# Patient Record
Sex: FEMALE | Race: WHITE | NOT HISPANIC OR LATINO | Employment: OTHER | URBAN - NONMETROPOLITAN AREA
[De-identification: names, ages, dates, MRNs, and addresses within clinical notes are randomized per-mention and may not be internally consistent; named-entity substitution may affect disease eponyms.]

---

## 2020-07-08 ENCOUNTER — HOSPITAL ENCOUNTER (INPATIENT)
Facility: HOSPITAL | Age: 85
LOS: 12 days | Discharge: NURSING, REHAB OR LONG-TERM CARE | End: 2020-07-20
Attending: FAMILY MEDICINE | Admitting: FAMILY MEDICINE

## 2020-07-08 DIAGNOSIS — Z74.09 IMPAIRED MOBILITY: ICD-10-CM

## 2020-07-08 DIAGNOSIS — Z74.09 IMPAIRED MOBILITY AND ADLS: ICD-10-CM

## 2020-07-08 DIAGNOSIS — Z78.9 IMPAIRED MOBILITY AND ADLS: ICD-10-CM

## 2020-07-08 DIAGNOSIS — R13.10 DYSPHAGIA, UNSPECIFIED TYPE: Primary | ICD-10-CM

## 2020-07-08 PROBLEM — A41.9 SEPSIS: Status: ACTIVE | Noted: 2020-07-08

## 2020-07-08 PROBLEM — J18.9 PNEUMONIA DUE TO INFECTIOUS ORGANISM: Status: ACTIVE | Noted: 2020-07-08

## 2020-07-08 LAB
ANION GAP SERPL CALCULATED.3IONS-SCNC: 10 MMOL/L (ref 5–15)
B PARAPERT DNA SPEC QL NAA+PROBE: NOT DETECTED
B PERT DNA SPEC QL NAA+PROBE: NOT DETECTED
BUN SERPL-MCNC: 20 MG/DL (ref 8–23)
BUN/CREAT SERPL: 15.9 (ref 7–25)
C PNEUM DNA NPH QL NAA+NON-PROBE: NOT DETECTED
CALCIUM SPEC-SCNC: 9.2 MG/DL (ref 8.2–9.6)
CHLORIDE SERPL-SCNC: 105 MMOL/L (ref 98–107)
CO2 SERPL-SCNC: 30 MMOL/L (ref 22–29)
CREAT SERPL-MCNC: 1.26 MG/DL (ref 0.57–1)
D-LACTATE SERPL-SCNC: 1.3 MMOL/L (ref 0.5–2)
D-LACTATE SERPL-SCNC: 1.6 MMOL/L (ref 0.5–2)
DEPRECATED RDW RBC AUTO: 46.8 FL (ref 37–54)
ERYTHROCYTE [DISTWIDTH] IN BLOOD BY AUTOMATED COUNT: 13.3 % (ref 12.3–15.4)
FLUAV H1 2009 PAND RNA NPH QL NAA+PROBE: NOT DETECTED
FLUAV H1 HA GENE NPH QL NAA+PROBE: NOT DETECTED
FLUAV H3 RNA NPH QL NAA+PROBE: NOT DETECTED
FLUAV SUBTYP SPEC NAA+PROBE: NOT DETECTED
FLUBV RNA ISLT QL NAA+PROBE: NOT DETECTED
GFR SERPL CREATININE-BSD FRML MDRD: 39 ML/MIN/1.73
GLUCOSE SERPL-MCNC: 114 MG/DL (ref 65–99)
HADV DNA SPEC NAA+PROBE: NOT DETECTED
HCOV 229E RNA SPEC QL NAA+PROBE: NOT DETECTED
HCOV HKU1 RNA SPEC QL NAA+PROBE: NOT DETECTED
HCOV NL63 RNA SPEC QL NAA+PROBE: NOT DETECTED
HCOV OC43 RNA SPEC QL NAA+PROBE: NOT DETECTED
HCT VFR BLD AUTO: 37.5 % (ref 34–46.6)
HGB BLD-MCNC: 11.9 G/DL (ref 12–15.9)
HMPV RNA NPH QL NAA+NON-PROBE: NOT DETECTED
HPIV1 RNA SPEC QL NAA+PROBE: NOT DETECTED
HPIV2 RNA SPEC QL NAA+PROBE: NOT DETECTED
HPIV3 RNA NPH QL NAA+PROBE: NOT DETECTED
HPIV4 P GENE NPH QL NAA+PROBE: NOT DETECTED
LYMPHOCYTES # BLD MANUAL: 0.45 10*3/MM3 (ref 0.7–3.1)
LYMPHOCYTES NFR BLD MANUAL: 3 % (ref 19.6–45.3)
LYMPHOCYTES NFR BLD MANUAL: 5 % (ref 5–12)
M PNEUMO IGG SER IA-ACNC: NOT DETECTED
MCH RBC QN AUTO: 30.3 PG (ref 26.6–33)
MCHC RBC AUTO-ENTMCNC: 31.7 G/DL (ref 31.5–35.7)
MCV RBC AUTO: 95.4 FL (ref 79–97)
METAMYELOCYTES NFR BLD MANUAL: 6 % (ref 0–0)
MONOCYTES # BLD AUTO: 0.75 10*3/MM3 (ref 0.1–0.9)
NEUTROPHILS # BLD AUTO: 12.7 10*3/MM3 (ref 1.7–7)
NEUTROPHILS NFR BLD MANUAL: 61 % (ref 42.7–76)
NEUTS BAND NFR BLD MANUAL: 24 % (ref 0–5)
PLATELET # BLD AUTO: 90 10*3/MM3 (ref 140–450)
PMV BLD AUTO: 11.1 FL (ref 6–12)
POLYCHROMASIA BLD QL SMEAR: ABNORMAL
POTASSIUM SERPL-SCNC: 3.5 MMOL/L (ref 3.5–5.2)
RBC # BLD AUTO: 3.93 10*6/MM3 (ref 3.77–5.28)
RHINOVIRUS RNA SPEC NAA+PROBE: NOT DETECTED
RSV RNA NPH QL NAA+NON-PROBE: NOT DETECTED
SARS-COV-2 RNA RESP QL NAA+PROBE: NOT DETECTED
SMALL PLATELETS BLD QL SMEAR: ABNORMAL
SODIUM SERPL-SCNC: 145 MMOL/L (ref 136–145)
VARIANT LYMPHS NFR BLD MANUAL: 1 % (ref 0–5)
WBC # BLD AUTO: 14.94 10*3/MM3 (ref 3.4–10.8)
WBC MORPH BLD: NORMAL

## 2020-07-08 PROCEDURE — 93005 ELECTROCARDIOGRAM TRACING: CPT | Performed by: FAMILY MEDICINE

## 2020-07-08 PROCEDURE — 93010 ELECTROCARDIOGRAM REPORT: CPT | Performed by: INTERNAL MEDICINE

## 2020-07-08 PROCEDURE — 85025 COMPLETE CBC W/AUTO DIFF WBC: CPT | Performed by: FAMILY MEDICINE

## 2020-07-08 PROCEDURE — 87635 SARS-COV-2 COVID-19 AMP PRB: CPT | Performed by: EMERGENCY MEDICINE

## 2020-07-08 PROCEDURE — 92610 EVALUATE SWALLOWING FUNCTION: CPT | Performed by: SPEECH-LANGUAGE PATHOLOGIST

## 2020-07-08 PROCEDURE — 99201: CPT

## 2020-07-08 PROCEDURE — 85007 BL SMEAR W/DIFF WBC COUNT: CPT | Performed by: FAMILY MEDICINE

## 2020-07-08 PROCEDURE — 0100U HC BIOFIRE FILMARRAY RESP PANEL 2: CPT | Performed by: FAMILY MEDICINE

## 2020-07-08 PROCEDURE — 87040 BLOOD CULTURE FOR BACTERIA: CPT | Performed by: FAMILY MEDICINE

## 2020-07-08 PROCEDURE — 99221 1ST HOSP IP/OBS SF/LOW 40: CPT | Performed by: FAMILY MEDICINE

## 2020-07-08 PROCEDURE — 83605 ASSAY OF LACTIC ACID: CPT | Performed by: FAMILY MEDICINE

## 2020-07-08 PROCEDURE — 25010000002 PIPERACILLIN SOD-TAZOBACTAM PER 1 G: Performed by: FAMILY MEDICINE

## 2020-07-08 PROCEDURE — 80048 BASIC METABOLIC PNL TOTAL CA: CPT | Performed by: FAMILY MEDICINE

## 2020-07-08 RX ORDER — CITALOPRAM 20 MG/1
20 TABLET ORAL DAILY
COMMUNITY
End: 2020-07-20 | Stop reason: HOSPADM

## 2020-07-08 RX ORDER — PREGABALIN 100 MG/1
100 CAPSULE ORAL 3 TIMES DAILY
COMMUNITY
End: 2020-07-20 | Stop reason: HOSPADM

## 2020-07-08 RX ORDER — SODIUM CHLORIDE 9 MG/ML
50 INJECTION, SOLUTION INTRAVENOUS CONTINUOUS
Status: DISCONTINUED | OUTPATIENT
Start: 2020-07-08 | End: 2020-07-10

## 2020-07-08 RX ORDER — ALPRAZOLAM 0.25 MG/1
0.25 TABLET ORAL DAILY PRN
COMMUNITY
End: 2020-07-20 | Stop reason: HOSPADM

## 2020-07-08 RX ORDER — HYDROCODONE BITARTRATE AND ACETAMINOPHEN 10; 325 MG/1; MG/1
1 TABLET ORAL EVERY 4 HOURS PRN
COMMUNITY
End: 2020-07-20 | Stop reason: HOSPADM

## 2020-07-08 RX ORDER — AMLODIPINE BESYLATE 5 MG/1
5 TABLET ORAL DAILY
COMMUNITY

## 2020-07-08 RX ORDER — POTASSIUM CHLORIDE 750 MG/1
10 TABLET, EXTENDED RELEASE ORAL DAILY
COMMUNITY
End: 2020-07-20 | Stop reason: HOSPADM

## 2020-07-08 RX ORDER — ACETAMINOPHEN 325 MG/1
650 TABLET ORAL 2 TIMES DAILY
COMMUNITY

## 2020-07-08 RX ORDER — FUROSEMIDE 20 MG/1
20 TABLET ORAL 2 TIMES DAILY
COMMUNITY
End: 2020-07-20 | Stop reason: HOSPADM

## 2020-07-08 RX ORDER — SODIUM CHLORIDE 0.9 % (FLUSH) 0.9 %
10 SYRINGE (ML) INJECTION EVERY 12 HOURS SCHEDULED
Status: DISCONTINUED | OUTPATIENT
Start: 2020-07-08 | End: 2020-07-20 | Stop reason: HOSPADM

## 2020-07-08 RX ORDER — ALPRAZOLAM 0.5 MG/1
0.5 TABLET ORAL DAILY
Status: DISCONTINUED | OUTPATIENT
Start: 2020-07-08 | End: 2020-07-13

## 2020-07-08 RX ORDER — CETIRIZINE HYDROCHLORIDE 10 MG/1
10 TABLET ORAL DAILY
COMMUNITY
End: 2020-07-20 | Stop reason: HOSPADM

## 2020-07-08 RX ORDER — SODIUM CHLORIDE 0.9 % (FLUSH) 0.9 %
10 SYRINGE (ML) INJECTION AS NEEDED
Status: DISCONTINUED | OUTPATIENT
Start: 2020-07-08 | End: 2020-07-20 | Stop reason: HOSPADM

## 2020-07-08 RX ORDER — CAPSAICIN 0.75 MG/G
CREAM TOPICAL 3 TIMES DAILY
Status: ON HOLD | COMMUNITY
End: 2020-07-08

## 2020-07-08 RX ORDER — HYDROCODONE BITARTRATE AND ACETAMINOPHEN 10; 325 MG/1; MG/1
1 TABLET ORAL EVERY 6 HOURS PRN
Status: DISCONTINUED | OUTPATIENT
Start: 2020-07-08 | End: 2020-07-13

## 2020-07-08 RX ORDER — CITALOPRAM 20 MG/1
20 TABLET ORAL DAILY
Status: DISCONTINUED | OUTPATIENT
Start: 2020-07-08 | End: 2020-07-16

## 2020-07-08 RX ORDER — BRIMONIDINE TARTRATE AND TIMOLOL MALEATE 2; 5 MG/ML; MG/ML
1 SOLUTION OPHTHALMIC EVERY 12 HOURS
COMMUNITY

## 2020-07-08 RX ORDER — CEFDINIR 300 MG/1
300 CAPSULE ORAL 2 TIMES DAILY
COMMUNITY
Start: 2020-07-07 | End: 2020-07-20 | Stop reason: HOSPADM

## 2020-07-08 RX ORDER — LIDOCAINE 40 MG/G
1 CREAM TOPICAL 4 TIMES DAILY PRN
COMMUNITY

## 2020-07-08 RX ORDER — ALBUTEROL SULFATE 90 UG/1
2 AEROSOL, METERED RESPIRATORY (INHALATION) EVERY 4 HOURS PRN
COMMUNITY

## 2020-07-08 RX ORDER — ALPRAZOLAM 0.25 MG/1
0.25 TABLET ORAL
COMMUNITY

## 2020-07-08 RX ORDER — FERROUS SULFATE 325(65) MG
325 TABLET ORAL
COMMUNITY

## 2020-07-08 RX ORDER — DOCUSATE SODIUM 100 MG/1
100 CAPSULE, LIQUID FILLED ORAL DAILY
COMMUNITY

## 2020-07-08 RX ADMIN — TAZOBACTAM SODIUM AND PIPERACILLIN SODIUM 3.38 G: 375; 3 INJECTION, SOLUTION INTRAVENOUS at 11:45

## 2020-07-08 RX ADMIN — TAZOBACTAM SODIUM AND PIPERACILLIN SODIUM 3.38 G: 375; 3 INJECTION, SOLUTION INTRAVENOUS at 18:41

## 2020-07-08 RX ADMIN — HYDROCODONE BITARTRATE AND ACETAMINOPHEN 1 TABLET: 10; 325 TABLET ORAL at 15:13

## 2020-07-08 RX ADMIN — SODIUM CHLORIDE, PRESERVATIVE FREE 10 ML: 5 INJECTION INTRAVENOUS at 08:55

## 2020-07-08 RX ADMIN — SODIUM CHLORIDE 75 ML/HR: 9 INJECTION, SOLUTION INTRAVENOUS at 03:42

## 2020-07-08 RX ADMIN — CITALOPRAM 20 MG: 20 TABLET, FILM COATED ORAL at 08:55

## 2020-07-08 RX ADMIN — ALPRAZOLAM 0.5 MG: 0.5 TABLET ORAL at 08:55

## 2020-07-08 NOTE — PLAN OF CARE
Problem: Patient Care Overview  Goal: Plan of Care Review  Outcome: Ongoing (interventions implemented as appropriate)  Flowsheets (Taken 7/8/2020 1029)  Progress: no change (eval)  Plan of Care Reviewed With: patient  Outcome Summary: ST clinical bedside swallow evaluation completed. Pt admitted with acute sepsis and bilateral pneumonia per MD notes. Hx of HTN and arthritis. Pt is oriented to name, month, and year. Pt able to state she in the hospital but unsure which hospital. Pt able to follow directions without difficulty. Pt vocal quality is noted to have a strained/hoarse quality. Unsure of baseline vocal quality. Pt presented with full range of consistencies except mech soft. Timely swallow noted. Anterior mastication noted with regular solids. Pt does not have bottom dentures with her currently. 1x delayed mild throat clear noted with nectar thick liquids. Audible swallow and belching noted with thin liquids via straw. Pt okay to begin mech soft diet with thin liquids. Meds whole with applesauce as this is how pt stated she takes meds at assisted living. ST cannot fully r/o aspiration with PO. Nsg to continue to monitor for any increased lung congestion. ST to follow PRN for diet toleration.

## 2020-07-08 NOTE — THERAPY EVALUATION
Acute Care - Speech Language Pathology   Swallow Initial Evaluation [facility]     Patient Name: Sunny Lacey  : 1921  MRN: [id number]  Today's Date: 2020               Admit Date: 2020  ST clinical bedside swallow evaluation completed. Pt admitted with acute sepsis and bilateral pneumonia per MD notes. Hx of HTN and arthritis. Pt is oriented to name, month, and year. Pt able to state she in the hospital but unsure which hospital. Pt able to follow directions without difficulty. Pt vocal quality is noted to have a strained/hoarse quality. Unsure of baseline vocal quality. Pt presented with full range of consistencies except mech soft. Timely swallow noted. Anterior mastication noted with regular solids. Pt does not have bottom dentures with her currently. 1x delayed mild throat clear noted with nectar thick liquids. Audible swallow and belching noted with thin liquids via straw. Pt okay to begin mech soft diet with thin liquids. Meds whole with applesauce as this is how pt stated she takes meds at assisted living. ST cannot fully r/o aspiration with PO. Nsg to continue to monitor for any increased lung congestion. ST to follow PRN for diet toleration.   Rosio Garcia CCC-SLP 2020 10:31      Visit Dx:     ICD-10-CM ICD-9-CM   1. Dysphagia, unspecified type R13.10 787.20     Patient Active Problem List   Diagnosis   • Sepsis (CMS/HCC)   • Pneumonia due to infectious organism     Past Medical History:   Diagnosis Date   • Anemia    • Anxiety    • Arthritis    • Hypertension    • Skin cancer      Past Surgical History:   Procedure Laterality Date   • SKIN CANCER EXCISION          SWALLOW EVALUATION (last 72 hours)      SLP Adult Swallow Evaluation     Row Name 20 0932                   Rehab Evaluation    Document Type  evaluation  -BN        Subjective Information  no complaints  -BN        Patient Observations  alert;cooperative  -BN        Patient/Family Observations  no family  present  -BN        Patient Effort  good  -BN           General Information    Patient Profile Reviewed  yes  -BN        Pertinent History Of Current Problem  Admitted with acute sepsis and bilateral pneumonia per MD notes. Hx of HTN and arthritis  -BN        Current Method of Nutrition  NPO  -BN        Precautions/Limitations, Vision  WFL with corrective lenses  -BN        Precautions/Limitations, Hearing  WFL  -BN        Prior Level of Function-Communication  WFL  -BN        Prior Level of Function-Swallowing  no diet consistency restrictions  -BN        Plans/Goals Discussed with  patient  -BN        Barriers to Rehab  none identified  -BN        Patient's Goals for Discharge  return to PO diet  -BN           Pain Assessment    Additional Documentation  Pain Scale: FACES Pre/Post-Treatment (Group)  -BN           Pain Scale: FACES Pre/Post-Treatment    Pain: FACES Scale, Pretreatment  0-->no hurt  -BN        Pain: FACES Scale, Post-Treatment  0-->no hurt  -BN           Oral Motor and Function    Dentition Assessment  upper dentures/partial in place;other (see comments) lower dentures not available  -BN        Secretion Management  WNL/WFL  -BN        Mucosal Quality  dry  -BN        Volitional Swallow  WFL  -BN           Oral Musculature and Cranial Nerve Assessment    Oral Motor General Assessment  WFL  -BN           General Eating/Swallowing Observations    Respiratory Support Currently in Use  nasal cannula  -BN        Eating/Swallowing Skills  fed by SLP  -BN        Positioning During Eating  upright in bed  -BN        Utensils Used  straw;spoon  -BN        Consistencies Trialed  regular textures;pudding thick;honey-thick liquids;nectar/syrup-thick liquids;thin liquids  -BN           Clinical Swallow Eval    Oral Prep Phase  impaired  -BN        Oral Transit  WFL  -BN        Oral Residue  impaired  -BN        Pharyngeal Phase  suspected pharyngeal impairment  -BN        Esophageal Phase  suspected esophageal  impairment  -BN        Clinical Swallow Evaluation Summary  ST clinical bedside swallow evaluation completed. Pt admitted with acute sepsis and bilateral pneumonia per MD notes. Hx of HTN and arthritis. Pt is oriented to name, month, and year. Pt able to state she in the hospital but unsure which hospital. Pt able to follow directions without difficulty. Pt vocal quality is noted to have a strained/hoarse quality. Unsure of baseline vocal quality. Pt presented with full range of consistencies except mech soft. Timely swallow noted. Anterior mastication noted with regular solids. Pt does not have bottom dentures with her currently. 1x delayed mild throat clear noted with nectar thick liquids. Audible swallow and belching noted with thin liquids via straw. Pt okay to begin mech soft diet with thin liquids. Meds whole with applesauce as this is how pt stated she takes meds at assisted living. ST cannot fully r/o aspiration with PO. Nsg to continue to monitor for any increased lung congestion. ST to follow PRN for diet toleration.   -BN           Oral Prep Concerns    Oral Prep Concerns  poor rotary chew  -BN        Poor Rotary Chew  regular consistencies  -BN           Oral Residue Concerns    Oral Residue Concerns  diffuse residue throughout oral cavity  -BN        Diffuse Residue Throughout Oral Cavity  regular consistencies  -BN           Pharyngeal Phase Concerns    Pharyngeal Phase Concerns  throat clear  -BN        Throat Clear  nectar  -BN           Esophageal Phase Concerns    Esophageal Phase Concerns  belching  -BN        Belching  thin  -BN           Clinical Impression    SLP Swallowing Diagnosis  mild;oral dysfunction;pharyngeal dysfunction  -BN        Functional Impact  risk of aspiration/pneumonia  -BN        Rehab Potential/Prognosis, Swallowing  good, to achieve stated therapy goals  -BN        Swallow Criteria for Skilled Therapeutic Interventions Met  demonstrates skilled criteria  -BN            Recommendations    Therapy Frequency (Swallow)  PRN  -BN        Predicted Duration Therapy Intervention (Days)  until discharge  -BN        SLP Diet Recommendation  soft textures;thin liquids  -BN        Recommended Precautions and Strategies  upright posture during/after eating;small bites of food and sips of liquid  -BN        SLP Rec. for Method of Medication Administration  meds whole;with pudding or applesauce  -BN        Monitor for Signs of Aspiration  yes;notify SLP if any concerns;cough;gurgly voice;throat clearing;pneumonia;right lower lobe infiltrates  -BN        Anticipated Dischage Disposition (SLP)  assisted living facility (care home)  -BN           Swallow Goals (SLP)    Oral Nutrition/Hydration Goal Selection (SLP)  oral nutrition/hydration, SLP goal 1  -BN           Oral Nutrition/Hydration Goal 1 (SLP)    Oral Nutrition/Hydration Goal 1, SLP  Pt will tolerate LRD without s/s of aspiration  -BN        Time Frame (Oral Nutrition/Hydration Goal 1, SLP)  short term goal (STG);by discharge  -BN        Barriers (Oral Nutrition/Hydration Goal 1, SLP)  n/a  -BN        Progress/Outcomes (Oral Nutrition/Hydration Goal 1, SLP)  goal ongoing  -BN          User Key  (r) = Recorded By, (t) = Taken By, (c) = Cosigned By    Initials Name Effective Dates    Rosio Torres, CCC-SLP 02/11/20 -           EDUCATION  The patient has been educated in the following areas:   Dysphagia (Swallowing Impairment) Modified Diet Instruction.    SLP Recommendation and Plan  SLP Swallowing Diagnosis: mild, oral dysfunction, pharyngeal dysfunction  SLP Diet Recommendation: soft textures, thin liquids  Recommended Precautions and Strategies: upright posture during/after eating, small bites of food and sips of liquid  SLP Rec. for Method of Medication Administration: meds whole, with pudding or applesauce     Monitor for Signs of Aspiration: yes, notify SLP if any concerns, cough, gurgly voice, throat clearing, pneumonia, right  lower lobe infiltrates     Swallow Criteria for Skilled Therapeutic Interventions Met: demonstrates skilled criteria  Anticipated Dischage Disposition (SLP): assisted living facility ([facility])  Rehab Potential/Prognosis, Swallowing: good, to achieve stated therapy goals  Therapy Frequency (Swallow): PRN  Predicted Duration Therapy Intervention (Days): until discharge       Plan of Care Reviewed With: patient  Progress: no change(eval)  Outcome Summary: ST clinical bedside swallow evaluation completed. Pt admitted with acute sepsis and bilateral pneumonia per MD notes. Hx of HTN and arthritis. Pt is oriented to name, month, and year. Pt able to state she in the hospital but unsure which hospital. Pt able to follow directions without difficulty. Pt vocal quality is noted to have a strained/hoarse quality. Unsure of baseline vocal quality. Pt presented with full range of consistencies except mech soft. Timely swallow noted. Anterior mastication noted with regular solids. Pt does not have bottom dentures with her currently. 1x delayed mild throat clear noted with nectar thick liquids. Audible swallow and belching noted with thin liquids via straw. Pt okay to begin mech soft diet with thin liquids. Meds whole with applesauce as this is how pt stated she takes meds at assisted living. ST cannot fully r/o aspiration with PO. Nsg to continue to monitor for any increased lung congestion. ST to follow PRN for diet toleration.    SLP GOALS     Row Name 07/08/20 0932             Oral Nutrition/Hydration Goal 1 (SLP)    Oral Nutrition/Hydration Goal 1, SLP  Pt will tolerate LRD without s/s of aspiration  -BN      Time Frame (Oral Nutrition/Hydration Goal 1, SLP)  short term goal (STG);by discharge  -BN      Barriers (Oral Nutrition/Hydration Goal 1, SLP)  n/a  -BN      Progress/Outcomes (Oral Nutrition/Hydration Goal 1, SLP)  goal ongoing  -BN        User Key  (r) = Recorded By, (t) = Taken By, (c) = Cosigned By    Initials Name  Provider Type    Rosio Torers CCC-SLP Speech and Language Pathologist             Time Calculation:   Time Calculation- SLP     Row Name 07/08/20 1031             Time Calculation- SLP    SLP Start Time  0932  -RADHA      SLP Stop Time  1031  -      SLP Time Calculation (min)  59 min  -RADHA      SLP Received On  07/08/20  -RADHA      SLP Goal Re-Cert Due Date  07/18/20  -        User Key  (r) = Recorded By, (t) = Taken By, (c) = Cosigned By    Initials Name Provider Type    Rosio Torres CCC-SLP Speech and Language Pathologist          Therapy Charges for Today     Code Description Service Date Service Provider Modifiers Qty    [id number] HC ST EVAL ORAL PHARYNG SWALLOW 4 7/8/2020 Rosio Garcia CCC-SLP GN 1               Rosio William Radha, CCC-SLP  7/8/2020

## 2020-07-08 NOTE — H&P
"[facility]  HISTORY AND PHYSICAL    Date of Admission: 7/8/2020  Primary Care Physician: Nevaeh Thornton PA    Subjective    Chief Complaint: \"she has pneumonia\"    This is a 89 yr old lady resident of assisted living in [facility] who presentd to the [facility] ed with fever 103, tachypnea and evidence of sepsis. Ct chest confirmed bilateral pneumonia and she was transferred her for sepsis care. Covid 19 testing was neg.      Review of Systems   Constitutional: Positive for activity change, chills, fatigue and fever.   HENT: Negative.    Eyes: Negative.    Respiratory: Positive for cough.    Cardiovascular: Negative.    Gastrointestinal: Negative.    Endocrine: Negative.    Genitourinary: Negative.    Musculoskeletal: Negative.    Skin: Negative.    Allergic/Immunologic: Negative.    Neurological: Negative.    Hematological: Negative.    Psychiatric/Behavioral: Negative.         Otherwise complete ROS reviewed and negative except as mentioned in the HPI.      Past Medical History:   Past Medical History:   Diagnosis Date   • Anemia    • Anxiety    • Arthritis    • Hypertension    • Skin cancer        Past Surgical History:  Past Surgical History:   Procedure Laterality Date   • SKIN CANCER EXCISION         Social History:  reports that she has never smoked. She does not have any smokeless tobacco history on file. She reports that she does not drink alcohol or use drugs.    Family History: family history is not on file.     Allergies:  No Known Allergies    Medications:  Prior to Admission medications    Medication Sig Start Date End Date Taking? Authorizing Provider   acetaminophen (TYLENOL) 500 MG tablet Take 650 mg by mouth.   Yes ProviderMilton MD   ALPRAZolam (XANAX) 0.5 MG tablet Take 0.5 mg by mouth Daily.   Yes ProviderMilton MD   amLODIPine (NORVASC) 5 MG tablet Take 5 mg by mouth Daily.   Yes ProviderMilton MD   bimatoprost (LUMIGAN) 0.01 % ophthalmic drops Administer 1 " "drop to both eyes Every Night.   Yes Milton Diaz MD   brimonidine-timolol (COMBIGAN) 0.2-0.5 % ophthalmic solution    Yes Milton Diaz MD   Calcium Carbonate-Vit D-Min (CALCIUM 1200 PO) Take 600 mg by mouth 2 (two) times a day.   Yes Milton Diaz MD   capsaicin (ZOSTRIX) 0.075 % topical cream Apply  topically to the appropriate area as directed 3 (Three) Times a Day.   Yes Milton Diaz MD   citalopram (CeleXA) 20 MG tablet Take 20 mg by mouth Daily.   Yes Milton Diaz MD   furosemide (LASIX) 20 MG tablet Take 20 mg by mouth 2 (Two) Times a Day.   Yes Provider, Historical, MD   HYDROcodone-acetaminophen (NORCO) 5-325 MG per tablet Take 1 tablet by mouth Every 6 (Six) Hours As Needed.   Yes Milton Diaz MD   potassium chloride (K-DUR,KLOR-CON) 10 MEQ CR tablet Take 10 mEq by mouth Daily.   Yes Milton Diaz MD   pregabalin (LYRICA) 100 MG capsule Take 100 mg by mouth 3 (Three) Times a Day.   Yes Milton Diaz MD       Objective    Vital Signs: /50 (BP Location: Right arm, Patient Position: Lying)   Pulse 99   Temp 98.1 °F (36.7 °C) (Oral)   Resp 18   Ht 170.2 cm (67\")   Wt 81.7 kg (180 lb 3.2 oz)   SpO2 95%   BMI 28.22 kg/m²   Physical Exam   Constitutional: She is oriented to person, place, and time. She appears well-developed and well-nourished.   HENT:   Head: Normocephalic and atraumatic.   Right Ear: External ear normal.   Left Ear: External ear normal.   Nose: Nose normal.   Mouth/Throat: Oropharynx is clear and moist.   Eyes: Pupils are equal, round, and reactive to light. Conjunctivae and EOM are normal.   Neck: Normal range of motion. Neck supple.   Cardiovascular: Regular rhythm, normal heart sounds and intact distal pulses.   Pulmonary/Chest: Effort normal and breath sounds normal.   Abdominal: Soft. Bowel sounds are normal.   Musculoskeletal: Normal range of motion.   Neurological: She is alert and oriented to person, " place, and time.   Skin: Skin is warm and dry. Capillary refill takes less than 2 seconds.   Psychiatric: She has a normal mood and affect. Her behavior is normal. Judgment and thought content normal.   Nursing note and vitals reviewed.      Results Reviewed:  Lab Results (last 24 hours)     Procedure Component Value Units Date/Time    Respiratory Panel, PCR - Swab, Nasopharynx [[id number]]  (Normal) Collected:  07/08/20 0505    Specimen:  Swab from Nasopharynx Updated:  07/08/20 0652     ADENOVIRUS, PCR Not Detected     Coronavirus 229E Not Detected     Coronavirus HKU1 Not Detected     Coronavirus NL63 Not Detected     Coronavirus OC43 Not Detected     Human Metapneumovirus Not Detected     Human Rhinovirus/Enterovirus Not Detected     Influenza B PCR Not Detected     Parainfluenza Virus 1 Not Detected     Parainfluenza Virus 2 Not Detected     Parainfluenza Virus 3 Not Detected     Parainfluenza Virus 4 Not Detected     Bordetella pertussis pcr Not Detected     Influenza A H1 2009 PCR Not Detected     Chlamydophila pneumoniae PCR Not Detected     Mycoplasma pneumo by PCR Not Detected     Influenza A PCR Not Detected     Influenza A H3 Not Detected     Influenza A H1 Not Detected     RSV, PCR Not Detected     Bordetella parapertussis PCR Not Detected    Narrative:       The coronavirus on the RVP is NOT COVID-19 and is NOT indicative of infection with COVID-19.     CBC & Differential [[id number]] Collected:  07/08/20 0445    Specimen:  Blood Updated:  07/08/20 0520    Narrative:       The following orders were created for panel order CBC & Differential.  Procedure                               Abnormality         Status                     ---------                               -----------         ------                     CBC Auto Differential[[id number]]        Abnormal            Final result                 Please view results for these tests on the individual orders.    CBC Auto Differential [[id number]]   (Abnormal) Collected:  07/08/20 0445    Specimen:  Blood Updated:  07/08/20 0520     WBC 14.94 10*3/mm3      RBC 3.93 10*6/mm3      Hemoglobin 11.9 g/dL      Hematocrit 37.5 %      MCV 95.4 fL      MCH 30.3 pg      MCHC 31.7 g/dL      RDW 13.3 %      RDW-SD 46.8 fl      MPV 11.1 fL      Platelets 90 10*3/mm3     Manual Differential [[id number]]  (Abnormal) Collected:  07/08/20 0445    Specimen:  Blood Updated:  07/08/20 0520     Neutrophil % 61.0 %      Lymphocyte % 3.0 %      Monocyte % 5.0 %      Bands %  24.0 %      Metamyelocyte % 6.0 %      Atypical Lymphocyte % 1.0 %      Neutrophils Absolute 12.70 10*3/mm3      Lymphocytes Absolute 0.45 10*3/mm3      Monocytes Absolute 0.75 10*3/mm3      Polychromasia Slight/1+     WBC Morphology Normal     Platelet Estimate Decreased    Basic Metabolic Panel [[id number]]  (Abnormal) Collected:  07/08/20 0445    Specimen:  Blood Updated:  07/08/20 0508     Glucose 114 mg/dL      BUN 20 mg/dL      Creatinine 1.26 mg/dL      Sodium 145 mmol/L      Potassium 3.5 mmol/L      Chloride 105 mmol/L      CO2 30.0 mmol/L      Calcium 9.2 mg/dL      eGFR Non African Amer 39 mL/min/1.73      BUN/Creatinine Ratio 15.9     Anion Gap 10.0 mmol/L     Narrative:       GFR Normal >60  Chronic Kidney Disease <60  Kidney Failure <15      Lactic Acid, Plasma [[id number]]  (Normal) Collected:  07/08/20 0445    Specimen:  Blood Updated:  07/08/20 0508     Lactate 1.6 mmol/L     Blood Culture - Blood, Hand, Right [[id number]] Collected:  07/08/20 0445    Specimen:  Blood from Hand, Right Updated:  07/08/20 0455    Blood Culture - Blood, Arm, Right [[id number]] Collected:  07/08/20 0445    Specimen:  Blood from Arm, Right Updated:  07/08/20 0454    COVID PRE-OP / PRE-PROCEDURE SCREENING ORDER (NO ISOLATION) - Swab, Nasopharynx [[id number]] Collected:  07/08/20 0055    Specimen:  Swab from Nasopharynx Updated:  07/08/20 0154    Narrative:       The following orders were created for panel order COVID PRE-OP /  PRE-PROCEDURE SCREENING ORDER (NO ISOLATION) - Swab, Nasopharynx.  Procedure                               Abnormality         Status                     ---------                               -----------         ------                     COVID-19,CEPHEID,COR/LUIS/...[[id number]]  Normal              Final result                 Please view results for these tests on the individual orders.    COVID-19,CEPHEID,COR/LUIS/PAD IN-HOUSE(OR EMERGENT/ADD-ON),NP SWAB IN TRANSPORT MEDIA 3-4 HR TAT - Swab, Nasopharynx [[id number]]  (Normal) Collected:  07/08/20 0055    Specimen:  Swab from Nasopharynx Updated:  07/08/20 0154     COVID19 Not Detected    Narrative:       Fact sheet for providers: https://www.fda.gov/media/695002/download     Fact sheet for patients: https://www.fda.gov/media/198008/download        Imaging Results (Last 24 Hours)     ** No results found for the last 24 hours. **            Active Hospital Problems    Diagnosis   • Sepsis (CMS/[facility])   • Pneumonia due to infectious organism       Assessment / Plan  Antbx, monitor sepsis markers urine output, discussion with son regarding code status      Code Status: full code     I discussed the patient's findings and my recommendations with the son..    Estimated length of stay 4 days.    Phong Joseph MD   07/08/20   07:44

## 2020-07-08 NOTE — CONSULTS
Pharmacy Dosing Service  Antimicrobials  Zosyn    Assessment/Action/Plan:  Initiated Zosyn 3.375g IV once followed by Zosyn 3.375g IV every 8 hours. Current end date: 7/13/20     Subjective:  Sunny Lacey is a 89 y.o. female currently being treated for Sepsis, PNA.  Day 1 of therapy    Objective:  Estimated Creatinine Clearance: 27.4 mL/min (A) (by C-G formula based on SCr of 1.26 mg/dL (H)).   Lab Results   Component Value Date    CREATININE 1.26 (H) 07/08/2020    CREATININE 1.63 (H) 02/29/2016    CREATININE 1.38 02/26/2016    CREATININE 1.19 02/02/2016     Lab Results   Component Value Date    WBC 5.13 02/29/2016     Temp Readings from Last 1 Encounters:   07/08/20 98.1 °F (36.7 °C) (Oral)       Culture Results:  Microbiology Results (last 10 days)       Procedure Component Value - Date/Time    COVID PRE-OP / PRE-PROCEDURE SCREENING ORDER (NO ISOLATION) - Swab, Nasopharynx [[id number]] Collected:  07/08/20 0055    Lab Status:  Final result Specimen:  Swab from Nasopharynx Updated:  07/08/20 0154    Narrative:       The following orders were created for panel order COVID PRE-OP / PRE-PROCEDURE SCREENING ORDER (NO ISOLATION) - Swab, Nasopharynx.  Procedure                               Abnormality         Status                     ---------                               -----------         ------                     COVID-19,CEPHEID,COR/LUIS/...[[id number]]  Normal              Final result                 Please view results for these tests on the individual orders.    COVID-19,CEPHEID,COR/LUIS/PAD IN-HOUSE(OR EMERGENT/ADD-ON),NP SWAB IN TRANSPORT MEDIA 3-4 HR TAT - Swab, Nasopharynx [[id number]]  (Normal) Collected:  07/08/20 0055    Lab Status:  Final result Specimen:  Swab from Nasopharynx Updated:  07/08/20 0154     COVID19 Not Detected    Narrative:       Fact sheet for providers: https://www.fda.gov/media/645532/download     Fact sheet for patients: https://www.fda.gov/media/365506/download          Current  Antimicrobials:   Anti-Infectives (From admission, onward)      Ordered     Dose/Rate Route Frequency Start Stop    07/08/20 0256  piperacillin-tazobactam (ZOSYN) 3.375 g in iso-osmotic dextrose 50 ml (premix)     Ordering Provider:  Phong Joseph MD    3.375 g  over 4 Hours Intravenous Every 8 Hours 07/08/20 1100 07/13/20 1059    07/08/20 0256  piperacillin-tazobactam (ZOSYN) 3.375 g in iso-osmotic dextrose 50 ml (premix)     Ordering Provider:  Phong Joseph MD    3.375 g  over 30 Minutes Intravenous Once 07/08/20 0400              Myriam Isbell [facility]  07/08/20 05:15

## 2020-07-08 NOTE — PLAN OF CARE
Problem: Patient Care Overview  Goal: Plan of Care Review  Flowsheets (Taken 7/8/2020 7042)  Outcome Summary: RD nutrition assessment completed from remote workstation.  Called pt's room x 2 with no answer.  No po intake recorded.  Will continue to monitor po intake, and follow for supplementation needs as well as further d/c needs.

## 2020-07-08 NOTE — CONSULTS
"Pharmacy Dosing Service  Pharmacokinetics  Vancomycin Initial Evaluation    Assessment/Action/Plan:  Initiated Vancomycin 750 mg IVPB every 24 hours. Vancomycin levels not ordered at this time..  Patient is receiving Zosyn (extended infusion).  Current vancomycin end date: 7/12/20. Pharmacy will monitor renal function and adjust dose accordingly.     Subjective:  Sunny Lacey is a 89 y.o. female with a Vancomycin \"Pharmacy to Dose\" consult for the treatment of sepsis, PNA .  Day 1 of therapy.    Objective:  Ht: 170.2 cm (67\"); Wt: 81.7 kg (180 lb 3.2 oz)  Estimated Creatinine Clearance: 27.4 mL/min (A) (by C-G formula based on SCr of 1.26 mg/dL (H)).   Lab Results   Component Value Date    CREATININE 1.26 (H) 07/08/2020    CREATININE 1.63 (H) 02/29/2016    CREATININE 1.38 02/26/2016    CREATININE 1.19 02/02/2016      Lab Results   Component Value Date    WBC 14.94 (H) 07/08/2020    WBC 5.13 02/29/2016    WBC 6.08 02/26/2016      Baseline culture results:  Microbiology Results (last 10 days)       Procedure Component Value - Date/Time    COVID PRE-OP / PRE-PROCEDURE SCREENING ORDER (NO ISOLATION) - Swab, Nasopharynx [[id number]] Collected:  07/08/20 0055    Lab Status:  Final result Specimen:  Swab from Nasopharynx Updated:  07/08/20 0154    Narrative:       The following orders were created for panel order COVID PRE-OP / PRE-PROCEDURE SCREENING ORDER (NO ISOLATION) - Swab, Nasopharynx.  Procedure                               Abnormality         Status                     ---------                               -----------         ------                     COVID-19,CEPHEID,COR/LUIS/...[[id number]]  Normal              Final result                 Please view results for these tests on the individual orders.    COVID-19,CEPHEID,COR/LUIS/PAD IN-HOUSE(OR EMERGENT/ADD-ON),NP SWAB IN TRANSPORT MEDIA 3-4 HR TAT - Swab, Nasopharynx [[id number]]  (Normal) Collected:  07/08/20 0055    Lab Status:  Final result Specimen:  " Swab from Nasopharynx Updated:  07/08/20 0154     COVID19 Not Detected    Narrative:       Fact sheet for providers: https://www.fda.gov/media/186500/download     Fact sheet for patients: https://www.fda.gov/media/513927/download            Myriam Isbell RPH  07/08/20 05:30

## 2020-07-08 NOTE — ED PROVIDER NOTES
Subjective   CODERS: this patient was not seen by myself. I was asked to place in the COVID testing only          Review of Systems    No past medical history on file.    Allergies not on file    No past surgical history on file.    No family history on file.    Social History     Socioeconomic History   • Marital status:      Spouse name: Not on file   • Number of children: Not on file   • Years of education: Not on file   • Highest education level: Not on file           Objective   Physical Exam    Procedures           ED Course                                           MDM    Final diagnoses:   None            Gray Dillon MD  07/08/20 0042

## 2020-07-08 NOTE — NURSING NOTE
Attempted IV stick, #22 left wrist, good blood return but IV infiltrated with flush.  Second attempt tried: #22 to left forearm; cath would not thread.

## 2020-07-08 NOTE — PLAN OF CARE
VSS. C/O pain controlled by PRN pain medication. Speech evaluated pt. Resulting in a mechanical soft and thin liquid diet. Pt. Tolerates taking medications with apple sauce well. Weight shift encouraged/assisted.  Sinus , 1st degree on tele. Safety maintained.

## 2020-07-08 NOTE — PROGRESS NOTES
Discharge Planning Assessment  [facility]     Patient Name: Sunny Lacey  MRN: [id number]  Today's Date: 7/8/2020    Admit Date: 7/8/2020    Discharge Needs Assessment     Row Name 07/08/20 1114       Living Environment    Lives With  facility resident    Name(s) of Who Lives With Patient  Pt from [facility]     Current Living Arrangements  independent/assisted living facility    Primary Care Provided by  self;other (see comments)    Provides Primary Care For  no one, unable/limited ability to care for self    Family Caregiver if Needed  none    Quality of Family Relationships  supportive;involved;helpful    Able to Return to Prior Arrangements  yes       Resource/Environmental Concerns    Resource/Environmental Concerns  none    Transportation Concerns  car, none       Transition Planning    Patient/Family Anticipates Transition to  other (see comments)    Patient/Family Anticipated Services at Transition  other (see comments)    Transportation Anticipated  family or friend will provide       Discharge Needs Assessment    Readmission Within the Last 30 Days  no previous admission in last 30 days    Concerns to be Addressed  denies needs/concerns at this time    Equipment Currently Used at Home  commode;shower chair;walker, rolling motorized scooter available    Anticipated Changes Related to Illness  none    Equipment Needed After Discharge  none    Outpatient/Agency/Support Group Needs  homecare agency    Discharge Facility/Level of Care Needs  home with home health        Discharge Plan     Row Name 07/08/20 1117       Plan    Plan  [facility] with possible need for Home Health    Patient/Family in Agreement with Plan  yes    Plan Comments  Spoke with pt and son- Cristino [phone] to assess for home needs. Pt lives at [place] [facility] and they plan same.  Pt will need to be able to get around, will follow and see how pt does here.  Pt has RX coverage/PCP. Will follow.         Destination       Coordination has not been started for this encounter.      Durable Medical Equipment      Coordination has not been started for this encounter.      Dialysis/Infusion      Coordination has not been started for this encounter.      Home Medical Care      Coordination has not been started for this encounter.      Therapy      Coordination has not been started for this encounter.      Community Resources      Coordination has not been started for this encounter.          Demographic Summary    No documentation.       Functional Status    No documentation.       Psychosocial    No documentation.       Abuse/Neglect    No documentation.       Legal    No documentation.       Substance Abuse    No documentation.       Patient Forms    No documentation.           ESTELA Romero

## 2020-07-09 ENCOUNTER — APPOINTMENT (OUTPATIENT)
Dept: GENERAL RADIOLOGY | Facility: HOSPITAL | Age: 85
End: 2020-07-09

## 2020-07-09 LAB
ANION GAP SERPL CALCULATED.3IONS-SCNC: 9 MMOL/L (ref 5–15)
BASOPHILS # BLD AUTO: 0.06 10*3/MM3 (ref 0–0.2)
BASOPHILS NFR BLD AUTO: 0.5 % (ref 0–1.5)
BUN SERPL-MCNC: 22 MG/DL (ref 8–23)
BUN/CREAT SERPL: 19.1 (ref 7–25)
CALCIUM SPEC-SCNC: 8.6 MG/DL (ref 8.2–9.6)
CHLORIDE SERPL-SCNC: 106 MMOL/L (ref 98–107)
CO2 SERPL-SCNC: 31 MMOL/L (ref 22–29)
CREAT SERPL-MCNC: 1.15 MG/DL (ref 0.57–1)
DEPRECATED RDW RBC AUTO: 47.5 FL (ref 37–54)
EOSINOPHIL # BLD AUTO: 0.04 10*3/MM3 (ref 0–0.4)
EOSINOPHIL NFR BLD AUTO: 0.4 % (ref 0.3–6.2)
ERYTHROCYTE [DISTWIDTH] IN BLOOD BY AUTOMATED COUNT: 13.3 % (ref 12.3–15.4)
GFR SERPL CREATININE-BSD FRML MDRD: 44 ML/MIN/1.73
GLUCOSE SERPL-MCNC: 106 MG/DL (ref 65–99)
HCT VFR BLD AUTO: 34.6 % (ref 34–46.6)
HGB BLD-MCNC: 10.8 G/DL (ref 12–15.9)
LYMPHOCYTES # BLD AUTO: 1.02 10*3/MM3 (ref 0.7–3.1)
LYMPHOCYTES NFR BLD AUTO: 9.2 % (ref 19.6–45.3)
MCH RBC QN AUTO: 29.9 PG (ref 26.6–33)
MCHC RBC AUTO-ENTMCNC: 31.2 G/DL (ref 31.5–35.7)
MCV RBC AUTO: 95.8 FL (ref 79–97)
MONOCYTES # BLD AUTO: 1.2 10*3/MM3 (ref 0.1–0.9)
MONOCYTES NFR BLD AUTO: 10.9 % (ref 5–12)
NEUTROPHILS NFR BLD AUTO: 77.8 % (ref 42.7–76)
NEUTROPHILS NFR BLD AUTO: 8.58 10*3/MM3 (ref 1.7–7)
PLATELET # BLD AUTO: 85 10*3/MM3 (ref 140–450)
PMV BLD AUTO: 11.6 FL (ref 6–12)
POTASSIUM SERPL-SCNC: 3.4 MMOL/L (ref 3.5–5.2)
RBC # BLD AUTO: 3.61 10*6/MM3 (ref 3.77–5.28)
SODIUM SERPL-SCNC: 146 MMOL/L (ref 136–145)
WBC # BLD AUTO: 11.03 10*3/MM3 (ref 3.4–10.8)

## 2020-07-09 PROCEDURE — 80048 BASIC METABOLIC PNL TOTAL CA: CPT | Performed by: FAMILY MEDICINE

## 2020-07-09 PROCEDURE — 25010000002 PIPERACILLIN SOD-TAZOBACTAM PER 1 G: Performed by: FAMILY MEDICINE

## 2020-07-09 PROCEDURE — 85025 COMPLETE CBC W/AUTO DIFF WBC: CPT | Performed by: FAMILY MEDICINE

## 2020-07-09 PROCEDURE — 71045 X-RAY EXAM CHEST 1 VIEW: CPT

## 2020-07-09 PROCEDURE — 25010000002 VANCOMYCIN 10 G RECONSTITUTED SOLUTION: Performed by: FAMILY MEDICINE

## 2020-07-09 PROCEDURE — 99231 SBSQ HOSP IP/OBS SF/LOW 25: CPT | Performed by: FAMILY MEDICINE

## 2020-07-09 PROCEDURE — 92526 ORAL FUNCTION THERAPY: CPT

## 2020-07-09 RX ADMIN — TAZOBACTAM SODIUM AND PIPERACILLIN SODIUM 3.38 G: 375; 3 INJECTION, SOLUTION INTRAVENOUS at 18:10

## 2020-07-09 RX ADMIN — VANCOMYCIN HYDROCHLORIDE 750 MG: 10 INJECTION, POWDER, LYOPHILIZED, FOR SOLUTION INTRAVENOUS at 05:56

## 2020-07-09 RX ADMIN — SODIUM CHLORIDE, PRESERVATIVE FREE 10 ML: 5 INJECTION INTRAVENOUS at 20:09

## 2020-07-09 RX ADMIN — SODIUM CHLORIDE 50 ML/HR: 9 INJECTION, SOLUTION INTRAVENOUS at 20:10

## 2020-07-09 RX ADMIN — SODIUM CHLORIDE, PRESERVATIVE FREE 10 ML: 5 INJECTION INTRAVENOUS at 08:12

## 2020-07-09 RX ADMIN — TAZOBACTAM SODIUM AND PIPERACILLIN SODIUM 3.38 G: 375; 3 INJECTION, SOLUTION INTRAVENOUS at 02:29

## 2020-07-09 RX ADMIN — HYDROCODONE BITARTRATE AND ACETAMINOPHEN 1 TABLET: 10; 325 TABLET ORAL at 01:27

## 2020-07-09 RX ADMIN — TAZOBACTAM SODIUM AND PIPERACILLIN SODIUM 3.38 G: 375; 3 INJECTION, SOLUTION INTRAVENOUS at 13:43

## 2020-07-09 RX ADMIN — SODIUM CHLORIDE 75 ML/HR: 9 INJECTION, SOLUTION INTRAVENOUS at 01:31

## 2020-07-09 RX ADMIN — ALPRAZOLAM 0.5 MG: 0.5 TABLET ORAL at 08:12

## 2020-07-09 RX ADMIN — CITALOPRAM 20 MG: 20 TABLET, FILM COATED ORAL at 08:12

## 2020-07-09 NOTE — PLAN OF CARE
Problem: Patient Care Overview  Goal: Plan of Care Review  Outcome: Ongoing (interventions implemented as appropriate)  Flowsheets (Taken 7/9/2020 0924)  Progress: no change  Plan of Care Reviewed With: patient  Outcome Summary: Swallowing tx completed this AM s/p breakfast. RN, Darcy, arrived, ascultated lungs and reported no concerns with R lung, yet wheezes and congestion on L. Pt was alert, cooperative. She completed trials of thin liquids via straw, as well as 1x regular solid diet consistency trial. Pt was noted to have functional mastication with the regular solid consistency, yet prolonged oral manipulation and increased distraction when the solid bolus was in the oral cavity, as she was observed to verbalize to SLP multiple times prior to completing a swallow, requiring cues. She was noted to have mild to moderate oral residue, though fatigue was noted at that time, and was provided thin liquid washes to clear, which were successful in clearing oral residue to minimal. Nurse aid, Supriya, reported pt had frequent coughing with mechanical soft meatloaf last PM, which she stated was dry, therefore, SLP placed diet message in EPIC to request side of gravy on each meal tray to be added to provided meat to increase bolus cohesion. With thin liquid trials presented by SLP at time of tx, pt often consumed multiple consecutive sips via straw, with mildly increased work of breathing observed. She presented with a significantly delayed throat clear x2, though each instance was minutes following the thin trial. Cannot fully r/o aspiration at this time, yet feel pt is safe to continue current diet of mechanical soft diet consistency with regular/thin liquid consistency. Will continue to monitor diet toleration PRN to r/o changes or new concerns. Thanks!

## 2020-07-09 NOTE — PLAN OF CARE
Problem: Patient Care Overview  Goal: Plan of Care Review  Flowsheets (Taken 7/9/2020 0542)  Progress: no change  Plan of Care Reviewed With: patient  Outcome Summary: VSS, prn pain med given for headache and generalized pain x1, turned q 2 hours, pt A&O x4 but is slightly confused at times, IV abx continued per order, scd's in place, sinus 85-99 with a first degree and PAC's on telemetry. Annalisa changed.

## 2020-07-09 NOTE — THERAPY TREATMENT NOTE
Acute Care - Speech Language Pathology   Swallow Treatment Note [facility]     Patient Name: Sunny Lacey  : 1921  MRN: [id number]  Today's Date: 2020               Admit Date: 2020     Swallowing tx completed this AM s/p breakfast. RN, Darcy, arrived, ascultated lungs and reported no concerns with R lung, yet wheezes and congestion on L. Pt was alert, cooperative. She completed trials of thin liquids via straw, as well as 1x regular solid diet consistency trial. Pt was noted to have functional mastication with the regular solid consistency, yet prolonged oral manipulation and increased distraction when the solid bolus was in the oral cavity, as she was observed to verbalize to SLP multiple times prior to completing a swallow, requiring cues. She was noted to have mild to moderate oral residue, though fatigue was noted at that time, and was provided thin liquid washes to clear, which were successful in clearing oral residue to minimal. Nurse aid, Supriya, reported pt had frequent coughing with mechanical soft meatloaf last PM, which she stated was dry, therefore, SLP placed diet message in EPIC to request side of gravy on each meal tray to be added to provided meat to increase bolus cohesion. With thin liquid trials presented by SLP at time of tx, pt often consumed multiple consecutive sips via straw, with mildly increased work of breathing observed. She presented with a significantly delayed throat clear x2, though each instance was minutes following the thin trial. Cannot fully r/o aspiration at this time, yet feel pt is safe to continue current diet of mechanical soft diet consistency with regular/thin liquid consistency. Will continue to monitor diet toleration PRN to r/o changes or new concerns. Thanks! Mariana Dickson, CCC-SLP 2020 09:31    Visit Dx:      ICD-10-CM ICD-9-CM   1. Dysphagia, unspecified type R13.10 787.20     Patient Active Problem List   Diagnosis   • Sepsis (CMS/HCC)   •  Pneumonia due to infectious organism       Therapy Treatment  Rehabilitation Treatment Summary     Row Name 07/09/20 0905             Treatment Time/Intention    Discipline  speech language pathologist  -TM      Document Type  therapy note (daily note)  -TM      Subjective Information  weakness  -TM      Mode of Treatment  individual therapy;speech-language pathology  -TM      Patient/Family Observations  No family present. RN, Darcy, and Nurse aid, Supriya, intermittently present.  -TM      Care Plan Review  care plan/treatment goals reviewed;risks/benefits reviewed  -TM      Patient Effort  adequate  -TM      Recorded by [TM] Mariana Dickson CCC-SLP 07/09/20 0923      Row Name 07/09/20 0905             Pain Assessment    Additional Documentation  Pain Scale: Numbers Pre/Post-Treatment (Group)  -TM      Recorded by [TM] Mariana Dickson CCC-SLP 07/09/20 0923      Row Name 07/09/20 0905             Pain Scale: Numbers Pre/Post-Treatment    Pain Scale: Numbers, Pretreatment  0/10 - no pain  -TM      Pain Scale: Numbers, Post-Treatment  0/10 - no pain  -TM      Recorded by [TM] Mariana Dickson CCC-SLP 07/09/20 0923      Row Name 07/09/20 0905             Outcome Summary/Treatment Plan (SLP)    Daily Summary of Progress (SLP)  progress towards functional goals is fair  -TM      Barriers to Overall Progress (SLP)  n/a  -TM      Plan for Continued Treatment (SLP)  Continue to monitor  -TM      Anticipated Dischage Disposition (SLP)  assisted living facility ([facility])  -TM      Recorded by [TM] Mariana Dickson CCC-SLP 07/09/20 0923        User Key  (r) = Recorded By, (t) = Taken By, (c) = Cosigned By    Initials Name Effective Dates Discipline    TM Mariana Dickson CCC-SLP 08/02/16 -  SLP          Outcome Summary  Outcome Summary/Treatment Plan (SLP)  Daily Summary of Progress (SLP): progress towards functional goals is fair (07/09/20 0905 : Mariana Dickson CCC-SLP)  Barriers to Overall Progress (SLP): n/a (07/09/20 0905 :  Mariana Dickson CCC-SLP)  Plan for Continued Treatment (SLP): Continue to monitor (07/09/20 0905 : Mariana Dickson CCC-SLP)  Anticipated Dischage Disposition (SLP): assisted living facility (skilled nursing) (07/09/20 0905 : Mariana Dickson CCC-SLP)      SLP GOALS     Row Name 07/09/20 0905 07/08/20 0932          Oral Nutrition/Hydration Goal 1 (SLP)    Oral Nutrition/Hydration Goal 1, SLP  Pt will tolerate LRD without s/s of aspiration  -TM  Pt will tolerate LRD without s/s of aspiration  -BN     Time Frame (Oral Nutrition/Hydration Goal 1, SLP)  short term goal (STG);by discharge  -TM  short term goal (STG);by discharge  -BN     Barriers (Oral Nutrition/Hydration Goal 1, SLP)  n/a  -TM  n/a  -BN     Progress/Outcomes (Oral Nutrition/Hydration Goal 1, SLP)  continuing progress toward goal  -TM  goal ongoing  -BN       User Key  (r) = Recorded By, (t) = Taken By, (c) = Cosigned By    Initials Name Provider Type    TM Mariana Dickson CCC-SLP Speech and Language Pathologist    Rosio Torres CCC-SLP Speech and Language Pathologist          EDUCATION  The patient has been educated in the following areas:   Dysphagia (Swallowing Impairment).    SLP Recommendation and Plan  Daily Summary of Progress (SLP): progress towards functional goals is fair  Barriers to Overall Progress (SLP): n/a  Plan for Continued Treatment (SLP): Continue to monitor  Anticipated Dischage Disposition (SLP): assisted living facility ([facility])                    Time Calculation:   Time Calculation- SLP     Row Name 07/09/20 0930             Time Calculation- SLP    SLP Start Time  0905  -TM      SLP Stop Time  0920  -TM      SLP Time Calculation (min)  15 min  -TM      SLP Received On  07/09/20  -        User Key  (r) = Recorded By, (t) = Taken By, (c) = Cosigned By    Initials Name Provider Type    Mariana Ornelas CCC-SLP Speech and Language Pathologist          Therapy Charges for Today     Code Description Service Date Service Provider  Modifiers Qty    [id number] HC ST TREATMENT SWALLOW 1 7/9/2020 Mariana Dickson, PATY-SLP GN 1                 Mariana A. Dickson, CCC-SLP  7/9/2020

## 2020-07-09 NOTE — PLAN OF CARE
VSS. No C/O pain. Assisted to chair x2. Continues on IV antibiotics. Weight shift assistance and provided. Sinus 73-95 PAC, PVC on tele. Lower extremities elevated throughout shift. Continues on 2L of 02 NC. Safety maintained.

## 2020-07-10 LAB
ANION GAP SERPL CALCULATED.3IONS-SCNC: 10 MMOL/L (ref 5–15)
BASOPHILS # BLD AUTO: 0.07 10*3/MM3 (ref 0–0.2)
BASOPHILS NFR BLD AUTO: 0.8 % (ref 0–1.5)
BUN SERPL-MCNC: 12 MG/DL (ref 8–23)
BUN/CREAT SERPL: 16.2 (ref 7–25)
CALCIUM SPEC-SCNC: 8.8 MG/DL (ref 8.2–9.6)
CHLORIDE SERPL-SCNC: 106 MMOL/L (ref 98–107)
CO2 SERPL-SCNC: 29 MMOL/L (ref 22–29)
CREAT SERPL-MCNC: 0.74 MG/DL (ref 0.57–1)
DEPRECATED RDW RBC AUTO: 45.2 FL (ref 37–54)
EOSINOPHIL # BLD AUTO: 0.08 10*3/MM3 (ref 0–0.4)
EOSINOPHIL NFR BLD AUTO: 0.9 % (ref 0.3–6.2)
ERYTHROCYTE [DISTWIDTH] IN BLOOD BY AUTOMATED COUNT: 13.2 % (ref 12.3–15.4)
GFR SERPL CREATININE-BSD FRML MDRD: 72 ML/MIN/1.73
GLUCOSE SERPL-MCNC: 108 MG/DL (ref 65–99)
HCT VFR BLD AUTO: 33 % (ref 34–46.6)
HGB BLD-MCNC: 10.6 G/DL (ref 12–15.9)
LYMPHOCYTES # BLD AUTO: 0.6 10*3/MM3 (ref 0.7–3.1)
LYMPHOCYTES NFR BLD AUTO: 6.7 % (ref 19.6–45.3)
MCH RBC QN AUTO: 30.2 PG (ref 26.6–33)
MCHC RBC AUTO-ENTMCNC: 32.1 G/DL (ref 31.5–35.7)
MCV RBC AUTO: 94 FL (ref 79–97)
MONOCYTES # BLD AUTO: 0.75 10*3/MM3 (ref 0.1–0.9)
MONOCYTES NFR BLD AUTO: 8.3 % (ref 5–12)
NEUTROPHILS NFR BLD AUTO: 7.46 10*3/MM3 (ref 1.7–7)
NEUTROPHILS NFR BLD AUTO: 82.9 % (ref 42.7–76)
PLATELET # BLD AUTO: 103 10*3/MM3 (ref 140–450)
PMV BLD AUTO: 11.5 FL (ref 6–12)
POTASSIUM SERPL-SCNC: 3.2 MMOL/L (ref 3.5–5.2)
RBC # BLD AUTO: 3.51 10*6/MM3 (ref 3.77–5.28)
SODIUM SERPL-SCNC: 145 MMOL/L (ref 136–145)
WBC # BLD AUTO: 9 10*3/MM3 (ref 3.4–10.8)

## 2020-07-10 PROCEDURE — 25010000002 PIPERACILLIN SOD-TAZOBACTAM PER 1 G: Performed by: FAMILY MEDICINE

## 2020-07-10 PROCEDURE — 99231 SBSQ HOSP IP/OBS SF/LOW 25: CPT | Performed by: FAMILY MEDICINE

## 2020-07-10 PROCEDURE — 85025 COMPLETE CBC W/AUTO DIFF WBC: CPT | Performed by: FAMILY MEDICINE

## 2020-07-10 PROCEDURE — 80048 BASIC METABOLIC PNL TOTAL CA: CPT | Performed by: FAMILY MEDICINE

## 2020-07-10 PROCEDURE — 25010000002 VANCOMYCIN 10 G RECONSTITUTED SOLUTION: Performed by: FAMILY MEDICINE

## 2020-07-10 RX ORDER — POTASSIUM CHLORIDE 1.5 G/1.77G
40 POWDER, FOR SOLUTION ORAL ONCE
Status: DISCONTINUED | OUTPATIENT
Start: 2020-07-10 | End: 2020-07-10

## 2020-07-10 RX ORDER — POTASSIUM CHLORIDE 750 MG/1
40 CAPSULE, EXTENDED RELEASE ORAL ONCE
Status: COMPLETED | OUTPATIENT
Start: 2020-07-10 | End: 2020-07-10

## 2020-07-10 RX ADMIN — VANCOMYCIN HYDROCHLORIDE 750 MG: 10 INJECTION, POWDER, LYOPHILIZED, FOR SOLUTION INTRAVENOUS at 06:01

## 2020-07-10 RX ADMIN — POTASSIUM CHLORIDE 40 MEQ: 750 CAPSULE, EXTENDED RELEASE ORAL at 08:22

## 2020-07-10 RX ADMIN — TAZOBACTAM SODIUM AND PIPERACILLIN SODIUM 3.38 G: 375; 3 INJECTION, SOLUTION INTRAVENOUS at 11:28

## 2020-07-10 RX ADMIN — TAZOBACTAM SODIUM AND PIPERACILLIN SODIUM 3.38 G: 375; 3 INJECTION, SOLUTION INTRAVENOUS at 02:28

## 2020-07-10 RX ADMIN — SODIUM CHLORIDE, PRESERVATIVE FREE 10 ML: 5 INJECTION INTRAVENOUS at 08:23

## 2020-07-10 RX ADMIN — ALPRAZOLAM 0.5 MG: 0.5 TABLET ORAL at 08:22

## 2020-07-10 RX ADMIN — TAZOBACTAM SODIUM AND PIPERACILLIN SODIUM 3.38 G: 375; 3 INJECTION, SOLUTION INTRAVENOUS at 18:09

## 2020-07-10 RX ADMIN — HYDROCODONE BITARTRATE AND ACETAMINOPHEN 1 TABLET: 10; 325 TABLET ORAL at 23:17

## 2020-07-10 RX ADMIN — GLYCERIN 2 G: 2 SUPPOSITORY RECTAL at 11:28

## 2020-07-10 RX ADMIN — CITALOPRAM 20 MG: 20 TABLET, FILM COATED ORAL at 08:22

## 2020-07-10 RX ADMIN — HYDROCODONE BITARTRATE AND ACETAMINOPHEN 1 TABLET: 10; 325 TABLET ORAL at 03:30

## 2020-07-10 NOTE — PROGRESS NOTES
Mrs crane is looking better---more awake and alert--bp is coming back up    Review of Systems   Constitutional: Negative.    HENT: Negative.    Eyes: Negative.    Respiratory: Positive for cough.    Cardiovascular: Negative.    Gastrointestinal: Negative.    Endocrine: Negative.    Genitourinary: Negative.    Musculoskeletal: Negative.    Skin: Negative.    Allergic/Immunologic: Negative.    Neurological: Negative.    Hematological: Negative.    Psychiatric/Behavioral: Negative.      Temp:  [97.7 °F (36.5 °C)-99.1 °F (37.3 °C)] 97.7 °F (36.5 °C)  Heart Rate:  [79-98] 92  Resp:  [20-22] 20  BP: (135-155)/(53-82) 153/67  I/O last 3 completed shifts:  In: 3861.7 [P.O.:560; I.V.:2801.7; IV Piggyback:500]  Out: 627 [Urine:627]  No intake/output data recorded.    Physical Exam   Constitutional: She appears well-developed and well-nourished.   HENT:   Head: Normocephalic and atraumatic.   Right Ear: External ear normal.   Left Ear: External ear normal.   Nose: Nose normal.   Mouth/Throat: Oropharynx is clear and moist.   Eyes: Pupils are equal, round, and reactive to light. Conjunctivae and EOM are normal.   Neck: Normal range of motion. Neck supple.   Cardiovascular: Normal rate, regular rhythm, normal heart sounds and intact distal pulses.   Pulmonary/Chest: Effort normal and breath sounds normal.   Abdominal: Soft. Bowel sounds are normal.   Musculoskeletal: Normal range of motion.   Neurological: She is alert.   Skin: Skin is warm. Capillary refill takes less than 2 seconds.   Psychiatric: She has a normal mood and affect.   Nursing note and vitals reviewed.        Sepsis (CMS/[facility])    Pneumonia due to infectious organism    contineu anbax for now---monitor labs--OOb

## 2020-07-10 NOTE — PLAN OF CARE
Problem: Patient Care Overview  Goal: Plan of Care Review  Outcome: Ongoing (interventions implemented as appropriate)  Note:   Medicated for pain x1. Pt restless most of the night. Answers questions approp. O2 on at 2l/m per NC. Purwick on but failed x2 and removed. Up with assist x2 to BSC voided small amt. Pt weak. Up to chair early in shift for cpl hrs. IV ABX's continue. S 79-99 pvc's.

## 2020-07-10 NOTE — PROGRESS NOTES
"Pharmacy Dosing Service  Pharmacokinetics  Vancomycin Follow-up Evaluation    Assessment/Action/Plan:  Patient is currently receiving Vancomycin 750 mg IV every 24 hours for the treatment of Pneumonia, day 2 of therapy. Current vancomycin end date: 7/13/20. Labs/dose reviewed. On concurrent Zosyn therapy. Scr trending down this AM. Cannot verify that initial dose of 750 mg was given on 7/8/20 (administration  never charted on the MAR and no notes indicate dose was given) - will add one day of therapy to current regimen in light of this. Will order a trough for tomorrow, 7/11/20, at 0500 (prior to 3rd dose). Pharmacy will continue to monitor renal function and adjust dose accordingly.     Subjective:  Sunny Lacey is a 89 y.o. female    Objective:  Ht: 170.2 cm (67\"); Wt: 85 kg (187 lb 8 oz)  Estimated Creatinine Clearance: 44 mL/min (by C-G formula based on SCr of 0.74 mg/dL).   Lab Results   Component Value Date    CREATININE 0.74 07/10/2020    CREATININE 1.15 (H) 07/09/2020    CREATININE 1.26 (H) 07/08/2020      Lab Results   Component Value Date    WBC 9.00 07/10/2020    WBC 11.03 (H) 07/09/2020    WBC 14.94 (H) 07/08/2020       No results found for: VANCOPEAK, VANCOTROUGH, VANCORANDOM    Culture Results:  Microbiology Results (last 10 days)       Procedure Component Value - Date/Time    Respiratory Panel, PCR - Swab, Nasopharynx [[id number]]  (Normal) Collected:  07/08/20 0505    Lab Status:  Final result Specimen:  Swab from Nasopharynx Updated:  07/08/20 0652     ADENOVIRUS, PCR Not Detected     Coronavirus 229E Not Detected     Coronavirus HKU1 Not Detected     Coronavirus NL63 Not Detected     Coronavirus OC43 Not Detected     Human Metapneumovirus Not Detected     Human Rhinovirus/Enterovirus Not Detected     Influenza B PCR Not Detected     Parainfluenza Virus 1 Not Detected     Parainfluenza Virus 2 Not Detected     Parainfluenza Virus 3 Not Detected     Parainfluenza Virus 4 Not Detected     " Bordetella pertussis pcr Not Detected     Influenza A H1 2009 PCR Not Detected     Chlamydophila pneumoniae PCR Not Detected     Mycoplasma pneumo by PCR Not Detected     Influenza A PCR Not Detected     Influenza A H3 Not Detected     Influenza A H1 Not Detected     RSV, PCR Not Detected     Bordetella parapertussis PCR Not Detected    Narrative:       The coronavirus on the RVP is NOT COVID-19 and is NOT indicative of infection with COVID-19.     Blood Culture - Blood, Arm, Right [[id number]] Collected:  07/08/20 0445    Lab Status:  Preliminary result Specimen:  Blood from Arm, Right Updated:  07/10/20 0500     Blood Culture No growth at 2 days    Blood Culture - Blood, Hand, Right [[id number]] Collected:  07/08/20 0445    Lab Status:  Preliminary result Specimen:  Blood from Hand, Right Updated:  07/10/20 0500     Blood Culture No growth at 2 days    COVID PRE-OP / PRE-PROCEDURE SCREENING ORDER (NO ISOLATION) - Swab, Nasopharynx [[id number]] Collected:  07/08/20 0055    Lab Status:  Final result Specimen:  Swab from Nasopharynx Updated:  07/08/20 0154    Narrative:       The following orders were created for panel order COVID PRE-OP / PRE-PROCEDURE SCREENING ORDER (NO ISOLATION) - Swab, Nasopharynx.  Procedure                               Abnormality         Status                     ---------                               -----------         ------                     COVID-19,CEPHEID,COR/LUIS/...[[id number]]  Normal              Final result                 Please view results for these tests on the individual orders.    COVID-19,CEPHEID,COR/LUIS/PAD IN-HOUSE(OR EMERGENT/ADD-ON),NP SWAB IN TRANSPORT MEDIA 3-4 HR TAT - Swab, Nasopharynx [[id number]]  (Normal) Collected:  07/08/20 0055    Lab Status:  Final result Specimen:  Swab from Nasopharynx Updated:  07/08/20 0154     COVID19 Not Detected    Narrative:       Fact sheet for providers: https://www.fda.gov/media/419159/download     Fact sheet for patients:  https://www.fda.gov/media/728016/download          Antimicrobials:  Anti-Infectives (From admission, onward)      Ordered     Dose/Rate Route Frequency Start Stop    07/10/20 0803  vancomycin 750 mg/250 mL 0.9% NS IVPB ([facility])     Ordering Provider:  Phong Joseph MD    750 mg  over 60 Minutes Intravenous Every 24 Hours 07/11/20 0600 07/14/20 0559    07/08/20 0256  piperacillin-tazobactam (ZOSYN) 3.375 g in iso-osmotic dextrose 50 ml (premix)  Review   Ordering Provider:  Phong Joseph MD    3.375 g  over 4 Hours Intravenous Every 8 Hours 07/08/20 1100 07/13/20 1059    07/08/20 0256  piperacillin-tazobactam (ZOSYN) 3.375 g in iso-osmotic dextrose 50 ml (premix)     Ordering Provider:  Phong Joseph MD    3.375 g  over 30 Minutes Intravenous Once 07/08/20 0400              Ankush Mendieta PharmD   07/10/20 08:04

## 2020-07-10 NOTE — DISCHARGE PLACEMENT REQUEST
"Deann Sousa Y (89 y.o. Female)     Date of Birth Social Security Number Address Home Phone MRN    09/28/1921  [facility] [address] [phone] [id number]    Hindu Marital Status          Jain        Admission Date Admission Type Admitting Provider Attending Provider Department, Room/Bed    7/8/20 Urgent Phong Joseph MD [place], Thomas Waldon, MD [facility] 4B, 408/1    Discharge Date Discharge Disposition Discharge Destination                       Attending Provider:  Phong Joseph MD    Allergies:  No Known Allergies    Isolation:  None   Infection:  None   Code Status:  CPR    Ht:  170.2 cm (67\")   Wt:  85 kg (187 lb 8 oz)    Admission Cmt:  None   Principal Problem:  None                Active Insurance as of 7/8/2020     Primary Coverage     Payor Plan Insurance Group Employer/Plan Group    MEDICARE MEDICARE A & B      Payor Plan Address Payor Plan Phone Number Payor Plan Fax Number Effective Dates    [address] [phone]  9/1/1986 - None Entered    [facility] [id number]       Subscriber Name Subscriber Birth Date Member ID       DEANN SOUSA Y 9/28/1921 [id number]           Secondary Coverage     Payor Plan Insurance Group Employer/Plan Group    [place]      Payor Plan Address Payor Plan Phone Number Payor Plan Fax Number Effective Dates    [address] [address] [phone]  7/7/2020 - None Entered    [facility] [id number]       Subscriber Name Subscriber Birth Date Member ID       DEANN SOUSA Y 9/28/1921 [id number]                 Emergency Contacts      (Rel.) Home Phone Work Phone Mobile Phone    Cristino Sousa (Power of ) -- -- [phone]    Dominic Sousa (Son) [phone] -- [phone]               History & Physical      Phong Joseph MD at 07/08/20 0741          [facility]  HISTORY AND PHYSICAL    Date of Admission: 7/8/2020  Primary Care Physician: " "Nevaeh Thornton PA    Subjective    Chief Complaint: \"she has pneumonia\"    This is a 89 yr old lady resident of assisted living in [facility] who presentd to the [facility] ed with fever 103, tachypnea and evidence of sepsis. Ct chest confirmed bilateral pneumonia and she was transferred her for sepsis care. Covid 19 testing was neg.      Review of Systems   Constitutional: Positive for activity change, chills, fatigue and fever.   HENT: Negative.    Eyes: Negative.    Respiratory: Positive for cough.    Cardiovascular: Negative.    Gastrointestinal: Negative.    Endocrine: Negative.    Genitourinary: Negative.    Musculoskeletal: Negative.    Skin: Negative.    Allergic/Immunologic: Negative.    Neurological: Negative.    Hematological: Negative.    Psychiatric/Behavioral: Negative.         Otherwise complete ROS reviewed and negative except as mentioned in the HPI.      Past Medical History:   Past Medical History:   Diagnosis Date   • Anemia    • Anxiety    • Arthritis    • Hypertension    • Skin cancer        Past Surgical History:  Past Surgical History:   Procedure Laterality Date   • SKIN CANCER EXCISION         Social History:  reports that she has never smoked. She does not have any smokeless tobacco history on file. She reports that she does not drink alcohol or use drugs.    Family History: family history is not on file.     Allergies:  No Known Allergies    Medications:  Prior to Admission medications    Medication Sig Start Date End Date Taking? Authorizing Provider   acetaminophen (TYLENOL) 500 MG tablet Take 650 mg by mouth.   Yes Milton Diaz MD   ALPRAZolam (XANAX) 0.5 MG tablet Take 0.5 mg by mouth Daily.   Yes Milton Diaz MD   amLODIPine (NORVASC) 5 MG tablet Take 5 mg by mouth Daily.   Yes Milton Diaz MD   bimatoprost (LUMIGAN) 0.01 % ophthalmic drops Administer 1 drop to both eyes Every Night.   Yes Milton Diaz MD   brimonidine-timolol (COMBIGAN) 0.2-0.5 % " "ophthalmic solution    Yes Milton Diaz MD   Calcium Carbonate-Vit D-Min (CALCIUM 1200 PO) Take 600 mg by mouth 2 (two) times a day.   Yes Milton Diaz MD   capsaicin (ZOSTRIX) 0.075 % topical cream Apply  topically to the appropriate area as directed 3 (Three) Times a Day.   Yes Milton Diaz MD   citalopram (CeleXA) 20 MG tablet Take 20 mg by mouth Daily.   Yes Provider, Historical, MD   furosemide (LASIX) 20 MG tablet Take 20 mg by mouth 2 (Two) Times a Day.   Yes Provider, Historical, MD   HYDROcodone-acetaminophen (NORCO) 5-325 MG per tablet Take 1 tablet by mouth Every 6 (Six) Hours As Needed.   Yes Milton Diaz MD   potassium chloride (K-DUR,KLOR-CON) 10 MEQ CR tablet Take 10 mEq by mouth Daily.   Yes Provider, Historical, MD   pregabalin (LYRICA) 100 MG capsule Take 100 mg by mouth 3 (Three) Times a Day.   Yes Milton Diaz MD       Objective    Vital Signs: /50 (BP Location: Right arm, Patient Position: Lying)   Pulse 99   Temp 98.1 °F (36.7 °C) (Oral)   Resp 18   Ht 170.2 cm (67\")   Wt 81.7 kg (180 lb 3.2 oz)   SpO2 95%   BMI 28.22 kg/m²    Physical Exam   Constitutional: She is oriented to person, place, and time. She appears well-developed and well-nourished.   HENT:   Head: Normocephalic and atraumatic.   Right Ear: External ear normal.   Left Ear: External ear normal.   Nose: Nose normal.   Mouth/Throat: Oropharynx is clear and moist.   Eyes: Pupils are equal, round, and reactive to light. Conjunctivae and EOM are normal.   Neck: Normal range of motion. Neck supple.   Cardiovascular: Regular rhythm, normal heart sounds and intact distal pulses.   Pulmonary/Chest: Effort normal and breath sounds normal.   Abdominal: Soft. Bowel sounds are normal.   Musculoskeletal: Normal range of motion.   Neurological: She is alert and oriented to person, place, and time.   Skin: Skin is warm and dry. Capillary refill takes less than 2 seconds.   Psychiatric: She " has a normal mood and affect. Her behavior is normal. Judgment and thought content normal.   Nursing note and vitals reviewed.      Results Reviewed:  Lab Results (last 24 hours)     Procedure Component Value Units Date/Time    Respiratory Panel, PCR - Swab, Nasopharynx [[id number]]  (Normal) Collected:  07/08/20 0505    Specimen:  Swab from Nasopharynx Updated:  07/08/20 0652     ADENOVIRUS, PCR Not Detected     Coronavirus 229E Not Detected     Coronavirus HKU1 Not Detected     Coronavirus NL63 Not Detected     Coronavirus OC43 Not Detected     Human Metapneumovirus Not Detected     Human Rhinovirus/Enterovirus Not Detected     Influenza B PCR Not Detected     Parainfluenza Virus 1 Not Detected     Parainfluenza Virus 2 Not Detected     Parainfluenza Virus 3 Not Detected     Parainfluenza Virus 4 Not Detected     Bordetella pertussis pcr Not Detected     Influenza A H1 2009 PCR Not Detected     Chlamydophila pneumoniae PCR Not Detected     Mycoplasma pneumo by PCR Not Detected     Influenza A PCR Not Detected     Influenza A H3 Not Detected     Influenza A H1 Not Detected     RSV, PCR Not Detected     Bordetella parapertussis PCR Not Detected    Narrative:       The coronavirus on the RVP is NOT COVID-19 and is NOT indicative of infection with COVID-19.     CBC & Differential [[id number]] Collected:  07/08/20 0445    Specimen:  Blood Updated:  07/08/20 0520    Narrative:       The following orders were created for panel order CBC & Differential.  Procedure                               Abnormality         Status                     ---------                               -----------         ------                     CBC Auto Differential[[id number]]        Abnormal            Final result                 Please view results for these tests on the individual orders.    CBC Auto Differential [[id number]]  (Abnormal) Collected:  07/08/20 0445    Specimen:  Blood Updated:  07/08/20 0520     WBC 14.94 10*3/mm3      RBC  3.93 10*6/mm3      Hemoglobin 11.9 g/dL      Hematocrit 37.5 %      MCV 95.4 fL      MCH 30.3 pg      MCHC 31.7 g/dL      RDW 13.3 %      RDW-SD 46.8 fl      MPV 11.1 fL      Platelets 90 10*3/mm3     Manual Differential [[id number]]  (Abnormal) Collected:  07/08/20 0445    Specimen:  Blood Updated:  07/08/20 0520     Neutrophil % 61.0 %      Lymphocyte % 3.0 %      Monocyte % 5.0 %      Bands %  24.0 %      Metamyelocyte % 6.0 %      Atypical Lymphocyte % 1.0 %      Neutrophils Absolute 12.70 10*3/mm3      Lymphocytes Absolute 0.45 10*3/mm3      Monocytes Absolute 0.75 10*3/mm3      Polychromasia Slight/1+     WBC Morphology Normal     Platelet Estimate Decreased    Basic Metabolic Panel [[id number]]  (Abnormal) Collected:  07/08/20 0445    Specimen:  Blood Updated:  07/08/20 0508     Glucose 114 mg/dL      BUN 20 mg/dL      Creatinine 1.26 mg/dL      Sodium 145 mmol/L      Potassium 3.5 mmol/L      Chloride 105 mmol/L      CO2 30.0 mmol/L      Calcium 9.2 mg/dL      eGFR Non African Amer 39 mL/min/1.73      BUN/Creatinine Ratio 15.9     Anion Gap 10.0 mmol/L     Narrative:       GFR Normal >60  Chronic Kidney Disease <60  Kidney Failure <15      Lactic Acid, Plasma [[id number]]  (Normal) Collected:  07/08/20 0445    Specimen:  Blood Updated:  07/08/20 0508     Lactate 1.6 mmol/L     Blood Culture - Blood, Hand, Right [[id number]] Collected:  07/08/20 0445    Specimen:  Blood from Hand, Right Updated:  07/08/20 0455    Blood Culture - Blood, Arm, Right [[id number]] Collected:  07/08/20 0445    Specimen:  Blood from Arm, Right Updated:  07/08/20 0454    COVID PRE-OP / PRE-PROCEDURE SCREENING ORDER (NO ISOLATION) - Swab, Nasopharynx [[id number]] Collected:  07/08/20 0055    Specimen:  Swab from Nasopharynx Updated:  07/08/20 0154    Narrative:       The following orders were created for panel order COVID PRE-OP / PRE-PROCEDURE SCREENING ORDER (NO ISOLATION) - Swab, Nasopharynx.  Procedure                                Abnormality         Status                     ---------                               -----------         ------                     COVID-19,CEPHEID,COR/LUIS/...[[id number]]  Normal              Final result                 Please view results for these tests on the individual orders.    COVID-19,CEPHEID,COR/LUIS/PAD IN-HOUSE(OR EMERGENT/ADD-ON),NP SWAB IN TRANSPORT MEDIA 3-4 HR TAT - Swab, Nasopharynx [[id number]]  (Normal) Collected:  07/08/20 0055    Specimen:  Swab from Nasopharynx Updated:  07/08/20 0154     COVID19 Not Detected    Narrative:       Fact sheet for providers: https://www.fda.gov/media/857092/download     Fact sheet for patients: https://www.fda.gov/media/263959/download        Imaging Results (Last 24 Hours)     ** No results found for the last 24 hours. **            Active Hospital Problems    Diagnosis   • Sepsis (CMS/[facility])   • Pneumonia due to infectious organism       Assessment / Plan  Antbx, monitor sepsis markers urine output, discussion with son regarding code status      Code Status: full code     I discussed the patient's findings and my recommendations with the son..    Estimated length of stay 4 days.    Phong Joseph MD   07/08/20   07:44    Electronically signed by Phong Joseph MD at 07/08/20 0745          Physician Progress Notes (most recent note)      Phong Joseph MD at 07/09/20 1786        Mrs crane is looking better---more awake and alert--bp is coming back up    Review of Systems   Constitutional: Negative.    HENT: Negative.    Eyes: Negative.    Respiratory: Positive for cough.    Cardiovascular: Negative.    Gastrointestinal: Negative.    Endocrine: Negative.    Genitourinary: Negative.    Musculoskeletal: Negative.    Skin: Negative.    Allergic/Immunologic: Negative.    Neurological: Negative.    Hematological: Negative.    Psychiatric/Behavioral: Negative.      Temp:  [97.7 °F (36.5 °C)-99.1 °F (37.3 °C)] 97.7 °F (36.5 °C)  Heart Rate:  [79-98]  "92  Resp:  [20-22] 20  BP: (135-155)/(53-82) 153/67  I/O last 3 completed shifts:  In: 3861.7 [P.O.:560; I.V.:2801.7; IV Piggyback:500]  Out: 627 [Urine:627]  No intake/output data recorded.    Physical Exam   Constitutional: She appears well-developed and well-nourished.   HENT:   Head: Normocephalic and atraumatic.   Right Ear: External ear normal.   Left Ear: External ear normal.   Nose: Nose normal.   Mouth/Throat: Oropharynx is clear and moist.   Eyes: Pupils are equal, round, and reactive to light. Conjunctivae and EOM are normal.   Neck: Normal range of motion. Neck supple.   Cardiovascular: Normal rate, regular rhythm, normal heart sounds and intact distal pulses.   Pulmonary/Chest: Effort normal and breath sounds normal.   Abdominal: Soft. Bowel sounds are normal.   Musculoskeletal: Normal range of motion.   Neurological: She is alert.   Skin: Skin is warm. Capillary refill takes less than 2 seconds.   Psychiatric: She has a normal mood and affect.   Nursing note and vitals reviewed.        Sepsis (CMS/HCC)    Pneumonia due to infectious organism    contineu anbax for now---monitor labs--OOb    Electronically signed by Phong Joseph MD at 07/10/20 0722          Consult Notes (most recent note)      Myriam Isbell [facility] at 07/08/20 0532          Pharmacy Dosing Service  Pharmacokinetics  Vancomycin Initial Evaluation    Assessment/Action/Plan:  Initiated Vancomycin 750 mg IVPB every 24 hours. Vancomycin levels not ordered at this time..  Patient is receiving Zosyn (extended infusion).  Current vancomycin end date: 7/12/20. Pharmacy will monitor renal function and adjust dose accordingly.     Subjective:  Sunny Lacey is a 89 y.o. female with a Vancomycin \"Pharmacy to Dose\" consult for the treatment of sepsis, PNA .  Day 1 of therapy.    Objective:  Ht: 170.2 cm (67\"); Wt: 81.7 kg (180 lb 3.2 oz)  Estimated Creatinine Clearance: 27.4 mL/min (A) (by C-G formula based on SCr of 1.26 mg/dL " (H)).   Lab Results   Component Value Date    CREATININE 1.26 (H) 07/08/2020    CREATININE 1.63 (H) 02/29/2016    CREATININE 1.38 02/26/2016    CREATININE 1.19 02/02/2016      Lab Results   Component Value Date    WBC 14.94 (H) 07/08/2020    WBC 5.13 02/29/2016    WBC 6.08 02/26/2016      Baseline culture results:  Microbiology Results (last 10 days)       Procedure Component Value - Date/Time    COVID PRE-OP / PRE-PROCEDURE SCREENING ORDER (NO ISOLATION) - Swab, Nasopharynx [[id number]] Collected:  07/08/20 0055    Lab Status:  Final result Specimen:  Swab from Nasopharynx Updated:  07/08/20 0154    Narrative:       The following orders were created for panel order COVID PRE-OP / PRE-PROCEDURE SCREENING ORDER (NO ISOLATION) - Swab, Nasopharynx.  Procedure                               Abnormality         Status                     ---------                               -----------         ------                     COVID-19,CEPHEID,COR/LUIS/...[[id number]]  Normal              Final result                 Please view results for these tests on the individual orders.    COVID-19,CEPHEID,COR/LUIS/PAD IN-HOUSE(OR EMERGENT/ADD-ON),NP SWAB IN TRANSPORT MEDIA 3-4 HR TAT - Swab, Nasopharynx [[id number]]  (Normal) Collected:  07/08/20 0055    Lab Status:  Final result Specimen:  Swab from Nasopharynx Updated:  07/08/20 0154     COVID19 Not Detected    Narrative:       Fact sheet for providers: https://www.fda.gov/media/154390/download     Fact sheet for patients: https://www.fda.gov/media/659460/download            Myriam Isbell RPH  07/08/20 05:30      Electronically signed by Myriam Isbell RPH at 07/08/20 0531       Physical Therapy Notes (most recent note)    No notes exist for this encounter.         Occupational Therapy Notes (most recent note)    No notes exist for this encounter.         Discharge Summary    No notes of this type exist for this encounter.

## 2020-07-10 NOTE — PROGRESS NOTES
Continued Stay Note  [facility]     Patient Name: Sunny Lacey  MRN: [id number]  Today's Date: 7/10/2020    Admit Date: 7/8/2020    Discharge Plan     Row Name 07/10/20 1343       Plan    Plan Comments  GENIE spoke with Cristino, son, in regards to discharge planning. For pt to be able to return to [facility] she would need to be mostly independent which she is not. Pt will need placement. Cristino, son, plans on calling family to see what their recommendations are for SNF placement         Discharge Codes    No documentation.             Candis Duarte

## 2020-07-10 NOTE — PROGRESS NOTES
Continued Stay Note   [place]     Patient Name: Sunny Lacey  MRN: [id number]  Today's Date: 7/10/2020    Admit Date: 7/8/2020    Discharge Plan     Row Name 07/10/20 1405       Plan    Plan Comments  Family has requested referral to be sent to [facility]. GENIE faxed info and left message with Cord, admissions. SW will await possible bed offer.        Candis Duarte

## 2020-07-10 NOTE — PLAN OF CARE
VSS. No C/O pain. Continues on IV antibiotics. Continues on 2L of O2 NC. Pt. Ambulated in room with walker. Weight shift assistance and provided. Sinus 66-90, PVCs on tele. Safety maintained.

## 2020-07-10 NOTE — PLAN OF CARE
Problem: Patient Care Overview  Goal: Plan of Care Review  Outcome: Ongoing (interventions implemented as appropriate)  Note:   PO intake is fair, 38% avg of 4 meals. Ordered Boost Plus daily with dinner. Pt had previously resided at [facility]. Per  note, family has requested referral be sent to [facility] as pt is not independent enough to return to the assisted living at this time. Will continue to follow.

## 2020-07-11 LAB
ANION GAP SERPL CALCULATED.3IONS-SCNC: 11 MMOL/L (ref 5–15)
BASOPHILS # BLD AUTO: 0.04 10*3/MM3 (ref 0–0.2)
BASOPHILS NFR BLD AUTO: 0.6 % (ref 0–1.5)
BUN SERPL-MCNC: 8 MG/DL (ref 8–23)
BUN/CREAT SERPL: 11.9 (ref 7–25)
CALCIUM SPEC-SCNC: 9.1 MG/DL (ref 8.2–9.6)
CHLORIDE SERPL-SCNC: 106 MMOL/L (ref 98–107)
CO2 SERPL-SCNC: 30 MMOL/L (ref 22–29)
CREAT SERPL-MCNC: 0.67 MG/DL (ref 0.57–1)
DEPRECATED RDW RBC AUTO: 44 FL (ref 37–54)
EOSINOPHIL # BLD AUTO: 0.13 10*3/MM3 (ref 0–0.4)
EOSINOPHIL NFR BLD AUTO: 1.9 % (ref 0.3–6.2)
ERYTHROCYTE [DISTWIDTH] IN BLOOD BY AUTOMATED COUNT: 13 % (ref 12.3–15.4)
GFR SERPL CREATININE-BSD FRML MDRD: 81 ML/MIN/1.73
GLUCOSE SERPL-MCNC: 108 MG/DL (ref 65–99)
HCT VFR BLD AUTO: 32.8 % (ref 34–46.6)
HGB BLD-MCNC: 10.6 G/DL (ref 12–15.9)
IMM GRANULOCYTES # BLD AUTO: 0.05 10*3/MM3 (ref 0–0.05)
IMM GRANULOCYTES NFR BLD AUTO: 0.7 % (ref 0–0.5)
LYMPHOCYTES # BLD AUTO: 0.7 10*3/MM3 (ref 0.7–3.1)
LYMPHOCYTES NFR BLD AUTO: 10.3 % (ref 19.6–45.3)
MCH RBC QN AUTO: 30.1 PG (ref 26.6–33)
MCHC RBC AUTO-ENTMCNC: 32.3 G/DL (ref 31.5–35.7)
MCV RBC AUTO: 93.2 FL (ref 79–97)
MONOCYTES # BLD AUTO: 0.61 10*3/MM3 (ref 0.1–0.9)
MONOCYTES NFR BLD AUTO: 8.9 % (ref 5–12)
NEUTROPHILS NFR BLD AUTO: 5.29 10*3/MM3 (ref 1.7–7)
NEUTROPHILS NFR BLD AUTO: 77.6 % (ref 42.7–76)
NRBC BLD AUTO-RTO: 0 /100 WBC (ref 0–0.2)
PLATELET # BLD AUTO: 111 10*3/MM3 (ref 140–450)
PMV BLD AUTO: 11.2 FL (ref 6–12)
POTASSIUM SERPL-SCNC: 3.4 MMOL/L (ref 3.5–5.2)
RBC # BLD AUTO: 3.52 10*6/MM3 (ref 3.77–5.28)
SODIUM SERPL-SCNC: 147 MMOL/L (ref 136–145)
VANCOMYCIN TROUGH SERPL-MCNC: 6.8 MCG/ML (ref 5–20)
WBC # BLD AUTO: 6.82 10*3/MM3 (ref 3.4–10.8)

## 2020-07-11 PROCEDURE — 80202 ASSAY OF VANCOMYCIN: CPT | Performed by: FAMILY MEDICINE

## 2020-07-11 PROCEDURE — 25010000002 VANCOMYCIN 10 G RECONSTITUTED SOLUTION: Performed by: FAMILY MEDICINE

## 2020-07-11 PROCEDURE — 94799 UNLISTED PULMONARY SVC/PX: CPT

## 2020-07-11 PROCEDURE — 99231 SBSQ HOSP IP/OBS SF/LOW 25: CPT | Performed by: FAMILY MEDICINE

## 2020-07-11 PROCEDURE — 25010000002 PIPERACILLIN SOD-TAZOBACTAM PER 1 G: Performed by: FAMILY MEDICINE

## 2020-07-11 PROCEDURE — 80048 BASIC METABOLIC PNL TOTAL CA: CPT | Performed by: FAMILY MEDICINE

## 2020-07-11 PROCEDURE — 85025 COMPLETE CBC W/AUTO DIFF WBC: CPT | Performed by: FAMILY MEDICINE

## 2020-07-11 RX ORDER — BRIMONIDINE TARTRATE AND TIMOLOL MALEATE 2; 5 MG/ML; MG/ML
1 SOLUTION OPHTHALMIC EVERY 12 HOURS
Status: DISCONTINUED | OUTPATIENT
Start: 2020-07-11 | End: 2020-07-20 | Stop reason: HOSPADM

## 2020-07-11 RX ORDER — ACETAMINOPHEN 325 MG/1
650 TABLET ORAL 2 TIMES DAILY
Status: DISCONTINUED | OUTPATIENT
Start: 2020-07-11 | End: 2020-07-20 | Stop reason: HOSPADM

## 2020-07-11 RX ORDER — LATANOPROST 50 UG/ML
1 SOLUTION/ DROPS OPHTHALMIC NIGHTLY
Status: DISCONTINUED | OUTPATIENT
Start: 2020-07-11 | End: 2020-07-20 | Stop reason: HOSPADM

## 2020-07-11 RX ORDER — LIDOCAINE 40 MG/G
CREAM TOPICAL 4 TIMES DAILY PRN
Status: DISCONTINUED | OUTPATIENT
Start: 2020-07-11 | End: 2020-07-20 | Stop reason: HOSPADM

## 2020-07-11 RX ORDER — DOCUSATE SODIUM 100 MG/1
100 CAPSULE, LIQUID FILLED ORAL DAILY
Status: DISCONTINUED | OUTPATIENT
Start: 2020-07-11 | End: 2020-07-20 | Stop reason: HOSPADM

## 2020-07-11 RX ORDER — AMLODIPINE BESYLATE 5 MG/1
5 TABLET ORAL DAILY
Status: DISCONTINUED | OUTPATIENT
Start: 2020-07-11 | End: 2020-07-14

## 2020-07-11 RX ORDER — ALPRAZOLAM 0.25 MG/1
0.25 TABLET ORAL DAILY PRN
Status: DISCONTINUED | OUTPATIENT
Start: 2020-07-11 | End: 2020-07-20 | Stop reason: HOSPADM

## 2020-07-11 RX ORDER — ALBUTEROL SULFATE 2.5 MG/3ML
2.5 SOLUTION RESPIRATORY (INHALATION) EVERY 6 HOURS PRN
Status: DISCONTINUED | OUTPATIENT
Start: 2020-07-11 | End: 2020-07-20 | Stop reason: HOSPADM

## 2020-07-11 RX ORDER — FERROUS SULFATE 325(65) MG
325 TABLET ORAL
Status: DISCONTINUED | OUTPATIENT
Start: 2020-07-11 | End: 2020-07-20 | Stop reason: HOSPADM

## 2020-07-11 RX ADMIN — DICLOFENAC 4 G: 10 GEL TOPICAL at 21:42

## 2020-07-11 RX ADMIN — TAZOBACTAM SODIUM AND PIPERACILLIN SODIUM 3.38 G: 375; 3 INJECTION, SOLUTION INTRAVENOUS at 02:26

## 2020-07-11 RX ADMIN — TAZOBACTAM SODIUM AND PIPERACILLIN SODIUM 3.38 G: 375; 3 INJECTION, SOLUTION INTRAVENOUS at 11:41

## 2020-07-11 RX ADMIN — ACETAMINOPHEN 650 MG: 325 TABLET, FILM COATED ORAL at 13:54

## 2020-07-11 RX ADMIN — BRIMONIDINE TARTRATE, TIMOLOL MALEATE 1 DROP: 2; 5 SOLUTION/ DROPS OPHTHALMIC at 21:41

## 2020-07-11 RX ADMIN — DICLOFENAC 4 G: 10 GEL TOPICAL at 13:55

## 2020-07-11 RX ADMIN — ACETAMINOPHEN 650 MG: 325 TABLET, FILM COATED ORAL at 21:42

## 2020-07-11 RX ADMIN — CITALOPRAM 20 MG: 20 TABLET, FILM COATED ORAL at 08:08

## 2020-07-11 RX ADMIN — AMLODIPINE BESYLATE 5 MG: 5 TABLET ORAL at 13:54

## 2020-07-11 RX ADMIN — DOCUSATE SODIUM 100 MG: 100 CAPSULE ORAL at 13:54

## 2020-07-11 RX ADMIN — SODIUM CHLORIDE, PRESERVATIVE FREE 10 ML: 5 INJECTION INTRAVENOUS at 21:41

## 2020-07-11 RX ADMIN — VANCOMYCIN HYDROCHLORIDE 750 MG: 10 INJECTION, POWDER, LYOPHILIZED, FOR SOLUTION INTRAVENOUS at 06:40

## 2020-07-11 RX ADMIN — FERROUS SULFATE TAB 325 MG (65 MG ELEMENTAL FE) 325 MG: 325 (65 FE) TAB at 13:54

## 2020-07-11 RX ADMIN — VANCOMYCIN HYDROCHLORIDE 500 MG: 10 INJECTION, POWDER, LYOPHILIZED, FOR SOLUTION INTRAVENOUS at 18:38

## 2020-07-11 RX ADMIN — ALPRAZOLAM 0.5 MG: 0.5 TABLET ORAL at 08:08

## 2020-07-11 RX ADMIN — TAZOBACTAM SODIUM AND PIPERACILLIN SODIUM 3.38 G: 375; 3 INJECTION, SOLUTION INTRAVENOUS at 19:54

## 2020-07-11 RX ADMIN — LATANOPROST 1 DROP: 50 SOLUTION OPHTHALMIC at 21:42

## 2020-07-11 NOTE — PROGRESS NOTES
"Pharmacy Dosing Service  Pharmacokinetics  Vancomycin Follow-up Evaluation    Assessment/Action/Plan:  7/11/220 0500 Vancomycin trough = 6.8 (23 hours post 750mg dose).  SCr = 0.67 Vancomycin dose adjusted to 500mg iv q12h.   Pharmacy will continue to monitor renal function and adjust dose accordingly.    Per [employer] calculator:  Regimen: 500 mg every 12 hours   Start time: 15:48 on 07/11/2020  Exposure target: AUC24 (range)400-600 mg/L.hr  AUC24,ss: 420 mg/L.hr  PAUC*: 60 %  Ctrough,ss: 14.0 mg/L  Pconc*: 4 %  Tox.: 9 %     Subjective:  Sunny Lacey is a 89 y.o. female currently on Vancomycin for the treatment of sepsis; pneumonia, day 3 of therapy (Current vancomycin end date: 7/13/2020).    Objective:  Ht: 170.2 cm (67\"); Wt: 80.6 kg (177 lb 9.6 oz)  Estimated Creatinine Clearance: 42.9 mL/min (by C-G formula based on SCr of 0.67 mg/dL).   Lab Results   Component Value Date    CREATININE 0.67 07/11/2020    CREATININE 0.74 07/10/2020    CREATININE 1.15 (H) 07/09/2020      Lab Results   Component Value Date    WBC 6.82 07/11/2020    WBC 9.00 07/10/2020    WBC 11.03 (H) 07/09/2020         Lab Results   Component Value Date    VANCOTROUGH 6.80 07/11/2020       Culture Results:  Microbiology Results (last 10 days)       Procedure Component Value - Date/Time    Respiratory Panel, PCR - Swab, Nasopharynx [[id number]]  (Normal) Collected:  07/08/20 0505    Lab Status:  Final result Specimen:  Swab from Nasopharynx Updated:  07/08/20 0652     ADENOVIRUS, PCR Not Detected     Coronavirus 229E Not Detected     Coronavirus HKU1 Not Detected     Coronavirus NL63 Not Detected     Coronavirus OC43 Not Detected     Human Metapneumovirus Not Detected     Human Rhinovirus/Enterovirus Not Detected     Influenza B PCR Not Detected     Parainfluenza Virus 1 Not Detected     Parainfluenza Virus 2 Not Detected     Parainfluenza Virus 3 Not Detected     Parainfluenza Virus 4 Not Detected     Bordetella pertussis pcr Not Detected "     Influenza A H1 2009 PCR Not Detected     Chlamydophila pneumoniae PCR Not Detected     Mycoplasma pneumo by PCR Not Detected     Influenza A PCR Not Detected     Influenza A H3 Not Detected     Influenza A H1 Not Detected     RSV, PCR Not Detected     Bordetella parapertussis PCR Not Detected    Narrative:       The coronavirus on the RVP is NOT COVID-19 and is NOT indicative of infection with COVID-19.     Blood Culture - Blood, Arm, Right [[id number]] Collected:  07/08/20 0445    Lab Status:  Preliminary result Specimen:  Blood from Arm, Right Updated:  07/11/20 0500     Blood Culture No growth at 3 days    Blood Culture - Blood, Hand, Right [[id number]] Collected:  07/08/20 0445    Lab Status:  Preliminary result Specimen:  Blood from Hand, Right Updated:  07/11/20 0500     Blood Culture No growth at 3 days    COVID PRE-OP / PRE-PROCEDURE SCREENING ORDER (NO ISOLATION) - Swab, Nasopharynx [[id number]] Collected:  07/08/20 0055    Lab Status:  Final result Specimen:  Swab from Nasopharynx Updated:  07/08/20 0154    Narrative:       The following orders were created for panel order COVID PRE-OP / PRE-PROCEDURE SCREENING ORDER (NO ISOLATION) - Swab, Nasopharynx.  Procedure                               Abnormality         Status                     ---------                               -----------         ------                     COVID-19,CEPHEID,COR/LUIS/...[[id number]]  Normal              Final result                 Please view results for these tests on the individual orders.    COVID-19,CEPHEID,COR/LUIS/PAD IN-HOUSE(OR EMERGENT/ADD-ON),NP SWAB IN TRANSPORT MEDIA 3-4 HR TAT - Swab, Nasopharynx [[id number]]  (Normal) Collected:  07/08/20 0055    Lab Status:  Final result Specimen:  Swab from Nasopharynx Updated:  07/08/20 0154     COVID19 Not Detected    Narrative:       Fact sheet for providers: https://www.fda.gov/media/152703/download     Fact sheet for patients: https://www.fda.gov/media/229291/download             Rafael Jack, PharmD   07/11/20 13:46

## 2020-07-11 NOTE — PLAN OF CARE
Problem: Patient Care Overview  Goal: Plan of Care Review  Outcome: Ongoing (interventions implemented as appropriate)  Flowsheets (Taken 7/11/2020 2008)  Progress: no change  Plan of Care Reviewed With: patient  Outcome Summary: Patient has no c/o pain/ Continues to receive IV antibiotics. NSR 70-94 on tele. VSS. Safety maintained. Will continue to monitor.

## 2020-07-12 ENCOUNTER — APPOINTMENT (OUTPATIENT)
Dept: CT IMAGING | Facility: HOSPITAL | Age: 85
End: 2020-07-12

## 2020-07-12 LAB
ANION GAP SERPL CALCULATED.3IONS-SCNC: 9 MMOL/L (ref 5–15)
BASOPHILS # BLD AUTO: 0.08 10*3/MM3 (ref 0–0.2)
BASOPHILS NFR BLD AUTO: 1.1 % (ref 0–1.5)
BUN SERPL-MCNC: 6 MG/DL (ref 8–23)
BUN/CREAT SERPL: 11.3 (ref 7–25)
CALCIUM SPEC-SCNC: 9.1 MG/DL (ref 8.2–9.6)
CHLORIDE SERPL-SCNC: 104 MMOL/L (ref 98–107)
CO2 SERPL-SCNC: 32 MMOL/L (ref 22–29)
CREAT SERPL-MCNC: 0.53 MG/DL (ref 0.57–1)
DEPRECATED RDW RBC AUTO: 41.4 FL (ref 37–54)
EOSINOPHIL # BLD AUTO: 0.16 10*3/MM3 (ref 0–0.4)
EOSINOPHIL NFR BLD AUTO: 2.2 % (ref 0.3–6.2)
ERYTHROCYTE [DISTWIDTH] IN BLOOD BY AUTOMATED COUNT: 12.6 % (ref 12.3–15.4)
GFR SERPL CREATININE-BSD FRML MDRD: 107 ML/MIN/1.73
GLUCOSE SERPL-MCNC: 106 MG/DL (ref 65–99)
HCT VFR BLD AUTO: 36.1 % (ref 34–46.6)
HGB BLD-MCNC: 11.8 G/DL (ref 12–15.9)
IMM GRANULOCYTES # BLD AUTO: 0.08 10*3/MM3 (ref 0–0.05)
IMM GRANULOCYTES NFR BLD AUTO: 1.1 % (ref 0–0.5)
LYMPHOCYTES # BLD AUTO: 0.69 10*3/MM3 (ref 0.7–3.1)
LYMPHOCYTES NFR BLD AUTO: 9.3 % (ref 19.6–45.3)
MCH RBC QN AUTO: 29.7 PG (ref 26.6–33)
MCHC RBC AUTO-ENTMCNC: 32.7 G/DL (ref 31.5–35.7)
MCV RBC AUTO: 90.9 FL (ref 79–97)
MONOCYTES # BLD AUTO: 0.7 10*3/MM3 (ref 0.1–0.9)
MONOCYTES NFR BLD AUTO: 9.5 % (ref 5–12)
NEUTROPHILS NFR BLD AUTO: 5.68 10*3/MM3 (ref 1.7–7)
NEUTROPHILS NFR BLD AUTO: 76.8 % (ref 42.7–76)
NRBC BLD AUTO-RTO: 0 /100 WBC (ref 0–0.2)
PLATELET # BLD AUTO: 129 10*3/MM3 (ref 140–450)
PMV BLD AUTO: 10.1 FL (ref 6–12)
POTASSIUM SERPL-SCNC: 2.8 MMOL/L (ref 3.5–5.2)
RBC # BLD AUTO: 3.97 10*6/MM3 (ref 3.77–5.28)
SODIUM SERPL-SCNC: 145 MMOL/L (ref 136–145)
WBC # BLD AUTO: 7.39 10*3/MM3 (ref 3.4–10.8)

## 2020-07-12 PROCEDURE — 25010000002 PIPERACILLIN SOD-TAZOBACTAM PER 1 G: Performed by: FAMILY MEDICINE

## 2020-07-12 PROCEDURE — 85025 COMPLETE CBC W/AUTO DIFF WBC: CPT | Performed by: FAMILY MEDICINE

## 2020-07-12 PROCEDURE — 25010000002 VANCOMYCIN 10 G RECONSTITUTED SOLUTION: Performed by: FAMILY MEDICINE

## 2020-07-12 PROCEDURE — 25010000002 ENOXAPARIN PER 10 MG: Performed by: FAMILY MEDICINE

## 2020-07-12 PROCEDURE — 70450 CT HEAD/BRAIN W/O DYE: CPT

## 2020-07-12 PROCEDURE — 80048 BASIC METABOLIC PNL TOTAL CA: CPT | Performed by: FAMILY MEDICINE

## 2020-07-12 PROCEDURE — 99222 1ST HOSP IP/OBS MODERATE 55: CPT | Performed by: PSYCHIATRY & NEUROLOGY

## 2020-07-12 RX ORDER — POTASSIUM CHLORIDE 750 MG/1
20 CAPSULE, EXTENDED RELEASE ORAL 2 TIMES DAILY WITH MEALS
Status: DISCONTINUED | OUTPATIENT
Start: 2020-07-12 | End: 2020-07-20 | Stop reason: HOSPADM

## 2020-07-12 RX ORDER — DEXTROSE, SODIUM CHLORIDE, AND POTASSIUM CHLORIDE 5; .45; .15 G/100ML; G/100ML; G/100ML
30 INJECTION INTRAVENOUS CONTINUOUS
Status: DISCONTINUED | OUTPATIENT
Start: 2020-07-12 | End: 2020-07-17

## 2020-07-12 RX ADMIN — TAZOBACTAM SODIUM AND PIPERACILLIN SODIUM 3.38 G: 375; 3 INJECTION, SOLUTION INTRAVENOUS at 03:42

## 2020-07-12 RX ADMIN — CITALOPRAM 20 MG: 20 TABLET, FILM COATED ORAL at 09:02

## 2020-07-12 RX ADMIN — ACETAMINOPHEN 650 MG: 325 TABLET, FILM COATED ORAL at 09:02

## 2020-07-12 RX ADMIN — DICLOFENAC 4 G: 10 GEL TOPICAL at 11:32

## 2020-07-12 RX ADMIN — DICLOFENAC 4 G: 10 GEL TOPICAL at 20:26

## 2020-07-12 RX ADMIN — TAZOBACTAM SODIUM AND PIPERACILLIN SODIUM 3.38 G: 375; 3 INJECTION, SOLUTION INTRAVENOUS at 20:31

## 2020-07-12 RX ADMIN — BRIMONIDINE TARTRATE, TIMOLOL MALEATE 1 DROP: 2; 5 SOLUTION/ DROPS OPHTHALMIC at 20:26

## 2020-07-12 RX ADMIN — POTASSIUM CHLORIDE 20 MEQ: 750 CAPSULE, EXTENDED RELEASE ORAL at 05:58

## 2020-07-12 RX ADMIN — DICLOFENAC 4 G: 10 GEL TOPICAL at 09:03

## 2020-07-12 RX ADMIN — POTASSIUM CHLORIDE 20 MEQ: 750 CAPSULE, EXTENDED RELEASE ORAL at 17:43

## 2020-07-12 RX ADMIN — AMLODIPINE BESYLATE 5 MG: 5 TABLET ORAL at 09:02

## 2020-07-12 RX ADMIN — ALPRAZOLAM 0.5 MG: 0.5 TABLET ORAL at 09:02

## 2020-07-12 RX ADMIN — SODIUM CHLORIDE 500 ML: 9 INJECTION, SOLUTION INTRAVENOUS at 10:22

## 2020-07-12 RX ADMIN — ENOXAPARIN SODIUM 40 MG: 40 INJECTION SUBCUTANEOUS at 17:43

## 2020-07-12 RX ADMIN — BRIMONIDINE TARTRATE, TIMOLOL MALEATE 1 DROP: 2; 5 SOLUTION/ DROPS OPHTHALMIC at 09:02

## 2020-07-12 RX ADMIN — ACETAMINOPHEN 650 MG: 325 TABLET, FILM COATED ORAL at 20:25

## 2020-07-12 RX ADMIN — VANCOMYCIN HYDROCHLORIDE 500 MG: 10 INJECTION, POWDER, LYOPHILIZED, FOR SOLUTION INTRAVENOUS at 09:02

## 2020-07-12 RX ADMIN — TAZOBACTAM SODIUM AND PIPERACILLIN SODIUM 3.38 G: 375; 3 INJECTION, SOLUTION INTRAVENOUS at 11:32

## 2020-07-12 RX ADMIN — POTASSIUM CHLORIDE, DEXTROSE MONOHYDRATE AND SODIUM CHLORIDE 50 ML/HR: 150; 5; 450 INJECTION, SOLUTION INTRAVENOUS at 11:29

## 2020-07-12 RX ADMIN — SODIUM CHLORIDE, PRESERVATIVE FREE 10 ML: 5 INJECTION INTRAVENOUS at 20:25

## 2020-07-12 RX ADMIN — DOCUSATE SODIUM 100 MG: 100 CAPSULE ORAL at 09:02

## 2020-07-12 RX ADMIN — LATANOPROST 1 DROP: 50 SOLUTION OPHTHALMIC at 20:26

## 2020-07-12 RX ADMIN — VANCOMYCIN HYDROCHLORIDE 750 MG: 10 INJECTION, POWDER, LYOPHILIZED, FOR SOLUTION INTRAVENOUS at 17:46

## 2020-07-12 RX ADMIN — DICLOFENAC 4 G: 10 GEL TOPICAL at 17:43

## 2020-07-12 RX ADMIN — FERROUS SULFATE TAB 325 MG (65 MG ELEMENTAL FE) 325 MG: 325 (65 FE) TAB at 09:02

## 2020-07-12 NOTE — PROGRESS NOTES
"Pharmacy Dosing Service  Anticoagulant  Enoxaparin    Assessment/Action/Plan:  Initiated Lovenox 40 mg SC every 24 hours for VTE Ppx.     Subjective:  Sunny Lacey is a 89 y.o. female on Enoxaparin 40 mg SQ every 24 hours for indication of VTE prophylaxis.  Objective:  [Ht: 170.2 cm (67\"); Wt: 80.5 kg (177 lb 6 oz); BMI: Body mass index is 27.78 kg/m².]  Estimated Creatinine Clearance: 42.9 mL/min (A) (by C-G formula based on SCr of 0.53 mg/dL (L)).   Lab Results   Component Value Date    INR 1.08 02/26/2016    INR 1.08 02/02/2016    INR 1.07 01/25/2016    PROTIME 14.3 02/26/2016    PROTIME 14.3 02/02/2016    PROTIME 14.2 01/25/2016      Lab Results   Component Value Date    HGB 11.8 (L) 07/12/2020    HGB 10.6 (L) 07/11/2020    HGB 10.6 (L) 07/10/2020      Lab Results   Component Value Date     (L) 07/12/2020     (L) 07/11/2020     (L) 07/10/2020       Arianne Myles, PharmD  07/12/20 09:26     "

## 2020-07-12 NOTE — PROGRESS NOTES
Sitting up eating dinner--voices no complaints    Review of Systems   Constitutional: Positive for activity change and fatigue.   HENT: Negative.    Eyes: Negative.    Respiratory: Negative.  Negative for cough.    Cardiovascular: Negative.    Gastrointestinal: Negative.    Endocrine: Negative.    Genitourinary: Negative.    Musculoskeletal: Negative.    Skin: Negative.    Allergic/Immunologic: Negative.    Neurological: Negative.    Hematological: Negative.    Psychiatric/Behavioral: Negative.      Temp:  [97.6 °F (36.4 °C)-98.6 °F (37 °C)] 97.8 °F (36.6 °C)  Heart Rate:  [80-88] 86  Resp:  [20] 20  BP: (118-174)/(67-93) 118/70  I/O last 3 completed shifts:  In: 650 [P.O.:600; IV Piggyback:50]  Out: 400 [Urine:400]  I/O this shift:  In: 100 [IV Piggyback:100]  Out: -     Physical Exam   Constitutional: She is oriented to person, place, and time. She appears well-developed and well-nourished.   HENT:   Head: Normocephalic and atraumatic.   Right Ear: External ear normal.   Left Ear: External ear normal.   Nose: Nose normal.   Mouth/Throat: Oropharynx is clear and moist.   Eyes: Pupils are equal, round, and reactive to light. Conjunctivae and EOM are normal.   Neck: Normal range of motion. Neck supple.   Cardiovascular: Normal rate, regular rhythm, normal heart sounds and intact distal pulses.   Pulmonary/Chest: Effort normal and breath sounds normal.   Abdominal: Soft. Bowel sounds are normal.   Musculoskeletal: Normal range of motion.   Neurological: She is alert and oriented to person, place, and time.   Skin: Skin is warm. Capillary refill takes less than 2 seconds.   Psychiatric: She has a normal mood and affect. Her behavior is normal. Judgment and thought content normal.   Nursing note and vitals reviewed.        Sepsis (CMS/HCC)    Pneumonia due to infectious organism    Continue iv antbx for now

## 2020-07-12 NOTE — PLAN OF CARE
Problem: Patient Care Overview  Goal: Plan of Care Review  Outcome: Ongoing (interventions implemented as appropriate)  Flowsheets (Taken 7/12/2020 0571)  Progress: declining  Plan of Care Reviewed With: patient  Outcome Summary: VSS. NSR with HR 70's-90's. K+ noted to be 2.8 this a.m. Dr. Starkey notified. Orders received. Spoke with son, Cristino, this shift and states he is concerned with pt. not being on continuous IVF's. Son informed that pt. Is still receiving IV antibiotics. Son states he would like to talk with MD at some point as he has some questions to ask. Pt. has become more confused during the night and yells out into the hallway. Bed alarm on for pt. safety. Safety maintained. Will continue to monitor.

## 2020-07-12 NOTE — PROGRESS NOTES
INFECTIOUS DISEASES CONSULT NOTE    Patient:  Sunny Lacey 89 y.o. female  ROOM # 408/1  YOB: 1921  MRN: [id number]  [facility]:  [id number]  Admit date: 7/8/2020   Admitting Physician: Phong Joseph MD  Primary Care Physician: Nevaeh Thornton PA  REFERRING PROVIDER: No ref. provider found      REASON FOR CONSULTATION :sepsis, blood and urine cultures from [facility](neg), 103 temp and cta [facility] showing bilat infil---      HISTORY OF PRESENT ILLNESS:  89 year old female unable to provide history.    Per [facility] and P , she lives in assisted living, presented to [facility] with temp to 103. CT chest reported with bilateral pneumonia.    Unable to see reports in media tab        Past Medical History:   Diagnosis Date   • Anemia    • Anxiety    • Arthritis    • Hypertension    • Skin cancer      Past Surgical History:   Procedure Laterality Date   • SKIN CANCER EXCISION       History reviewed. No pertinent family history.  Social History     Socioeconomic History   • Marital status:      Spouse name: Not on file   • Number of children: Not on file   • Years of education: Not on file   • Highest education level: Not on file   Tobacco Use   • Smoking status: Never Smoker   Substance and Sexual Activity   • Alcohol use: Never     Frequency: Never   • Drug use: Never   • Sexual activity: Defer           Current Meds:     Current Facility-Administered Medications   Medication Dose Route Frequency Provider Last Rate Last Dose   • acetaminophen (TYLENOL) tablet 650 mg  650 mg Oral BID Phong Joseph MD   650 mg at 07/12/20 0902   • albuterol (PROVENTIL) nebulizer solution 0.083% 2.5 mg/3mL  2.5 mg Nebulization Q6H PRN Phong Joseph MD       • ALPRAZolam (XANAX) tablet 0.25 mg  0.25 mg Oral Daily PRN Phong Joseph MD       • ALPRAZolam (XANAX) tablet 0.5 mg  0.5 mg Oral Daily Phong Joseph MD   0.5 mg at 07/12/20 0902   • amLODIPine (NORVASC) tablet 5 mg  5 mg Oral Daily  Phong Joseph MD   5 mg at 07/12/20 0902   • brimonidine-timolol (COMBIGAN) 0.2-0.5 % ophthalmic solution 1 drop  1 drop Right Eye Q12H Phong Joseph MD   1 drop at 07/12/20 0902   • citalopram (CeleXA) tablet 20 mg  20 mg Oral Daily Phong Joseph MD   20 mg at 07/12/20 0902   • dextrose 5 % and sodium chloride 0.45 % with KCl 20 mEq/L infusion  50 mL/hr Intravenous Continuous Phong Joseph MD 50 mL/hr at 07/12/20 1129 50 mL/hr at 07/12/20 1129   • diclofenac (VOLTAREN) 1 % gel 4 g  4 g Topical 4x Daily Phong Joseph MD   4 g at 07/12/20 1132   • docusate sodium (COLACE) capsule 100 mg  100 mg Oral Daily Phong Joseph MD   100 mg at 07/12/20 0902   • enoxaparin (LOVENOX) syringe 40 mg  40 mg Subcutaneous Q24H Phong Joseph MD       • ferrous sulfate tablet 325 mg  325 mg Oral Daily With Breakfast Phong Joseph MD   325 mg at 07/12/20 0902   • glycerin (ADULT) rectal suppository 2 g  2 g Rectal Daily PRN Phong Joseph MD   2 g at 07/10/20 1128   • HYDROcodone-acetaminophen (NORCO)  MG per tablet 1 tablet  1 tablet Oral Q6H PRN Phong Joseph MD   1 tablet at 07/10/20 2317   • latanoprost (XALATAN) 0.005 % ophthalmic solution 1 drop  1 drop Both Eyes Nightly Phong Joseph MD   1 drop at 07/11/20 2142   • lidocaine (LMX) 4 % cream   Topical 4x Daily PRN Phong Joseph MD       • Pharmacy Consult - Pharmacy to dose   Does not apply Continuous PRN Phong Joseph MD       • Pharmacy to Dose enoxaparin (LOVENOX)   Does not apply Continuous PRN Phong Joseph MD       • piperacillin-tazobactam (ZOSYN) 3.375 g in iso-osmotic dextrose 50 ml (premix)  3.375 g Intravenous Q8H Phong Joseph MD 0 mL/hr at 07/09/20 2012 3.375 g at 07/12/20 1132   • potassium chloride (MICRO-K) CR capsule 20 mEq  20 mEq Oral BID With Meals Oliver Starkey MD   20 mEq at 07/12/20 0526   • sodium chloride 0.9  % bolus 1,464 mL  30 mL/kg Intravenous PRN Phong Joseph MD       • sodium chloride 0.9 % flush 10 mL  10 mL Intravenous Q12H Phong Joseph MD   10 mL at 07/11/20 2141   • sodium chloride 0.9 % flush 10 mL  10 mL Intravenous PRN Phong Joseph MD       • vancomycin 750 mg/250 mL 0.9% NS IVPB ([facility])  750 mg Intravenous Q12H Phong Joseph MD           Home Meds:  Prior to Admission medications    Medication Sig Start Date End Date Taking? Authorizing Provider   acetaminophen (TYLENOL) 325 MG tablet Take 650 mg by mouth 2 (two) times a day.   Yes Milton Diaz MD   ALPRAZolam (XANAX) 0.25 MG tablet Take 0.25 mg by mouth every night at bedtime.   Yes Milton Diaz MD   amLODIPine (NORVASC) 5 MG tablet Take 5 mg by mouth Daily.   Yes Milton Diaz MD   bimatoprost (LUMIGAN) 0.01 % ophthalmic drops Administer 1 drop to both eyes Every Night.   Yes Milton Diaz MD   brimonidine-timolol (COMBIGAN) 0.2-0.5 % ophthalmic solution Administer 1 drop to the right eye Every 12 (Twelve) Hours.   Yes ProviderMilton MD   Calcium Carbonate-Vit D-Min (CALCIUM 1200 PO) Take 600 mg by mouth 2 (two) times a day.   Yes Milton Diaz MD   cefdinir (OMNICEF) 300 MG capsule Take 300 mg by mouth 2 (Two) Times a Day.   Yes Milton Diaz MD   cetirizine (zyrTEC) 10 MG tablet Take 10 mg by mouth Daily.   Yes Milton Diaz MD   citalopram (CeleXA) 20 MG tablet Take 20 mg by mouth Daily.   Yes Milton Diaz MD   diclofenac (VOLTAREN) 1 % gel gel Apply 4 g topically to the appropriate area as directed 4 (Four) Times a Day As Needed.   Yes Milton Diaz MD   docusate sodium (COLACE) 100 MG capsule Take 100 mg by mouth Daily.   Yes Milton Diaz MD   ferrous sulfate 325 (65 FE) MG tablet Take 325 mg by mouth Daily With Breakfast.   Yes Milton Diaz MD   furosemide (LASIX) 20 MG tablet Take 20 mg by mouth 2 (Two) Times a  "Day.   Yes Milton Diaz MD   HYDROcodone-acetaminophen (NORCO)  MG per tablet Take 1 tablet by mouth Every 4 (Four) Hours As Needed (Q4-6H PRN, max 3 per day).   Yes Milton Diaz MD   potassium chloride (K-DUR,KLOR-CON) 10 MEQ CR tablet Take 10 mEq by mouth Daily.   Yes Milton Diaz MD   pregabalin (LYRICA) 100 MG capsule Take 100 mg by mouth 3 (Three) Times a Day.   Yes Milton Diaz MD   albuterol sulfate  (90 Base) MCG/ACT inhaler Inhale 2 puffs Every 4 (Four) Hours As Needed for Wheezing or Shortness of Air.    Milton Diaz MD   ALPRAZolam (XANAX) 0.25 MG tablet Take 0.25 mg by mouth Daily As Needed for Anxiety.    Milton Diaz MD   lidocaine (LMX) 4 % cream Apply  topically to the appropriate area as directed 4 (Four) Times a Day As Needed for Mild Pain .    Milton Diaz MD          No Known Allergies    Review of Systems   Unable to perform ROS: Dementia         Vital Signs:  /70 (BP Location: Left arm, Patient Position: Sitting)   Pulse 79   Temp 98.4 °F (36.9 °C) (Oral)   Resp 20   Ht 170.2 cm (67\")   Wt 80.5 kg (177 lb 6 oz)   SpO2 93%   BMI 27.78 kg/m²   Temp (24hrs), Av.9 °F (36.6 °C), Min:97.6 °F (36.4 °C), Max:98.4 °F (36.9 °C)      Physical Exam   Constitutional: She appears well-developed and well-nourished. No distress.   Sitting up in recliner. No family at bedside   HENT:   Head: Normocephalic and atraumatic.   Eyes: No scleral icterus.   Cardiovascular: Normal rate.   Pulmonary/Chest: Effort normal. No respiratory distress. She has no rales.   Abdominal: She exhibits no distension. There is no tenderness.   Neurological: She is alert.   Appeared to have some difficulty with answering questions - expressive   She did converse some [place] and a niece living in [place]   Skin: Skin is warm and dry. She is not diaphoretic.   Psychiatric:   Pleasant and cooperative   "   Vitals reviewed.      Results Review:    I reviewed the patient's new clinical results.    Lab Results:  CBC:   Lab Results   Lab 07/08/20  0445  07/10/20  0448 07/11/20  0500 07/12/20  0248   WBC 14.94*   < > 9.00 6.82 7.39   HEMOGLOBIN 11.9*   < > 10.6* 10.6* 11.8*   HEMATOCRIT 37.5   < > 33.0* 32.8* 36.1   PLATELETS 90*   < > 103* 111* 129*   LYMPHOCYTE % 3.0*  --   --   --   --    MONOCYTES % 5.0  --   --   --   --     < > = values in this interval not displayed.       CMP:   Lab Results   Lab 07/10/20  0448 07/11/20  0500 07/12/20  0248   SODIUM 145 147* 145   POTASSIUM 3.2* 3.4* 2.8*   CHLORIDE 106 106 104   CO2 29.0 30.0* 32.0*   BUN 12 8 6*   CREATININE 0.74 0.67 0.53*   CALCIUM 8.8 9.1 9.1   GLUCOSE 108* 108* 106*       Lab Results (last 72 hours)     Procedure Component Value Units Date/Time    Blood Culture - Blood, Arm, Right [[id number]] Collected:  07/08/20 0445    Specimen:  Blood from Arm, Right Updated:  07/12/20 0500     Blood Culture No growth at 4 days    Blood Culture - Blood, Hand, Right [[id number]] Collected:  07/08/20 0445    Specimen:  Blood from Hand, Right Updated:  07/12/20 0500     Blood Culture No growth at 4 days    Basic Metabolic Panel [[id number]]  (Abnormal) Collected:  07/12/20 0248    Specimen:  Blood Updated:  07/12/20 0345     Glucose 106 mg/dL      BUN 6 mg/dL      Creatinine 0.53 mg/dL      Sodium 145 mmol/L      Potassium 2.8 mmol/L      Chloride 104 mmol/L      CO2 32.0 mmol/L      Calcium 9.1 mg/dL      eGFR Non African Amer 107 mL/min/1.73      BUN/Creatinine Ratio 11.3     Anion Gap 9.0 mmol/L     Narrative:       GFR Normal >60  Chronic Kidney Disease <60  Kidney Failure <15      CBC & Differential [[id number]] Collected:  07/12/20 0248    Specimen:  Blood Updated:  07/12/20 0331    Narrative:       The following orders were created for panel order CBC & Differential.  Procedure                               Abnormality         Status                     ---------                                -----------         ------                     CBC Auto Differential[[id number]]        Abnormal            Final result                 Please view results for these tests on the individual orders.    CBC Auto Differential [[id number]]  (Abnormal) Collected:  07/12/20 0248    Specimen:  Blood Updated:  07/12/20 0331     WBC 7.39 10*3/mm3      RBC 3.97 10*6/mm3      Hemoglobin 11.8 g/dL      Hematocrit 36.1 %      MCV 90.9 fL      MCH 29.7 pg      MCHC 32.7 g/dL      RDW 12.6 %      RDW-SD 41.4 fl      MPV 10.1 fL      Platelets 129 10*3/mm3      Neutrophil % 76.8 %      Lymphocyte % 9.3 %      Monocyte % 9.5 %      Eosinophil % 2.2 %      Basophil % 1.1 %      Immature Grans % 1.1 %      Neutrophils, Absolute 5.68 10*3/mm3      Lymphocytes, Absolute 0.69 10*3/mm3      Monocytes, Absolute 0.70 10*3/mm3      Eosinophils, Absolute 0.16 10*3/mm3      Basophils, Absolute 0.08 10*3/mm3      Immature Grans, Absolute 0.08 10*3/mm3      nRBC 0.0 /100 WBC     Basic Metabolic Panel [[id number]]  (Abnormal) Collected:  07/11/20 0500    Specimen:  Blood Updated:  07/11/20 0755     Glucose 108 mg/dL      BUN 8 mg/dL      Creatinine 0.67 mg/dL      Sodium 147 mmol/L      Potassium 3.4 mmol/L      Chloride 106 mmol/L      CO2 30.0 mmol/L      Calcium 9.1 mg/dL      eGFR Non African Amer 81 mL/min/1.73      BUN/Creatinine Ratio 11.9     Anion Gap 11.0 mmol/L     Narrative:       GFR Normal >60  Chronic Kidney Disease <60  Kidney Failure <15      Vancomycin, Trough Please draw vancomycin trough one hour before the next dose is due at 0600. [[id number]]  (Normal) Collected:  07/11/20 0500    Specimen:  Blood Updated:  07/11/20 0600     Vancomycin Trough 6.80 mcg/mL     CBC & Differential [[id number]] Collected:  07/11/20 0500    Specimen:  Blood Updated:  07/11/20 0550    Narrative:       The following orders were created for panel order CBC & Differential.  Procedure                               Abnormality          Status                     ---------                               -----------         ------                     CBC Auto Differential[[id number]]        Abnormal            Final result                 Please view results for these tests on the individual orders.    CBC Auto Differential [[id number]]  (Abnormal) Collected:  07/11/20 0500    Specimen:  Blood Updated:  07/11/20 0550     WBC 6.82 10*3/mm3      RBC 3.52 10*6/mm3      Hemoglobin 10.6 g/dL      Hematocrit 32.8 %      MCV 93.2 fL      MCH 30.1 pg      MCHC 32.3 g/dL      RDW 13.0 %      RDW-SD 44.0 fl      MPV 11.2 fL      Platelets 111 10*3/mm3      Neutrophil % 77.6 %      Lymphocyte % 10.3 %      Monocyte % 8.9 %      Eosinophil % 1.9 %      Basophil % 0.6 %      Immature Grans % 0.7 %      Neutrophils, Absolute 5.29 10*3/mm3      Lymphocytes, Absolute 0.70 10*3/mm3      Monocytes, Absolute 0.61 10*3/mm3      Eosinophils, Absolute 0.13 10*3/mm3      Basophils, Absolute 0.04 10*3/mm3      Immature Grans, Absolute 0.05 10*3/mm3      nRBC 0.0 /100 WBC     Basic Metabolic Panel [[id number]]  (Abnormal) Collected:  07/10/20 0448    Specimen:  Blood Updated:  07/10/20 0543     Glucose 108 mg/dL      BUN 12 mg/dL      Creatinine 0.74 mg/dL      Sodium 145 mmol/L      Potassium 3.2 mmol/L      Chloride 106 mmol/L      CO2 29.0 mmol/L      Calcium 8.8 mg/dL      eGFR Non African Amer 72 mL/min/1.73      BUN/Creatinine Ratio 16.2     Anion Gap 10.0 mmol/L     Narrative:       GFR Normal >60  Chronic Kidney Disease <60  Kidney Failure <15      CBC & Differential [[id number]] Collected:  07/10/20 0448    Specimen:  Blood Updated:  07/10/20 0539    Narrative:       The following orders were created for panel order CBC & Differential.  Procedure                               Abnormality         Status                     ---------                               -----------         ------                     CBC Auto Differential[[id number]]        Abnormal             Final result                 Please view results for these tests on the individual orders.    CBC Auto Differential [[id number]]  (Abnormal) Collected:  07/10/20 0448    Specimen:  Blood Updated:  07/10/20 0539     WBC 9.00 10*3/mm3      RBC 3.51 10*6/mm3      Hemoglobin 10.6 g/dL      Hematocrit 33.0 %      MCV 94.0 fL      MCH 30.2 pg      MCHC 32.1 g/dL      RDW 13.2 %      RDW-SD 45.2 fl      MPV 11.5 fL      Platelets 103 10*3/mm3      Neutrophil % 82.9 %      Lymphocyte % 6.7 %      Monocyte % 8.3 %      Eosinophil % 0.9 %      Basophil % 0.8 %      Neutrophils, Absolute 7.46 10*3/mm3      Lymphocytes, Absolute 0.60 10*3/mm3      Monocytes, Absolute 0.75 10*3/mm3      Eosinophils, Absolute 0.08 10*3/mm3      Basophils, Absolute 0.07 10*3/mm3           Culture Results:    Blood Culture   Date Value Ref Range Status   07/08/2020 No growth at 4 days  Preliminary   07/08/2020 No growth at 4 days  Preliminary         Radiology:   Imaging Results (Last 72 Hours)     Procedure Component Value Units Date/Time    CT Head Without Contrast [[id number]] Collected:  07/12/20 1007     Updated:  07/12/20 1012    Narrative:       EXAMINATION:   CT HEAD WO CONTRAST-  7/12/2020 10:07 AM CDT     HISTORY: CT BRAIN without contrast 7/12/2020 9:46 AM CDT     HISTORY: Neuro deficit     COMPARISON: None      DLP: 688 mGy cm     TECHNIQUE: Serial axial tomographic images of the brain were obtained  without the use of intravenous contrast.      FINDINGS:   The midline structures are nondisplaced. There is mild cerebral and  cerebellar volume loss, with an associated increase in the prominence of  the ventricles and sulci. The basilar cisterns are normal in size and  configuration. There is no evidence of intracranial hemorrhage or  mass-effect. There is low attenuation in the periventricular white  matter, consistent with chronic ischemic change.     Old lacunar infarctions present right basal ganglia region.     There are no abnormal  extra-axial fluid collections. There is no  evidence of tonsillar herniation.      The included orbits and their contents are unremarkable. The visualized  paranasal sinuses, mastoid air cells and middle ear cavities are clear.  The visualized osseous structures and overlying soft tissues of the  skull and face are intact.        Impression:       Mild cerebral and cerebellar volume loss with chronic microvascular  disease but no evidence of acute intracranial process.        This report was finalized on 07/12/2020 10:09 by Dr. Dimas Arcos MD.            Assessment/Plan       Sepsis (CMS/HCC)    Pneumonia due to infectious organism  Leukocytosis - improving  Fever at [facility] - resolved    RECOMMENDATION:   · Review [facility] records - CT  · Continue current regimen as improving   · Follow up re: cultures at [place]        Ligia Dominguez MD  07/12/20  15:18

## 2020-07-12 NOTE — PROGRESS NOTES
"Pharmacy Dosing Service  Pharmacokinetics  Vancomycin Follow-up Evaluation     Assessment/Action/Plan:  Vancomycin dose adjusted to 750mg iv q12h.  SCr = 0.53 (down from 0.67)   Previously on 7/11/220 0500 Vancomycin trough = 6.8 (23 hours post 750mg dose). Pharmacy will continue to monitor renal function and adjust dose accordingly.     Per [employer] calculator:  Loading dose: none  Regimen: 750 mg every 12 hours  Start time: 16:42 on 07/12/2020  Exposure target: AUC24 (range)400-600 mg/L.hr  AUC24,ss: 505 mg/L.hr  PAUC*: 92 %  Ctrough,ss: 15.8 mg/L  Pconc*: 12 %  Tox.: 11 %        Subjective:  Sunny Lacey is a 89 y.o. female currently on Vancomycin for the treatment of sepsis; pneumonia, day 4 of therapy (Current vancomycin end date: 7/13/2020).    Objective:  Ht: 170.2 cm (67\"); Wt: 80.5 kg (177 lb 6 oz)  Estimated Creatinine Clearance: 42.9 mL/min (A) (by C-G formula based on SCr of 0.53 mg/dL (L)).   Lab Results   Component Value Date    CREATININE 0.53 (L) 07/12/2020    CREATININE 0.67 07/11/2020    CREATININE 0.74 07/10/2020      Lab Results   Component Value Date    WBC 7.39 07/12/2020    WBC 6.82 07/11/2020    WBC 9.00 07/10/2020         Lab Results   Component Value Date    VANCOTROUGH 6.80 07/11/2020       Culture Results:  Microbiology Results (last 10 days)       Procedure Component Value - Date/Time    Respiratory Panel, PCR - Swab, Nasopharynx [[id number]]  (Normal) Collected:  07/08/20 0505    Lab Status:  Final result Specimen:  Swab from Nasopharynx Updated:  07/08/20 0697     ADENOVIRUS, PCR Not Detected     Coronavirus 229E Not Detected     Coronavirus HKU1 Not Detected     Coronavirus NL63 Not Detected     Coronavirus OC43 Not Detected     Human Metapneumovirus Not Detected     Human Rhinovirus/Enterovirus Not Detected     Influenza B PCR Not Detected     Parainfluenza Virus 1 Not Detected     Parainfluenza Virus 2 Not Detected     Parainfluenza Virus 3 Not Detected     Parainfluenza " Virus 4 Not Detected     Bordetella pertussis pcr Not Detected     Influenza A H1 2009 PCR Not Detected     Chlamydophila pneumoniae PCR Not Detected     Mycoplasma pneumo by PCR Not Detected     Influenza A PCR Not Detected     Influenza A H3 Not Detected     Influenza A H1 Not Detected     RSV, PCR Not Detected     Bordetella parapertussis PCR Not Detected    Narrative:       The coronavirus on the RVP is NOT COVID-19 and is NOT indicative of infection with COVID-19.     Blood Culture - Blood, Arm, Right [[id number]] Collected:  07/08/20 0445    Lab Status:  Preliminary result Specimen:  Blood from Arm, Right Updated:  07/12/20 0500     Blood Culture No growth at 4 days    Blood Culture - Blood, Hand, Right [[id number]] Collected:  07/08/20 0445    Lab Status:  Preliminary result Specimen:  Blood from Hand, Right Updated:  07/12/20 0500     Blood Culture No growth at 4 days    COVID PRE-OP / PRE-PROCEDURE SCREENING ORDER (NO ISOLATION) - Swab, Nasopharynx [[id number]] Collected:  07/08/20 0055    Lab Status:  Final result Specimen:  Swab from Nasopharynx Updated:  07/08/20 0154    Narrative:       The following orders were created for panel order COVID PRE-OP / PRE-PROCEDURE SCREENING ORDER (NO ISOLATION) - Swab, Nasopharynx.  Procedure                               Abnormality         Status                     ---------                               -----------         ------                     COVID-19,CEPHEID,COR/LUIS/...[[id number]]  Normal              Final result                 Please view results for these tests on the individual orders.    COVID-19,CEPHEID,COR/LUIS/PAD IN-HOUSE(OR EMERGENT/ADD-ON),NP SWAB IN TRANSPORT MEDIA 3-4 HR TAT - Swab, Nasopharynx [[id number]]  (Normal) Collected:  07/08/20 0055    Lab Status:  Final result Specimen:  Swab from Nasopharynx Updated:  07/08/20 0154     COVID19 Not Detected    Narrative:       Fact sheet for providers: https://www.fda.gov/media/848547/download     Fact sheet  for patients: https://www.fda.gov/media/619092/download            Rafael Jack, PharmD   07/12/20 14:48

## 2020-07-12 NOTE — CONSULTS
Neurology Consult Note    Patient:  Sunny Lacey   YOB: 1921  MRN:  [id number]  Date of Admission:  7/8/2020 12:46 AM    Date: 7/12/2020    Referring Provider: Phong Joseph MD   Reason for Consultation:   delirium    History of present illness:     This is a 89 y.o.  female admitted with chills, fever, tachypnea and a chest CT + for pneumonia evaluated for delirium.Patient is Covid negative.She also has a H/O renal insufficiency, anxiety and osteoarthritis, hypertension.   Granddaughter is in room and states patient lives in assistant living but is normally quite sharp  Nursing states she has been like this all day and they have not noticed a change or improvement.     However patient is able to express that she need to use bathroom. She has not been eating but has been taking her pills in applesauce. She does not like applesauce however.  Also she is hard of hearing so sometimes she does not answer because she does not hear what is said.    She is on vancomycin and Zosyn.   Of note patient is on Norco 5/325 mg every 6 hours and Xanax 0.5 mg q day.    She has been hypokalemic and today's K+ is only 2.8   She is anemic with a hemoglobin of 10.6 improved today to 112.8. 4 days ago her WBC was up to 14,940 and now I normal.     She had a Head CT which did not show any acute changes.   Past Medical History:   Diagnosis Date   • Anemia    • Anxiety    • Arthritis    • Hypertension    • Skin cancer    PAST MEDICAL HISTORY:   1.  Hypertension.    2.  Hyperlipidemia.   3.  Renal insufficiency.   4.  Anxiety.   5.  Depression.   6.  Osteoarthritis.   7.  Osteoporosis.   8.  Shingles.        Past Surgical History:   Procedure Laterality Date   • SKIN CANCER EXCISION     1.  Hip replacement bilaterally.  2.  Knee replacement unilaterally.   3.  Bilateral cataract removal with intraocular lens implant.   4.  Hemorrhoidectomy.  5.  Total abdominal hysterectomy.  6.  Back surgery.     Prior to  Admission medications    Medication Sig Start Date End Date Taking? Authorizing Provider   acetaminophen (TYLENOL) 325 MG tablet Take 650 mg by mouth 2 (two) times a day.   Yes Milton Diaz MD   ALPRAZolam (XANAX) 0.25 MG tablet Take 0.25 mg by mouth every night at bedtime.   Yes Milton Diaz MD   amLODIPine (NORVASC) 5 MG tablet Take 5 mg by mouth Daily.   Yes Milton Diaz MD   bimatoprost (LUMIGAN) 0.01 % ophthalmic drops Administer 1 drop to both eyes Every Night.   Yes Milton Diaz MD   brimonidine-timolol (COMBIGAN) 0.2-0.5 % ophthalmic solution Administer 1 drop to the right eye Every 12 (Twelve) Hours.   Yes Milton Diaz MD   Calcium Carbonate-Vit D-Min (CALCIUM 1200 PO) Take 600 mg by mouth 2 (two) times a day.   Yes Milton Diaz MD   cefdinir (OMNICEF) 300 MG capsule Take 300 mg by mouth 2 (Two) Times a Day.   Yes Milton Diaz MD   cetirizine (zyrTEC) 10 MG tablet Take 10 mg by mouth Daily.   Yes Milton Diaz MD   citalopram (CeleXA) 20 MG tablet Take 20 mg by mouth Daily.   Yes Milton Diaz MD   diclofenac (VOLTAREN) 1 % gel gel Apply 4 g topically to the appropriate area as directed 4 (Four) Times a Day As Needed.   Yes Milton Diaz MD   docusate sodium (COLACE) 100 MG capsule Take 100 mg by mouth Daily.   Yes Milton Diaz MD   ferrous sulfate 325 (65 FE) MG tablet Take 325 mg by mouth Daily With Breakfast.   Yes Milton Diaz MD   furosemide (LASIX) 20 MG tablet Take 20 mg by mouth 2 (Two) Times a Day.   Yes Milton Diaz MD   HYDROcodone-acetaminophen (NORCO)  MG per tablet Take 1 tablet by mouth Every 4 (Four) Hours As Needed (Q4-6H PRN, max 3 per day).   Yes Milton Diaz MD   potassium chloride (K-DUR,KLOR-CON) 10 MEQ CR tablet Take 10 mEq by mouth Daily.   Yes Milton Diaz MD   pregabalin (LYRICA) 100 MG capsule Take 100 mg by mouth 3 (Three) Times a Day.   Yes  ProviderMilton MD   albuterol sulfate  (90 Base) MCG/ACT inhaler Inhale 2 puffs Every 4 (Four) Hours As Needed for Wheezing or Shortness of Air.    Milton Diaz MD   ALPRAZolam (XANAX) 0.25 MG tablet Take 0.25 mg by mouth Daily As Needed for Anxiety.    Milton Diaz MD   lidocaine (LMX) 4 % cream Apply  topically to the appropriate area as directed 4 (Four) Times a Day As Needed for Mild Pain .    Milton Diaz MD       Hospital scheduled medications:     acetaminophen 650 mg Oral BID   ALPRAZolam 0.5 mg Oral Daily   amLODIPine 5 mg Oral Daily   brimonidine-timolol 1 drop Right Eye Q12H   citalopram 20 mg Oral Daily   diclofenac 4 g Topical 4x Daily   docusate sodium 100 mg Oral Daily   enoxaparin 40 mg Subcutaneous Q24H   ferrous sulfate 325 mg Oral Daily With Breakfast   latanoprost 1 drop Both Eyes Nightly   piperacillin-tazobactam 3.375 g Intravenous Q8H   potassium chloride 20 mEq Oral BID With Meals   sodium chloride 10 mL Intravenous Q12H   vancomycin 750 mg Intravenous Q12H     Hospital PRN medications:  albuterol  •  ALPRAZolam  •  glycerin  •  HYDROcodone-acetaminophen  •  lidocaine  •  Pharmacy Consult - Pharmacy to dose  •  Pharmacy to Dose enoxaparin (LOVENOX)  •  sodium chloride  •  sodium chloride    No Known Allergies    Social History     Socioeconomic History   • Marital status:      Spouse name: Not on file   • Number of children: Not on file   • Years of education: Not on file   • Highest education level: Not on file   Tobacco Use   • Smoking status: Never Smoker   Substance and Sexual Activity   • Alcohol use: Never     Frequency: Never   • Drug use: Never   • Sexual activity: Defer     History reviewed. No pertinent family history.    Review of Systems  A 14 point review of systems was reviewed but she only partially answered but what was answered was negative except for needing to go to bathroom    Vital Signs   Temp:  [97.6 °F (36.4 °C)-98.5 °F  (36.9 °C)] 98.5 °F (36.9 °C)  Heart Rate:  [79-95] 85  Resp:  [20] 20  BP: (118-158)/(67-84) 149/70    General Exam:  Head:  Normal cephalic, atraumatic  HEENT:  Neck supple  Fundoscopic Exam:  No signs of disc edema  CVS:  Regular rate and rhythm.  No murmurs  Carotid Examination:  No bruits  Lungs:  Clear to auscultation  Abdomen:  Non-tender, Non-distended  Extremities:  No signs of peripheral edema  Skin:  No rashes    Neurologic Exam:    Mental Status:    -Awake, Alert, Oriented to person.  Knows she is in [place] and in a hospital Knows it is summer and thinks the month is August  Knows the year is 2020. Cannot name the president or describe him. Began to get a bit agitated by questions.   -No word finding difficulties  -No aphasia  -No dysarthria  -Follows some simple  commands    CN II:  Visual fields full.to threat  Pupils equally reactive to light  CN III, IV, VI:  Extraocular Muscles full with no signs of nystagmus  CN V:  Facial sensory is symmetric   CN VII:  Facial motor symmetric  CN VIII:  Gross hearing intact bilaterally  CN IX/X:  Palate elevates symmetrically  CN XI:  Shoulder shrug symmetric  CN XII:  Tongue is midline on protrusion    Motor: (strength out of 5:  1= minimal movement, 2 = movement in plane of gravity, 3 = movement against gravity, 4 = movement against some resistance, 5 = full strength)    She spontaneously moves all 4 extremities and has a good hand  and can pull up (biceps) but she would not actively resist on other muscle testing.     DTR:  -Right   Bicep: 2+ Triceps: 2+ Brachioradialis: 2+   Patella: 2+ Ankle: 2+ Neg Babinski  -Left   Bicep: 2+ Triceps: 2+ Brachioradialis: 2+   Patella: 2+ Ankle: 2+ Neg Babinski    Sensory:  -Intact to light touch, pinprick, temperature, pain, and proprioception    Coordination:  -Finger to nose/Heel to shin --was unable to get her to do -No ataxia with movements seen    Gait  -Not tested for safety reasons.       Results Review:  Lab  Results (last 7 days)     Procedure Component Value Units Date/Time    Blood Culture - Blood, Arm, Right [[id number]] Collected:  07/08/20 0445    Specimen:  Blood from Arm, Right Updated:  07/12/20 0500     Blood Culture No growth at 4 days    Blood Culture - Blood, Hand, Right [[id number]] Collected:  07/08/20 0445    Specimen:  Blood from Hand, Right Updated:  07/12/20 0500     Blood Culture No growth at 4 days    Basic Metabolic Panel [[id number]]  (Abnormal) Collected:  07/12/20 0248    Specimen:  Blood Updated:  07/12/20 0345     Glucose 106 mg/dL      BUN 6 mg/dL      Creatinine 0.53 mg/dL      Sodium 145 mmol/L      Potassium 2.8 mmol/L      Chloride 104 mmol/L      CO2 32.0 mmol/L      Calcium 9.1 mg/dL      eGFR Non African Amer 107 mL/min/1.73      BUN/Creatinine Ratio 11.3     Anion Gap 9.0 mmol/L     Narrative:       GFR Normal >60  Chronic Kidney Disease <60  Kidney Failure <15      CBC & Differential [[id number]] Collected:  07/12/20 0248    Specimen:  Blood Updated:  07/12/20 0331    Narrative:       The following orders were created for panel order CBC & Differential.  Procedure                               Abnormality         Status                     ---------                               -----------         ------                     CBC Auto Differential[[id number]]        Abnormal            Final result                 Please view results for these tests on the individual orders.    CBC Auto Differential [[id number]]  (Abnormal) Collected:  07/12/20 0248    Specimen:  Blood Updated:  07/12/20 0331     WBC 7.39 10*3/mm3      RBC 3.97 10*6/mm3      Hemoglobin 11.8 g/dL      Hematocrit 36.1 %      MCV 90.9 fL      MCH 29.7 pg      MCHC 32.7 g/dL      RDW 12.6 %      RDW-SD 41.4 fl      MPV 10.1 fL      Platelets 129 10*3/mm3      Neutrophil % 76.8 %      Lymphocyte % 9.3 %      Monocyte % 9.5 %      Eosinophil % 2.2 %      Basophil % 1.1 %      Immature Grans % 1.1 %      Neutrophils, Absolute 5.68  10*3/mm3      Lymphocytes, Absolute 0.69 10*3/mm3      Monocytes, Absolute 0.70 10*3/mm3      Eosinophils, Absolute 0.16 10*3/mm3      Basophils, Absolute 0.08 10*3/mm3      Immature Grans, Absolute 0.08 10*3/mm3      nRBC 0.0 /100 WBC     Basic Metabolic Panel [[id number]]  (Abnormal) Collected:  07/11/20 0500    Specimen:  Blood Updated:  07/11/20 0755     Glucose 108 mg/dL      BUN 8 mg/dL      Creatinine 0.67 mg/dL      Sodium 147 mmol/L      Potassium 3.4 mmol/L      Chloride 106 mmol/L      CO2 30.0 mmol/L      Calcium 9.1 mg/dL      eGFR Non African Amer 81 mL/min/1.73      BUN/Creatinine Ratio 11.9     Anion Gap 11.0 mmol/L     Narrative:       GFR Normal >60  Chronic Kidney Disease <60  Kidney Failure <15      Vancomycin, Trough Please draw vancomycin trough one hour before the next dose is due at 0600. [[id number]]  (Normal) Collected:  07/11/20 0500    Specimen:  Blood Updated:  07/11/20 0600     Vancomycin Trough 6.80 mcg/mL     CBC & Differential [[id number]] Collected:  07/11/20 0500    Specimen:  Blood Updated:  07/11/20 0550    Narrative:       The following orders were created for panel order CBC & Differential.  Procedure                               Abnormality         Status                     ---------                               -----------         ------                     CBC Auto Differential[[id number]]        Abnormal            Final result                 Please view results for these tests on the individual orders.    CBC Auto Differential [[id number]]  (Abnormal) Collected:  07/11/20 0500    Specimen:  Blood Updated:  07/11/20 0550     WBC 6.82 10*3/mm3      RBC 3.52 10*6/mm3      Hemoglobin 10.6 g/dL      Hematocrit 32.8 %      MCV 93.2 fL      MCH 30.1 pg      MCHC 32.3 g/dL      RDW 13.0 %      RDW-SD 44.0 fl      MPV 11.2 fL      Platelets 111 10*3/mm3      Neutrophil % 77.6 %      Lymphocyte % 10.3 %      Monocyte % 8.9 %      Eosinophil % 1.9 %      Basophil % 0.6 %      Immature  Grans % 0.7 %      Neutrophils, Absolute 5.29 10*3/mm3      Lymphocytes, Absolute 0.70 10*3/mm3      Monocytes, Absolute 0.61 10*3/mm3      Eosinophils, Absolute 0.13 10*3/mm3      Basophils, Absolute 0.04 10*3/mm3      Immature Grans, Absolute 0.05 10*3/mm3      nRBC 0.0 /100 WBC     Basic Metabolic Panel [[id number]]  (Abnormal) Collected:  07/10/20 0448    Specimen:  Blood Updated:  07/10/20 0543     Glucose 108 mg/dL      BUN 12 mg/dL      Creatinine 0.74 mg/dL      Sodium 145 mmol/L      Potassium 3.2 mmol/L      Chloride 106 mmol/L      CO2 29.0 mmol/L      Calcium 8.8 mg/dL      eGFR Non African Amer 72 mL/min/1.73      BUN/Creatinine Ratio 16.2     Anion Gap 10.0 mmol/L     Narrative:       GFR Normal >60  Chronic Kidney Disease <60  Kidney Failure <15      CBC & Differential [[id number]] Collected:  07/10/20 0448    Specimen:  Blood Updated:  07/10/20 0539    Narrative:       The following orders were created for panel order CBC & Differential.  Procedure                               Abnormality         Status                     ---------                               -----------         ------                     CBC Auto Differential[[id number]]        Abnormal            Final result                 Please view results for these tests on the individual orders.    CBC Auto Differential [[id number]]  (Abnormal) Collected:  07/10/20 0448    Specimen:  Blood Updated:  07/10/20 0539     WBC 9.00 10*3/mm3      RBC 3.51 10*6/mm3      Hemoglobin 10.6 g/dL      Hematocrit 33.0 %      MCV 94.0 fL      MCH 30.2 pg      MCHC 32.1 g/dL      RDW 13.2 %      RDW-SD 45.2 fl      MPV 11.5 fL      Platelets 103 10*3/mm3      Neutrophil % 82.9 %      Lymphocyte % 6.7 %      Monocyte % 8.3 %      Eosinophil % 0.9 %      Basophil % 0.8 %      Neutrophils, Absolute 7.46 10*3/mm3      Lymphocytes, Absolute 0.60 10*3/mm3      Monocytes, Absolute 0.75 10*3/mm3      Eosinophils, Absolute 0.08 10*3/mm3      Basophils, Absolute 0.07  10*3/mm3     Basic Metabolic Panel [[id number]]  (Abnormal) Collected:  07/09/20 0557    Specimen:  Blood Updated:  07/09/20 0649     Glucose 106 mg/dL      BUN 22 mg/dL      Creatinine 1.15 mg/dL      Sodium 146 mmol/L      Potassium 3.4 mmol/L      Chloride 106 mmol/L      CO2 31.0 mmol/L      Calcium 8.6 mg/dL      eGFR Non African Amer 44 mL/min/1.73      BUN/Creatinine Ratio 19.1     Anion Gap 9.0 mmol/L     Narrative:       GFR Normal >60  Chronic Kidney Disease <60  Kidney Failure <15      CBC & Differential [[id number]] Collected:  07/09/20 0557    Specimen:  Blood Updated:  07/09/20 0637    Narrative:       The following orders were created for panel order CBC & Differential.  Procedure                               Abnormality         Status                     ---------                               -----------         ------                     CBC Auto Differential[[id number]]        Abnormal            Final result                 Please view results for these tests on the individual orders.    CBC Auto Differential [[id number]]  (Abnormal) Collected:  07/09/20 0557    Specimen:  Blood Updated:  07/09/20 0637     WBC 11.03 10*3/mm3      RBC 3.61 10*6/mm3      Hemoglobin 10.8 g/dL      Hematocrit 34.6 %      MCV 95.8 fL      MCH 29.9 pg      MCHC 31.2 g/dL      RDW 13.3 %      RDW-SD 47.5 fl      MPV 11.6 fL      Platelets 85 10*3/mm3      Neutrophil % 77.8 %      Lymphocyte % 9.2 %      Monocyte % 10.9 %      Eosinophil % 0.4 %      Basophil % 0.5 %      Neutrophils, Absolute 8.58 10*3/mm3      Lymphocytes, Absolute 1.02 10*3/mm3      Monocytes, Absolute 1.20 10*3/mm3      Eosinophils, Absolute 0.04 10*3/mm3      Basophils, Absolute 0.06 10*3/mm3     Lactic Acid, Plasma [[id number]]  (Normal) Collected:  07/08/20 1256    Specimen:  Blood Updated:  07/08/20 1323     Lactate 1.3 mmol/L     Respiratory Panel, PCR - Swab, Nasopharynx [[id number]]  (Normal) Collected:  07/08/20 0505    Specimen:  Swab from  Nasopharynx Updated:  07/08/20 0652     ADENOVIRUS, PCR Not Detected     Coronavirus 229E Not Detected     Coronavirus HKU1 Not Detected     Coronavirus NL63 Not Detected     Coronavirus OC43 Not Detected     Human Metapneumovirus Not Detected     Human Rhinovirus/Enterovirus Not Detected     Influenza B PCR Not Detected     Parainfluenza Virus 1 Not Detected     Parainfluenza Virus 2 Not Detected     Parainfluenza Virus 3 Not Detected     Parainfluenza Virus 4 Not Detected     Bordetella pertussis pcr Not Detected     Influenza A H1 2009 PCR Not Detected     Chlamydophila pneumoniae PCR Not Detected     Mycoplasma pneumo by PCR Not Detected     Influenza A PCR Not Detected     Influenza A H3 Not Detected     Influenza A H1 Not Detected     RSV, PCR Not Detected     Bordetella parapertussis PCR Not Detected    Narrative:       The coronavirus on the RVP is NOT COVID-19 and is NOT indicative of infection with COVID-19.     CBC & Differential [[id number]] Collected:  07/08/20 0445    Specimen:  Blood Updated:  07/08/20 0520    Narrative:       The following orders were created for panel order CBC & Differential.  Procedure                               Abnormality         Status                     ---------                               -----------         ------                     CBC Auto Differential[[id number]]        Abnormal            Final result                 Please view results for these tests on the individual orders.    CBC Auto Differential [[id number]]  (Abnormal) Collected:  07/08/20 0445    Specimen:  Blood Updated:  07/08/20 0520     WBC 14.94 10*3/mm3      RBC 3.93 10*6/mm3      Hemoglobin 11.9 g/dL      Hematocrit 37.5 %      MCV 95.4 fL      MCH 30.3 pg      MCHC 31.7 g/dL      RDW 13.3 %      RDW-SD 46.8 fl      MPV 11.1 fL      Platelets 90 10*3/mm3     Manual Differential [[id number]]  (Abnormal) Collected:  07/08/20 0445    Specimen:  Blood Updated:  07/08/20 0520     Neutrophil % 61.0 %       Lymphocyte % 3.0 %      Monocyte % 5.0 %      Bands %  24.0 %      Metamyelocyte % 6.0 %      Atypical Lymphocyte % 1.0 %      Neutrophils Absolute 12.70 10*3/mm3      Lymphocytes Absolute 0.45 10*3/mm3      Monocytes Absolute 0.75 10*3/mm3      Polychromasia Slight/1+     WBC Morphology Normal     Platelet Estimate Decreased    Basic Metabolic Panel [[id number]]  (Abnormal) Collected:  07/08/20 0445    Specimen:  Blood Updated:  07/08/20 0508     Glucose 114 mg/dL      BUN 20 mg/dL      Creatinine 1.26 mg/dL      Sodium 145 mmol/L      Potassium 3.5 mmol/L      Chloride 105 mmol/L      CO2 30.0 mmol/L      Calcium 9.2 mg/dL      eGFR Non African Amer 39 mL/min/1.73      BUN/Creatinine Ratio 15.9     Anion Gap 10.0 mmol/L     Narrative:       GFR Normal >60  Chronic Kidney Disease <60  Kidney Failure <15      Lactic Acid, Plasma [[id number]]  (Normal) Collected:  07/08/20 0445    Specimen:  Blood Updated:  07/08/20 0508     Lactate 1.6 mmol/L     COVID PRE-OP / PRE-PROCEDURE SCREENING ORDER (NO ISOLATION) - Swab, Nasopharynx [[id number]] Collected:  07/08/20 0055    Specimen:  Swab from Nasopharynx Updated:  07/08/20 0154    Narrative:       The following orders were created for panel order COVID PRE-OP / PRE-PROCEDURE SCREENING ORDER (NO ISOLATION) - Swab, Nasopharynx.  Procedure                               Abnormality         Status                     ---------                               -----------         ------                     COVID-19,CEPHEID,COR/LUIS/...[[id number]]  Normal              Final result                 Please view results for these tests on the individual orders.    COVID-19,CEPHEID,COR/LUIS/PAD IN-HOUSE(OR EMERGENT/ADD-ON),NP SWAB IN TRANSPORT MEDIA 3-4 HR TAT - Swab, Nasopharynx [[id number]]  (Normal) Collected:  07/08/20 0055    Specimen:  Swab from Nasopharynx Updated:  07/08/20 0154     COVID19 Not Detected    Narrative:       Fact sheet for providers:  https://www.fda.gov/media/434077/download     Fact sheet for patients: https://www.fda.gov/media/091672/download          .  Imaging Results (Last 24 Hours)     Procedure Component Value Units Date/Time    CT Head Without Contrast [[id number]] Collected:  07/12/20 1007     Updated:  07/12/20 1012    Narrative:       EXAMINATION:   CT HEAD WO CONTRAST-  7/12/2020 10:07 AM CDT     HISTORY: CT BRAIN without contrast 7/12/2020 9:46 AM CDT     HISTORY: Neuro deficit     COMPARISON: None      DLP: 688 mGy cm     TECHNIQUE: Serial axial tomographic images of the brain were obtained  without the use of intravenous contrast.      FINDINGS:   The midline structures are nondisplaced. There is mild cerebral and  cerebellar volume loss, with an associated increase in the prominence of  the ventricles and sulci. The basilar cisterns are normal in size and  configuration. There is no evidence of intracranial hemorrhage or  mass-effect. There is low attenuation in the periventricular white  matter, consistent with chronic ischemic change.     Old lacunar infarctions present right basal ganglia region.     There are no abnormal extra-axial fluid collections. There is no  evidence of tonsillar herniation.      The included orbits and their contents are unremarkable. The visualized  paranasal sinuses, mastoid air cells and middle ear cavities are clear.  The visualized osseous structures and overlying soft tissues of the  skull and face are intact.        Impression:       Mild cerebral and cerebellar volume loss with chronic microvascular  disease but no evidence of acute intracranial process.        This report was finalized on 07/12/2020 10:09 by Dr. Dimas Arcos MD.              Impression  1. Suspect some underlying cognitive impairment superimposed on metabolic encephalopathy  2.  Medications in this 89 year old that would contribute to cognitive issues include xanax and Norco.   3. Pneumonia/infection  will contribute to  cognitive deficits  4. Anemia  5. Hypokalemia    Plan  Recommend reduction of nighttime xanax to 0.25 and a rare prn 0.25 mg xanax.  Goal is to stop this in the future if at all possible.  Recommend reduced dosage of Norco as well with goal of stopping this if possible.   Defer treatment of hypokalemia to Dr Joseph     Its a nonfocal exam but was not fully able to form a complete exam due to her inability to comprehend some commands for testing.  For now I do not see the need to obtain a MRI unless the exam progresses or she worsens.       I discussed the patients findings and my recommendations with patient, family and nursing staff    Seda Poe MD  07/12/20  17:51

## 2020-07-13 PROBLEM — E87.6 HYPOKALEMIA: Status: ACTIVE | Noted: 2020-07-13

## 2020-07-13 PROBLEM — Z91.81 AT RISK FOR FALLS: Status: ACTIVE | Noted: 2020-07-13

## 2020-07-13 LAB
AMMONIA BLD-SCNC: 36 UMOL/L (ref 11–51)
ANION GAP SERPL CALCULATED.3IONS-SCNC: 11 MMOL/L (ref 5–15)
BACTERIA SPEC AEROBE CULT: NORMAL
BACTERIA SPEC AEROBE CULT: NORMAL
BACTERIA UR QL AUTO: ABNORMAL /HPF
BASOPHILS # BLD AUTO: 0.11 10*3/MM3 (ref 0–0.2)
BASOPHILS NFR BLD AUTO: 1.3 % (ref 0–1.5)
BILIRUB UR QL STRIP: NEGATIVE
BUN SERPL-MCNC: 8 MG/DL (ref 8–23)
BUN/CREAT SERPL: 12.1 (ref 7–25)
CALCIUM SPEC-SCNC: 9.5 MG/DL (ref 8.2–9.6)
CHLORIDE SERPL-SCNC: 106 MMOL/L (ref 98–107)
CLARITY UR: CLEAR
CO2 SERPL-SCNC: 28 MMOL/L (ref 22–29)
COLOR UR: YELLOW
CREAT SERPL-MCNC: 0.66 MG/DL (ref 0.57–1)
D DIMER PPP FEU-MCNC: 1.38 MG/L (FEU) (ref 0–0.5)
DEPRECATED RDW RBC AUTO: 42 FL (ref 37–54)
EOSINOPHIL # BLD AUTO: 0.23 10*3/MM3 (ref 0–0.4)
EOSINOPHIL NFR BLD AUTO: 2.7 % (ref 0.3–6.2)
ERYTHROCYTE [DISTWIDTH] IN BLOOD BY AUTOMATED COUNT: 12.9 % (ref 12.3–15.4)
FERRITIN SERPL-MCNC: 410 NG/ML (ref 13–150)
GFR SERPL CREATININE-BSD FRML MDRD: 83 ML/MIN/1.73
GLUCOSE SERPL-MCNC: 133 MG/DL (ref 65–99)
GLUCOSE UR STRIP-MCNC: NEGATIVE MG/DL
HCT VFR BLD AUTO: 41.5 % (ref 34–46.6)
HGB BLD-MCNC: 13.6 G/DL (ref 12–15.9)
HGB UR QL STRIP.AUTO: ABNORMAL
HYALINE CASTS UR QL AUTO: ABNORMAL /LPF
IMM GRANULOCYTES # BLD AUTO: 0.18 10*3/MM3 (ref 0–0.05)
IMM GRANULOCYTES NFR BLD AUTO: 2.1 % (ref 0–0.5)
IRON 24H UR-MRATE: 58 MCG/DL (ref 37–145)
IRON SATN MFR SERPL: 23 % (ref 20–50)
KETONES UR QL STRIP: NEGATIVE
LEUKOCYTE ESTERASE UR QL STRIP.AUTO: ABNORMAL
LYMPHOCYTES # BLD AUTO: 1.02 10*3/MM3 (ref 0.7–3.1)
LYMPHOCYTES NFR BLD AUTO: 12 % (ref 19.6–45.3)
MAGNESIUM SERPL-MCNC: 2.1 MG/DL (ref 1.7–2.3)
MCH RBC QN AUTO: 29.6 PG (ref 26.6–33)
MCHC RBC AUTO-ENTMCNC: 32.8 G/DL (ref 31.5–35.7)
MCV RBC AUTO: 90.4 FL (ref 79–97)
MONOCYTES # BLD AUTO: 0.77 10*3/MM3 (ref 0.1–0.9)
MONOCYTES NFR BLD AUTO: 9 % (ref 5–12)
NEUTROPHILS NFR BLD AUTO: 6.2 10*3/MM3 (ref 1.7–7)
NEUTROPHILS NFR BLD AUTO: 72.9 % (ref 42.7–76)
NITRITE UR QL STRIP: NEGATIVE
NRBC BLD AUTO-RTO: 0 /100 WBC (ref 0–0.2)
PH UR STRIP.AUTO: 7.5 [PH] (ref 5–8)
PLATELET # BLD AUTO: 168 10*3/MM3 (ref 140–450)
PMV BLD AUTO: 10 FL (ref 6–12)
POTASSIUM SERPL-SCNC: 3 MMOL/L (ref 3.5–5.2)
PROT UR QL STRIP: ABNORMAL
RBC # BLD AUTO: 4.59 10*6/MM3 (ref 3.77–5.28)
RBC # UR: ABNORMAL /HPF
REF LAB TEST METHOD: ABNORMAL
SODIUM SERPL-SCNC: 145 MMOL/L (ref 136–145)
SP GR UR STRIP: 1.01 (ref 1–1.03)
SQUAMOUS #/AREA URNS HPF: ABNORMAL /HPF
TIBC SERPL-MCNC: 252 MCG/DL (ref 298–536)
TRANSFERRIN SERPL-MCNC: 169 MG/DL (ref 200–360)
TSH SERPL DL<=0.05 MIU/L-ACNC: 2.13 UIU/ML (ref 0.27–4.2)
UNIDENT CRYS URNS QL MICRO: ABNORMAL /HPF
UROBILINOGEN UR QL STRIP: ABNORMAL
VIT B12 BLD-MCNC: 611 PG/ML (ref 211–946)
WBC # BLD AUTO: 8.51 10*3/MM3 (ref 3.4–10.8)
WBC UR QL AUTO: ABNORMAL /HPF

## 2020-07-13 PROCEDURE — 84425 ASSAY OF VITAMIN B-1: CPT | Performed by: PSYCHIATRY & NEUROLOGY

## 2020-07-13 PROCEDURE — 85379 FIBRIN DEGRADATION QUANT: CPT | Performed by: PSYCHIATRY & NEUROLOGY

## 2020-07-13 PROCEDURE — 82607 VITAMIN B-12: CPT | Performed by: PSYCHIATRY & NEUROLOGY

## 2020-07-13 PROCEDURE — 25010000002 VANCOMYCIN 10 G RECONSTITUTED SOLUTION: Performed by: FAMILY MEDICINE

## 2020-07-13 PROCEDURE — 82140 ASSAY OF AMMONIA: CPT | Performed by: PSYCHIATRY & NEUROLOGY

## 2020-07-13 PROCEDURE — 99232 SBSQ HOSP IP/OBS MODERATE 35: CPT | Performed by: PSYCHIATRY & NEUROLOGY

## 2020-07-13 PROCEDURE — 82728 ASSAY OF FERRITIN: CPT | Performed by: PSYCHIATRY & NEUROLOGY

## 2020-07-13 PROCEDURE — 93005 ELECTROCARDIOGRAM TRACING: CPT | Performed by: FAMILY MEDICINE

## 2020-07-13 PROCEDURE — 81001 URINALYSIS AUTO W/SCOPE: CPT | Performed by: FAMILY MEDICINE

## 2020-07-13 PROCEDURE — 92526 ORAL FUNCTION THERAPY: CPT

## 2020-07-13 PROCEDURE — 99231 SBSQ HOSP IP/OBS SF/LOW 25: CPT | Performed by: FAMILY MEDICINE

## 2020-07-13 PROCEDURE — 83540 ASSAY OF IRON: CPT | Performed by: PSYCHIATRY & NEUROLOGY

## 2020-07-13 PROCEDURE — 25010000002 ONDANSETRON PER 1 MG: Performed by: FAMILY MEDICINE

## 2020-07-13 PROCEDURE — 94799 UNLISTED PULMONARY SVC/PX: CPT

## 2020-07-13 PROCEDURE — 97116 GAIT TRAINING THERAPY: CPT

## 2020-07-13 PROCEDURE — 84207 ASSAY OF VITAMIN B-6: CPT | Performed by: PSYCHIATRY & NEUROLOGY

## 2020-07-13 PROCEDURE — 84443 ASSAY THYROID STIM HORMONE: CPT | Performed by: PSYCHIATRY & NEUROLOGY

## 2020-07-13 PROCEDURE — 25010000002 PIPERACILLIN SOD-TAZOBACTAM PER 1 G: Performed by: FAMILY MEDICINE

## 2020-07-13 PROCEDURE — 83735 ASSAY OF MAGNESIUM: CPT | Performed by: CLINICAL NURSE SPECIALIST

## 2020-07-13 PROCEDURE — 80048 BASIC METABOLIC PNL TOTAL CA: CPT | Performed by: FAMILY MEDICINE

## 2020-07-13 PROCEDURE — 84466 ASSAY OF TRANSFERRIN: CPT | Performed by: PSYCHIATRY & NEUROLOGY

## 2020-07-13 PROCEDURE — 97110 THERAPEUTIC EXERCISES: CPT

## 2020-07-13 PROCEDURE — 25010000002 ENOXAPARIN PER 10 MG: Performed by: FAMILY MEDICINE

## 2020-07-13 PROCEDURE — 93010 ELECTROCARDIOGRAM REPORT: CPT | Performed by: INTERNAL MEDICINE

## 2020-07-13 PROCEDURE — 85025 COMPLETE CBC W/AUTO DIFF WBC: CPT | Performed by: FAMILY MEDICINE

## 2020-07-13 PROCEDURE — 97161 PT EVAL LOW COMPLEX 20 MIN: CPT

## 2020-07-13 RX ORDER — ONDANSETRON 2 MG/ML
4 INJECTION INTRAMUSCULAR; INTRAVENOUS EVERY 6 HOURS PRN
Status: DISCONTINUED | OUTPATIENT
Start: 2020-07-13 | End: 2020-07-20 | Stop reason: HOSPADM

## 2020-07-13 RX ORDER — ACETAMINOPHEN 325 MG/1
650 TABLET ORAL EVERY 6 HOURS PRN
Status: DISCONTINUED | OUTPATIENT
Start: 2020-07-13 | End: 2020-07-20 | Stop reason: HOSPADM

## 2020-07-13 RX ORDER — ALPRAZOLAM 0.25 MG/1
0.25 TABLET ORAL NIGHTLY
Status: DISCONTINUED | OUTPATIENT
Start: 2020-07-13 | End: 2020-07-20 | Stop reason: HOSPADM

## 2020-07-13 RX ADMIN — ONDANSETRON HYDROCHLORIDE 4 MG: 2 SOLUTION INTRAMUSCULAR; INTRAVENOUS at 16:54

## 2020-07-13 RX ADMIN — FERROUS SULFATE TAB 325 MG (65 MG ELEMENTAL FE) 325 MG: 325 (65 FE) TAB at 09:00

## 2020-07-13 RX ADMIN — ACETAMINOPHEN 650 MG: 325 TABLET, FILM COATED ORAL at 20:48

## 2020-07-13 RX ADMIN — SODIUM CHLORIDE, PRESERVATIVE FREE 10 ML: 5 INJECTION INTRAVENOUS at 20:49

## 2020-07-13 RX ADMIN — VANCOMYCIN HYDROCHLORIDE 750 MG: 10 INJECTION, POWDER, LYOPHILIZED, FOR SOLUTION INTRAVENOUS at 17:48

## 2020-07-13 RX ADMIN — TAZOBACTAM SODIUM AND PIPERACILLIN SODIUM 3.38 G: 375; 3 INJECTION, SOLUTION INTRAVENOUS at 11:14

## 2020-07-13 RX ADMIN — DICLOFENAC 4 G: 10 GEL TOPICAL at 20:48

## 2020-07-13 RX ADMIN — BRIMONIDINE TARTRATE, TIMOLOL MALEATE 1 DROP: 2; 5 SOLUTION/ DROPS OPHTHALMIC at 20:48

## 2020-07-13 RX ADMIN — POTASSIUM CHLORIDE 20 MEQ: 750 CAPSULE, EXTENDED RELEASE ORAL at 17:40

## 2020-07-13 RX ADMIN — ACETAMINOPHEN 650 MG: 325 TABLET, FILM COATED ORAL at 09:00

## 2020-07-13 RX ADMIN — VANCOMYCIN HYDROCHLORIDE 750 MG: 10 INJECTION, POWDER, LYOPHILIZED, FOR SOLUTION INTRAVENOUS at 06:58

## 2020-07-13 RX ADMIN — DICLOFENAC 4 G: 10 GEL TOPICAL at 08:59

## 2020-07-13 RX ADMIN — AMLODIPINE BESYLATE 5 MG: 5 TABLET ORAL at 09:00

## 2020-07-13 RX ADMIN — TAZOBACTAM SODIUM AND PIPERACILLIN SODIUM 3.38 G: 375; 3 INJECTION, SOLUTION INTRAVENOUS at 03:42

## 2020-07-13 RX ADMIN — SODIUM CHLORIDE, PRESERVATIVE FREE 10 ML: 5 INJECTION INTRAVENOUS at 08:59

## 2020-07-13 RX ADMIN — DICLOFENAC 4 G: 10 GEL TOPICAL at 17:40

## 2020-07-13 RX ADMIN — TAZOBACTAM SODIUM AND PIPERACILLIN SODIUM 3.38 G: 375; 3 INJECTION, SOLUTION INTRAVENOUS at 20:48

## 2020-07-13 RX ADMIN — POTASSIUM CHLORIDE, DEXTROSE MONOHYDRATE AND SODIUM CHLORIDE 50 ML/HR: 150; 5; 450 INJECTION, SOLUTION INTRAVENOUS at 12:11

## 2020-07-13 RX ADMIN — CITALOPRAM 20 MG: 20 TABLET, FILM COATED ORAL at 09:00

## 2020-07-13 RX ADMIN — BRIMONIDINE TARTRATE, TIMOLOL MALEATE 1 DROP: 2; 5 SOLUTION/ DROPS OPHTHALMIC at 09:00

## 2020-07-13 RX ADMIN — DICLOFENAC 4 G: 10 GEL TOPICAL at 11:14

## 2020-07-13 RX ADMIN — ENOXAPARIN SODIUM 40 MG: 40 INJECTION SUBCUTANEOUS at 17:40

## 2020-07-13 RX ADMIN — LATANOPROST 1 DROP: 50 SOLUTION OPHTHALMIC at 20:48

## 2020-07-13 RX ADMIN — ALPRAZOLAM 0.25 MG: 0.25 TABLET ORAL at 20:48

## 2020-07-13 RX ADMIN — POTASSIUM CHLORIDE 20 MEQ: 750 CAPSULE, EXTENDED RELEASE ORAL at 09:00

## 2020-07-13 NOTE — PROGRESS NOTES
Continued Stay Note   [place]     Patient Name: Sunny Lacey  MRN: [id number]  Today's Date: 7/13/2020    Admit Date: 7/8/2020    Discharge Plan     Row Name 07/13/20 1323       Plan    Plan  Waiting on decision from [facility]- [facility] declined    Patient/Family in Agreement with Plan  yes    Plan Comments  Harpreet from [facility] called back and declined pt.  Informed Cristino Lacey that is wanting to proceed with [facility], left message for Cord from [facility] to call SW back decision. Will follow.         Discharge Codes    No documentation.             ESTELA Romero

## 2020-07-13 NOTE — DISCHARGE PLACEMENT REQUEST
"Jossy Bejarano [phone]  Deann Lacey Y (89 y.o. Female)     Date of Birth Social Security Number Address Home Phone MRN    09/28/1921  [facility] [id number] [phone] [id number]    Hindu Marital Status          Buddhist        Admission Date Admission Type Admitting Provider Attending Provider Department, Room/Bed    7/8/20 Urgent Phong Joseph MD [place], Thomas Waldon, MD [facility] 4B, 408/1    Discharge Date Discharge Disposition Discharge Destination                       Attending Provider:  Phong Joseph MD    Allergies:  No Known Allergies    Isolation:  None   Infection:  None   Code Status:  CPR    Ht:  170.2 cm (67\")   Wt:  76.7 kg (169 lb 3 oz)    Admission Cmt:  None   Principal Problem:  None                Active Insurance as of 7/8/2020     Primary Coverage     Payor Plan Insurance Group Employer/Plan Group    MEDICARE MEDICARE A & B      Payor Plan Address Payor Plan Phone Number Payor Plan Fax Number Effective Dates    [address] [phone]  9/1/1986 - None Entered    [facility] [place] [address]       Subscriber Name Subscriber Birth Date Member ID       DEANN LACEY Y 9/28/1921 [id number]           Secondary Coverage     Payor Plan Insurance Group Employer/Plan Group    [place]      Payor Plan Address Payor Plan Phone Number Payor Plan Fax Number Effective Dates    [address] [address] [phone]  7/7/2020 - None Entered    [facility] [id number]       Subscriber Name Subscriber Birth Date Member ID       DEANN LACEY Y 9/28/1921 [id number]                 Emergency Contacts      (Rel.) Home Phone Work Phone Mobile Phone    Cristino Lacey (Power of ) -- -- [phone]    Dominic Lacey (Son) [phone] -- [phone]               Physical Therapy Notes (last 24 hours) (Notes from 07/12/20 1117 through 07/13/20 1117)      Michel Sanchez D, PT at 07/13/20 1040  Version 1 of 1     "     Problem: Patient Care Overview  Goal: Plan of Care Review  Outcome: Ongoing (interventions implemented as appropriate)  Flowsheets (Taken 2020 09)  Plan of Care Reviewed With: patient  Outcome Summary: PT IE complete.  Pt ambulated a few feet to chair cg A x1 w/ RW.  O2 sat 97% on 2L during activity.  Pt w/ decreased strength and endurance.  Frail appearing.  High fall risk.  PT to provide strengthening and gait training.  Recommend SNF at [place].  Thank you for referral.       Electronically signed by Michel Sanchez, PT at 20 1040     Michel Sanchez, PT at 20 1041  Version 1 of 1         Patient Name: Sunny Lacey  : 1921    MRN: [id number]                              Today's Date: 2020       Admit Date: 2020    Visit Dx:     ICD-10-CM ICD-9-CM   1. Dysphagia, unspecified type R13.10 787.20   2. Impaired mobility Z74.09 799.89     Patient Active Problem List   Diagnosis   • Sepsis (CMS/HCC)   • Pneumonia due to infectious organism     Past Medical History:   Diagnosis Date   • Anemia    • Anxiety    • Arthritis    • Hypertension    • Skin cancer      Past Surgical History:   Procedure Laterality Date   • SKIN CANCER EXCISION       General Information     Row Name 20          PT Evaluation Time/Intention    Document Type  evaluation presented to [facility] w/ 103 fever, B pneumonia, sepsis  -     Mode of Treatment  physical therapy  -     Row Name 20          General Information    Patient Profile Reviewed?  yes  -     Prior Level of Function  independent:;min assist:;all household mobility;ADL's difficult to gather PLOF, pt would begin sentence then lose her train of thought  -     Existing Precautions/Restrictions  fall;oxygen therapy device and L/min  -     Barriers to Rehab  hearing deficit  -     Row Name 20          Resource/Environmental Concerns    Current Living Arrangements  independent/assisted living facility  -     Row Name  07/13/20 0903          Cognitive Assessment/Intervention- PT/OT    Orientation Status (Cognition)  oriented x 3  -LH     Row Name 07/13/20 0903          Safety Issues, Functional Mobility    Safety Issues Affecting Function (Mobility)  ability to follow commands  -     Impairments Affecting Function (Mobility)  balance;cognition;endurance/activity tolerance;strength;shortness of breath  -       User Key  (r) = Recorded By, (t) = Taken By, (c) = Cosigned By    Initials Name Provider Type     Michel Sanchez PT Physical Therapist        Mobility     Row Name 07/13/20 0903          Bed Mobility Assessment/Treatment    Bed Mobility Assessment/Treatment  supine-sit  -     Supine-Sit [place] (Bed Mobility)  verbal cues;contact guard  -     Assistive Device (Bed Mobility)  bed rails;head of bed elevated  -     Row Name 07/13/20 0903          Sit-Stand Transfer    Sit-Stand [place] (Transfers)  verbal cues;contact guard  -     Row Name 07/13/20 0903          Gait/Stairs Assessment/Training    [place] Level (Gait)  verbal cues;contact guard  -     Distance in Feet (Gait)  6  -LH     Pattern (Gait)  step-to  -LH     Deviations/Abnormal Patterns (Gait)  gait speed decreased;stride length decreased  -LH     Bilateral Gait Deviations  heel strike decreased  -LH     Left Sided Gait Deviations  forward flexed posture  -       User Key  (r) = Recorded By, (t) = Taken By, (c) = Cosigned By    Initials Name Provider Type     Michel Sanchez PT Physical Therapist        Obj/Interventions     Row Name 07/13/20 0903          General ROM    GENERAL ROM COMMENTS  WFL  -     Row Name 07/13/20 0903          MMT (Manual Muscle Testing)    General MMT Comments  functionally 3+ to 4-/5  -LH     Row Name 07/13/20 0903          Static Sitting Balance    Level of [place] (Unsupported Sitting, Static Balance)  standby assist  -     Sitting Position (Unsupported Sitting, Static Balance)  sitting on edge of  bed  -[facility] Name 07/13/20 0903          Static Standing Balance    Level of [place] (Supported Standing, Static Balance)  contact guard assist  -     Assistive Device Utilized (Supported Standing, Static Balance)  walker, rolling  -     Row Name 07/13/20 0903          Sensory Assessment/Intervention    Sensory General Assessment  no sensation deficits identified  -       User Key  (r) = Recorded By, (t) = Taken By, (c) = Cosigned By    Initials Name Provider Type     Michel Sanchez, PT Physical Therapist        Goals/Plan     Row Name 07/13/20 0903          Bed Mobility Goal 1 (PT)    Activity/Assistive Device (Bed Mobility Goal 1, PT)  sit to supine/supine to sit  -     [place] Level/Cues Needed (Bed Mobility Goal 1, PT)  standby assist  -     Time Frame (Bed Mobility Goal 1, PT)  by discharge  -     Progress/Outcomes (Bed Mobility Goal 1, PT)  goal ongoing  -LH     Row Name 07/13/20 0903          Transfer Goal 1 (PT)    Activity/Assistive Device (Transfer Goal 1, PT)  sit-to-stand/stand-to-sit  -     [place] Level/Cues Needed (Transfer Goal 1, PT)  standby assist  -     Time Frame (Transfer Goal 1, PT)  by discharge  -     Progress/Outcome (Transfer Goal 1, PT)  goal ongoing  -[facility] Name 07/13/20 0903          Gait Training Goal 1 (PT)    Activity/Assistive Device (Gait Training Goal 1, PT)  gait (walking locomotion);assistive device use;decrease fall risk;increase endurance/gait distance  -     [place] Level (Gait Training Goal 1, PT)  contact guard assist  -     Distance (Gait Goal 1, PT)  40ft  -     Time Frame (Gait Training Goal 1, PT)  by discharge  -     Progress/Outcome (Gait Training Goal 1, PT)  goal ongoing  -       User Key  (r) = Recorded By, (t) = Taken By, (c) = Cosigned By    Initials Name Provider Type     Michel Sanchez, PT Physical Therapist        Clinical Impression     [facility] Name 07/13/20 0903          Pain Assessment    Additional  Documentation  Pain Scale: Numbers Pre/Post-Treatment (Group)  -     Row Name 07/13/20 0903          Pain Scale: Numbers Pre/Post-Treatment    Pain Scale: Numbers, Pretreatment  0/10 - no pain  -     Pain Scale: Numbers, Post-Treatment  0/10 - no pain  -     Pain Intervention(s)  Medication (See MAR)  -     Row Name 07/13/20 0903          Plan of Care Review    Plan of Care Reviewed With  patient  -     Outcome Summary  PT IE complete.  Pt ambulated a few feet to chair cg A x1 w/ RW.  O2 sat 97% on 2L during activity.  Pt w/ decreased strength and endurance.  Frail appearing.  High fall risk.  PT to provide strengthening and gait training.  Recommend SNF at [place].  Thank you for referral.  -[facility] Name 07/13/20 0903          Physical Therapy Clinical Impression    Patient/Family Goals Statement (PT Clinical Impression)  return home  -     Criteria for Skilled Interventions Met (PT Clinical Impression)  yes;treatment indicated  -     Rehab Potential (PT Clinical Summary)  good, to achieve stated therapy goals  -     Predicted Duration of Therapy (PT)  unti dc  -[facility] Name 07/13/20 0903          Positioning and Restraints    Pre-Treatment Position  in bed  -     Post Treatment Position  chair  -LH     In Chair  reclined;call light within reach;encouraged to call for assist;exit alarm on;legs elevated  -       User Key  (r) = Recorded By, (t) = Taken By, (c) = Cosigned By    Initials Name Provider Type     Michel Sanchez, PT Physical Therapist        Outcome Measures     Row Name 07/13/20 1036          How much help from another person do you currently need...    Turning from your back to your side while in flat bed without using bedrails?  3  -LH     Moving from lying on back to sitting on the side of a flat bed without bedrails?  2  -LH     Moving to and from a bed to a chair (including a wheelchair)?  3  -LH     Standing up from a chair using your arms (e.g., wheelchair, bedside chair)?  3   -     Climbing 3-5 steps with a railing?  1  -     To walk in hospital room?  3  -     AM-PAC 6 Clicks Score (PT)  15  -     Row Name 07/13/20 1036          Functional Assessment    Outcome Measure Options  AM-PAC 6 Clicks Basic Mobility (PT)  -       User Key  (r) = Recorded By, (t) = Taken By, (c) = Cosigned By    Initials Name Provider Type     Michel Sanchez, PT Physical Therapist        Physical Therapy Education                 Title: PT OT SLP Therapies (Done)     Topic: Physical Therapy (Done)     Point: Mobility training (Done)     Description:   Instruct learner(s) on safety and technique for assisting patient out of bed, chair or wheelchair.  Instruct in the proper use of assistive devices, such as walker, crutches, cane or brace.              Patient Friendly Description:   It's important to get you on your feet again, but we need to do so in a way that is safe for you. Falling has serious consequences, and your personal safety is the most important thing of all.        When it's time to get out of bed, one of us or a family member will sit next to you on the bed to give you support.     If your doctor or nurse tells you to use a walker, crutches, a cane, or a brace, be sure you use it every time you get out of bed, even if you think you don't need it.    Learning Progress Summary           Patient Acceptance, E,D, DU,VU by  at 7/13/2020 1039    Comment:  benefits of PT and POC, call for A OOB                   Point: Precautions (Done)     Description:   Instruct learner(s) on prescribed precautions during mobility and gait tasks              Learning Progress Summary           Patient Acceptance, E,D, DU,VU by  at 7/13/2020 1039    Comment:  benefits of PT and POC, call for A OOB                               User Key     Initials Effective Dates Name Provider Type [facility] 08/02/16 -  Michel Sanchez, PT Physical Therapist PT              PT Recommendation and Plan  Planned  Therapy Interventions (PT Eval): balance training, bed mobility training, gait training, home exercise program, patient/family education, strengthening, transfer training  Outcome Summary/Treatment Plan (PT)  Anticipated Discharge Disposition (PT): skilled nursing facility  Plan of Care Reviewed With: patient  Outcome Summary: PT IE complete.  Pt ambulated a few feet to chair cg A x1 w/ RW.  O2 sat 97% on 2L during activity.  Pt w/ decreased strength and endurance.  Frail appearing.  High fall risk.  PT to provide strengthening and gait training.  Recommend SNF at [place].  Thank you for referral.     Time Calculation:   PT Charges     Row Name 07/13/20 1040             Time Calculation    Start Time  0903  -      Stop Time  0957  -      Time Calculation (min)  54 min  -      PT Received On  07/13/20  -      PT Goal Re-Cert Due Date  07/23/20  -        User Key  (r) = Recorded By, (t) = Taken By, (c) = Cosigned By    Initials Name Provider Type     Michel Sanchez, PT Physical Therapist        Therapy Charges for Today     Code Description Service Date Service Provider Modifiers Qty    83692772039  PT EVAL LOW COMPLEXITY 4 7/13/2020 Michel Sanchez, PT GP 1          PT G-Codes  Outcome Measure Options: AM-PAC 6 Clicks Basic Mobility (PT)  AM-PAC 6 Clicks Score (PT): 15    Michel Sanchez PT  7/13/2020         Electronically signed by Michel Sanchez, PT at 07/13/20 1041

## 2020-07-13 NOTE — THERAPY TREATMENT NOTE
Acute Care - Physical Therapy Treatment Note  [facility]     Patient Name: Sunny Lacey  : 1921  MRN: [id number]  Today's Date: 2020             Admit Date: 2020    Visit Dx:    ICD-10-CM ICD-9-CM   1. Dysphagia, unspecified type R13.10 787.20   2. Impaired mobility Z74.09 799.89     Patient Active Problem List   Diagnosis   • Sepsis (CMS/HCC)   • Pneumonia due to infectious organism   • At risk for falls   • Hypokalemia       Therapy Treatment    Rehabilitation Treatment Summary     Row Name 20 1425 20 1406          Treatment Time/Intention    Discipline  physical therapy assistant  -AE  speech language pathologist  -MB     Document Type  therapy note (daily note)  -AE  therapy note (daily note)  -MB     Subjective Information  complains of;weakness  -AE  complains of;fatigue  -MB     Mode of Treatment  --  speech-language pathology  -MB     Patient/Family Observations  --  No family present  -MB     Patient Effort  --  adequate  -MB     Existing Precautions/Restrictions  fall;oxygen therapy device and L/min  -AE  --     Recorded by [AE] Megan Alejandre, PTA 20 1503 [MB] Anjelica Gonzales CCC-SLP 20 1437     Row Name 20 1425             Vital Signs    Pre SpO2 (%)  9  -AE      O2 Delivery Pre Treatment  supplemental O2 3L  -AE      O2 Delivery Intra Treatment  supplemental O2 3L  -AE      Post SpO2 (%)  96  -AE      O2 Delivery Post Treatment  supplemental O2 3L  -AE      Recorded by [AE] Megan Alejandre, PTA 20 1503      Row Name 20 1425             Bed Mobility Assessment/Treatment    Supine-Sit [place] (Bed Mobility)  minimum assist (75% patient effort);verbal cues  -AE      Assistive Device (Bed Mobility)  bed rails;draw sheet  -AE      Recorded by [AE] Megan Alejandre, PTA 20 1503      Row Name 20 1421             Sit-Stand Transfer    Sit-Stand [place] (Transfers)  contact guard;verbal cues  -AE      Recorded by [AE]  Megan Alejandre, [facility] 07/13/20 1503      Row Name 07/13/20 1425             Gait/Stairs Assessment/Training    [place] Level (Gait)  contact guard;verbal cues  -AE      Assistive Device (Gait)  walker, front-wheeled  -AE      Distance in Feet (Gait)  30  -AE      Deviations/Abnormal Patterns (Gait)  base of support, narrow  -AE      Left Sided Gait Deviations  forward flexed posture  -AE      Recorded by [AE] Megan Alejandre, [facility] 07/13/20 1503      Row Name 07/13/20 1425             Therapeutic Exercise    Lower Extremity (Therapeutic Exercise)  heel slides, bilateral  -AE      Lower Extremity Range of Motion (Therapeutic Exercise)  hip abduction/adduction, bilateral  -AE      Exercise Type (Therapeutic Exercise)  AROM (active range of motion)  -AE      Position (Therapeutic Exercise)  supine  -AE      Sets/Reps (Therapeutic Exercise)  20  -AE      Recorded by [AE] Megan Alejandre, [facility] 07/13/20 1503      Row Name 07/13/20 1425             Positioning and Restraints    Pre-Treatment Position  in bed  -AE      Post Treatment Position  bed  -AE      In Bed  call light within reach  -AE      Recorded by [AE] Megan Alejandre, [facility] 07/13/20 1503      Row Name 07/13/20 1425             Pain Scale: Numbers Pre/Post-Treatment    Pain Scale: Numbers, Pretreatment  0/10 - no pain  -AE      Pain Scale: Numbers, Post-Treatment  0/10 - no pain  -AE      Recorded by [AE] Megan Alejandre, [facility] 07/13/20 1503      Row Name 07/13/20 1406             Pain Scale: FACES Pre/Post-Treatment    Pain: FACES Scale, Pretreatment  0-->no hurt  -MB      Recorded by [MB] Anjelica Gonzales, CCC-SLP 07/13/20 1437      Row Name 07/13/20 1406             Outcome Summary/Treatment Plan (SLP)    Daily Summary of Progress (SLP)  progress toward functional goals is gradual  -MB      Barriers to Overall Progress (SLP)  none  -MB      Plan for Continued Treatment (SLP)  Downgrade to nectar thick, continue mechanical soft solids  -MB      Anticipated  Dischage Disposition (SLP)  assisted living facility ([facility])  -MB      Recorded by [MB] Anjelica Gonzales, CCC-SLP 07/13/20 1437        User Key  (r) = Recorded By, (t) = Taken By, (c) = Cosigned By    Initials Name Effective Dates Discipline    Anjelica Barron, CCC-SLP 08/02/16 -  SLP    AE Megan Alejandre, PTA 06/22/15 -  PT               Rehab Goal Summary     Row Name 07/13/20 1406 07/13/20 0903          Bed Mobility Goal 1 (PT)    Activity/Assistive Device (Bed Mobility Goal 1, PT)  --  sit to supine/supine to sit  -     [place] Level/Cues Needed (Bed Mobility Goal 1, PT)  --  standby assist  -LH     Time Frame (Bed Mobility Goal 1, PT)  --  by discharge  -     Progress/Outcomes (Bed Mobility Goal 1, PT)  --  goal ongoing  -        Transfer Goal 1 (PT)    Activity/Assistive Device (Transfer Goal 1, PT)  --  sit-to-stand/stand-to-sit  -LH     [place] Level/Cues Needed (Transfer Goal 1, PT)  --  standby assist  -LH     Time Frame (Transfer Goal 1, PT)  --  by discharge  -     Progress/Outcome (Transfer Goal 1, PT)  --  goal ongoing  -        Gait Training Goal 1 (PT)    Activity/Assistive Device (Gait Training Goal 1, PT)  --  gait (walking locomotion);assistive device use;decrease fall risk;increase endurance/gait distance  -     [place] Level (Gait Training Goal 1, PT)  --  contact guard assist  -LH     Distance (Gait Goal 1, PT)  --  40ft  -     Time Frame (Gait Training Goal 1, PT)  --  by discharge  -     Progress/Outcome (Gait Training Goal 1, PT)  --  goal ongoing  -        Oral Nutrition/Hydration Goal 1 (SLP)    Oral Nutrition/Hydration Goal 1, SLP  Pt will tolerate LRD without s/s of aspiration  -MB  --     Time Frame (Oral Nutrition/Hydration Goal 1, SLP)  short term goal (STG);by discharge  -MB  --     Barriers (Oral Nutrition/Hydration Goal 1, SLP)  none  -MB  --     Progress/Outcomes (Oral Nutrition/Hydration Goal 1, SLP)  progress slower than expected  -MB  --        User Key  (r) = Recorded By, (t) = Taken By, (c) = Cosigned By    Initials Name Provider Type Discipline    MB Anjelica Gonzales, CCC-SLP Speech and Language Pathologist SLP     Michel Sanchez, PT Physical Therapist PT          Physical Therapy Education                 Title: PT OT SLP Therapies (Done)     Topic: Physical Therapy (Done)     Point: Mobility training (Done)     Description:   Instruct learner(s) on safety and technique for assisting patient out of bed, chair or wheelchair.  Instruct in the proper use of assistive devices, such as walker, crutches, cane or brace.              Patient Friendly Description:   It's important to get you on your feet again, but we need to do so in a way that is safe for you. Falling has serious consequences, and your personal safety is the most important thing of all.        When it's time to get out of bed, one of us or a family member will sit next to you on the bed to give you support.     If your doctor or nurse tells you to use a walker, crutches, a cane, or a brace, be sure you use it every time you get out of bed, even if you think you don't need it.    Learning Progress Summary           Patient Acceptance, E,D, DU,VU by  at 7/13/2020 1039    Comment:  benefits of PT and POC, call for A OOB                   Point: Precautions (Done)     Description:   Instruct learner(s) on prescribed precautions during mobility and gait tasks              Learning Progress Summary           Patient Acceptance, E,D, DU,VU by  at 7/13/2020 1039    Comment:  benefits of PT and POC, call for A OOB                               User Key     Initials Effective Dates Name Provider Type Discipline     08/02/16 -  Michel Sanchez, PT Physical Therapist PT                PT Recommendation and Plan           Time Calculation:   PT Charges     Row Name 07/13/20 1503 07/13/20 1040          Time Calculation    Start Time  1425  -AE  0903  -     Stop Time  1448  -AE  0957  -      Time Calculation (min)  23 min  -AE  54 min  -     PT Received On  07/13/20  -AE  07/13/20  -     PT Goal Re-Cert Due Date  07/23/20  -AE  07/23/20  -        Time Calculation- PT    Total Timed Code Minutes- PT  23 minute(s)  -AE  --        Timed Charges    72760 - PT Therapeutic Exercise Minutes  8  -AE  --     57040 - Gait Training Minutes   15  -AE  --       User Key  (r) = Recorded By, (t) = Taken By, (c) = Cosigned By    Initials Name Provider Type    AE Megan Alejandre PTA Physical Therapy Assistant     Michel Sanchez, PT Physical Therapist        Therapy Charges for Today     Code Description Service Date Service Provider Modifiers Qty    05709845838 HC PT THER PROC EA 15 MIN 7/13/2020 Megan Alejandre PTA GP 1    50012962377 HC GAIT TRAINING EA 15 MIN 7/13/2020 Megan Alejandre PTA GP 1          PT G-Codes  Outcome Measure Options: AM-PAC 6 Clicks Basic Mobility (PT)  AM-PAC 6 Clicks Score (PT): 15    Megan Alejandre PTA  7/13/2020

## 2020-07-13 NOTE — THERAPY EVALUATION
Patient Name: Sunny Lacey  : 1921    MRN: [id number]                              Today's Date: 2020       Admit Date: 2020    Visit Dx:     ICD-10-CM ICD-9-CM   1. Dysphagia, unspecified type R13.10 787.20   2. Impaired mobility Z74.09 799.89     Patient Active Problem List   Diagnosis   • Sepsis (CMS/HCC)   • Pneumonia due to infectious organism     Past Medical History:   Diagnosis Date   • Anemia    • Anxiety    • Arthritis    • Hypertension    • Skin cancer      Past Surgical History:   Procedure Laterality Date   • SKIN CANCER EXCISION       General Information     Row Name 20          PT Evaluation Time/Intention    Document Type  evaluation presented to [facility] w/ 103 fever, B pneumonia, sepsis  -     Mode of Treatment  physical therapy  -     Row Name 20          General Information    Patient Profile Reviewed?  yes  -     Prior Level of Function  independent:;min assist:;all household mobility;ADL's difficult to gather PLOF, pt would begin sentence then lose her train of thought  -     Existing Precautions/Restrictions  fall;oxygen therapy device and L/min  -     Barriers to Rehab  hearing deficit  -     Row Name 20          Resource/Environmental Concerns    Current Living Arrangements  independent/assisted living facility  -     Row Name 20          Cognitive Assessment/Intervention- PT/OT    Orientation Status (Cognition)  oriented x 3  -     Row Name 20          Safety Issues, Functional Mobility    Safety Issues Affecting Function (Mobility)  ability to follow commands  -     Impairments Affecting Function (Mobility)  balance;cognition;endurance/activity tolerance;strength;shortness of breath  -       User Key  (r) = Recorded By, (t) = Taken By, (c) = Cosigned By    Initials Name Provider Type     Michel Sanchez, PT Physical Therapist        Mobility     Row Name 20          Bed Mobility  Assessment/Treatment    Bed Mobility Assessment/Treatment  supine-sit  -     Supine-Sit [place] (Bed Mobility)  verbal cues;contact guard  -     Assistive Device (Bed Mobility)  bed rails;head of bed elevated  -[facility] Name 07/13/20 0903          Sit-Stand Transfer    Sit-Stand [place] (Transfers)  verbal cues;contact guard  -LH     Row Name 07/13/20 0903          Gait/Stairs Assessment/Training    [place] Level (Gait)  verbal cues;contact guard  -     Distance in Feet (Gait)  6  -LH     Pattern (Gait)  step-to  -LH     Deviations/Abnormal Patterns (Gait)  gait speed decreased;stride length decreased  -     Bilateral Gait Deviations  heel strike decreased  -     Left Sided Gait Deviations  forward flexed posture  -       User Key  (r) = Recorded By, (t) = Taken By, (c) = Cosigned By    Initials Name Provider Type     Michel Sanchez, PT Physical Therapist        Obj/Interventions     [facility] Name 07/13/20 0903          General ROM    GENERAL ROM COMMENTS  WFL  -[facility] Name 07/13/20 0903          MMT (Manual Muscle Testing)    General MMT Comments  functionally 3+ to 4-/5  -[facility] Name 07/13/20 0903          Static Sitting Balance    Level of [place] (Unsupported Sitting, Static Balance)  standby assist  -     Sitting Position (Unsupported Sitting, Static Balance)  sitting on edge of bed  -[facility] Name 07/13/20 0903          Static Standing Balance    Level of [place] (Supported Standing, Static Balance)  contact guard assist  -     Assistive Device Utilized (Supported Standing, Static Balance)  walker, rolling  -[facility] Name 07/13/20 0903          Sensory Assessment/Intervention    Sensory General Assessment  no sensation deficits identified  -       User Key  (r) = Recorded By, (t) = Taken By, (c) = Cosigned By    Initials Name Provider Type     Michel Sanchez, PT Physical Therapist        Goals/Plan     [facility] Name 07/13/20 0903          Bed Mobility Goal 1  (PT)    Activity/Assistive Device (Bed Mobility Goal 1, PT)  sit to supine/supine to sit  -     [place] Level/Cues Needed (Bed Mobility Goal 1, PT)  standby assist  -LH     Time Frame (Bed Mobility Goal 1, PT)  by discharge  -     Progress/Outcomes (Bed Mobility Goal 1, PT)  goal ongoing  [facility] Name 07/13/20 0903          Transfer Goal 1 (PT)    Activity/Assistive Device (Transfer Goal 1, PT)  sit-to-stand/stand-to-sit  -     [place] Level/Cues Needed (Transfer Goal 1, PT)  standby assist  -LH     Time Frame (Transfer Goal 1, PT)  by discharge  -     Progress/Outcome (Transfer Goal 1, PT)  goal ongoing  [facility] Name 07/13/20 0903          Gait Training Goal 1 (PT)    Activity/Assistive Device (Gait Training Goal 1, PT)  gait (walking locomotion);assistive device use;decrease fall risk;increase endurance/gait distance  -     [place] Level (Gait Training Goal 1, PT)  contact guard assist  -     Distance (Gait Goal 1, PT)  40ft  -     Time Frame (Gait Training Goal 1, PT)  by discharge  -     Progress/Outcome (Gait Training Goal 1, PT)  goal ongoing  -       User Key  (r) = Recorded By, (t) = Taken By, (c) = Cosigned By    Initials Name Provider Type     Michel Sanchez, PT Physical Therapist        Clinical Impression     [facility] Name 07/13/20 0903          Pain Assessment    Additional Documentation  Pain Scale: Numbers Pre/Post-Treatment (Group)  -LH     Row Name 07/13/20 0903          Pain Scale: Numbers Pre/Post-Treatment    Pain Scale: Numbers, Pretreatment  0/10 - no pain  -     Pain Scale: Numbers, Post-Treatment  0/10 - no pain  -     Pain Intervention(s)  Medication (See MAR)  -[facility] Name 07/13/20 0903          Plan of Care Review    Plan of Care Reviewed With  patient  -     Outcome Summary  PT IE complete.  Pt ambulated a few feet to chair cg A x1 w/ RW.  O2 sat 97% on 2L during activity.  Pt w/ decreased strength and endurance.  Frail appearing.  High fall  risk.  PT to provide strengthening and gait training.  Recommend SNF at [place].  Thank you for referral.  -     Row Name 07/13/20 0903          Physical Therapy Clinical Impression    Patient/Family Goals Statement (PT Clinical Impression)  return home  -     Criteria for Skilled Interventions Met (PT Clinical Impression)  yes;treatment indicated  -     Rehab Potential (PT Clinical Summary)  good, to achieve stated therapy goals  -LH     Predicted Duration of Therapy (PT)  unti dc  -     Row Name 07/13/20 0903          Positioning and Restraints    Pre-Treatment Position  in bed  -LH     Post Treatment Position  chair  -LH     In Chair  reclined;call light within reach;encouraged to call for assist;exit alarm on;legs elevated  -       User Key  (r) = Recorded By, (t) = Taken By, (c) = Cosigned By    Initials Name Provider Type    Michel Miller, PT Physical Therapist        Outcome Measures     Row Name 07/13/20 1036          How much help from another person do you currently need...    Turning from your back to your side while in flat bed without using bedrails?  3  -LH     Moving from lying on back to sitting on the side of a flat bed without bedrails?  2  -LH     Moving to and from a bed to a chair (including a wheelchair)?  3  -LH     Standing up from a chair using your arms (e.g., wheelchair, bedside chair)?  3  -LH     Climbing 3-5 steps with a railing?  1  -LH     To walk in hospital room?  3  -     AM-PAC 6 Clicks Score (PT)  15  -     Row Name 07/13/20 1036          Functional Assessment    Outcome Measure Options  AM-PAC 6 Clicks Basic Mobility (PT)  -       User Key  (r) = Recorded By, (t) = Taken By, (c) = Cosigned By    Initials Name Provider Type    Michel Miller, PT Physical Therapist        Physical Therapy Education                 Title: PT OT SLP Therapies (Done)     Topic: Physical Therapy (Done)     Point: Mobility training (Done)     Description:   Instruct learner(s) on safety  and technique for assisting patient out of bed, chair or wheelchair.  Instruct in the proper use of assistive devices, such as walker, crutches, cane or brace.              Patient Friendly Description:   It's important to get you on your feet again, but we need to do so in a way that is safe for you. Falling has serious consequences, and your personal safety is the most important thing of all.        When it's time to get out of bed, one of us or a family member will sit next to you on the bed to give you support.     If your doctor or nurse tells you to use a walker, crutches, a cane, or a brace, be sure you use it every time you get out of bed, even if you think you don't need it.    Learning Progress Summary           Patient Acceptance, KAI BINGHAM DU, VU by  at 7/13/2020 1039    Comment:  benefits of PT and POC, call for A OOB                   Point: Precautions (Done)     Description:   Instruct learner(s) on prescribed precautions during mobility and gait tasks              Learning Progress Summary           Patient Acceptance, KAI BINGHAM DU, VU by  at 7/13/2020 1039    Comment:  benefits of PT and POC, call for A OOB                               User Key     Initials Effective Dates Name Provider Type Discipline     08/02/16 -  Michel Sanchez, PT Physical Therapist PT              PT Recommendation and Plan  Planned Therapy Interventions (PT Eval): balance training, bed mobility training, gait training, home exercise program, patient/family education, strengthening, transfer training  Outcome Summary/Treatment Plan (PT)  Anticipated Discharge Disposition (PT): skilled nursing facility  Plan of Care Reviewed With: patient  Outcome Summary: PT IE complete.  Pt ambulated a few feet to chair cg A x1 w/ RW.  O2 sat 97% on 2L during activity.  Pt w/ decreased strength and endurance.  Frail appearing.  High fall risk.  PT to provide strengthening and gait training.  Recommend SNF at [place].  Thank you for referral.     Time  Calculation:   PT Charges     Row Name 07/13/20 1040             Time Calculation    Start Time  0903  -      Stop Time  0957  -      Time Calculation (min)  54 min  -      PT Received On  07/13/20  -      PT Goal Re-Cert Due Date  07/23/20  -        User Key  (r) = Recorded By, (t) = Taken By, (c) = Cosigned By    Initials Name Provider Type     Michel Sanchez, PT Physical Therapist        Therapy Charges for Today     Code Description Service Date Service Provider Modifiers Qty    39571264580 HC PT EVAL LOW COMPLEXITY 4 7/13/2020 Michel Sanchez, PT GP 1          PT G-Codes  Outcome Measure Options: AM-PAC 6 Clicks Basic Mobility (PT)  AM-PAC 6 Clicks Score (PT): 15    Michel Sanchez PT  7/13/2020

## 2020-07-13 NOTE — PLAN OF CARE
Problem: Patient Care Overview  Goal: Plan of Care Review  Outcome: Ongoing (interventions implemented as appropriate)  Flowsheets (Taken 7/13/2020 0393)  Progress: no change  Plan of Care Reviewed With: patient; other (see comments) (Nurse, Rohini)  Outcome Summary: Pt completed trials of thin, nectar, and honey consistencies. Pt had a 1x delayed cough and 1x immediate coughing episode with thin liquids. No overt s/s of aspiration with honey. Delayed throat clearing following nectar thick. Downgrade to nectar thick liquids, continue mechanical soft solids. Meds with applesauce/pudding. SLP will continue to follow.

## 2020-07-13 NOTE — DISCHARGE PLACEMENT REQUEST
"Jossy Bejarano [phone]  Will provide P.T. Notes when available   Deann Lacey (89 y.o. Female)     Date of Birth Social Security Number Address Home Phone MRN    09/28/1921  [facility] [id number] [phone] [id number]    Jain Marital Status          Holiness        Admission Date Admission Type Admitting Provider Attending Provider Department, Room/Bed    7/8/20 Urgent Phong Joseph MD [place], Thomas Waldon, MD [facility] 4B, 408/1    Discharge Date Discharge Disposition Discharge Destination                       Attending Provider:  Phong Joseph MD    Allergies:  No Known Allergies    Isolation:  None   Infection:  None   Code Status:  CPR    Ht:  170.2 cm (67\")   Wt:  76.7 kg (169 lb 3 oz)    Admission Cmt:  None   Principal Problem:  None                Active Insurance as of 7/8/2020     Primary Coverage     Payor Plan Insurance Group Employer/Plan Group    MEDICARE MEDICARE A & B      Payor Plan Address Payor Plan Phone Number Payor Plan Fax Number Effective Dates    [address] [phone]  9/1/1986 - None Entered    [facility] [id number]       Subscriber Name Subscriber Birth Date Member ID       DEANN LACEY Y 9/28/1921 [id number]           Secondary Coverage     Payor Plan Insurance Group Employer/Plan Group    [facility] MEDICAID      Payor Plan Address Payor Plan Phone Number Payor Plan Fax Number Effective Dates    [address] [address] [phone]  7/7/2020 - None Entered    [facility] [id number]       Subscriber Name Subscriber Birth Date Member ID       DEANN LACEY Y 9/28/1921 [id number]                 Emergency Contacts      (Rel.) Home Phone Work Phone Mobile Phone    DiegoCristino dickey (Power of ) -- -- [phone]    Dominic Lacey (Son) [phone] -- [phone]               History & Physical      Phong Joseph MD at 07/08/20 0741          [facility]  HISTORY " "AND PHYSICAL    Date of Admission: 7/8/2020  Primary Care Physician: Nevaeh Thornton PA    Subjective    Chief Complaint: \"she has pneumonia\"    This is a 89 yr old lady resident of assisted living in [facility] who presentd to the [facility] ed with fever 103, tachypnea and evidence of sepsis. Ct chest confirmed bilateral pneumonia and she was transferred her for sepsis care. Covid 19 testing was neg.      Review of Systems   Constitutional: Positive for activity change, chills, fatigue and fever.   HENT: Negative.    Eyes: Negative.    Respiratory: Positive for cough.    Cardiovascular: Negative.    Gastrointestinal: Negative.    Endocrine: Negative.    Genitourinary: Negative.    Musculoskeletal: Negative.    Skin: Negative.    Allergic/Immunologic: Negative.    Neurological: Negative.    Hematological: Negative.    Psychiatric/Behavioral: Negative.         Otherwise complete ROS reviewed and negative except as mentioned in the HPI.      Past Medical History:   Past Medical History:   Diagnosis Date   • Anemia    • Anxiety    • Arthritis    • Hypertension    • Skin cancer        Past Surgical History:  Past Surgical History:   Procedure Laterality Date   • SKIN CANCER EXCISION         Social History:  reports that she has never smoked. She does not have any smokeless tobacco history on file. She reports that she does not drink alcohol or use drugs.    Family History: family history is not on file.     Allergies:  No Known Allergies    Medications:  Prior to Admission medications    Medication Sig Start Date End Date Taking? Authorizing Provider   acetaminophen (TYLENOL) 500 MG tablet Take 650 mg by mouth.   Yes Milton Diaz MD   ALPRAZolam (XANAX) 0.5 MG tablet Take 0.5 mg by mouth Daily.   Yes Milton Diaz MD   amLODIPine (NORVASC) 5 MG tablet Take 5 mg by mouth Daily.   Yes Milton Diaz MD   bimatoprost (LUMIGAN) 0.01 % ophthalmic drops Administer 1 drop to both eyes Every Night.   " "Yes Milton Diaz MD   brimonidine-timolol (COMBIGAN) 0.2-0.5 % ophthalmic solution    Yes Provider, Historical, MD   Calcium Carbonate-Vit D-Min (CALCIUM 1200 PO) Take 600 mg by mouth 2 (two) times a day.   Yes Provider, Historical, MD   capsaicin (ZOSTRIX) 0.075 % topical cream Apply  topically to the appropriate area as directed 3 (Three) Times a Day.   Yes Provider, Historical, MD   citalopram (CeleXA) 20 MG tablet Take 20 mg by mouth Daily.   Yes Provider, Historical, MD   furosemide (LASIX) 20 MG tablet Take 20 mg by mouth 2 (Two) Times a Day.   Yes Provider, Historical, MD   HYDROcodone-acetaminophen (NORCO) 5-325 MG per tablet Take 1 tablet by mouth Every 6 (Six) Hours As Needed.   Yes Provider, Historical, MD   potassium chloride (K-DUR,KLOR-CON) 10 MEQ CR tablet Take 10 mEq by mouth Daily.   Yes Provider, Historical, MD   pregabalin (LYRICA) 100 MG capsule Take 100 mg by mouth 3 (Three) Times a Day.   Yes Milton Diaz MD       Objective    Vital Signs: /50 (BP Location: Right arm, Patient Position: Lying)   Pulse 99   Temp 98.1 °F (36.7 °C) (Oral)   Resp 18   Ht 170.2 cm (67\")   Wt 81.7 kg (180 lb 3.2 oz)   SpO2 95%   BMI 28.22 kg/m²    Physical Exam   Constitutional: She is oriented to person, place, and time. She appears well-developed and well-nourished.   HENT:   Head: Normocephalic and atraumatic.   Right Ear: External ear normal.   Left Ear: External ear normal.   Nose: Nose normal.   Mouth/Throat: Oropharynx is clear and moist.   Eyes: Pupils are equal, round, and reactive to light. Conjunctivae and EOM are normal.   Neck: Normal range of motion. Neck supple.   Cardiovascular: Regular rhythm, normal heart sounds and intact distal pulses.   Pulmonary/Chest: Effort normal and breath sounds normal.   Abdominal: Soft. Bowel sounds are normal.   Musculoskeletal: Normal range of motion.   Neurological: She is alert and oriented to person, place, and time.   Skin: Skin is warm " and dry. Capillary refill takes less than 2 seconds.   Psychiatric: She has a normal mood and affect. Her behavior is normal. Judgment and thought content normal.   Nursing note and vitals reviewed.      Results Reviewed:  Lab Results (last 24 hours)     Procedure Component Value Units Date/Time    Respiratory Panel, PCR - Swab, Nasopharynx [[id number]]  (Normal) Collected:  07/08/20 0505    Specimen:  Swab from Nasopharynx Updated:  07/08/20 0652     ADENOVIRUS, PCR Not Detected     Coronavirus 229E Not Detected     Coronavirus HKU1 Not Detected     Coronavirus NL63 Not Detected     Coronavirus OC43 Not Detected     Human Metapneumovirus Not Detected     Human Rhinovirus/Enterovirus Not Detected     Influenza B PCR Not Detected     Parainfluenza Virus 1 Not Detected     Parainfluenza Virus 2 Not Detected     Parainfluenza Virus 3 Not Detected     Parainfluenza Virus 4 Not Detected     Bordetella pertussis pcr Not Detected     Influenza A H1 2009 PCR Not Detected     Chlamydophila pneumoniae PCR Not Detected     Mycoplasma pneumo by PCR Not Detected     Influenza A PCR Not Detected     Influenza A H3 Not Detected     Influenza A H1 Not Detected     RSV, PCR Not Detected     Bordetella parapertussis PCR Not Detected    Narrative:       The coronavirus on the RVP is NOT COVID-19 and is NOT indicative of infection with COVID-19.     CBC & Differential [[id number]] Collected:  07/08/20 0445    Specimen:  Blood Updated:  07/08/20 0520    Narrative:       The following orders were created for panel order CBC & Differential.  Procedure                               Abnormality         Status                     ---------                               -----------         ------                     CBC Auto Differential[[id number]]        Abnormal            Final result                 Please view results for these tests on the individual orders.    CBC Auto Differential [[id number]]  (Abnormal) Collected:  07/08/20 0445     Specimen:  Blood Updated:  07/08/20 0520     WBC 14.94 10*3/mm3      RBC 3.93 10*6/mm3      Hemoglobin 11.9 g/dL      Hematocrit 37.5 %      MCV 95.4 fL      MCH 30.3 pg      MCHC 31.7 g/dL      RDW 13.3 %      RDW-SD 46.8 fl      MPV 11.1 fL      Platelets 90 10*3/mm3     Manual Differential [[id number]]  (Abnormal) Collected:  07/08/20 0445    Specimen:  Blood Updated:  07/08/20 0520     Neutrophil % 61.0 %      Lymphocyte % 3.0 %      Monocyte % 5.0 %      Bands %  24.0 %      Metamyelocyte % 6.0 %      Atypical Lymphocyte % 1.0 %      Neutrophils Absolute 12.70 10*3/mm3      Lymphocytes Absolute 0.45 10*3/mm3      Monocytes Absolute 0.75 10*3/mm3      Polychromasia Slight/1+     WBC Morphology Normal     Platelet Estimate Decreased    Basic Metabolic Panel [[id number]]  (Abnormal) Collected:  07/08/20 0445    Specimen:  Blood Updated:  07/08/20 0508     Glucose 114 mg/dL      BUN 20 mg/dL      Creatinine 1.26 mg/dL      Sodium 145 mmol/L      Potassium 3.5 mmol/L      Chloride 105 mmol/L      CO2 30.0 mmol/L      Calcium 9.2 mg/dL      eGFR Non African Amer 39 mL/min/1.73      BUN/Creatinine Ratio 15.9     Anion Gap 10.0 mmol/L     Narrative:       GFR Normal >60  Chronic Kidney Disease <60  Kidney Failure <15      Lactic Acid, Plasma [[id number]]  (Normal) Collected:  07/08/20 0445    Specimen:  Blood Updated:  07/08/20 0508     Lactate 1.6 mmol/L     Blood Culture - Blood, Hand, Right [[id number]] Collected:  07/08/20 0445    Specimen:  Blood from Hand, Right Updated:  07/08/20 0455    Blood Culture - Blood, Arm, Right [[id number]] Collected:  07/08/20 0445    Specimen:  Blood from Arm, Right Updated:  07/08/20 0454    COVID PRE-OP / PRE-PROCEDURE SCREENING ORDER (NO ISOLATION) - Swab, Nasopharynx [[id number]] Collected:  07/08/20 0055    Specimen:  Swab from Nasopharynx Updated:  07/08/20 0154    Narrative:       The following orders were created for panel order COVID PRE-OP / PRE-PROCEDURE SCREENING ORDER (NO  ISOLATION) - Swab, Nasopharynx.  Procedure                               Abnormality         Status                     ---------                               -----------         ------                     COVID-19,CEPHEID,COR/LUIS/...[[id number]]  Normal              Final result                 Please view results for these tests on the individual orders.    COVID-19,CEPHEID,COR/LUIS/PAD IN-HOUSE(OR EMERGENT/ADD-ON),NP SWAB IN TRANSPORT MEDIA 3-4 HR TAT - Swab, Nasopharynx [[id number]]  (Normal) Collected:  07/08/20 0055    Specimen:  Swab from Nasopharynx Updated:  07/08/20 0154     COVID19 Not Detected    Narrative:       Fact sheet for providers: https://www.fda.gov/media/677313/download     Fact sheet for patients: https://www.fda.gov/media/036199/download        Imaging Results (Last 24 Hours)     ** No results found for the last 24 hours. **            Active Hospital Problems    Diagnosis   • Sepsis (CMS/[facility])   • Pneumonia due to infectious organism       Assessment / Plan  Antbx, monitor sepsis markers urine output, discussion with son regarding code status      Code Status: full code     I discussed the patient's findings and my recommendations with the son..    Estimated length of stay 4 days.    Phong Joseph MD   07/08/20   07:44    Electronically signed by Phong Joseph MD at 07/08/20 0745         Current Facility-Administered Medications   Medication Dose Route Frequency Provider Last Rate Last Dose   • acetaminophen (TYLENOL) tablet 650 mg  650 mg Oral BID Phong Joseph MD   650 mg at 07/13/20 0900   • acetaminophen (TYLENOL) tablet 650 mg  650 mg Oral Q6H PRN Phong Joseph MD       • albuterol (PROVENTIL) nebulizer solution 0.083% 2.5 mg/3mL  2.5 mg Nebulization Q6H PRN Phong Joseph MD       • ALPRAZolam (XANAX) tablet 0.25 mg  0.25 mg Oral Daily PRN Phong Joseph MD       • ALPRAZolam (XANAX) tablet 0.25 mg  0.25 mg Oral Nightly Phong Joseph  MD Ernesto       • amLODIPine (NORVASC) tablet 5 mg  5 mg Oral Daily Phong Joseph MD   5 mg at 07/13/20 0900   • brimonidine-timolol (COMBIGAN) 0.2-0.5 % ophthalmic solution 1 drop  1 drop Right Eye Q12H Pohng Joseph MD   1 drop at 07/13/20 0900   • citalopram (CeleXA) tablet 20 mg  20 mg Oral Daily Phong Joseph MD   20 mg at 07/13/20 0900   • dextrose 5 % and sodium chloride 0.45 % with KCl 20 mEq/L infusion  50 mL/hr Intravenous Continuous Phong Joseph MD 50 mL/hr at 07/12/20 1129 50 mL/hr at 07/12/20 1129   • diclofenac (VOLTAREN) 1 % gel 4 g  4 g Topical 4x Daily Phong Joseph MD   4 g at 07/13/20 0859   • docusate sodium (COLACE) capsule 100 mg  100 mg Oral Daily Phong Joseph MD   100 mg at 07/12/20 0902   • enoxaparin (LOVENOX) syringe 40 mg  40 mg Subcutaneous Q24H Phong Joseph MD   40 mg at 07/12/20 1743   • ferrous sulfate tablet 325 mg  325 mg Oral Daily With Breakfast Phong Joseph MD   325 mg at 07/13/20 0900   • glycerin (ADULT) rectal suppository 2 g  2 g Rectal Daily PRN Phong Joseph MD   2 g at 07/10/20 1128   • latanoprost (XALATAN) 0.005 % ophthalmic solution 1 drop  1 drop Both Eyes Nightly Phong Joseph MD   1 drop at 07/12/20 2026   • lidocaine (LMX) 4 % cream   Topical 4x Daily PRN Phong Joseph MD       • Pharmacy Consult - Pharmacy to dose   Does not apply Continuous PRN Phong Joseph MD       • Pharmacy to Dose enoxaparin (LOVENOX)   Does not apply Continuous PRN Phong Joseph MD       • piperacillin-tazobactam (ZOSYN) 3.375 g in iso-osmotic dextrose 50 ml (premix)  3.375 g Intravenous Q8H Phong Joseph MD   Stopped at 07/13/20 0639   • potassium chloride (MICRO-K) CR capsule 20 mEq  20 mEq Oral BID With Meals Oliver Starkey MD   20 mEq at 07/13/20 0900   • sodium chloride 0.9 % bolus 1,464 mL  30 mL/kg Intravenous PRN Phong Joseph MD       •  sodium chloride 0.9 % flush 10 mL  10 mL Intravenous Q12H Phong Joseph MD   10 mL at 07/13/20 0859   • sodium chloride 0.9 % flush 10 mL  10 mL Intravenous PRN Phong Joseph MD       • vancomycin 750 mg/250 mL 0.9% NS IVPB ([facility])  750 mg Intravenous Q12H Phong Joseph MD 0 mL/hr at 07/12/20 2000 750 mg at 07/13/20 0658     Operative/Procedure Notes (last 24 hours) (Notes from 07/12/20 0949 through 07/13/20 0949)    No notes of this type exist for this encounter.            Physician Progress Notes (last 24 hours) (Notes from 07/12/20 0949 through 07/13/20 0949)      Ligia Dominguez MD at 07/12/20 1515          INFECTIOUS DISEASES CONSULT NOTE    Patient:  Sunny Lacey 89 y.o. female  ROOM # 408/1  YOB: 1921  MRN: [id number]  CSN:  [id number]  Admit date: 7/8/2020   Admitting Physician: Phong Joseph MD  Primary Care Physician: Nevaeh Thornton PA  REFERRING PROVIDER: No ref. provider found      REASON FOR CONSULTATION :sepsis, blood and urine cultures from [facility](neg), 103 temp and cta [facility] showing bilat infil---      HISTORY OF PRESENT ILLNESS:  89 year old female unable to provide history.    Per [facility] , she lives in assisted living, presented to [facility] with temp to 103. CT chest reported with bilateral pneumonia.    Unable to see reports in media tab        Past Medical History:   Diagnosis Date   • Anemia    • Anxiety    • Arthritis    • Hypertension    • Skin cancer      Past Surgical History:   Procedure Laterality Date   • SKIN CANCER EXCISION       History reviewed. No pertinent family history.  Social History     Socioeconomic History   • Marital status:      Spouse name: Not on file   • Number of children: Not on file   • Years of education: Not on file   • Highest education level: Not on file   Tobacco Use   • Smoking status: Never Smoker   Substance and Sexual Activity   • Alcohol use: Never     Frequency: Never   • Drug use:  Never   • Sexual activity: Defer           Current Meds:     Current Facility-Administered Medications   Medication Dose Route Frequency Provider Last Rate Last Dose   • acetaminophen (TYLENOL) tablet 650 mg  650 mg Oral BID Phong Joseph MD   650 mg at 07/12/20 0902   • albuterol (PROVENTIL) nebulizer solution 0.083% 2.5 mg/3mL  2.5 mg Nebulization Q6H PRN Phong Joseph MD       • ALPRAZolam (XANAX) tablet 0.25 mg  0.25 mg Oral Daily PRN Phogn Joseph MD       • ALPRAZolam (XANAX) tablet 0.5 mg  0.5 mg Oral Daily Phong Joseph MD   0.5 mg at 07/12/20 0902   • amLODIPine (NORVASC) tablet 5 mg  5 mg Oral Daily Phong Joseph MD   5 mg at 07/12/20 0902   • brimonidine-timolol (COMBIGAN) 0.2-0.5 % ophthalmic solution 1 drop  1 drop Right Eye Q12H Phong Joseph MD   1 drop at 07/12/20 0902   • citalopram (CeleXA) tablet 20 mg  20 mg Oral Daily Phong Joseph MD   20 mg at 07/12/20 0902   • dextrose 5 % and sodium chloride 0.45 % with KCl 20 mEq/L infusion  50 mL/hr Intravenous Continuous Phong Joseph MD 50 mL/hr at 07/12/20 1129 50 mL/hr at 07/12/20 1129   • diclofenac (VOLTAREN) 1 % gel 4 g  4 g Topical 4x Daily Phong Joseph MD   4 g at 07/12/20 1132   • docusate sodium (COLACE) capsule 100 mg  100 mg Oral Daily Phnog Joseph MD   100 mg at 07/12/20 0902   • enoxaparin (LOVENOX) syringe 40 mg  40 mg Subcutaneous Q24H Phong Joseph MD       • ferrous sulfate tablet 325 mg  325 mg Oral Daily With Breakfast Phong Joseph MD   325 mg at 07/12/20 0902   • glycerin (ADULT) rectal suppository 2 g  2 g Rectal Daily PRN Phong Joseph MD   2 g at 07/10/20 1128   • HYDROcodone-acetaminophen (NORCO)  MG per tablet 1 tablet  1 tablet Oral Q6H PRN Phong Joseph MD   1 tablet at 07/10/20 4619   • latanoprost (XALATAN) 0.005 % ophthalmic solution 1 drop  1 drop Both Eyes Nightly Phong Joseph MD    1 drop at 07/11/20 2142   • lidocaine (LMX) 4 % cream   Topical 4x Daily PRN Phong Joseph MD       • Pharmacy Consult - Pharmacy to dose   Does not apply Continuous PRN Phong Joseph MD       • Pharmacy to Dose enoxaparin (LOVENOX)   Does not apply Continuous PRN Phong Joseph MD       • piperacillin-tazobactam (ZOSYN) 3.375 g in iso-osmotic dextrose 50 ml (premix)  3.375 g Intravenous Q8H Phong Joseph MD 0 mL/hr at 07/09/20 2012 3.375 g at 07/12/20 1132   • potassium chloride (MICRO-K) CR capsule 20 mEq  20 mEq Oral BID With Meals Oliver Starkey MD   20 mEq at 07/12/20 0558   • sodium chloride 0.9 % bolus 1,464 mL  30 mL/kg Intravenous PRN Phong Joseph MD       • sodium chloride 0.9 % flush 10 mL  10 mL Intravenous Q12H Phong Joseph MD   10 mL at 07/11/20 2141   • sodium chloride 0.9 % flush 10 mL  10 mL Intravenous PRN Phong Joseph MD       • vancomycin 750 mg/250 mL 0.9% NS IVPB ([facility])  750 mg Intravenous Q12H Phong Joseph MD           Home Meds:  Prior to Admission medications    Medication Sig Start Date End Date Taking? Authorizing Provider   acetaminophen (TYLENOL) 325 MG tablet Take 650 mg by mouth 2 (two) times a day.   Yes Milton Diaz MD   ALPRAZolam (XANAX) 0.25 MG tablet Take 0.25 mg by mouth every night at bedtime.   Yes Milton Diaz MD   amLODIPine (NORVASC) 5 MG tablet Take 5 mg by mouth Daily.   Yes Milton Diaz MD   bimatoprost (LUMIGAN) 0.01 % ophthalmic drops Administer 1 drop to both eyes Every Night.   Yes Milton Diaz MD   brimonidine-timolol (COMBIGAN) 0.2-0.5 % ophthalmic solution Administer 1 drop to the right eye Every 12 (Twelve) Hours.   Yes Milton Diaz MD   Calcium Carbonate-Vit D-Min (CALCIUM 1200 PO) Take 600 mg by mouth 2 (two) times a day.   Yes Milton Diaz MD   cefdinir (OMNICEF) 300 MG capsule Take 300 mg by mouth 2 (Two) Times a Day.   Yes  "Milton Diaz MD   cetirizine (zyrTEC) 10 MG tablet Take 10 mg by mouth Daily.   Yes Milton Diaz MD   citalopram (CeleXA) 20 MG tablet Take 20 mg by mouth Daily.   Yes Milton Diaz MD   diclofenac (VOLTAREN) 1 % gel gel Apply 4 g topically to the appropriate area as directed 4 (Four) Times a Day As Needed.   Yes Milton Diaz MD   docusate sodium (COLACE) 100 MG capsule Take 100 mg by mouth Daily.   Yes Milton Diaz MD   ferrous sulfate 325 (65 FE) MG tablet Take 325 mg by mouth Daily With Breakfast.   Yes Milton Diaz MD   furosemide (LASIX) 20 MG tablet Take 20 mg by mouth 2 (Two) Times a Day.   Yes Milton Diaz MD   HYDROcodone-acetaminophen (NORCO)  MG per tablet Take 1 tablet by mouth Every 4 (Four) Hours As Needed (Q4-6H PRN, max 3 per day).   Yes Milton Diaz MD   potassium chloride (K-DUR,KLOR-CON) 10 MEQ CR tablet Take 10 mEq by mouth Daily.   Yes Milton Diaz MD   pregabalin (LYRICA) 100 MG capsule Take 100 mg by mouth 3 (Three) Times a Day.   Yes Milton Diaz MD   albuterol sulfate  (90 Base) MCG/ACT inhaler Inhale 2 puffs Every 4 (Four) Hours As Needed for Wheezing or Shortness of Air.    Milton Diaz MD   ALPRAZolam (XANAX) 0.25 MG tablet Take 0.25 mg by mouth Daily As Needed for Anxiety.    Milton Diaz MD   lidocaine (LMX) 4 % cream Apply  topically to the appropriate area as directed 4 (Four) Times a Day As Needed for Mild Pain .    Milton Diaz MD          No Known Allergies    Review of Systems   Unable to perform ROS: Dementia         Vital Signs:  /70 (BP Location: Left arm, Patient Position: Sitting)   Pulse 79   Temp 98.4 °F (36.9 °C) (Oral)   Resp 20   Ht 170.2 cm (67\")   Wt 80.5 kg (177 lb 6 oz)   SpO2 93%   BMI 27.78 kg/m²    Temp (24hrs), Av.9 °F (36.6 °C), Min:97.6 °F (36.4 °C), Max:98.4 °F (36.9 °C)      Physical Exam   Constitutional: She " appears well-developed and well-nourished. No distress.   Sitting up in recliner. No family at bedside   HENT:   Head: Normocephalic and atraumatic.   Eyes: No scleral icterus.   Cardiovascular: Normal rate.   Pulmonary/Chest: Effort normal. No respiratory distress. She has no rales.   Abdominal: She exhibits no distension. There is no tenderness.   Neurological: She is alert.   Appeared to have some difficulty with answering questions - expressive   She did converse some [place] and a niece living in [place]   Skin: Skin is warm and dry. She is not diaphoretic.   Psychiatric:   Pleasant and cooperative   Vitals reviewed.      Results Review:    I reviewed the patient's new clinical results.    Lab Results:  CBC:   Lab Results   Lab 07/08/20  0445  07/10/20  0448 07/11/20  0500 07/12/20  0248   WBC 14.94*   < > 9.00 6.82 7.39   HEMOGLOBIN 11.9*   < > 10.6* 10.6* 11.8*   HEMATOCRIT 37.5   < > 33.0* 32.8* 36.1   PLATELETS 90*   < > 103* 111* 129*   LYMPHOCYTE % 3.0*  --   --   --   --    MONOCYTES % 5.0  --   --   --   --     < > = values in this interval not displayed.       CMP:   Lab Results   Lab 07/10/20  0448 07/11/20  0500 07/12/20  0248   SODIUM 145 147* 145   POTASSIUM 3.2* 3.4* 2.8*   CHLORIDE 106 106 104   CO2 29.0 30.0* 32.0*   BUN 12 8 6*   CREATININE 0.74 0.67 0.53*   CALCIUM 8.8 9.1 9.1   GLUCOSE 108* 108* 106*       Lab Results (last 72 hours)     Procedure Component Value Units Date/Time    Blood Culture - Blood, Arm, Right [[id number]] Collected:  07/08/20 0445    Specimen:  Blood from Arm, Right Updated:  07/12/20 0500     Blood Culture No growth at 4 days    Blood Culture - Blood, Hand, Right [[id number]] Collected:  07/08/20 0445    Specimen:  Blood from Hand, Right Updated:  07/12/20 0500     Blood Culture No growth at 4 days    Basic Metabolic Panel [[id number]]  (Abnormal) Collected:  07/12/20 0248    Specimen:  Blood Updated:  07/12/20 0345     Glucose  106 mg/dL      BUN 6 mg/dL      Creatinine 0.53 mg/dL      Sodium 145 mmol/L      Potassium 2.8 mmol/L      Chloride 104 mmol/L      CO2 32.0 mmol/L      Calcium 9.1 mg/dL      eGFR Non African Amer 107 mL/min/1.73      BUN/Creatinine Ratio 11.3     Anion Gap 9.0 mmol/L     Narrative:       GFR Normal >60  Chronic Kidney Disease <60  Kidney Failure <15      CBC & Differential [[id number]] Collected:  07/12/20 0248    Specimen:  Blood Updated:  07/12/20 0331    Narrative:       The following orders were created for panel order CBC & Differential.  Procedure                               Abnormality         Status                     ---------                               -----------         ------                     CBC Auto Differential[[id number]]        Abnormal            Final result                 Please view results for these tests on the individual orders.    CBC Auto Differential [[id number]]  (Abnormal) Collected:  07/12/20 0248    Specimen:  Blood Updated:  07/12/20 0331     WBC 7.39 10*3/mm3      RBC 3.97 10*6/mm3      Hemoglobin 11.8 g/dL      Hematocrit 36.1 %      MCV 90.9 fL      MCH 29.7 pg      MCHC 32.7 g/dL      RDW 12.6 %      RDW-SD 41.4 fl      MPV 10.1 fL      Platelets 129 10*3/mm3      Neutrophil % 76.8 %      Lymphocyte % 9.3 %      Monocyte % 9.5 %      Eosinophil % 2.2 %      Basophil % 1.1 %      Immature Grans % 1.1 %      Neutrophils, Absolute 5.68 10*3/mm3      Lymphocytes, Absolute 0.69 10*3/mm3      Monocytes, Absolute 0.70 10*3/mm3      Eosinophils, Absolute 0.16 10*3/mm3      Basophils, Absolute 0.08 10*3/mm3      Immature Grans, Absolute 0.08 10*3/mm3      nRBC 0.0 /100 WBC     Basic Metabolic Panel [[id number]]  (Abnormal) Collected:  07/11/20 0500    Specimen:  Blood Updated:  07/11/20 0755     Glucose 108 mg/dL      BUN 8 mg/dL      Creatinine 0.67 mg/dL      Sodium 147 mmol/L      Potassium 3.4 mmol/L      Chloride 106 mmol/L      CO2 30.0 mmol/L      Calcium 9.1 mg/dL       eGFR Non African Amer 81 mL/min/1.73      BUN/Creatinine Ratio 11.9     Anion Gap 11.0 mmol/L     Narrative:       GFR Normal >60  Chronic Kidney Disease <60  Kidney Failure <15      Vancomycin, Trough Please draw vancomycin trough one hour before the next dose is due at 0600. [[id number]]  (Normal) Collected:  07/11/20 0500    Specimen:  Blood Updated:  07/11/20 0600     Vancomycin Trough 6.80 mcg/mL     CBC & Differential [[id number]] Collected:  07/11/20 0500    Specimen:  Blood Updated:  07/11/20 0550    Narrative:       The following orders were created for panel order CBC & Differential.  Procedure                               Abnormality         Status                     ---------                               -----------         ------                     CBC Auto Differential[[id number]]        Abnormal            Final result                 Please view results for these tests on the individual orders.    CBC Auto Differential [[id number]]  (Abnormal) Collected:  07/11/20 0500    Specimen:  Blood Updated:  07/11/20 0550     WBC 6.82 10*3/mm3      RBC 3.52 10*6/mm3      Hemoglobin 10.6 g/dL      Hematocrit 32.8 %      MCV 93.2 fL      MCH 30.1 pg      MCHC 32.3 g/dL      RDW 13.0 %      RDW-SD 44.0 fl      MPV 11.2 fL      Platelets 111 10*3/mm3      Neutrophil % 77.6 %      Lymphocyte % 10.3 %      Monocyte % 8.9 %      Eosinophil % 1.9 %      Basophil % 0.6 %      Immature Grans % 0.7 %      Neutrophils, Absolute 5.29 10*3/mm3      Lymphocytes, Absolute 0.70 10*3/mm3      Monocytes, Absolute 0.61 10*3/mm3      Eosinophils, Absolute 0.13 10*3/mm3      Basophils, Absolute 0.04 10*3/mm3      Immature Grans, Absolute 0.05 10*3/mm3      nRBC 0.0 /100 WBC     Basic Metabolic Panel [[id number]]  (Abnormal) Collected:  07/10/20 0448    Specimen:  Blood Updated:  07/10/20 0543     Glucose 108 mg/dL      BUN 12 mg/dL      Creatinine 0.74 mg/dL      Sodium 145 mmol/L      Potassium 3.2 mmol/L      Chloride 106 mmol/L        CO2 29.0 mmol/L      Calcium 8.8 mg/dL      eGFR Non African Amer 72 mL/min/1.73      BUN/Creatinine Ratio 16.2     Anion Gap 10.0 mmol/L     Narrative:       GFR Normal >60  Chronic Kidney Disease <60  Kidney Failure <15      CBC & Differential [[id number]] Collected:  07/10/20 0448    Specimen:  Blood Updated:  07/10/20 0539    Narrative:       The following orders were created for panel order CBC & Differential.  Procedure                               Abnormality         Status                     ---------                               -----------         ------                     CBC Auto Differential[[id number]]        Abnormal            Final result                 Please view results for these tests on the individual orders.    CBC Auto Differential [[id number]]  (Abnormal) Collected:  07/10/20 0448    Specimen:  Blood Updated:  07/10/20 0539     WBC 9.00 10*3/mm3      RBC 3.51 10*6/mm3      Hemoglobin 10.6 g/dL      Hematocrit 33.0 %      MCV 94.0 fL      MCH 30.2 pg      MCHC 32.1 g/dL      RDW 13.2 %      RDW-SD 45.2 fl      MPV 11.5 fL      Platelets 103 10*3/mm3      Neutrophil % 82.9 %      Lymphocyte % 6.7 %      Monocyte % 8.3 %      Eosinophil % 0.9 %      Basophil % 0.8 %      Neutrophils, Absolute 7.46 10*3/mm3      Lymphocytes, Absolute 0.60 10*3/mm3      Monocytes, Absolute 0.75 10*3/mm3      Eosinophils, Absolute 0.08 10*3/mm3      Basophils, Absolute 0.07 10*3/mm3           Culture Results:    Blood Culture   Date Value Ref Range Status   07/08/2020 No growth at 4 days  Preliminary   07/08/2020 No growth at 4 days  Preliminary         Radiology:   Imaging Results (Last 72 Hours)     Procedure Component Value Units Date/Time    CT Head Without Contrast [[id number]] Collected:  07/12/20 1007     Updated:  07/12/20 1012    Narrative:       EXAMINATION:   CT HEAD WO CONTRAST-  7/12/2020 10:07 AM CDT     HISTORY: CT BRAIN without contrast 7/12/2020 9:46 AM CDT     HISTORY: Neuro deficit      COMPARISON: None      DLP: 688 mGy      TECHNIQUE: Serial axial tomographic images of the brain were obtained  without the use of intravenous contrast.      FINDINGS:   The midline structures are nondisplaced. There is mild cerebral and  cerebellar volume loss, with an associated increase in the prominence of  the ventricles and sulci. The basilar cisterns are normal in size and  configuration. There is no evidence of intracranial hemorrhage or  mass-effect. There is low attenuation in the periventricular white  matter, consistent with chronic ischemic change.     Old lacunar infarctions present right basal ganglia region.     There are no abnormal extra-axial fluid collections. There is no  evidence of tonsillar herniation.      The included orbits and their contents are unremarkable. The visualized  paranasal sinuses, mastoid air cells and middle ear cavities are clear.  The visualized osseous structures and overlying soft tissues of the  skull and face are intact.        Impression:       Mild cerebral and cerebellar volume loss with chronic microvascular  disease but no evidence of acute intracranial process.        This report was finalized on 07/12/2020 10:09 by Dr. Dimas Arcos MD.            Assessment/Plan       Sepsis (CMS/HCC)    Pneumonia due to infectious organism  Leukocytosis - improving  Fever at [facility] - resolved    RECOMMENDATION:   · Review [facility] records - [place]  · Continue current regimen as improving   · Follow up re: cultures at [facility]        Ligia Dominguez MD  07/12/20  15:18          Electronically signed by Ligia Dominguez MD at 07/12/20 2052          Consult Notes (last 24 hours) (Notes from 07/12/20 0949 through 07/13/20 0949)      Seda Murphy MD at 07/12/20 1751      Consult Orders    1. Inpatient Neurology Consult General [[id number]] ordered by Phong Joseph MD at 07/12/20 0945                    Neurology Consult Note    Patient:  Sunny RODRIGES  Deepthi   YOB: 1921  MRN:  [id number]  Date of Admission:  7/8/2020 12:46 AM    Date: 7/12/2020    Referring Provider: Phong Joseph MD   Reason for Consultation:   delirium    History of present illness:     This is a 89 y.o.  female admitted with chills, fever, tachypnea and a chest CT + for pneumonia evaluated for delirium.Patient is Covid negative.She also has a H/O renal insufficiency, anxiety and osteoarthritis, hypertension.   Granddaughter is in room and states patient lives in assistant living but is normally quite sharp  Nursing states she has been like this all day and they have not noticed a change or improvement.     However patient is able to express that she need to use bathroom. She has not been eating but has been taking her pills in applesauce. She does not like applesauce however.  Also she is hard of hearing so sometimes she does not answer because she does not hear what is said.    She is on vancomycin and Zosyn.   Of note patient is on Norco 5/325 mg every 6 hours and Xanax 0.5 mg q day.    She has been hypokalemic and today's K+ is only 2.8   She is anemic with a hemoglobin of 10.6 improved today to 112.8. 4 days ago her WBC was up to 14,940 and now I normal.     She had a Head CT which did not show any acute changes.   Past Medical History:   Diagnosis Date   • Anemia    • Anxiety    • Arthritis    • Hypertension    • Skin cancer    PAST MEDICAL HISTORY:   1.  Hypertension.    2.  Hyperlipidemia.   3.  Renal insufficiency.   4.  Anxiety.   5.  Depression.   6.  Osteoarthritis.   7.  Osteoporosis.   8.  Shingles.        Past Surgical History:   Procedure Laterality Date   • SKIN CANCER EXCISION     1.  Hip replacement bilaterally.  2.  Knee replacement unilaterally.   3.  Bilateral cataract removal with intraocular lens implant.   4.  Hemorrhoidectomy.  5.  Total abdominal hysterectomy.  6.  Back surgery.     Prior to Admission medications    Medication Sig Start Date  End Date Taking? Authorizing Provider   acetaminophen (TYLENOL) 325 MG tablet Take 650 mg by mouth 2 (two) times a day.   Yes Milton Diaz MD   ALPRAZolam (XANAX) 0.25 MG tablet Take 0.25 mg by mouth every night at bedtime.   Yes Milton Diaz MD   amLODIPine (NORVASC) 5 MG tablet Take 5 mg by mouth Daily.   Yes Milton Diaz MD   bimatoprost (LUMIGAN) 0.01 % ophthalmic drops Administer 1 drop to both eyes Every Night.   Yes Milton Diaz MD   brimonidine-timolol (COMBIGAN) 0.2-0.5 % ophthalmic solution Administer 1 drop to the right eye Every 12 (Twelve) Hours.   Yes Milton Diaz MD   Calcium Carbonate-Vit D-Min (CALCIUM 1200 PO) Take 600 mg by mouth 2 (two) times a day.   Yes Milton Diaz MD   cefdinir (OMNICEF) 300 MG capsule Take 300 mg by mouth 2 (Two) Times a Day.   Yes Milton Diaz MD   cetirizine (zyrTEC) 10 MG tablet Take 10 mg by mouth Daily.   Yes Milton Diaz MD   citalopram (CeleXA) 20 MG tablet Take 20 mg by mouth Daily.   Yes Milton Diaz MD   diclofenac (VOLTAREN) 1 % gel gel Apply 4 g topically to the appropriate area as directed 4 (Four) Times a Day As Needed.   Yes Milton Diaz MD   docusate sodium (COLACE) 100 MG capsule Take 100 mg by mouth Daily.   Yes Milton Diaz MD   ferrous sulfate 325 (65 FE) MG tablet Take 325 mg by mouth Daily With Breakfast.   Yes Milton Diaz MD   furosemide (LASIX) 20 MG tablet Take 20 mg by mouth 2 (Two) Times a Day.   Yes Milton Diaz MD   HYDROcodone-acetaminophen (NORCO)  MG per tablet Take 1 tablet by mouth Every 4 (Four) Hours As Needed (Q4-6H PRN, max 3 per day).   Yes Milton Diaz MD   potassium chloride (K-DUR,KLOR-CON) 10 MEQ CR tablet Take 10 mEq by mouth Daily.   Yes Milton Diaz MD   pregabalin (LYRICA) 100 MG capsule Take 100 mg by mouth 3 (Three) Times a Day.   Yes Milton Diaz MD   albuterol sulfate   (90 Base) MCG/ACT inhaler Inhale 2 puffs Every 4 (Four) Hours As Needed for Wheezing or Shortness of Air.    Provider, MD Milton   ALPRAZolam (XANAX) 0.25 MG tablet Take 0.25 mg by mouth Daily As Needed for Anxiety.    Provider, MD Milton   lidocaine (LMX) 4 % cream Apply  topically to the appropriate area as directed 4 (Four) Times a Day As Needed for Mild Pain .    ProviderMilton MD       Hospital scheduled medications:     acetaminophen 650 mg Oral BID   ALPRAZolam 0.5 mg Oral Daily   amLODIPine 5 mg Oral Daily   brimonidine-timolol 1 drop Right Eye Q12H   citalopram 20 mg Oral Daily   diclofenac 4 g Topical 4x Daily   docusate sodium 100 mg Oral Daily   enoxaparin 40 mg Subcutaneous Q24H   ferrous sulfate 325 mg Oral Daily With Breakfast   latanoprost 1 drop Both Eyes Nightly   piperacillin-tazobactam 3.375 g Intravenous Q8H   potassium chloride 20 mEq Oral BID With Meals   sodium chloride 10 mL Intravenous Q12H   vancomycin 750 mg Intravenous Q12H     Hospital PRN medications:  albuterol  •  ALPRAZolam  •  glycerin  •  HYDROcodone-acetaminophen  •  lidocaine  •  Pharmacy Consult - Pharmacy to dose  •  Pharmacy to Dose enoxaparin (LOVENOX)  •  sodium chloride  •  sodium chloride    No Known Allergies    Social History     Socioeconomic History   • Marital status:      Spouse name: Not on file   • Number of children: Not on file   • Years of education: Not on file   • Highest education level: Not on file   Tobacco Use   • Smoking status: Never Smoker   Substance and Sexual Activity   • Alcohol use: Never     Frequency: Never   • Drug use: Never   • Sexual activity: Defer     History reviewed. No pertinent family history.    Review of Systems  A 14 point review of systems was reviewed but she only partially answered but what was answered was negative except for needing to go to bathroom    Vital Signs   Temp:  [97.6 °F (36.4 °C)-98.5 °F (36.9 °C)] 98.5 °F (36.9 °C)  Heart Rate:  [79-95]  85  Resp:  [20] 20  BP: (118-158)/(67-84) 149/70    General Exam:  Head:  Normal cephalic, atraumatic  HEENT:  Neck supple  Fundoscopic Exam:  No signs of disc edema  CVS:  Regular rate and rhythm.  No murmurs  Carotid Examination:  No bruits  Lungs:  Clear to auscultation  Abdomen:  Non-tender, Non-distended  Extremities:  No signs of peripheral edema  Skin:  No rashes    Neurologic Exam:    Mental Status:    -Awake, Alert, Oriented to person.  Knows she is in [place] and in a hospital Knows it is summer and thinks the month is August  Knows the year is 2020. Cannot name the president or describe him. Began to get a bit agitated by questions.   -No word finding difficulties  -No aphasia  -No dysarthria  -Follows some simple  commands    CN II:  Visual fields full.to threat  Pupils equally reactive to light  CN III, IV, VI:  Extraocular Muscles full with no signs of nystagmus  CN V:  Facial sensory is symmetric   CN VII:  Facial motor symmetric  CN VIII:  Gross hearing intact bilaterally  CN IX/X:  Palate elevates symmetrically  CN XI:  Shoulder shrug symmetric  CN XII:  Tongue is midline on protrusion    Motor: (strength out of 5:  1= minimal movement, 2 = movement in plane of gravity, 3 = movement against gravity, 4 = movement against some resistance, 5 = full strength)    She spontaneously moves all 4 extremities and has a good hand  and can pull up (biceps) but she would not actively resist on other muscle testing.     DTR:  -Right   Bicep: 2+ Triceps: 2+ Brachioradialis: 2+   Patella: 2+ Ankle: 2+ Neg Babinski  -Left   Bicep: 2+ Triceps: 2+ Brachioradialis: 2+   Patella: 2+ Ankle: 2+ Neg Babinski    Sensory:  -Intact to light touch, pinprick, temperature, pain, and proprioception    Coordination:  -Finger to nose/Heel to shin --was unable to get her to do -No ataxia with movements seen    Gait  -Not tested for safety reasons.       Results Review:  Lab Results (last 7 days)     Procedure Component Value  Units Date/Time    Blood Culture - Blood, Arm, Right [[id number]] Collected:  07/08/20 0445    Specimen:  Blood from Arm, Right Updated:  07/12/20 0500     Blood Culture No growth at 4 days    Blood Culture - Blood, Hand, Right [[id number]] Collected:  07/08/20 0445    Specimen:  Blood from Hand, Right Updated:  07/12/20 0500     Blood Culture No growth at 4 days    Basic Metabolic Panel [[id number]]  (Abnormal) Collected:  07/12/20 0248    Specimen:  Blood Updated:  07/12/20 0345     Glucose 106 mg/dL      BUN 6 mg/dL      Creatinine 0.53 mg/dL      Sodium 145 mmol/L      Potassium 2.8 mmol/L      Chloride 104 mmol/L      CO2 32.0 mmol/L      Calcium 9.1 mg/dL      eGFR Non African Amer 107 mL/min/1.73      BUN/Creatinine Ratio 11.3     Anion Gap 9.0 mmol/L     Narrative:       GFR Normal >60  Chronic Kidney Disease <60  Kidney Failure <15      CBC & Differential [[id number]] Collected:  07/12/20 0248    Specimen:  Blood Updated:  07/12/20 0331    Narrative:       The following orders were created for panel order CBC & Differential.  Procedure                               Abnormality         Status                     ---------                               -----------         ------                     CBC Auto Differential[[id number]]        Abnormal            Final result                 Please view results for these tests on the individual orders.    CBC Auto Differential [[id number]]  (Abnormal) Collected:  07/12/20 0248    Specimen:  Blood Updated:  07/12/20 0331     WBC 7.39 10*3/mm3      RBC 3.97 10*6/mm3      Hemoglobin 11.8 g/dL      Hematocrit 36.1 %      MCV 90.9 fL      MCH 29.7 pg      MCHC 32.7 g/dL      RDW 12.6 %      RDW-SD 41.4 fl      MPV 10.1 fL      Platelets 129 10*3/mm3      Neutrophil % 76.8 %      Lymphocyte % 9.3 %      Monocyte % 9.5 %      Eosinophil % 2.2 %      Basophil % 1.1 %      Immature Grans % 1.1 %      Neutrophils, Absolute 5.68 10*3/mm3      Lymphocytes, Absolute 0.69 10*3/mm3       Monocytes, Absolute 0.70 10*3/mm3      Eosinophils, Absolute 0.16 10*3/mm3      Basophils, Absolute 0.08 10*3/mm3      Immature Grans, Absolute 0.08 10*3/mm3      nRBC 0.0 /100 WBC     Basic Metabolic Panel [[id number]]  (Abnormal) Collected:  07/11/20 0500    Specimen:  Blood Updated:  07/11/20 0755     Glucose 108 mg/dL      BUN 8 mg/dL      Creatinine 0.67 mg/dL      Sodium 147 mmol/L      Potassium 3.4 mmol/L      Chloride 106 mmol/L      CO2 30.0 mmol/L      Calcium 9.1 mg/dL      eGFR Non African Amer 81 mL/min/1.73      BUN/Creatinine Ratio 11.9     Anion Gap 11.0 mmol/L     Narrative:       GFR Normal >60  Chronic Kidney Disease <60  Kidney Failure <15      Vancomycin, Trough Please draw vancomycin trough one hour before the next dose is due at 0600. [[id number]]  (Normal) Collected:  07/11/20 0500    Specimen:  Blood Updated:  07/11/20 0600     Vancomycin Trough 6.80 mcg/mL     CBC & Differential [[id number]] Collected:  07/11/20 0500    Specimen:  Blood Updated:  07/11/20 0550    Narrative:       The following orders were created for panel order CBC & Differential.  Procedure                               Abnormality         Status                     ---------                               -----------         ------                     CBC Auto Differential[[id number]]        Abnormal            Final result                 Please view results for these tests on the individual orders.    CBC Auto Differential [[id number]]  (Abnormal) Collected:  07/11/20 0500    Specimen:  Blood Updated:  07/11/20 0550     WBC 6.82 10*3/mm3      RBC 3.52 10*6/mm3      Hemoglobin 10.6 g/dL      Hematocrit 32.8 %      MCV 93.2 fL      MCH 30.1 pg      MCHC 32.3 g/dL      RDW 13.0 %      RDW-SD 44.0 fl      MPV 11.2 fL      Platelets 111 10*3/mm3      Neutrophil % 77.6 %      Lymphocyte % 10.3 %      Monocyte % 8.9 %      Eosinophil % 1.9 %      Basophil % 0.6 %      Immature Grans % 0.7 %      Neutrophils, Absolute 5.29  10*3/mm3      Lymphocytes, Absolute 0.70 10*3/mm3      Monocytes, Absolute 0.61 10*3/mm3      Eosinophils, Absolute 0.13 10*3/mm3      Basophils, Absolute 0.04 10*3/mm3      Immature Grans, Absolute 0.05 10*3/mm3      nRBC 0.0 /100 WBC     Basic Metabolic Panel [[id number]]  (Abnormal) Collected:  07/10/20 0448    Specimen:  Blood Updated:  07/10/20 0543     Glucose 108 mg/dL      BUN 12 mg/dL      Creatinine 0.74 mg/dL      Sodium 145 mmol/L      Potassium 3.2 mmol/L      Chloride 106 mmol/L      CO2 29.0 mmol/L      Calcium 8.8 mg/dL      eGFR Non African Amer 72 mL/min/1.73      BUN/Creatinine Ratio 16.2     Anion Gap 10.0 mmol/L     Narrative:       GFR Normal >60  Chronic Kidney Disease <60  Kidney Failure <15      CBC & Differential [[id number]] Collected:  07/10/20 0448    Specimen:  Blood Updated:  07/10/20 0539    Narrative:       The following orders were created for panel order CBC & Differential.  Procedure                               Abnormality         Status                     ---------                               -----------         ------                     CBC Auto Differential[[id number]]        Abnormal            Final result                 Please view results for these tests on the individual orders.    CBC Auto Differential [[id number]]  (Abnormal) Collected:  07/10/20 0448    Specimen:  Blood Updated:  07/10/20 0539     WBC 9.00 10*3/mm3      RBC 3.51 10*6/mm3      Hemoglobin 10.6 g/dL      Hematocrit 33.0 %      MCV 94.0 fL      MCH 30.2 pg      MCHC 32.1 g/dL      RDW 13.2 %      RDW-SD 45.2 fl      MPV 11.5 fL      Platelets 103 10*3/mm3      Neutrophil % 82.9 %      Lymphocyte % 6.7 %      Monocyte % 8.3 %      Eosinophil % 0.9 %      Basophil % 0.8 %      Neutrophils, Absolute 7.46 10*3/mm3      Lymphocytes, Absolute 0.60 10*3/mm3      Monocytes, Absolute 0.75 10*3/mm3      Eosinophils, Absolute 0.08 10*3/mm3      Basophils, Absolute 0.07 10*3/mm3     Basic Metabolic Panel [[id number]]   (Abnormal) Collected:  07/09/20 0557    Specimen:  Blood Updated:  07/09/20 0649     Glucose 106 mg/dL      BUN 22 mg/dL      Creatinine 1.15 mg/dL      Sodium 146 mmol/L      Potassium 3.4 mmol/L      Chloride 106 mmol/L      CO2 31.0 mmol/L      Calcium 8.6 mg/dL      eGFR Non African Amer 44 mL/min/1.73      BUN/Creatinine Ratio 19.1     Anion Gap 9.0 mmol/L     Narrative:       GFR Normal >60  Chronic Kidney Disease <60  Kidney Failure <15      CBC & Differential [[id number]] Collected:  07/09/20 0557    Specimen:  Blood Updated:  07/09/20 0637    Narrative:       The following orders were created for panel order CBC & Differential.  Procedure                               Abnormality         Status                     ---------                               -----------         ------                     CBC Auto Differential[[id number]]        Abnormal            Final result                 Please view results for these tests on the individual orders.    CBC Auto Differential [[id number]]  (Abnormal) Collected:  07/09/20 0557    Specimen:  Blood Updated:  07/09/20 0637     WBC 11.03 10*3/mm3      RBC 3.61 10*6/mm3      Hemoglobin 10.8 g/dL      Hematocrit 34.6 %      MCV 95.8 fL      MCH 29.9 pg      MCHC 31.2 g/dL      RDW 13.3 %      RDW-SD 47.5 fl      MPV 11.6 fL      Platelets 85 10*3/mm3      Neutrophil % 77.8 %      Lymphocyte % 9.2 %      Monocyte % 10.9 %      Eosinophil % 0.4 %      Basophil % 0.5 %      Neutrophils, Absolute 8.58 10*3/mm3      Lymphocytes, Absolute 1.02 10*3/mm3      Monocytes, Absolute 1.20 10*3/mm3      Eosinophils, Absolute 0.04 10*3/mm3      Basophils, Absolute 0.06 10*3/mm3     Lactic Acid, Plasma [[id number]]  (Normal) Collected:  07/08/20 1256    Specimen:  Blood Updated:  07/08/20 1323     Lactate 1.3 mmol/L     Respiratory Panel, PCR - Swab, Nasopharynx [[id number]]  (Normal) Collected:  07/08/20 0505    Specimen:  Swab from Nasopharynx Updated:  07/08/20 0652     ADENOVIRUS,  PCR Not Detected     Coronavirus 229E Not Detected     Coronavirus HKU1 Not Detected     Coronavirus NL63 Not Detected     Coronavirus OC43 Not Detected     Human Metapneumovirus Not Detected     Human Rhinovirus/Enterovirus Not Detected     Influenza B PCR Not Detected     Parainfluenza Virus 1 Not Detected     Parainfluenza Virus 2 Not Detected     Parainfluenza Virus 3 Not Detected     Parainfluenza Virus 4 Not Detected     Bordetella pertussis pcr Not Detected     Influenza A H1 2009 PCR Not Detected     Chlamydophila pneumoniae PCR Not Detected     Mycoplasma pneumo by PCR Not Detected     Influenza A PCR Not Detected     Influenza A H3 Not Detected     Influenza A H1 Not Detected     RSV, PCR Not Detected     Bordetella parapertussis PCR Not Detected    Narrative:       The coronavirus on the RVP is NOT COVID-19 and is NOT indicative of infection with COVID-19.     CBC & Differential [[id number]] Collected:  07/08/20 0445    Specimen:  Blood Updated:  07/08/20 0520    Narrative:       The following orders were created for panel order CBC & Differential.  Procedure                               Abnormality         Status                     ---------                               -----------         ------                     CBC Auto Differential[[id number]]        Abnormal            Final result                 Please view results for these tests on the individual orders.    CBC Auto Differential [[id number]]  (Abnormal) Collected:  07/08/20 0445    Specimen:  Blood Updated:  07/08/20 0520     WBC 14.94 10*3/mm3      RBC 3.93 10*6/mm3      Hemoglobin 11.9 g/dL      Hematocrit 37.5 %      MCV 95.4 fL      MCH 30.3 pg      MCHC 31.7 g/dL      RDW 13.3 %      RDW-SD 46.8 fl      MPV 11.1 fL      Platelets 90 10*3/mm3     Manual Differential [[id number]]  (Abnormal) Collected:  07/08/20 0445    Specimen:  Blood Updated:  07/08/20 0520     Neutrophil % 61.0 %      Lymphocyte % 3.0 %      Monocyte % 5.0 %      Bands %   24.0 %      Metamyelocyte % 6.0 %      Atypical Lymphocyte % 1.0 %      Neutrophils Absolute 12.70 10*3/mm3      Lymphocytes Absolute 0.45 10*3/mm3      Monocytes Absolute 0.75 10*3/mm3      Polychromasia Slight/1+     WBC Morphology Normal     Platelet Estimate Decreased    Basic Metabolic Panel [[id number]]  (Abnormal) Collected:  07/08/20 0445    Specimen:  Blood Updated:  07/08/20 0508     Glucose 114 mg/dL      BUN 20 mg/dL      Creatinine 1.26 mg/dL      Sodium 145 mmol/L      Potassium 3.5 mmol/L      Chloride 105 mmol/L      CO2 30.0 mmol/L      Calcium 9.2 mg/dL      eGFR Non African Amer 39 mL/min/1.73      BUN/Creatinine Ratio 15.9     Anion Gap 10.0 mmol/L     Narrative:       GFR Normal >60  Chronic Kidney Disease <60  Kidney Failure <15      Lactic Acid, Plasma [[id number]]  (Normal) Collected:  07/08/20 0445    Specimen:  Blood Updated:  07/08/20 0508     Lactate 1.6 mmol/L     COVID PRE-OP / PRE-PROCEDURE SCREENING ORDER (NO ISOLATION) - Swab, Nasopharynx [[id number]] Collected:  07/08/20 0055    Specimen:  Swab from Nasopharynx Updated:  07/08/20 0154    Narrative:       The following orders were created for panel order COVID PRE-OP / PRE-PROCEDURE SCREENING ORDER (NO ISOLATION) - Swab, Nasopharynx.  Procedure                               Abnormality         Status                     ---------                               -----------         ------                     COVID-19,CEPHEID,COR/LUIS/...[[id number]]  Normal              Final result                 Please view results for these tests on the individual orders.    COVID-19,CEPHEID,COR/LUIS/PAD IN-HOUSE(OR EMERGENT/ADD-ON),NP SWAB IN TRANSPORT MEDIA 3-4 HR TAT - Swab, Nasopharynx [[id number]]  (Normal) Collected:  07/08/20 0055    Specimen:  Swab from Nasopharynx Updated:  07/08/20 0154     COVID19 Not Detected    Narrative:       Fact sheet for providers: https://www.fda.gov/media/359318/download     Fact sheet for patients:  https://www.fda.gov/media/030381/download          .  Imaging Results (Last 24 Hours)     Procedure Component Value Units Date/Time    CT Head Without Contrast [[id number]] Collected:  07/12/20 1007     Updated:  07/12/20 1012    Narrative:       EXAMINATION:   CT HEAD WO CONTRAST-  7/12/2020 10:07 AM CDT     HISTORY: CT BRAIN without contrast 7/12/2020 9:46 AM CDT     HISTORY: Neuro deficit     COMPARISON: None      DLP: 688 mGy cm     TECHNIQUE: Serial axial tomographic images of the brain were obtained  without the use of intravenous contrast.      FINDINGS:   The midline structures are nondisplaced. There is mild cerebral and  cerebellar volume loss, with an associated increase in the prominence of  the ventricles and sulci. The basilar cisterns are normal in size and  configuration. There is no evidence of intracranial hemorrhage or  mass-effect. There is low attenuation in the periventricular white  matter, consistent with chronic ischemic change.     Old lacunar infarctions present right basal ganglia region.     There are no abnormal extra-axial fluid collections. There is no  evidence of tonsillar herniation.      The included orbits and their contents are unremarkable. The visualized  paranasal sinuses, mastoid air cells and middle ear cavities are clear.  The visualized osseous structures and overlying soft tissues of the  skull and face are intact.        Impression:       Mild cerebral and cerebellar volume loss with chronic microvascular  disease but no evidence of acute intracranial process.        This report was finalized on 07/12/2020 10:09 by Dr. Dimas Arcos MD.              Impression  1. Suspect some underlying cognitive impairment superimposed on metabolic encephalopathy  2.  Medications in this 89 year old that would contribute to cognitive issues include xanax and Norco.   3. Pneumonia/infection  will contribute to cognitive deficits  4. Anemia  5. Hypokalemia    Plan  Recommend reduction  of nighttime xanax to 0.25 and a rare prn 0.25 mg xanax.  Goal is to stop this in the future if at all possible.  Recommend reduced dosage of Norco as well with goal of stopping this if possible.   Defer treatment of hypokalemia to Dr Joseph     Its a nonfocal exam but was not fully able to form a complete exam due to her inability to comprehend some commands for testing.  For now I do not see the need to obtain a MRI unless the exam progresses or she worsens.       I discussed the patients findings and my recommendations with patient, family and nursing staff    Seda Poe MD  07/12/20  17:51      Electronically signed by Seda Murphy MD at 07/12/20 2015       Nutrition Notes (last 24 hours) (Notes from 07/12/20 0949 through 07/13/20 0949)    No notes exist for this encounter.         Physical Therapy Notes (last 24 hours) (Notes from 07/12/20 0949 through 07/13/20 0949)    No notes exist for this encounter.         Occupational Therapy Notes (last 24 hours) (Notes from 07/12/20 0949 through 07/13/20 0949)    No notes exist for this encounter.         Speech Language Pathology Notes (last 24 hours) (Notes from 07/12/20 0949 through 07/13/20 0949)    No notes exist for this encounter.         Respiratory Therapy Notes (last 24 hours) (Notes from 07/12/20 0949 through 07/13/20 0949)    No notes exist for this encounter.

## 2020-07-13 NOTE — PROGRESS NOTES
Continued Stay Note   [place]     Patient Name: Sunny Lacey  MRN: [id number]  Today's Date: 7/13/2020    Admit Date: 7/8/2020    Discharge Plan     Row Name 07/13/20 0950       Plan    Plan  Referral to [facility]    Patient/Family in Agreement with Plan  yes    Plan Comments  Spoke with MADDIE Cristino Lacey [phone] and he wishes pt to be listed at [facility] Swing bed unit, will send P.T. notes when available.  Explained that pt probably needs more than swing bed,  he wishes to try just to see if bed will be offered.  Will follow.         Discharge Codes    No documentation.             ESTELA Romero

## 2020-07-13 NOTE — PROGRESS NOTES
Neurology Progress Note      Chief Complaint:  F/u delirium    Subjective     Subjective:  Patient up in chair. RN at bedside. C/o nausea but no vomiting. Disoriented. She thought I was one of her grand daughters. Xanax 0.25 mg now administered nightly and patient has not had Norco since 7/10/2020.     Ammonia- 36  Ferritin 410  TSH 2.13  K+ 3.0  Iron  37 - 145 mcg/dL 58    Iron Saturation  20 - 50 % 23    Transferrin  200 - 360 mg/dL 169Low     TIBC  298 - 536 mcg/dL 252Low         Medications:  Current Facility-Administered Medications   Medication Dose Route Frequency Provider Last Rate Last Dose   • acetaminophen (TYLENOL) tablet 650 mg  650 mg Oral BID Phong Jospeh MD   650 mg at 07/13/20 0900   • acetaminophen (TYLENOL) tablet 650 mg  650 mg Oral Q6H PRN Phong Joseph MD       • albuterol (PROVENTIL) nebulizer solution 0.083% 2.5 mg/3mL  2.5 mg Nebulization Q6H PRN Phong Joseph MD       • ALPRAZolam (XANAX) tablet 0.25 mg  0.25 mg Oral Daily PRN Phong Joseph MD       • ALPRAZolam (XANAX) tablet 0.25 mg  0.25 mg Oral Nightly Phong Joseph MD       • amLODIPine (NORVASC) tablet 5 mg  5 mg Oral Daily Phong Joseph MD   5 mg at 07/13/20 0900   • brimonidine-timolol (COMBIGAN) 0.2-0.5 % ophthalmic solution 1 drop  1 drop Right Eye Q12H Phong Joseph MD   1 drop at 07/13/20 0900   • citalopram (CeleXA) tablet 20 mg  20 mg Oral Daily Phong Joseph MD   20 mg at 07/13/20 0900   • dextrose 5 % and sodium chloride 0.45 % with KCl 20 mEq/L infusion  50 mL/hr Intravenous Continuous Phong Joseph MD 50 mL/hr at 07/12/20 1129 50 mL/hr at 07/12/20 1129   • diclofenac (VOLTAREN) 1 % gel 4 g  4 g Topical 4x Daily Phong Joseph MD   4 g at 07/13/20 1114   • docusate sodium (COLACE) capsule 100 mg  100 mg Oral Daily Phong Joseph MD   100 mg at 07/12/20 0902   • enoxaparin (LOVENOX) syringe 40 mg  40 mg Subcutaneous Q24H  Phong Joseph MD   40 mg at 07/12/20 1743   • ferrous sulfate tablet 325 mg  325 mg Oral Daily With Breakfast Phong Joseph MD   325 mg at 07/13/20 0900   • glycerin (ADULT) rectal suppository 2 g  2 g Rectal Daily PRN Phong Joseph MD   2 g at 07/10/20 1128   • latanoprost (XALATAN) 0.005 % ophthalmic solution 1 drop  1 drop Both Eyes Nightly Phong Joseph MD   1 drop at 07/12/20 2026   • lidocaine (LMX) 4 % cream   Topical 4x Daily PRN Phong Joseph MD       • Pharmacy Consult - Pharmacy to dose   Does not apply Continuous PRN Phong Joseph MD       • Pharmacy to Dose enoxaparin (LOVENOX)   Does not apply Continuous PRN Phong Joseph MD       • piperacillin-tazobactam (ZOSYN) 3.375 g in iso-osmotic dextrose 50 ml (premix)  3.375 g Intravenous Q8H Phong Joseph MD 0 mL/hr at 07/13/20 0639 3.375 g at 07/13/20 1114   • potassium chloride (MICRO-K) CR capsule 20 mEq  20 mEq Oral BID With Meals Oliver Starkey MD   20 mEq at 07/13/20 0900   • sodium chloride 0.9 % bolus 1,464 mL  30 mL/kg Intravenous PRN Phong Joseph MD       • sodium chloride 0.9 % flush 10 mL  10 mL Intravenous Q12H Phong Joseph MD   10 mL at 07/13/20 0859   • sodium chloride 0.9 % flush 10 mL  10 mL Intravenous PRN Phong Joseph MD       • vancomycin 750 mg/250 mL 0.9% NS IVPB ([facility])  750 mg Intravenous Q12H Phong Joseph MD 0 mL/hr at 07/12/20 2000 750 mg at 07/13/20 0658       Review of Systems:   -A 14 point review of systems is completed and is negative except for nausea and confusion.      Objective      Vital Signs  Temp:  [97.6 °F (36.4 °C)-98.6 °F (37 °C)] 98.6 °F (37 °C)  Heart Rate:  [] 91  Resp:  [20] 20  BP: (109-170)/(66-86) 170/76    Telemetry: S 77-96    Physical Exam:  General Exam:  Head:  Normocephalic, atraumatic  HEENT:  Neck supple  CVS:  Regular rate and rhythm.  No murmurs  Carotid Examination:  No  bruits  Lungs:  Clear to auscultation  Abdomen:  Nontender, nondistended  Extremities:  No signs of peripheral edema  Skin:  No rashes    Neurologic Exam:    Mental Status:    -Alert, Oriented X to person and place.   -No word-finding difficulties but slow to answer  -No aphasia  -No dysarthria  -Follows simple  commands    CN II:  Visual fields full.  Pupils equally reactive to light  CN III, IV, VI:  Extraocular Muscles full with no signs of nystagmus  CN V:  Facial sensory is symmetric with no asymmetries.  CN VII:  Facial motor symmetric  CN VIII:  Gross hearing intact bilaterally  CN IX:  Palate elevates symmetrically  CN X:  Palate elevates symmetrically  CN XI:  Shoulder shrug symmetric  CN XII:  Tongue protrudes to midline  Motor: (strength out of 5:  1= minimal movement, 2 = movement in plane of gravity, 3 = movement against gravity, 4 = movement against some resistance, 5 = full strength)    -moving all four extremities symmetrically but has general weakness.    DTR:  -Right   Bicep: 2+ Tricep: 2+ Brachoradialis: 2+   Patella: 2+ Ankle: 2+ Neg Babinski  -Left   Bicep: 2+ Tricep: 2+ Brachoradialis: 2+   Patella: 2+ Ankle: 2+ Neg Babinski    Sensory:  -Intact to light touch, pinprick, temperature, pain, and proprioception    Coordination:  -Finger to nose intact  -Heel to shin intact  - no increase tone.     Gait  -Needs assistance. Has decreased strength and endurance with PT.  - high fall risk         Results Review:    I reviewed the patient's new clinical results.    Results from last 7 days   Lab Units 07/13/20  0403 07/12/20  0248 07/11/20  0500   WBC 10*3/mm3 8.51 7.39 6.82   HEMOGLOBIN g/dL 13.6 11.8* 10.6*   HEMATOCRIT % 41.5 36.1 32.8*   PLATELETS 10*3/mm3 168 129* 111*        Results from last 7 days   Lab Units 07/13/20  0403 07/12/20  0248 07/11/20  0500   SODIUM mmol/L 145 145 147*   POTASSIUM mmol/L 3.0* 2.8* 3.4*   CHLORIDE mmol/L 106 104 106   CO2 mmol/L 28.0 32.0* 30.0*   BUN mg/dL 8 6* 8    CREATININE mg/dL 0.66 0.53* 0.67   CALCIUM mg/dL 9.5 9.1 9.1   GLUCOSE mg/dL 133* 106* 108*        Lab Results   Component Value Date    PROTIME 14.3 02/26/2016    INR 1.08 02/26/2016     No components found for: POCGLUC  No components found for: A1C  No results found for: HDL, LDL  No components found for: B12  Lab Results   Component Value Date    TSH 2.130 07/13/2020       Assessment/Plan     Hospital Problem List      Sepsis (CMS/HCC)    Pneumonia due to infectious organism    At risk for falls    Hypokalemia    Impression:  1. Suspect some underlying cognitive impairment superimposed on metabolic encephalopathy  2.  Medications in this 89 year old that would contribute to cognitive issues include xanax and Norco.   3. Pneumonia/infection  will contribute to cognitive deficits  4. Anemia  5. Hypokalemia  6. Ammonia level 36. Will need to monitor.     Plan:  1. Xanax 0.25 mg nightly and with goal to wean and discontinue slowly.  2. Defer hypokalemia to attending.  3. Check magnesium  4. Continue therapies.  5. Supportive care.   6. Anemia per attending.         Jolynn Mitchell, APRN  07/13/20  11:32

## 2020-07-13 NOTE — DISCHARGE PLACEMENT REQUEST
"Deann Sousa Y (89 y.o. Female)     Date of Birth Social Security Number Address Home Phone MRN    09/28/1921  [facility] [id number] [phone] [id number]    [employer] Marital Status          Sikh        Admission Date Admission Type Admitting Provider Attending Provider Department, Room/Bed    7/8/20 Urgent Phong Joseph MD [place], Thomas Waldon, MD [facility] 4B, 408/1    Discharge Date Discharge Disposition Discharge Destination                       Attending Provider:  Phong Joseph MD    Allergies:  No Known Allergies    Isolation:  None   Infection:  None   Code Status:  CPR    Ht:  170.2 cm (67\")   Wt:  76.7 kg (169 lb 3 oz)    Admission Cmt:  None   Principal Problem:  None                Active Insurance as of 7/8/2020     Primary Coverage     Payor Plan Insurance Group Employer/Plan Group    MEDICARE MEDICARE A & B      Payor Plan Address Payor Plan Phone Number Payor Plan Fax Number Effective Dates    [address] [phone]  9/1/1986 - None Entered    [facility] [id number]       Subscriber Name Subscriber Birth Date Member ID       DEANN SOUSA Y 9/28/1921 [id number]           Secondary Coverage     Payor Plan Insurance Group Employer/Plan Group    [place]      Payor Plan Address Payor Plan Phone Number Payor Plan Fax Number Effective Dates    [address] [address] [phone]  7/7/2020 - None Entered    [facility] [id number]       Subscriber Name Subscriber Birth Date Member ID       DEANN SOUSA Y 9/28/1921 [id number]                 Emergency Contacts      (Rel.) Home Phone Work Phone Mobile Phone    Cristino Sousa (Power of ) -- -- [phone]    Dominic Sousa (Son) [phone] -- [phone]               Nursing Assessments (last 48 hours)      Adult PCS Body System     Row Name 07/13/20 1100 07/13/20 1000 07/13/20 0900 07/13/20 0700 07/13/20 0600       Pain/Comfort/Sleep    " Presence of Pain  denies pain/discomfort  denies pain/discomfort  complains of pain/discomfort  denies pain/discomfort  denies pain/discomfort    Preferred Pain Scale  --  --  number (Numeric Rating Pain Scale)  --  --    Pain Body Location - Orientation  --  --  generalized  --  --    Frequency  --  --  intermittent  --  --    Pain Management Interventions  --  --  see MAR;pillow support provided;position adjusted  --  --    POSS (Pasero Opioid-Induced Sed Scale)  --  --  1 - Awake and alert  --  --    Fever Reduction/Comfort Measures  --  --  medication administered  --  --    Sleep/Rest/Relaxation  awake  awake  --  awake  awake       External Urinary Catheter    External Urinary Catheter Properties Placement Date: 07/08/20 Placement Time: 0530       Peripheral IV 07/12/20 0605 Left Forearm    IV Properties Placement Date: 07/12/20 Placement Time: 0605 Hand Hygiene Completed: Yes Size (Gauge): 22 G Orientation: Left Location: Forearm Site Prep: Chlorhexidine isopropyl alcohol Local Anesthetic: None Technique: Anatomical landmarks Inserted by: Rosey Darling RN Total insertion attempts: 1 Patient Tolerance: Tolerated well       Safety Management    Safety Promotion/Fall Prevention  safety round/check completed  safety round/check completed  nonskid shoes/slippers when out of bed;safety round/check completed  safety round/check completed  safety round/check completed       Daily Care    Activity Management  activity adjusted per tolerance;up in chair  activity adjusted per tolerance  activity adjusted per tolerance;activity encouraged  activity adjusted per tolerance  --       Positioning    Body Position  turned;legs elevated  side-lying, right;turned  sitting up in bed;turned  supine  weight shift assistance provided;side-lying, left    Head of Bed (HOB)  --  --  --  --  HOB at 30-45 degrees    Positioning/Transfer Devices  --  --  --  --  pillows;in use    Row Name 07/13/20 0400 07/13/20 0200 07/13/20 0000  07/12/20 2200 07/12/20 2100       Pain/Comfort/Sleep    Presence of Pain  denies pain/discomfort  non-verbal indicators absent  denies pain/discomfort  non-verbal indicators absent  denies pain/discomfort    Sleep/Rest/Relaxation  awake  appears asleep;no problem identified  awake  appears asleep;no problem identified  awake       ECG    Rhythm  normal sinus rhythm 78-94  --  --  --  --       External Urinary Catheter    External Urinary Catheter Properties Placement Date: 07/08/20 Placement Time: 0530       Peripheral IV 07/12/20 0605 Left Forearm    IV Properties Placement Date: 07/12/20 Placement Time: 0605 Hand Hygiene Completed: Yes Size (Gauge): 22 G Orientation: Left Location: Forearm Site Prep: Chlorhexidine isopropyl alcohol Local Anesthetic: None Technique: Anatomical landmarks Inserted by: Rosey Darling RN Total insertion attempts: 1 Patient Tolerance: Tolerated well       Safety Management    Safety Promotion/Fall Prevention  safety round/check completed  safety round/check completed  safety round/check completed  safety round/check completed  safety round/check completed       Daily Care    Elimination Assistance  --  --  diaper changed  --  --       Positioning    Body Position  turned;side-lying, left  position maintained;side-lying, right  turned;side-lying, right  patient/family refused;position maintained;sitting up in bed  --    Head of Bed (HOB)  HOB at 30-45 degrees  HOB at 30-45 degrees  --  HOB at 30-45 degrees  --    Positioning/Transfer Devices  pillows;in use  pillows;in use  --  pillows;in use  --    Row Name 07/12/20 2021 07/12/20 1930 07/12/20 1500 07/12/20 1400 07/12/20 1300       Pain/Comfort/Sleep    Presence of Pain  denies pain/discomfort  denies pain/discomfort  --  denies pain/discomfort  --    Preferred Pain Scale  number (Numeric Rating Pain Scale)  --  --  --  --    Pain Rating (0-10): Rest  0  --  --  --  --    Sleep/Rest/Relaxation  awake  awake  --  --  --        Coping/Psychosocial    Observed Emotional State  accepting;calm;cooperative  --  --  --  --    Verbalized Emotional State  acceptance  --  --  --  --    Plan of Care Reviewed With  patient  --  --  --  --       Psychosocial Support    Trust Relationship/Rapport  care explained;choices provided  --  --  --  --    Family/Support System Care  self-care encouraged;support provided  --  --  --  --       Involvement in Care    Family/Support System, Persons  family  --  --  --  --    Involvement in Care  not present at bedside;participating in care  --  --  --  --       HEENT    HEENT WDL  WDL except;hearing change;vision aid  --  --  --  --    Left Hearing Change  decreased  --  --  --  --    Right Hearing Change  decreased  --  --  --  --    Vision Aid  glasses on  --  --  --  --       Mouth/Teeth WDL    Mouth/Teeth WDL  WDL except;teeth  --  --  --  --    Teeth Symptoms  tooth/teeth missing  --  --  --  --       Cognitive    Cognitive/Neuro/Behavioral WDL  WDL except;orientation  --  --  --  --    Orientation  situation  --  --  --  --       [place] Coma Scale    Best Eye Response  4-->(E4) spontaneous  --  --  --  --    Best Motor Response  6-->(M6) obeys commands  --  --  --  --    Best Verbal Response  4-->(V4) confused at times  --  --  --  --    Ioana Coma Scale Score  14  --  --  --  --       Respiratory    Respiratory WDL  WDL except;breath sounds;cough  --  --  --  --    Cough Frequency  infrequent  --  --  --  --    Cough Type  loose;nonproductive  --  --  --  --    Cough And Deep Breathing  done independently per patient  --  --  --  --       Breath Sounds    Breath Sounds  All Fields  --  --  --  --    All Lung Fields Breath Sounds  Anterior:;diminished  --  --  --  --       Oxygen Therapy    Flow (L/min)  2  --  --  --  --    Device (Oxygen Therapy)  nasal cannula  --  --  --  --       Cardiac    Cardiac WDL  WDL  --  --  --  --       ECG    Rhythm  normal sinus rhythm  --  --  --  --       Peripheral  Neurovascular    Peripheral Neurovascular WDL  WDL except;edema  --  --  --  --    VTE Prevention/Management  other (see comments) lovenox  --  --  --  --       Pulse Dorsalis Pedis    Left Dorsalis Pedis Pulse  2+ (normal);palpation  --  --  --  --    Right Dorsalis Pedis Pulse  2+ (normal);palpation  --  --  --  --       Edema    Edema  ankle, left;ankle, right  --  --  --  --    Leg, Left Edema  1+ (Trace)  --  --  --  --    Leg, Right Edema  1+ (Trace)  --  --  --  --    Knee, Left Edema  1+ (Trace)  --  --  --  --    Knee, Right Edema  1+ (Trace)  --  --  --  --    Ankle, Left Edema  1+ (Trace)  --  --  --  --    Ankle, Right Edema  1+ (Trace)  --  --  --  --    Foot, Left Edema  1+ (Trace)  --  --  --  --    Foot, Right Edema  1+ (Trace)  --  --  --  --       Gastrointestinal    GI WDL  WDL except;GI symptoms  --  --  --  --    GI Signs/Symptoms  fecal incontinence  --  --  --  --    Last Bowel Movement  07/12/20  --  --  --  --       Genitourinary    Genitourinary WDL  WDL except;voiding ability/characteristics  --  --  --  --    Voiding Characteristics  incontinence  --  --  --  --       Skin    Skin WDL  WDL except;characteristics  --  --  --  --    Skin Color/Characteristics  pale  --  --  --  --    Skin Temperature  warm  --  --  --  --    Skin Moisture  dry  --  --  --  --    Skin Elasticity  slow return to original state  --  --  --  --    Skin Integrity  bruised (ecchymotic)  --  --  --  --       Milo Risk Assessment (If Milo score </= 18, add the appropriate CPG to the care plan)    Sensory Perception  3-->slightly limited  --  --  --  --    Moisture  3-->occasionally moist  --  --  --  --    Activity  3-->walks occasionally  --  --  --  --    Mobility  3-->slightly limited  --  --  --  --    Nutrition  2-->probably inadequate  --  --  --  --    Friction and Shear  2-->potential problem  --  --  --  --    Milo Score  16  --  --  --  --       Skin Interventions    Pressure Reduction Devices   pressure-redistributing mattress utilized  --  --  --  --    Pressure Reduction Techniques  frequent weight shift encouraged;weight shift assistance provided  --  --  --  --    Skin Protection  adhesive use limited;incontinence pads utilized;skin-to-device areas padded  --  --  --  --       Musculoskeletal    Musculoskeletal WDL  WDL except;mobility  --  --  --  --    General Mobility  generalized weakness;mildly impaired  --  --  --  --       Functional Screen (every 3 days/change)    Ambulation  3 - assistive equipment and person  --  --  --  --    Transferring  2 - assistive person  --  --  --  --    Toileting  3 - assistive equipment and person  --  --  --  --    Bathing  2 - assistive person  --  --  --  --    Dressing  2 - assistive person  --  --  --  --    Eating  2 - assistive person  --  --  --  --    Communication  2 - difficulty understanding and speaking (not related to language barrier)  --  --  --  --    Swallowing  2 - difficulty swallowing liquids/foods  --  --  --  --       Nutrition    Diet/Nutrition Received  mechanical/dental soft  --  --  --  --       External Urinary Catheter    External Urinary Catheter Properties Placement Date: 07/08/20 Placement Time: 0530       Peripheral IV 07/12/20 0605 Left Forearm    IV Properties Placement Date: 07/12/20 Placement Time: 0605 Hand Hygiene Completed: Yes Size (Gauge): 22 G Orientation: Left Location: Forearm Site Prep: Chlorhexidine isopropyl alcohol Local Anesthetic: None Technique: Anatomical landmarks Inserted by: Rosey Darling RN Total insertion attempts: 1 Patient Tolerance: Tolerated well    Site Assessment  Clean;Dry;Intact  --  --  --  --    Dressing Type  Border Dressing  --  --  --  --    Line Status  Infusing  --  --  --  --    Dressing Status  Clean;Dry;Intact  --  --  --  --    Reason Not Rotated  Not due  --  --  --  --    Phlebitis  0-->no symptoms  --  --  --  --       Adult Sepsis Screening Tool    Previous adult screen positive in  last 48 hrs?  no  --  --  --  --    1. Criteria Present  HR > 90 bpm  --  --  --  --    Are 2 or > of the above criteria present?  no: STOP/negative screen  --  --  --  --       Safety    Safety WDL  WDL  --  --  WDL  --    Safety Factors  ID band on;upper side rails raised x 2;call light in reach;wheels locked;bed in low position  --  --  --  --    All Alarms  alarm(s) activated and audible  --  --  --  --    Safety/Security Measures  bed alarm set  --  --  --  --       [facility] High Risk Falls Assessment (If Fall score is >/=13, add the Fall Risk CPG to the care plan)     Fallen in past 6 months  0--> No  --  --  --  --    Mental Status  1--> confused  --  --  --  --    Elimination  0--> No elimination issues  --  --  --  --    Mobility  2--> Requires assistance- transfer, walker, etc.  --  --  --  --    Medications  0--> No meds  --  --  --  --    Nurses' Clinical Judgement  10  --  --  --  --    Total Fall Risk Score  15  --  --  --  --       Safety Management    Safety Promotion/Fall Prevention  safety round/check completed  safety round/check completed  safety round/check completed  safety round/check completed  safety round/check completed    Medication Review/Management  medications reviewed  --  --  --  --    Environmental Safety Modification  assistive device/personal items within reach;clutter free environment maintained  --  --  --  --       Daily Care    Activity Management  activity adjusted per tolerance  up to bedside commode  --  activity adjusted per tolerance;up ad roger  --    Activity Assistance Provided  assistance, 2 people  assistance, 2 people  --  --  --       Positioning    Body Position  weight shift assistance provided;supine  --  --  position changed independently  --    Head of Bed (HOB)  HOB at 30-45 degrees  --  --  --  --    Positioning/Transfer Devices  pillows;in use  --  --  --  --    Row Name 07/12/20 1200 07/12/20 1100 07/12/20 1000 07/12/20 0900 07/12/20 0800        Pain/Comfort/Sleep    Presence of Pain  non-verbal indicators absent  --  --  --  non-verbal indicators absent    Sleep/Rest/Relaxation  --  --  --  --  no problem identified;awake       Coping/Psychosocial    Observed Emotional State  --  --  --  --  calm;cooperative       Involvement in Care    Family/Support System, Persons  --  --  --  --  family    Involvement in Care  --  --  --  --  not present at bedside       HEENT    HEENT WDL  --  --  --  --  WDL except;vision aid    Vision Aid  --  --  --  --  glasses at bedside       Mouth/Teeth WDL    Mouth/Teeth WDL  --  --  --  --  WDL except;teeth       Cognitive    Cognitive/Neuro/Behavioral WDL  --  --  --  --  WDL except;orientation    Level of Consciousness  --  --  --  --  Alert    Orientation  --  --  --  --  disoriented to;place;time;situation    Mood/Behavior  --  --  --  --  cooperative       Pinon Coma Scale    Best Eye Response  --  --  --  --  4-->(E4) spontaneous    Best Motor Response  --  --  --  --  6-->(M6) obeys commands    Best Verbal Response  --  --  --  --  4-->(V4) confused    Pinon Coma Scale Score  --  --  --  --  14    Assessment Qualifiers  --  --  --  --  patient not sedated/intubated       Respiratory    Respiratory WDL  --  --  --  --  WDL except;breath sounds;cough    Cough Frequency  --  --  --  --  infrequent    Cough Type  --  --  --  --  fair       Breath Sounds    Breath Sounds  --  --  --  --  All Fields    All Lung Fields Breath Sounds  --  --  --  --  Anterior:;Lateral:;diminished       Oxygen Therapy    Flow (L/min)  --  --  --  --  2    Device (Oxygen Therapy)  --  --  --  --  nasal cannula       Cardiac    Cardiac WDL  --  --  --  --  WDL       ECG    Rhythm  --  --  --  --  normal sinus rhythm       Peripheral Neurovascular    Peripheral Neurovascular WDL  --  --  --  --  WDL except;edema       Neurovascular Assessment    Neurovascular Assessment  --  --  --  --  General    All Extremities Temperature  --  --  --  --   cool    All Extremities Color  --  --  --  --  no discoloration       Pulse Radial    Left Radial Pulse  --  --  --  --  2+ (normal)    Right Radial Pulse  --  --  --  --  2+ (normal)       Edema    Edema  --  --  --  --  leg, left;leg, right;knee, left;knee, right;foot, left;foot, right    Leg, Left Edema  --  --  --  --  2+ (Mild)    Leg, Right Edema  --  --  --  --  2+ (Mild)    Knee, Left Edema  --  --  --  --  2+ (Mild)    Knee, Right Edema  --  --  --  --  2+ (Mild)    Foot, Left Edema  --  --  --  --  2+ (Mild)    Foot, Right Edema  --  --  --  --  2+ (Mild)       Gastrointestinal    GI WDL  --  --  --  --  WDL except;appearance/characteristics    Abdominal Appearance  --  --  --  --  rounded       Genitourinary    Genitourinary WDL  --  --  --  --  WDL except;voiding ability/characteristics    Voiding Characteristics  --  --  --  --  incontinence       Skin    Skin WDL  --  --  --  --  WDL except;characteristics    Skin Color/Characteristics  --  --  --  --  pale    Skin Temperature  --  --  --  --  cool    Skin Moisture  --  --  --  --  dry    Skin Integrity  --  --  --  --  bruised (ecchymotic)       Milo Risk Assessment (If Milo score </= 18, add the appropriate CPG to the care plan)    Sensory Perception  --  --  --  --  4-->no impairment    Moisture  --  --  --  --  2-->very moist    Activity  --  --  --  --  3-->walks occasionally    Mobility  --  --  --  --  3-->slightly limited    Nutrition  --  --  --  --  3-->adequate    Friction and Shear  --  --  --  --  2-->potential problem    Milo Score  --  --  --  --  17       Nutrition    Diet/Nutrition Received  --  --  --  --  mechanical/dental soft       External Urinary Catheter    External Urinary Catheter Properties Placement Date: 07/08/20 Placement Time: 0530       [REMOVED] Peripheral IV 07/08/20 2225 Anterior;Right Forearm    IV Properties Placement Date: 07/08/20 Placement Time: 2225 Size (Gauge): 20 G Orientation: Anterior;Right Location:  Forearm Site Prep: Chlorhexidine Technique: Anatomical landmarks Inserted by: Maksim DOBBINS Total insertion attempts: 1 Patient Tolerance: Tolerated well Removal Date: 07/12/20 Removal Time: 0605 Removed : Infiltrated;Tip intact       Peripheral IV 07/12/20 0605 Left Forearm    IV Properties Placement Date: 07/12/20 Placement Time: 0605 Hand Hygiene Completed: Yes Size (Gauge): 22 G Orientation: Left Location: Forearm Site Prep: Chlorhexidine isopropyl alcohol Local Anesthetic: None Technique: Anatomical landmarks Inserted by: Rosey Darling RN Total insertion attempts: 1 Patient Tolerance: Tolerated well    Site Assessment  --  --  --  --  Clean;Dry;Intact    Phlebitis  --  --  --  --  0-->no symptoms       Sepsis Screening Options    Which Sepsis Screen to be Used?  --  --  --  --  Adult Sepsis Screen       Adult Sepsis Screening Tool    Previous adult screen positive in last 48 hrs?  --  --  --  --  no    Are 2 or > of the above criteria present?  --  --  --  --  no: STOP/negative screen       Safety    Safety [facility]  --  [facility]  --  [facility] High Risk Falls Assessment (If Fall score is >/=13, add the Fall Risk CPG to the care plan)     Fallen in past 6 months  --  --  --  --  0--> No    Mental Status  --  --  --  --  0--> no mental status change    Elimination  --  --  --  --  0--> No elimination issues    Mobility  --  --  --  --  2--> Requires assistance- transfer, walker, etc.    Medications  --  --  --  --  1--> Narcotics    Nurses' Clinical Judgement  --  --  --  --  10    Total Fall Risk Score  --  --  --  --  15       Safety Management    Safety Promotion/Fall Prevention  safety round/check completed  safety round/check completed  safety round/check completed  safety round/check completed  safety round/check completed       Daily Care    Activity Management  up in chair  --  up in chair  --  activity adjusted per tolerance       Positioning    Body Position  position changed independently  --   position changed independently  --  position changed independently    Row Name 07/12/20 0600 07/12/20 0512 07/12/20 0428 07/12/20 0400 07/12/20 0200       Pain/Comfort/Sleep    Presence of Pain  non-verbal indicators absent  --  --  non-verbal indicators absent  non-verbal indicators absent    Sleep/Rest/Relaxation  no problem identified  --  --  awake  no problem identified       ECG    Rhythm  --  --  normal sinus rhythm S 72-96  --  --       External Urinary Catheter    External Urinary Catheter Properties Placement Date: 07/08/20 Placement Time: 0530       [REMOVED] Peripheral IV 07/08/20 2225 Anterior;Right Forearm    IV Properties Placement Date: 07/08/20 Placement Time: 2225 Size (Gauge): 20 G Orientation: Anterior;Right Location: Forearm Site Prep: Chlorhexidine Technique: Anatomical landmarks Inserted by: Maksim DOBBINS Total insertion attempts: 1 Patient Tolerance: Tolerated well Removal Date: 07/12/20 Removal Time: 0605 Removed : Infiltrated;Tip intact       Peripheral IV 07/12/20 0605 Left Forearm    IV Properties Placement Date: 07/12/20 Placement Time: 0605 Hand Hygiene Completed: Yes Size (Gauge): 22 G Orientation: Left Location: Forearm Site Prep: Chlorhexidine isopropyl alcohol Local Anesthetic: None Technique: Anatomical landmarks Inserted by: Rosey Darling RN Total insertion attempts: 1 Patient Tolerance: Tolerated well       Safety    Safety [facility]  WDL  --  --  [facility]       Safety Management    Safety Promotion/Fall Prevention  safety round/check completed  --  --  safety round/check completed  safety round/check completed    Environmental Safety Modification  assistive device/personal items within reach  --  --  --  --       Daily Care    Activity Management  activity adjusted per tolerance  --  --  activity adjusted per tolerance  activity adjusted per tolerance       Positioning    Body Position  turned;supine  --  --  patient/family refused;supine  position changed independently;supine    Head  of Bed (HOB)  HOB elevated  --  --  HOB elevated  HOB elevated       Provider Notification    Reason for Communication  --  Evaluate K+ 2.8  --  --  --    Provider Name  --  Dr. Starkey  --  --  --    Notification Route  --  Answering service  --  --  --    Response  --  See orders  --  --  --    Row Name 07/12/20 0000 07/11/20 2200 07/11/20 2000 07/11/20 1849 07/11/20 1800       Pain/Comfort/Sleep    Presence of Pain  non-verbal indicators absent  non-verbal indicators absent  denies pain/discomfort  --  denies pain/discomfort    Preferred Pain Scale  --  --  number (Numeric Rating Pain Scale)  --  --    Pain Rating (0-10): Rest  --  --  0  --  --    Sleep/Rest/Relaxation  no problem identified  no problem identified;awake  no problem identified  --  --       Pain/Comfort/Sleep Interventions    Sleep/Rest Enhancement  --  awakenings minimized  --  --  --       Coping/Psychosocial    Observed Emotional State  --  calm;cooperative  --  --  --    Plan of Care Reviewed With  --  patient  --  --  --       Psychosocial Support    Trust Relationship/Rapport  --  care explained  --  --  --       Involvement in Care    Family/Support System, Persons  --  family  --  --  --    Involvement in Care  --  not present at bedside  --  --  --       HEENT    HEENT WDL  --  WDL except;vision aid  --  --  --    Vision Aid  --  glasses at bedside  --  --  --       Mouth/Teeth WDL    Mouth/Teeth WDL  --  WDL except;teeth  --  --  --    Teeth Symptoms  --  tooth/teeth missing  --  --  --       Cognitive    Cognitive/Neuro/Behavioral WDL  --  WDL except;orientation  --  --  --    Level of Consciousness  --  Alert  --  --  --    Orientation  --  disoriented to;place;time;situation  --  --  --    Mood/Behavior  --  cooperative  --  --  --       Neuro    Additional Documentation  --  Ioana Coma Scale (Group)  --  --  --       Ioana Coma Scale    Best Eye Response  --  4-->(E4) spontaneous  --  --  --    Best Motor Response  --  6-->(M6)  obeys commands  --  --  --    Best Verbal Response  --  4-->(V4) confused  --  --  --    Ioana Coma Scale Score  --  14  --  --  --    Assessment Qualifiers  --  patient not sedated/intubated  --  --  --       Respiratory    Respiratory WDL  --  WDL except;breath sounds;cough  --  --  --    Cough Frequency  --  infrequent  --  --  --    Cough Type  --  fair  --  --  --    Cough And Deep Breathing  --  done with encouragement  --  --  --       Breath Sounds    Breath Sounds  --  All Fields  --  --  --    All Lung Fields Breath Sounds  --  Anterior:;Lateral:;diminished  --  --  --       Oxygen Therapy    Flow (L/min)  --  2  --  --  --    Device (Oxygen Therapy)  --  nasal cannula  --  --  --       Cardiac    Cardiac WDL  --  WDL  --  --  --       ECG    Rhythm  --  normal sinus rhythm  --  --  --       Peripheral Neurovascular    Peripheral Neurovascular WDL  --  WDL except;edema  --  --  --    VTE Prevention/Management  --  bilateral;sequential compression devices off;patient refused intervention  --  --  --       Neurovascular Assessment    Neurovascular Assessment  --  General  --  --  --    All Extremities Temperature  --  cool  --  --  --    All Extremities Color  --  no discoloration  --  --  --       Pulse Radial    Left Radial Pulse  --  2+ (normal)  --  --  --    Right Radial Pulse  --  2+ (normal)  --  --  --       Edema    Edema  --  leg, left;leg, right;knee, left;knee, right;foot, left;foot, right  --  --  --    Leg, Left Edema  --  2+ (Mild)  --  --  --    Leg, Right Edema  --  2+ (Mild)  --  --  --    Knee, Left Edema  --  2+ (Mild)  --  --  --    Knee, Right Edema  --  2+ (Mild)  --  --  --    Foot, Left Edema  --  2+ (Mild)  --  --  --    Foot, Right Edema  --  2+ (Mild)  --  --  --       Gastrointestinal    GI WDL  --  WDL except;appearance/characteristics  --  --  --    Abdominal Appearance  --  rounded  --  --  --    Last Bowel Movement  --  07/11/20  --  --  07/11/20       Skin    Skin WDL  --   WDL except;characteristics  --  --  --    Skin Color/Characteristics  --  pale  --  --  --    Skin Temperature  --  cool  --  --  --    Skin Moisture  --  dry  --  --  --    Skin Integrity  --  bruised (ecchymotic)  --  --  --       Milo Risk Assessment (If Milo score </= 18, add the appropriate CPG to the care plan)    Sensory Perception  --  4-->no impairment  --  --  --    Moisture  --  2-->very moist  --  --  --    Activity  --  3-->walks occasionally  --  --  --    Mobility  --  3-->slightly limited  --  --  --    Nutrition  --  3-->adequate  --  --  --    Friction and Shear  --  2-->potential problem  --  --  --    Milo Score  --  17  --  --  --       Skin Interventions    Pressure Reduction Devices  --  pressure-redistributing mattress utilized  --  --  --    Pressure Reduction Techniques  --  frequent weight shift encouraged;weight shift assistance provided  --  --  --    Skin Protection  --  adhesive use limited  --  --  --       Functional Screen (every 3 days/change)    Ambulation  --  3 - assistive equipment and person  --  --  --    Transferring  --  3 - assistive equipment and person  --  --  --    Toileting  --  3 - assistive equipment and person  --  --  --    Bathing  --  2 - assistive person  --  --  --    Dressing  --  2 - assistive person  --  --  --    Eating  --  0 - independent  --  --  --    Communication  --  0 - understands/communicates without difficulty  --  --  --    Swallowing  --  2 - difficulty swallowing liquids/foods  --  --  --       Nutrition    Diet/Nutrition Received  --  mechanical/dental soft  --  --  --       External Urinary Catheter    External Urinary Catheter Properties Placement Date: 07/08/20 Placement Time: 0530       [REMOVED] Peripheral IV 07/08/20 2225 Anterior;Right Forearm    IV Properties Placement Date: 07/08/20 Placement Time: 2225 Size (Gauge): 20 G Orientation: Anterior;Right Location: Forearm Site Prep: Chlorhexidine Technique: Anatomical landmarks  Inserted by: Maksim DOBBISN Total insertion attempts: 1 Patient Tolerance: Tolerated well Removal Date: 07/12/20 Removal Time: 0605 Removed : Infiltrated;Tip intact    Site Assessment  --  Clean;Dry;Intact  --  --  --    Line Status  --  Saline locked  --  --  --    Dressing Status  --  Clean;Dry;Intact  --  --  --    Reason Not Rotated  --  Not due  --  --  --    Phlebitis  --  0-->no symptoms  --  --  --       Sepsis Screening Options    Which Sepsis Screen to be Used?  --  Adult Sepsis Screen  --  --  --       Adult Sepsis Screening Tool    Previous adult screen positive in last 48 hrs?  --  no  --  --  --    Are 2 or > of the above criteria present?  --  no: STOP/negative screen  --  --  --       Safety    Safety WD  WDL  WDL  WDL  --  [facility] High Risk Falls Assessment (If Fall score is >/=13, add the Fall Risk CPG to the care plan)     Fallen in past 6 months  --  0--> No  --  --  --    Mental Status  --  0--> no mental status change  --  --  --    Elimination  --  0--> No elimination issues  --  --  --    Mobility  --  2--> Requires assistance- transfer, walker, etc.  --  --  --    Medications  --  1--> Narcotics  --  --  --    Nurses' Clinical Judgement  --  10  --  --  --    Total Fall Risk Score  --  15  --  --  --       Safety Management    Safety Promotion/Fall Prevention  safety round/check completed  safety round/check completed  safety round/check completed  --  safety round/check completed       Daily Care    Activity Management  activity adjusted per tolerance  activity adjusted per tolerance  activity adjusted per tolerance  --  activity adjusted per tolerance    Elimination Assistance  --  --  --  incontinence pad changed  --       Positioning    Body Position  position changed independently;supine  turned;side-lying, right  supine;position changed independently  --  position changed independently    Head of Bed (HOB)  HOB elevated  HOB elevated  HOB elevated  --  --     Positioning/Transfer Devices  --  pillows;in use  pillows;in use  --  --       Hygiene Care    Bathing/Skin Care  --  incontinence care  --  incontinence care  --    Perineal Care  --  absorbent pad changed;diaper changed;perineum cleansed  --  absorbent pad changed;perineum cleansed;protective cream applied  --    Hygiene Assistance  --  --  --  per care provider x 2  --    Row Name 07/11/20 1700 07/11/20 1600 07/11/20 1500 07/11/20 1400 07/11/20 1300       Pain/Comfort/Sleep    Presence of Pain  --  --  --  denies pain/discomfort  --       External Urinary Catheter    External Urinary Catheter Properties Placement Date: 07/08/20 Placement Time: 0530       [REMOVED] Peripheral IV 07/08/20 2225 Anterior;Right Forearm    IV Properties Placement Date: 07/08/20 Placement Time: 2225 Size (Gauge): 20 G Orientation: Anterior;Right Location: Forearm Site Prep: Chlorhexidine Technique: Anatomical landmarks Inserted by: Maksim DOBBINS Total insertion attempts: 1 Patient Tolerance: Tolerated well Removal Date: 07/12/20 Removal Time: 0605 Removed : Infiltrated;Tip intact       Safety    Safety WDL  --  WDL  --  WDL  --       Safety Management    Safety Promotion/Fall Prevention  safety round/check completed  safety round/check completed  safety round/check completed  safety round/check completed  safety round/check completed       Daily Care    Activity Management  --  activity adjusted per tolerance  --  activity adjusted per tolerance  --       Positioning    Body Position  --  position changed independently  --  position changed independently  --       Hygiene Care    Perineal Care  --  --  --  --  absorbent pad changed;protective cream applied;perineum cleansed    Row Name 07/11/20 1200                   Pain/Comfort/Sleep    Presence of Pain  denies pain/discomfort           External Urinary Catheter    External Urinary Catheter Properties Placement Date: 07/08/20 Placement Time: 0530       [REMOVED] Peripheral IV 07/08/20  2225 Anterior;Right Forearm    IV Properties Placement Date: 07/08/20 Placement Time: 2225 Size (Gauge): 20 G Orientation: Anterior;Right Location: Forearm Site Prep: Chlorhexidine Technique: Anatomical landmarks Inserted by: Maksim DOBBINS Total insertion attempts: 1 Patient Tolerance: Tolerated well Removal Date: 07/12/20 Removal Time: 0605 Removed : Infiltrated;Tip intact       Safety    Safety WDL  WDL           Safety Management    Safety Promotion/Fall Prevention  safety round/check completed           Daily Care    Activity Management  activity adjusted per tolerance           Positioning    Body Position  position changed independently

## 2020-07-13 NOTE — PLAN OF CARE
"  Problem: Skin Injury Risk (Adult)  Goal: Skin Health and Integrity  Outcome: Ongoing (interventions implemented as appropriate)     Problem: Patient Care Overview  Goal: Plan of Care Review  Outcome: Ongoing (interventions implemented as appropriate)  Flowsheets  Taken 7/13/2020 8583  Progress: declining  Outcome Summary: VSS. No c/o pain. Up in chair when arrived at shift change last night, extremely weak when transferring to bed with assist x 2. Pt. awake most of night and remains confused yelling out in hallway and whimpering.. Everytime I leave the room pt. states something similar to \"don't leave\" or \"don't be gone too long\". Could possibly benefit from family staying with pt. if possible. Turned q2hr and incontinence care as needed. Safety maintained.  Taken 7/12/2020 2021  Plan of Care Reviewed With: patient  Goal: Individualization and Mutuality  Outcome: Ongoing (interventions implemented as appropriate)  Goal: Discharge Needs Assessment  Outcome: Ongoing (interventions implemented as appropriate)  Goal: Interprofessional Rounds/Family Conf  Outcome: Ongoing (interventions implemented as appropriate)     Problem: Pneumonia (Adult)  Goal: Signs and Symptoms of Listed Potential Problems Will be Absent, Minimized or Managed (Pneumonia)  Outcome: Ongoing (interventions implemented as appropriate)     "

## 2020-07-13 NOTE — PLAN OF CARE
Problem: Patient Care Overview  Goal: Plan of Care Review  Outcome: Ongoing (interventions implemented as appropriate)  Flowsheets (Taken 7/13/2020 0903)  Plan of Care Reviewed With: patient  Outcome Summary: PT IE complete.  Pt ambulated a few feet to chair cg A x1 w/ RW.  O2 sat 97% on 2L during activity.  Pt w/ decreased strength and endurance.  Frail appearing.  High fall risk.  PT to provide strengthening and gait training.  Recommend SNF at [place].  Thank you for referral.

## 2020-07-13 NOTE — THERAPY TREATMENT NOTE
Acute Care - Speech Language Pathology   Swallow Treatment Note [facility]     Patient Name: Sunny Lacey  : 1921  MRN: [id number]  Today's Date: 2020               Admit Date: 2020  Pt completed trials of thin, nectar, and honey consistencies. Pt had a 1x delayed cough and 1x immediate coughing episode with thin liquids. No overt s/s of aspiration with honey. Delayed throat clearing following nectar thick. Downgrade to nectar thick liquids, continue mechanical soft solids. Meds with applesauce/pudding. SLP will continue to follow.  Anjelica Gonzales CCC-SLP 2020 14:40    Visit Dx:      ICD-10-CM ICD-9-CM   1. Dysphagia, unspecified type R13.10 787.20   2. Impaired mobility Z74.09 799.89     Patient Active Problem List   Diagnosis   • Sepsis (CMS/HCC)   • Pneumonia due to infectious organism   • At risk for falls   • Hypokalemia       Therapy Treatment  Rehabilitation Treatment Summary     Row Name 20 1406             Treatment Time/Intention    Discipline  speech language pathologist  -MB      Document Type  therapy note (daily note)  -MB      Subjective Information  complains of;fatigue  -MB      Mode of Treatment  speech-language pathology  -MB      Patient/Family Observations  No family present  -MB      Patient Effort  adequate  -MB      Recorded by [MB] Anjelica Gonzales CCC-SLP 20 1437      Row Name 20 1406             Pain Scale: FACES Pre/Post-Treatment    Pain: FACES Scale, Pretreatment  0-->no hurt  -MB      Recorded by [MB] Anjelica Gonzales CCC-SLP 20 1437      Row Name 20 1406             Outcome Summary/Treatment Plan (SLP)    Daily Summary of Progress (SLP)  progress toward functional goals is gradual  -MB      Barriers to Overall Progress (SLP)  none  -MB      Plan for Continued Treatment (SLP)  Downgrade to nectar thick, continue mechanical soft solids  -MB      Anticipated Dischage Disposition (SLP)  assisted living facility ([facility])  -MB       Recorded by [MB] Anjelica Gonzales CCC-SLP 07/13/20 1437        User Key  (r) = Recorded By, (t) = Taken By, (c) = Cosigned By    Initials Name Effective Dates Discipline    Anjelica Barron CCC-SLP 08/02/16 -  SLP          Outcome Summary  Outcome Summary/Treatment Plan (SLP)  Daily Summary of Progress (SLP): progress toward functional goals is gradual (07/13/20 1406 : Anjelica Gonzales CCC-SLP)  Barriers to Overall Progress (SLP): none (07/13/20 1406 : Anjelica Gonzales CCC-SLP)  Plan for Continued Treatment (SLP): Downgrade to nectar thick, continue mechanical soft solids (07/13/20 1406 : Anjelica Gonzales CCC-SLP)  Anticipated Dischage Disposition (SLP): assisted living facility (jail) (07/13/20 1406 : Anjelica Gonzales CCC-SLP)      SLP GOALS     Row Name 07/13/20 1406             Oral Nutrition/Hydration Goal 1 (SLP)    Oral Nutrition/Hydration Goal 1, SLP  Pt will tolerate LRD without s/s of aspiration  -MB      Time Frame (Oral Nutrition/Hydration Goal 1, SLP)  short term goal (STG);by discharge  -MB      Barriers (Oral Nutrition/Hydration Goal 1, SLP)  none  -MB      Progress/Outcomes (Oral Nutrition/Hydration Goal 1, SLP)  progress slower than expected  -MB        User Key  (r) = Recorded By, (t) = Taken By, (c) = Cosigned By    Initials Name Provider Type    Anjelica Barron CCC-SLP Speech and Language Pathologist          EDUCATION  The patient has been educated in the following areas:   Dysphagia (Swallowing Impairment).    SLP Recommendation and Plan  Daily Summary of Progress (SLP): progress toward functional goals is gradual  Barriers to Overall Progress (SLP): none  Plan for Continued Treatment (SLP): Downgrade to nectar thick, continue mechanical soft solids  Anticipated Dischage Disposition (SLP): assisted living facility (jail)                    Time Calculation:   Time Calculation- SLP     Row Name 07/13/20 1440             Time Calculation- SLP    SLP Start Time  1406   -MB      SLP Stop Time  1430  -MB      SLP Time Calculation (min)  24 min  -MB      SLP Received On  07/13/20  -MB        User Key  (r) = Recorded By, (t) = Taken By, (c) = Cosigned By    Initials Name Provider Type    Anjelica Barron CCC-SLP Speech and Language Pathologist          Therapy Charges for Today     Code Description Service Date Service Provider Modifiers Qty    [id number]  ST TREATMENT SWALLOW 2 7/13/2020 Anjelica Gonzales CCC-SLP GN 1                 TERRY Tyler  7/13/2020

## 2020-07-14 LAB
ANION GAP SERPL CALCULATED.3IONS-SCNC: 9 MMOL/L (ref 5–15)
BASOPHILS # BLD AUTO: 0.08 10*3/MM3 (ref 0–0.2)
BASOPHILS NFR BLD AUTO: 1 % (ref 0–1.5)
BUN SERPL-MCNC: 11 MG/DL (ref 8–23)
BUN/CREAT SERPL: 14.7 (ref 7–25)
CALCIUM SPEC-SCNC: 9.3 MG/DL (ref 8.2–9.6)
CHLORIDE SERPL-SCNC: 111 MMOL/L (ref 98–107)
CO2 SERPL-SCNC: 28 MMOL/L (ref 22–29)
CREAT SERPL-MCNC: 0.75 MG/DL (ref 0.57–1)
DEPRECATED RDW RBC AUTO: 42.9 FL (ref 37–54)
EOSINOPHIL # BLD AUTO: 0.26 10*3/MM3 (ref 0–0.4)
EOSINOPHIL NFR BLD AUTO: 3.2 % (ref 0.3–6.2)
ERYTHROCYTE [DISTWIDTH] IN BLOOD BY AUTOMATED COUNT: 13.1 % (ref 12.3–15.4)
GFR SERPL CREATININE-BSD FRML MDRD: 71 ML/MIN/1.73
GLUCOSE SERPL-MCNC: 122 MG/DL (ref 65–99)
HCT VFR BLD AUTO: 39.1 % (ref 34–46.6)
HGB BLD-MCNC: 12.8 G/DL (ref 12–15.9)
IMM GRANULOCYTES # BLD AUTO: 0.25 10*3/MM3 (ref 0–0.05)
IMM GRANULOCYTES NFR BLD AUTO: 3.1 % (ref 0–0.5)
LYMPHOCYTES # BLD AUTO: 1.14 10*3/MM3 (ref 0.7–3.1)
LYMPHOCYTES NFR BLD AUTO: 14.1 % (ref 19.6–45.3)
MCH RBC QN AUTO: 29.6 PG (ref 26.6–33)
MCHC RBC AUTO-ENTMCNC: 32.7 G/DL (ref 31.5–35.7)
MCV RBC AUTO: 90.3 FL (ref 79–97)
MONOCYTES # BLD AUTO: 0.82 10*3/MM3 (ref 0.1–0.9)
MONOCYTES NFR BLD AUTO: 10.2 % (ref 5–12)
NEUTROPHILS NFR BLD AUTO: 5.51 10*3/MM3 (ref 1.7–7)
NEUTROPHILS NFR BLD AUTO: 68.4 % (ref 42.7–76)
NRBC BLD AUTO-RTO: 0 /100 WBC (ref 0–0.2)
PLATELET # BLD AUTO: 184 10*3/MM3 (ref 140–450)
PMV BLD AUTO: 10.2 FL (ref 6–12)
POTASSIUM SERPL-SCNC: 3.4 MMOL/L (ref 3.5–5.2)
RBC # BLD AUTO: 4.33 10*6/MM3 (ref 3.77–5.28)
SODIUM SERPL-SCNC: 148 MMOL/L (ref 136–145)
WBC # BLD AUTO: 8.06 10*3/MM3 (ref 3.4–10.8)

## 2020-07-14 PROCEDURE — 92526 ORAL FUNCTION THERAPY: CPT

## 2020-07-14 PROCEDURE — 85025 COMPLETE CBC W/AUTO DIFF WBC: CPT | Performed by: FAMILY MEDICINE

## 2020-07-14 PROCEDURE — 99231 SBSQ HOSP IP/OBS SF/LOW 25: CPT | Performed by: FAMILY MEDICINE

## 2020-07-14 PROCEDURE — 99231 SBSQ HOSP IP/OBS SF/LOW 25: CPT | Performed by: PSYCHIATRY & NEUROLOGY

## 2020-07-14 PROCEDURE — 94799 UNLISTED PULMONARY SVC/PX: CPT

## 2020-07-14 PROCEDURE — 25010000002 PIPERACILLIN SOD-TAZOBACTAM PER 1 G: Performed by: FAMILY MEDICINE

## 2020-07-14 PROCEDURE — 97116 GAIT TRAINING THERAPY: CPT

## 2020-07-14 PROCEDURE — 25010000002 ENOXAPARIN PER 10 MG: Performed by: FAMILY MEDICINE

## 2020-07-14 PROCEDURE — 97110 THERAPEUTIC EXERCISES: CPT

## 2020-07-14 PROCEDURE — 80048 BASIC METABOLIC PNL TOTAL CA: CPT | Performed by: FAMILY MEDICINE

## 2020-07-14 PROCEDURE — 97530 THERAPEUTIC ACTIVITIES: CPT

## 2020-07-14 PROCEDURE — 25010000002 ONDANSETRON PER 1 MG: Performed by: FAMILY MEDICINE

## 2020-07-14 RX ORDER — MEGESTROL ACETATE 40 MG/ML
800 SUSPENSION ORAL DAILY
Status: DISCONTINUED | OUTPATIENT
Start: 2020-07-14 | End: 2020-07-20 | Stop reason: HOSPADM

## 2020-07-14 RX ORDER — AMLODIPINE BESYLATE 10 MG/1
10 TABLET ORAL DAILY
Status: DISCONTINUED | OUTPATIENT
Start: 2020-07-14 | End: 2020-07-20 | Stop reason: HOSPADM

## 2020-07-14 RX ADMIN — ENOXAPARIN SODIUM 40 MG: 40 INJECTION SUBCUTANEOUS at 17:28

## 2020-07-14 RX ADMIN — POTASSIUM CHLORIDE 20 MEQ: 750 CAPSULE, EXTENDED RELEASE ORAL at 17:28

## 2020-07-14 RX ADMIN — AMLODIPINE BESYLATE 10 MG: 10 TABLET ORAL at 08:55

## 2020-07-14 RX ADMIN — ACETAMINOPHEN 650 MG: 325 TABLET, FILM COATED ORAL at 21:19

## 2020-07-14 RX ADMIN — DICLOFENAC 4 G: 10 GEL TOPICAL at 11:10

## 2020-07-14 RX ADMIN — ACETAMINOPHEN 650 MG: 325 TABLET, FILM COATED ORAL at 08:22

## 2020-07-14 RX ADMIN — POTASSIUM CHLORIDE, DEXTROSE MONOHYDRATE AND SODIUM CHLORIDE 50 ML/HR: 150; 5; 450 INJECTION, SOLUTION INTRAVENOUS at 06:36

## 2020-07-14 RX ADMIN — LATANOPROST 1 DROP: 50 SOLUTION OPHTHALMIC at 21:19

## 2020-07-14 RX ADMIN — MEGESTROL ACETATE 800 MG: 40 SUSPENSION ORAL at 08:55

## 2020-07-14 RX ADMIN — DICLOFENAC 4 G: 10 GEL TOPICAL at 21:19

## 2020-07-14 RX ADMIN — FERROUS SULFATE TAB 325 MG (65 MG ELEMENTAL FE) 325 MG: 325 (65 FE) TAB at 08:23

## 2020-07-14 RX ADMIN — BRIMONIDINE TARTRATE, TIMOLOL MALEATE 1 DROP: 2; 5 SOLUTION/ DROPS OPHTHALMIC at 08:23

## 2020-07-14 RX ADMIN — ALPRAZOLAM 0.25 MG: 0.25 TABLET ORAL at 21:19

## 2020-07-14 RX ADMIN — POTASSIUM CHLORIDE 20 MEQ: 750 CAPSULE, EXTENDED RELEASE ORAL at 08:23

## 2020-07-14 RX ADMIN — ONDANSETRON HYDROCHLORIDE 4 MG: 2 SOLUTION INTRAMUSCULAR; INTRAVENOUS at 21:26

## 2020-07-14 RX ADMIN — DICLOFENAC 4 G: 10 GEL TOPICAL at 17:29

## 2020-07-14 RX ADMIN — TAZOBACTAM SODIUM AND PIPERACILLIN SODIUM 3.38 G: 375; 3 INJECTION, SOLUTION INTRAVENOUS at 05:05

## 2020-07-14 RX ADMIN — BRIMONIDINE TARTRATE, TIMOLOL MALEATE 1 DROP: 2; 5 SOLUTION/ DROPS OPHTHALMIC at 21:19

## 2020-07-14 RX ADMIN — DICLOFENAC 4 G: 10 GEL TOPICAL at 08:22

## 2020-07-14 RX ADMIN — CITALOPRAM 20 MG: 20 TABLET, FILM COATED ORAL at 08:23

## 2020-07-14 RX ADMIN — DOCUSATE SODIUM 100 MG: 100 CAPSULE ORAL at 08:23

## 2020-07-14 NOTE — PLAN OF CARE
Problem: Patient Care Overview  Goal: Plan of Care Review  Outcome: Ongoing (interventions implemented as appropriate)  Flowsheets (Taken 7/14/2020 1420)  Progress: no change  Plan of Care Reviewed With: patient  Outcome Summary: Pt is on a soft texture, ground meat diet with thin liquids. She has a very poor appetite and has refused the last 5 meals recorded. She is receiving supplements with all meals. Noted gradual weight loss since admission. She has been started on Megace 800mg daily today. Will cont to follow.

## 2020-07-14 NOTE — PLAN OF CARE
Problem: Patient Care Overview  Goal: Plan of Care Review  Outcome: Ongoing (interventions implemented as appropriate)  Flowsheets (Taken 7/14/2020 2917)  Progress: no change  Plan of Care Reviewed With: patient  Outcome Summary: Pt completed trials of nectar thick liquids with no overt s/s of aspiration. Pt appears SOA at baseline prior to PO trials. Continue mechanical soft solids and nectar thick liquids. SLP will continue to follow.

## 2020-07-14 NOTE — THERAPY TREATMENT NOTE
Acute Care - Physical Therapy Treatment Note  [facility]     Patient Name: Sunny Lacey  : 1921  MRN: [id number]  Today's Date: 2020             Admit Date: 2020    Visit Dx:    ICD-10-CM ICD-9-CM   1. Dysphagia, unspecified type R13.10 787.20   2. Impaired mobility Z74.09 799.89     Patient Active Problem List   Diagnosis   • Sepsis (CMS/HCC)   • Pneumonia due to infectious organism   • At risk for falls   • Hypokalemia       Therapy Treatment    Rehabilitation Treatment Summary     Row Name 20 1330 20 1010          Treatment Time/Intention    Discipline  physical therapy assistant  -KJ  physical therapy assistant  (Pended)   -SA     Document Type  therapy note (daily note)  -KJ  therapy note (daily note)  (Pended)   -SA     Subjective Information  no complaints  -KJ  complains of;weakness  (Pended)   -SA2     Mode of Treatment  physical therapy  -KJ  --     Patient Effort  adequate  -KJ  --     Existing Precautions/Restrictions  fall  -KJ  fall  (Pended)   -SA2     Recorded by [KJ] Jennifer Trotter, [facility] 20 1357 [SA] Waleska Qiu PTA Student 20 1010  [SA2] Waleska Qiu PTA Student 20 1047     Row Name 20 1010             Vital Signs    Pre SpO2 (%)  95  (Pended)   -SA      O2 Delivery Pre Treatment  room air  (Pended)   -SA      Post SpO2 (%)  95  (Pended)   -SA      O2 Delivery Post Treatment  room air  (Pended)   -SA      Recorded by [SA] Waleska Qiu PTA Student 20 1047      Row Name 20 1010             Safety Issues, Functional Mobility    Safety Issues Affecting Function (Mobility)  ability to follow commands;awareness of need for assistance  (Pended)   -SA      Impairments Affecting Function (Mobility)  endurance/activity tolerance;strength  (Pended)   -SA      Recorded by [SA] Waleska Qiu PTA Student 20 1047      Row Name 20 1330 20 1010          Bed Mobility Assessment/Treatment    Bed Mobility  Assessment/Treatment  --  supine-sit  (Pended)   -SA     Supine-Sit [place] (Bed Mobility)  --  minimum assist (75% patient effort);verbal cues  (Pended)   -SA     Sit-Supine [place] (Bed Mobility)  minimum assist (75% patient effort)  -KJ  --     Assistive Device (Bed Mobility)  --  bed rails;draw sheet;head of bed elevated  (Pended)   -SA     Recorded by [KJ] Jennifer Trotter, PTA 07/14/20 1424 [SA] Waleska Qiu PTA Student 07/14/20 1047     Row Name 07/14/20 1010             Transfer Assessment/Treatment    Transfer Assessment/Treatment  toilet transfer  (Pended)   -SA      Recorded by [SA] Waleska Qiu PTA Student 07/14/20 1047      Row Name 07/14/20 1330 07/14/20 1010          Sit-Stand Transfer    Sit-Stand [place] (Transfers)  verbal cues;contact guard;minimum assist (75% patient effort)  -KJ  minimum assist (75% patient effort);verbal cues  (Pended)   -SA     Recorded by [KJ] Jennifer Trotter, PTA 07/14/20 1424 [SA] Waleska Qiu PTA Student 07/14/20 1047     Row Name 07/14/20 1330 07/14/20 1010          Toilet Transfer    Type (Toilet Transfer)  sit-stand;stand-sit  -KJ  sit-stand;stand-sit  (Pended)   -SA     [place] Level (Toilet Transfer)  verbal cues;minimum assist (75% patient effort)  -KJ  verbal cues;minimum assist (75% patient effort)  (Pended)   -SA2     Assistive Device (Toilet Transfer)  walker, front-wheeled  -KJ  walker, front-wheeled;commode, 3-in-1  (Pended)   -SA2     Recorded by [KJ] Jennifer Trotter, PTA 07/14/20 1424 [SA] Waleska Qiu PTA Student 07/14/20 1048  [SA2] Waleska Qiu PTA Student 07/14/20 1102     Row Name 07/14/20 1330 07/14/20 1010          Gait/Stairs Assessment/Training    [place] Level (Gait)  verbal cues;contact guard  -KJ  contact guard;verbal cues  (Pended)   -SA     Assistive Device (Gait)  walker, front-wheeled  -KJ  walker, front-wheeled  (Pended)   -SA     Distance in Feet (Gait)  10' x 2  -KJ  amb to bedside commode then 10  ft x 2  (Pended)   -SA     Pattern (Gait)  --  step-to  (Pended)   -SA     Deviations/Abnormal Patterns (Gait)  gait speed decreased;stride length decreased  -KJ  base of support, narrow;gait speed decreased;stride length decreased  (Pended)   -SA     Bilateral Gait Deviations  heel strike decreased  -KJ  forward flexed posture  (Pended)   -SA     Left Sided Gait Deviations  forward flexed posture  -KJ  --     Recorded by [KJ] Jennifer Trotter, [facility] 07/14/20 1424 [SA] Waleska Qiu [facility] Student 07/14/20 1047     Row Name 07/14/20 1010             Therapeutic Exercise    Lower Extremity (Therapeutic Exercise)  marching while standing;LAQ (long arc quad), bilateral  (Pended)   -SA      Lower Extremity Range of Motion (Therapeutic Exercise)  ankle dorsiflexion/plantar flexion, bilateral  (Pended)   -SA      Exercise Type (Therapeutic Exercise)  AROM (active range of motion)  (Pended)   -SA      Position (Therapeutic Exercise)  seated  (Pended)   -SA      Sets/Reps (Therapeutic Exercise)  x 15  (Pended)   -SA      Recorded by [SA] Waleska Qiu [facility] Student 07/14/20 1102      Row Name 07/14/20 1330 07/14/20 1010          Positioning and Restraints    Pre-Treatment Position  sitting in chair/recliner  -KJ  in bed  (Pended)   -SA     Post Treatment Position  bed  -KJ  chair  (Pended)   -SA     In Chair  --  reclined;notified nsg;call light within reach;encouraged to call for assist;exit alarm on  (Pended)   -SA     Recorded by [KJ] Jennifer Trotter, [facility] 07/14/20 1424 [SA] Waleska Qiu [facility] Student 07/14/20 1102     Row Name 07/14/20 1330             Pain Scale: Numbers Pre/Post-Treatment    Pain Scale: Numbers, Pretreatment  0/10 - no pain  -KJ      Pain Scale: Numbers, Post-Treatment  0/10 - no pain  -KJ      Recorded by [KJ] Jennifer Trotter, [facility] 07/14/20 1424      Row Name 07/14/20 1010             Pain Scale: FACES Pre/Post-Treatment    Pain: FACES Scale, Pretreatment  0-->no hurt  (Pended)   -SA      Pain: FACES  Scale, Post-Treatment  0-->no hurt  (Pended)   -      Recorded by [SA] QiuJen mariaa, [facility] Student 07/14/20 1102        User Key  (r) = Recorded By, (t) = Taken By, (c) = Cosigned By    Initials Name Effective Dates Discipline    Jennifer Fox, [facility] 08/02/16 -  PT    SA QiuPb mariarina, [facility] Student 06/23/20 -  PT                   Physical Therapy Education                 Title: PT OT SLP Therapies (Done)     Topic: Physical Therapy (Done)     Point: Mobility training (Done)     Description:   Instruct learner(s) on safety and technique for assisting patient out of bed, chair or wheelchair.  Instruct in the proper use of assistive devices, such as walker, crutches, cane or brace.              Patient Friendly Description:   It's important to get you on your feet again, but we need to do so in a way that is safe for you. Falling has serious consequences, and your personal safety is the most important thing of all.        When it's time to get out of bed, one of us or a family member will sit next to you on the bed to give you support.     If your doctor or nurse tells you to use a walker, crutches, a cane, or a brace, be sure you use it every time you get out of bed, even if you think you don't need it.    Learning Progress Summary           Patient Acceptance, E,D, DU,VU by  at 7/13/2020 1039    Comment:  benefits of PT and POC, call for A OOB                   Point: Precautions (Done)     Description:   Instruct learner(s) on prescribed precautions during mobility and gait tasks              Learning Progress Summary           Patient Acceptance, E,D, DU,VU by  at 7/13/2020 1039    Comment:  benefits of PT and POC, call for A OOB                               User Key     Initials Effective Dates Name Provider Type Discipline     08/02/16 -  Michel Sanchez, PT Physical Therapist PT                PT Recommendation and Plan           Time Calculation:   PT Charges     Row Name 07/14/20 9459 07/14/20  1106          Time Calculation    Start Time  1330  -KJ  1010  (Pended)   -     Stop Time  1353  -KJ  1050  (Pended)   -     Time Calculation (min)  23 min  -KJ  40 min  (Pended)   -     PT Received On  --  07/14/20  (Pended)   -     PT Goal Re-Cert Due Date  --  07/23/20  (Pended)   -        Time Calculation- PT    Total Timed Code Minutes- PT  23 minute(s)  -KJ  40 minute(s)  (Pended)   -        Timed Charges    09533 - PT Therapeutic Exercise Minutes  --  10  (Pended)   -     51521 - Gait Training Minutes   --  15  (Pended)   -     39956 - PT Therapeutic Activity Minutes  --  15  (Pended)   -       User Key  (r) = Recorded By, (t) = Taken By, (c) = Cosigned By    Initials Name Provider Type    Jennifer Fox PTA Physical Therapy Assistant    Waleska Wise PTA Student PTA Student        Therapy Charges for Today     Code Description Service Date Service Provider Modifiers Qty    [id number]  PT THERAPEUTIC ACT EA 15 MIN 7/14/2020 Jennifer Trotter PTA GP 2          PT G-Codes  Outcome Measure Options: AM-PAC 6 Clicks Basic Mobility (PT)  AM-PAC 6 Clicks Score (PT): 15    Jennifer Trotter PTA  7/14/2020

## 2020-07-14 NOTE — PLAN OF CARE
VSS. No C/O pain. Weight shift assistance and provided. Nutrition intake encouraged. Continues on 1.5L of O2 NC. Sinus 78-86 on tele. Safety maintained.

## 2020-07-14 NOTE — PLAN OF CARE
Problem: Patient Care Overview  Goal: Plan of Care Review  7/14/2020 0540 by Bhumika King RN  Outcome: Ongoing (interventions implemented as appropriate)  Flowsheets (Taken 7/14/2020 5052)  Outcome Summary: pt had no c/o pain this shift; pt has rested well; BP significantly elevated; other VSS; sinus 66-88 on tele; safety maintained; will continue to monitor

## 2020-07-14 NOTE — PROGRESS NOTES
Continued Stay Note   [facility]     Patient Name: Sunny Lacey  MRN: [id number]  Today's Date: 7/14/2020    Admit Date: 7/8/2020    Discharge Plan     Row Name 07/14/20 0941       Plan    Plan  [facility]     Patient/Family in Agreement with Plan  yes    Plan Comments  Bed offer from [facility], [facility]. Pt will be able to go there at discharge.  [phone]        Discharge Codes    No documentation.             ESTELA Romero

## 2020-07-14 NOTE — PROGRESS NOTES
Neurology Progress Note      Chief Complaint:   F/u delirium    Subjective     Subjective:  Sitting up in chair. Alert. BP improved but remains elevated. Afebrile. Has bed offer from [facility].   K+ 3.4 this AM, on IV fluids D5 1/2 NO with 20 KCL at 50 mL /hour    D Dimer repeated 1.38    Medications:  Current Facility-Administered Medications   Medication Dose Route Frequency Provider Last Rate Last Dose   • acetaminophen (TYLENOL) tablet 650 mg  650 mg Oral BID Phong Joseph MD   650 mg at 07/14/20 0822   • acetaminophen (TYLENOL) tablet 650 mg  650 mg Oral Q6H PRN Phong Joseph MD       • albuterol (PROVENTIL) nebulizer solution 0.083% 2.5 mg/3mL  2.5 mg Nebulization Q6H PRN Phong Joseph MD       • ALPRAZolam (XANAX) tablet 0.25 mg  0.25 mg Oral Daily PRN Phong Joseph MD       • ALPRAZolam (XANAX) tablet 0.25 mg  0.25 mg Oral Nightly Phong Joseph MD   0.25 mg at 07/13/20 2048   • amLODIPine (NORVASC) tablet 10 mg  10 mg Oral Daily Phong Joseph MD   10 mg at 07/14/20 0855   • brimonidine-timolol (COMBIGAN) 0.2-0.5 % ophthalmic solution 1 drop  1 drop Right Eye Q12H Phong Joseph MD   1 drop at 07/14/20 0823   • citalopram (CeleXA) tablet 20 mg  20 mg Oral Daily Phong Joseph MD   20 mg at 07/14/20 0823   • dextrose 5 % and sodium chloride 0.45 % with KCl 20 mEq/L infusion  50 mL/hr Intravenous Continuous Phong Joseph MD 50 mL/hr at 07/14/20 0636 50 mL/hr at 07/14/20 0636   • diclofenac (VOLTAREN) 1 % gel 4 g  4 g Topical 4x Daily Phong Joseph MD   4 g at 07/14/20 0822   • docusate sodium (COLACE) capsule 100 mg  100 mg Oral Daily Phong Joseph MD   100 mg at 07/14/20 0823   • enoxaparin (LOVENOX) syringe 40 mg  40 mg Subcutaneous Q24H Phong Joseph MD   40 mg at 07/13/20 1740   • ferrous sulfate tablet 325 mg  325 mg Oral Daily With Breakfast Phong Joseph MD   325 mg at 07/14/20 0823   •  glycerin (ADULT) rectal suppository 2 g  2 g Rectal Daily PRN Phong Joseph MD   2 g at 07/10/20 1128   • latanoprost (XALATAN) 0.005 % ophthalmic solution 1 drop  1 drop Both Eyes Nightly Phong Joseph MD   1 drop at 07/13/20 2048   • lidocaine (LMX) 4 % cream   Topical 4x Daily PRN Phong Joseph MD       • megestrol (MEGACE) 40 MG/ML suspension 800 mg  800 mg Oral Daily Phong Joseph MD   800 mg at 07/14/20 0855   • ondansetron (ZOFRAN) injection 4 mg  4 mg Intravenous Q6H PRN Phong Joseph MD   4 mg at 07/13/20 1654   • Pharmacy to Dose enoxaparin (LOVENOX)   Does not apply Continuous PRN Phong Joseph MD       • potassium chloride (MICRO-K) CR capsule 20 mEq  20 mEq Oral BID With Meals Oliver Starkey MD   20 mEq at 07/14/20 0823   • sodium chloride 0.9 % bolus 1,464 mL  30 mL/kg Intravenous PRN Phong Joseph MD       • sodium chloride 0.9 % flush 10 mL  10 mL Intravenous Q12H Phong Joseph MD   10 mL at 07/13/20 2049   • sodium chloride 0.9 % flush 10 mL  10 mL Intravenous PRN Phong Joseph MD           Review of Systems:   -A 14 point review of systems is completed and is negative except for confusion.      Objective      Vital Signs  Temp:  [97.9 °F (36.6 °C)-98.5 °F (36.9 °C)] 98.5 °F (36.9 °C)  Heart Rate:  [76-87] 87  Resp:  [18-20] 18  BP: (159-178)/(66-87) 162/82    Telemetry: S 66-88 with 5 second PAT up to 145    Physical Exam:  General Exam:  Head:  Normocephalic, atraumatic  HEENT:  Neck supple  CVS:  Regular rate and rhythm.  No murmurs  Carotid Examination:  No bruits  Lungs:  Clear to auscultation  Abdomen:  Nontender, nondistended  Extremities:  No signs of peripheral edema  Skin:  No rashes     Neurologic Exam:     Mental Status:    -Alert, Oriented X to person and place.   -No word-finding difficulties but slow to answer  -No aphasia  -No dysarthria  -Follows simple  commands     CN II:  Visual fields full.   Pupils equally reactive to light  CN III, IV, VI:  Extraocular Muscles full with no signs of nystagmus  CN V:  Facial sensory is symmetric with no asymmetries.  CN VII:  Facial motor symmetric  CN VIII:  Gross hearing intact bilaterally  CN IX:  Palate elevates symmetrically  CN X:  Palate elevates symmetrically  CN XI:  Shoulder shrug symmetric  CN XII:  Tongue protrudes to midline  Motor: (strength out of 5:  1= minimal movement, 2 = movement in plane of gravity, 3 = movement against gravity, 4 = movement against some resistance, 5 = full strength)     -moving all four extremities symmetrically but has general weakness.     DTR:  -Right              Bicep: 2+         Tricep: 2+        Brachoradialis: 2+              Patella: 2+       Ankle: 2+         Neg Babinski  -Left              Bicep: 2+         Tricep: 2+        Brachoradialis: 2+              Patella: 2+       Ankle: 2+         Neg Babinski     Sensory:  -Intact to light touch, pinprick, temperature, pain, and proprioception     Coordination:  -Finger to nose intact  -Heel to shin intact  - no increase tone.      Gait  -Needs assistance. Has decreased strength and endurance with PT.  - high fall risk          Results Review:    I reviewed the patient's new clinical results.    Results from last 7 days   Lab Units 07/14/20  0407 07/13/20 0403 07/12/20  0248   WBC 10*3/mm3 8.06 8.51 7.39   HEMOGLOBIN g/dL 12.8 13.6 11.8*   HEMATOCRIT % 39.1 41.5 36.1   PLATELETS 10*3/mm3 184 168 129*        Results from last 7 days   Lab Units 07/14/20  0407 07/13/20  0403 07/12/20  0248   SODIUM mmol/L 148* 145 145   POTASSIUM mmol/L 3.4* 3.0* 2.8*   CHLORIDE mmol/L 111* 106 104   CO2 mmol/L 28.0 28.0 32.0*   BUN mg/dL 11 8 6*   CREATININE mg/dL 0.75 0.66 0.53*   CALCIUM mg/dL 9.3 9.5 9.1   GLUCOSE mg/dL 122* 133* 106*        Lab Results   Component Value Date    MG 2.1 07/13/2020    PROTIME 14.3 02/26/2016    INR 1.08 02/26/2016     No components found for: POCGLUC  No  components found for: A1C  No results found for: HDL, LDL  No components found for: B12  Lab Results   Component Value Date    TSH 2.130 07/13/2020       Assessment/Plan     Hospital Problem List      Sepsis (CMS/HCC)    Pneumonia due to infectious organism    At risk for falls    Hypokalemia    Impression:  1. Suspect some underlying cognitive impairment superimposed on metabolic encephalopathy  2.  Medications in this 89 year old that would contribute to cognitive issues include xanax and Norco.   3. Pneumonia/infection  will contribute to cognitive deficits  4. Anemia  5. Hypokalemia  6. Ammonia level 36. Will need to monitor.        Plan:  1. Xanax 0.25 mg nightly and with goal to wean and discontinue slowly.  2. Defer hypokalemia to attending.  3. Continue therapies.  4. Supportive care.   5. Anemia per attending.   6. Ok to SNF when cleared by attending. F/u neurology PRN.      Jolynn Mitchell, PEGGY  07/14/20  11:06

## 2020-07-14 NOTE — THERAPY TREATMENT NOTE
Acute Care - Speech Language Pathology   Swallow Treatment Note [facility]     Patient Name: Sunny Lacey  : 1921  MRN: [id number]  Today's Date: 2020               Admit Date: 2020  Pt completed trials of nectar thick liquids with no overt s/s of aspiration. Pt appears SOA at baseline prior to PO trials. Continue mechanical soft solids and nectar thick liquids. SLP will continue to follow.  Anjelica Gonzales CCC-SLP 2020 15:57    Visit Dx:      ICD-10-CM ICD-9-CM   1. Dysphagia, unspecified type R13.10 787.20   2. Impaired mobility Z74.09 799.89     Patient Active Problem List   Diagnosis   • Sepsis (CMS/HCC)   • Pneumonia due to infectious organism   • At risk for falls   • Hypokalemia       Therapy Treatment  Rehabilitation Treatment Summary     Row Name 20 1536 20 1330 20 1010       Treatment Time/Intention    Discipline  speech language pathologist  -MB  physical therapy assistant  -KJ  physical therapy assistant  (Pended)   -SA    Document Type  therapy note (daily note)  -MB  therapy note (daily note)  -KJ  therapy note (daily note)  (Pended)   -SA    Subjective Information  no complaints  -MB  no complaints  -KJ  complains of;weakness  (Pended)   -SA2    Mode of Treatment  speech-language pathology  -MB  physical therapy  -KJ  --    Patient/Family Observations  No family present  -MB  --  --    Patient Effort  good  -MB  adequate  -KJ  --    Existing Precautions/Restrictions  --  fall  -KJ  fall  (Pended)   -SA2    Recorded by [MB] Anjelica Gonzales CCC-SLP 20 1554 [KJ] Jennifer Trotter PTA 20 1357 [SA] Waleska Qiu PTA Student 20 1010  [SA2] Waleska Qiu PTA Student 20 1047    Row Name 20 1010             Vital Signs    Pre SpO2 (%)  95  (Pended)   -SA      O2 Delivery Pre Treatment  room air  (Pended)   -SA      Post SpO2 (%)  95  (Pended)   -SA      O2 Delivery Post Treatment  room air  (Pended)   -SA      Recorded by  [SA] Waleska Qiu PTA Student 07/14/20 1047      Row Name 07/14/20 1010             Safety Issues, Functional Mobility    Safety Issues Affecting Function (Mobility)  ability to follow commands;awareness of need for assistance  (Pended)   -SA      Impairments Affecting Function (Mobility)  endurance/activity tolerance;strength  (Pended)   -SA      Recorded by [SA] Waleska Qiu PTA Student 07/14/20 1047      Row Name 07/14/20 1330 07/14/20 1010          Bed Mobility Assessment/Treatment    Bed Mobility Assessment/Treatment  --  supine-sit  (Pended)   -SA     Supine-Sit [place] (Bed Mobility)  --  minimum assist (75% patient effort);verbal cues  (Pended)   -SA     Sit-Supine [place] (Bed Mobility)  minimum assist (75% patient effort)  -KJ  --     Assistive Device (Bed Mobility)  --  bed rails;draw sheet;head of bed elevated  (Pended)   -SA     Recorded by [KJ] Jennifer Trotter PTA 07/14/20 1424 [SA] Waleska Qiu PTA Student 07/14/20 1047     Row Name 07/14/20 1010             Transfer Assessment/Treatment    Transfer Assessment/Treatment  toilet transfer  (Pended)   -SA      Recorded by [SA] Waleska Qiu PTA Student 07/14/20 1047      Row Name 07/14/20 1330 07/14/20 1010          Sit-Stand Transfer    Sit-Stand [place] (Transfers)  verbal cues;contact guard;minimum assist (75% patient effort)  -KJ  minimum assist (75% patient effort);verbal cues  (Pended)   -SA     Recorded by [KJ] Jennifer Trotter PTA 07/14/20 1424 [SA] Waleska Qiu [facility] Student 07/14/20 1047     Row Name 07/14/20 1330 07/14/20 1010          Toilet Transfer    Type (Toilet Transfer)  sit-stand;stand-sit  -KJ  sit-stand;stand-sit  (Pended)   -SA     [place] Level (Toilet Transfer)  verbal cues;minimum assist (75% patient effort)  -KJ  verbal cues;minimum assist (75% patient effort)  (Pended)   -SA2     Assistive Device (Toilet Transfer)  walker, front-wheeled  -KJ  walker, front-wheeled;commode, 3-in-1  (Pended)    -SA2     Recorded by [KJ] Jennifer Trotter, PTA 07/14/20 1424 [SA] Waleska Qiu [facility] Student 07/14/20 1048  [SA2] Waleska Qiu [facility] Student 07/14/20 1102     Row Name 07/14/20 1330 07/14/20 1010          Gait/Stairs Assessment/Training    [place] Level (Gait)  verbal cues;contact guard  -KJ  contact guard;verbal cues  (Pended)   -SA     Assistive Device (Gait)  walker, front-wheeled  -KJ  walker, front-wheeled  (Pended)   -SA     Distance in Feet (Gait)  10' x 2  -KJ  amb to bedside commode then 10 ft x 2  (Pended)   -SA     Pattern (Gait)  --  step-to  (Pended)   -SA     Deviations/Abnormal Patterns (Gait)  gait speed decreased;stride length decreased  -KJ  base of support, narrow;gait speed decreased;stride length decreased  (Pended)   -SA     Bilateral Gait Deviations  heel strike decreased  -KJ  forward flexed posture  (Pended)   -SA     Left Sided Gait Deviations  forward flexed posture  -KJ  --     Recorded by [KJ] Jennifer Trotter, [facility] 07/14/20 1424 [SA] Waleska Qiu [facility] Student 07/14/20 1047     Row Name 07/14/20 1010             Therapeutic Exercise    Lower Extremity (Therapeutic Exercise)  marching while standing;LAQ (long arc quad), bilateral  (Pended)   -SA      Lower Extremity Range of Motion (Therapeutic Exercise)  ankle dorsiflexion/plantar flexion, bilateral  (Pended)   -SA      Exercise Type (Therapeutic Exercise)  AROM (active range of motion)  (Pended)   -SA      Position (Therapeutic Exercise)  seated  (Pended)   -SA      Sets/Reps (Therapeutic Exercise)  x 15  (Pended)   -SA      Recorded by [SA] Waleska Qiu [facility] Student 07/14/20 1102      Row Name 07/14/20 1330 07/14/20 1010          Positioning and Restraints    Pre-Treatment Position  sitting in chair/recliner  -KJ  in bed  (Pended)   -SA     Post Treatment Position  bed  -KJ  chair  (Pended)   -SA     In Chair  --  reclined;notified nsg;call light within reach;encouraged to call for assist;exit alarm on  (Pended)   -SA      Recorded by [KJ] Jennifer Trotter, PTA 07/14/20 1424 [SA] Waleska Qiu, PTA Student 07/14/20 1102     Row Name 07/14/20 1330             Pain Scale: Numbers Pre/Post-Treatment    Pain Scale: Numbers, Pretreatment  0/10 - no pain  -KJ      Pain Scale: Numbers, Post-Treatment  0/10 - no pain  -KJ      Recorded by [KJ] Jennifer Trotter, PTA 07/14/20 1424      Row Name 07/14/20 1536 07/14/20 1010          Pain Scale: FACES Pre/Post-Treatment    Pain: FACES Scale, Pretreatment  0-->no hurt  -MB  0-->no hurt  (Pended)   -SA     Pain: FACES Scale, Post-Treatment  --  0-->no hurt  (Pended)   -SA     Recorded by [MB] Anjelica Gonzales CCC-SLP 07/14/20 1554 [SA] Waleska Qiu PTA Student 07/14/20 1102     Row Name 07/14/20 1536             Outcome Summary/Treatment Plan (SLP)    Daily Summary of Progress (SLP)  progress toward functional goals is good  -MB      Barriers to Overall Progress (SLP)  none  -MB      Plan for Continued Treatment (SLP)  Continue to follow  -MB      Anticipated Dischage Disposition (SLP)  assisted living facility ([facility])  -MB      Recorded by [MB] Anjelica Gonzales CCC-SLP 07/14/20 1554        User Key  (r) = Recorded By, (t) = Taken By, (c) = Cosigned By    Initials Name Effective Dates Discipline    MB Anjelica Gonzales CCC-SLP 08/02/16 -  SLP    KJ Jennifer Trotter, [facility] 08/02/16 -  PT     Waleska Qiu PTA Student 06/23/20 -  PT          Outcome Summary  Outcome Summary/Treatment Plan (SLP)  Daily Summary of Progress (SLP): progress toward functional goals is good (07/14/20 1536 : Anjelica Gonzales CCC-SLP)  Barriers to Overall Progress (SLP): none (07/14/20 1536 : Anjelica Gonzales CCC-SLP)  Plan for Continued Treatment (SLP): Continue to follow (07/14/20 1536 : Anjelica Gonzales CCC-SLP)  Anticipated Dischage Disposition (SLP): assisted living facility ([facility]) (07/14/20 1536 : Anjelica Gonzales, CCC-SLP)      SLP GOALS     Row Name 07/14/20 1536 07/13/20 1406          Oral  Nutrition/Hydration Goal 1 (SLP)    Oral Nutrition/Hydration Goal 1, SLP  Pt will tolerate LRD without s/s of aspiration  -MB  Pt will tolerate LRD without s/s of aspiration  -MB     Time Frame (Oral Nutrition/Hydration Goal 1, SLP)  short term goal (STG);by discharge  -MB  short term goal (STG);by discharge  -MB     Barriers (Oral Nutrition/Hydration Goal 1, SLP)  none  -MB  none  -MB     Progress/Outcomes (Oral Nutrition/Hydration Goal 1, SLP)  continuing progress toward goal  -MB  progress slower than expected  -MB       User Key  (r) = Recorded By, (t) = Taken By, (c) = Cosigned By    Initials Name Provider Type    Anjelica Barron CCC-SLP Speech and Language Pathologist          EDUCATION  The patient has been educated in the following areas:   Dysphagia (Swallowing Impairment).    SLP Recommendation and Plan  Daily Summary of Progress (SLP): progress toward functional goals is good  Barriers to Overall Progress (SLP): none  Plan for Continued Treatment (SLP): Continue to follow  Anticipated Dischage Disposition (SLP): assisted living facility ([facility])                    Time Calculation:   Time Calculation- SLP     Row Name 07/14/20 1556             Time Calculation- SLP    SLP Start Time  1536  -MB      SLP Stop Time  1553  -MB      SLP Time Calculation (min)  17 min  -MB      SLP Received On  07/14/20  -MB        User Key  (r) = Recorded By, (t) = Taken By, (c) = Cosigned By    Initials Name Provider Type    Anjelica Barron CCC-SLP Speech and Language Pathologist          Therapy Charges for Today     Code Description Service Date Service Provider Modifiers Qty    65830454490  ST TREATMENT SWALLOW 2 7/13/2020 Anjelica Gonzales CCC-SLP GN 1    95380046272 HC ST TREATMENT SWALLOW 1 7/14/2020 Anjelica Gonzales CCC-SLP GN 1                 TERRY Tyler  7/14/2020

## 2020-07-14 NOTE — PROGRESS NOTES
Discussion with poa and another son--requesting appetite stimulant for their mother    Review of Systems   Constitutional: Positive for activity change and appetite change.   HENT: Negative.    Eyes: Negative.    Respiratory: Negative.    Cardiovascular: Negative.    Gastrointestinal: Negative.    Endocrine: Negative.    Genitourinary: Negative.    Musculoskeletal: Negative.    Skin: Negative.    Allergic/Immunologic: Negative.    Neurological: Negative.    Hematological: Negative.    Psychiatric/Behavioral: Positive for confusion.     Temp:  [97.9 °F (36.6 °C)-98.4 °F (36.9 °C)] 98.2 °F (36.8 °C)  Heart Rate:  [76-86] 76  Resp:  [18-20] 18  BP: (159-178)/(66-87) 178/78  I/O last 3 completed shifts:  In: 1060 [P.O.:60; I.V.:950; IV Piggyback:50]  Out: 500 [Urine:500]  No intake/output data recorded.    Physical Exam   Constitutional: She appears well-developed and well-nourished.   HENT:   Head: Normocephalic and atraumatic.   Right Ear: External ear normal.   Left Ear: External ear normal.   Nose: Nose normal.   Mouth/Throat: Oropharynx is clear and moist.   Eyes: Pupils are equal, round, and reactive to light. Conjunctivae and EOM are normal.   Neck: Normal range of motion. Neck supple.   Cardiovascular: Normal rate, regular rhythm, normal heart sounds and intact distal pulses.   Pulmonary/Chest: Effort normal and breath sounds normal.   Abdominal: Soft. Bowel sounds are normal.   Musculoskeletal: Normal range of motion.   Neurological: She is alert.   Skin: Skin is warm. Capillary refill takes less than 2 seconds.   Psychiatric: She has a normal mood and affect. Her behavior is normal. Judgment and thought content normal.   Nursing note and vitals reviewed.        Sepsis (CMS/HCC)    Pneumonia due to infectious organism    At risk for falls    Hypokalemia    20min discussion with lona---will try megace--monitor vitals and intake--appreciate input from neurology and inf dz

## 2020-07-14 NOTE — THERAPY TREATMENT NOTE
Acute Care - Physical Therapy Treatment Note  [facility]     Patient Name: Sunny Lacey  : 1921  MRN: [id number]  Today's Date: 2020             Admit Date: 2020    Visit Dx:    ICD-10-CM ICD-9-CM   1. Dysphagia, unspecified type R13.10 787.20   2. Impaired mobility Z74.09 799.89     Patient Active Problem List   Diagnosis   • Sepsis (CMS/[facility])   • Pneumonia due to infectious organism   • At risk for falls   • Hypokalemia       Therapy Treatment    Rehabilitation Treatment Summary     Row Name 20 1010             Treatment Time/Intention    Discipline  physical therapy assistant  (Pended)   -SA      Document Type  therapy note (daily note)  (Pended)   -SA      Subjective Information  complains of;weakness  (Pended)   -SA2      Existing Precautions/Restrictions  fall  (Pended)   -SA2      Recorded by [SA] Waleska Qiu PTA Student 20 1010  [SA2] Waleska Qiu PTA Student 20 1047      Row Name 20 1010             Vital Signs    Pre SpO2 (%)  95  (Pended)   -SA      O2 Delivery Pre Treatment  room air  (Pended)   -SA      Post SpO2 (%)  95  (Pended)   -SA      O2 Delivery Post Treatment  room air  (Pended)   -SA      Recorded by [SA] Waleska Qiu PTA Student 20 1047      Row Name 20 1010             Safety Issues, Functional Mobility    Safety Issues Affecting Function (Mobility)  ability to follow commands;awareness of need for assistance  (Pended)   -SA      Impairments Affecting Function (Mobility)  endurance/activity tolerance;strength  (Pended)   -SA      Recorded by [SA] Waleska Qiu PTA Student 20 1047      Row Name 20 1010             Bed Mobility Assessment/Treatment    Bed Mobility Assessment/Treatment  supine-sit  (Pended)   -SA      Supine-Sit [place] (Bed Mobility)  minimum assist (75% patient effort);verbal cues  (Pended)   -SA      Assistive Device (Bed Mobility)  bed rails;draw sheet;head of bed elevated  (Pended)    -SA      Recorded by [SA] Waleska Qiu PTA Student 07/14/20 1047      Row Name 07/14/20 1010             Transfer Assessment/Treatment    Transfer Assessment/Treatment  toilet transfer  (Pended)   -SA      Recorded by [SA] Waleska Qiu PTA Student 07/14/20 1047      Row Name 07/14/20 1010             Sit-Stand Transfer    Sit-Stand [place] (Transfers)  minimum assist (75% patient effort);verbal cues  (Pended)   -SA      Recorded by [SA] Waleska Qiu PTA Student 07/14/20 1047      Row Name 07/14/20 1010             Toilet Transfer    Type (Toilet Transfer)  sit-stand;stand-sit  (Pended)   -SA      [place] Level (Toilet Transfer)  verbal cues;minimum assist (75% patient effort)  (Pended)   -SA2      Assistive Device (Toilet Transfer)  walker, front-wheeled;commode, 3-in-1  (Pended)   -SA2      Recorded by [SA] Waleska Qiu PTA Student 07/14/20 1048  [SA2] Waleska Qiu PTA Student 07/14/20 1102      Row Name 07/14/20 1010             Gait/Stairs Assessment/Training    [place] Level (Gait)  contact guard;verbal cues  (Pended)   -SA      Assistive Device (Gait)  walker, front-wheeled  (Pended)   -SA      Distance in Feet (Gait)  amb to bedside commode then 10 ft x 2  (Pended)   -SA      Pattern (Gait)  step-to  (Pended)   -SA      Deviations/Abnormal Patterns (Gait)  base of support, narrow;gait speed decreased;stride length decreased  (Pended)   -SA      Bilateral Gait Deviations  forward flexed posture  (Pended)   -SA      Recorded by [SA] Waleska Qiu PTA Student 07/14/20 1047      Row Name 07/14/20 1010             Therapeutic Exercise    Lower Extremity (Therapeutic Exercise)  marching while standing;LAQ (long arc quad), bilateral  (Pended)   -SA      Lower Extremity Range of Motion (Therapeutic Exercise)  ankle dorsiflexion/plantar flexion, bilateral  (Pended)   -SA      Exercise Type (Therapeutic Exercise)  AROM (active range of motion)  (Pended)   -SA      Position  (Therapeutic Exercise)  seated  (Pended)   -SA      Sets/Reps (Therapeutic Exercise)  x 15  (Pended)   -SA      Recorded by [SA] LazarusPbWaleskaJASBIR salguero Student 07/14/20 1102      Row Name 07/14/20 1010             Positioning and Restraints    Pre-Treatment Position  in bed  (Pended)   -SA      Post Treatment Position  chair  (Pended)   -SA      In Chair  reclined;notified nsg;call light within reach;encouraged to call for assist;exit alarm on  (Pended)   -SA      Recorded by [SA] Waleska Qiu PTA Student 07/14/20 1102      Row Name 07/14/20 1010             Pain Scale: FACES Pre/Post-Treatment    Pain: FACES Scale, Pretreatment  0-->no hurt  (Pended)   -SA      Pain: FACES Scale, Post-Treatment  0-->no hurt  (Pended)   -SA      Recorded by [SA] Waleska Qiu PTA Student 07/14/20 1102        User Key  (r) = Recorded By, (t) = Taken By, (c) = Cosigned By    Initials Name Effective Dates Discipline    SA Qiu, JASBIR Galeano Student 06/23/20 -  PT                   Physical Therapy Education                 Title: PT OT SLP Therapies (Done)     Topic: Physical Therapy (Done)     Point: Mobility training (Done)     Description:   Instruct learner(s) on safety and technique for assisting patient out of bed, chair or wheelchair.  Instruct in the proper use of assistive devices, such as walker, crutches, cane or brace.              Patient Friendly Description:   It's important to get you on your feet again, but we need to do so in a way that is safe for you. Falling has serious consequences, and your personal safety is the most important thing of all.        When it's time to get out of bed, one of us or a family member will sit next to you on the bed to give you support.     If your doctor or nurse tells you to use a walker, crutches, a cane, or a brace, be sure you use it every time you get out of bed, even if you think you don't need it.    Learning Progress Summary           Patient Acceptance, E,D, DU,VU by   at 7/13/2020 1039    Comment:  benefits of PT and POC, call for A OOB                   Point: Precautions (Done)     Description:   Instruct learner(s) on prescribed precautions during mobility and gait tasks              Learning Progress Summary           Patient Acceptance, E,D, DU,VU by  at 7/13/2020 1039    Comment:  benefits of PT and POC, call for A OOB                               User Key     Initials Effective Dates Name Provider Type Discipline     08/02/16 -  Michel Sanchez, PT Physical Therapist PT                PT Recommendation and Plan     Plan of Care Reviewed With: (P) patient  Progress: (P) improving  Outcome Summary: (P) Pt supine-sit min x1 with verbal cues, and draw sheet. Pt sit- stand min x1 w/ RW amb to bedside commode w/ CGA and verbal cues for walker placement. Pt amb 10 ft x 2 w/ CGA and verbal cues back to chair. AROM BLE x 15 sitting in chair w/ verbal cues, pt easily distracted. Pt would benefit from cont Pt for endurance and strength.     Time Calculation:   PT Charges     Row Name 07/14/20 1106             Time Calculation    Start Time  1010  (Pended)   -SA      Stop Time  1050  (Pended)   -      Time Calculation (min)  40 min  (Pended)   -      PT Received On  07/14/20  (Pended)   -      PT Goal Re-Cert Due Date  07/23/20  (Pended)   -         Time Calculation- PT    Total Timed Code Minutes- PT  40 minute(s)  (Pended)   -         Timed Charges    79810 - PT Therapeutic Exercise Minutes  10  (Pended)   -      47427 - Gait Training Minutes   15  (Pended)   -      10328 - PT Therapeutic Activity Minutes  15  (Pended)   -        User Key  (r) = Recorded By, (t) = Taken By, (c) = Cosigned By    Initials Name Provider Type    SA Waleska Qiu PTA Student PTA Student        Therapy Charges for Today     Code Description Service Date Service Provider Modifiers Qty    59116929216 HC GAIT TRAINING EA 15 MIN 7/14/2020 Waleska Qiu PTA Student GP 1    37052114098  HC PT THERAPEUTIC ACT EA 15 MIN 7/14/2020 Waleska Qiu PTA Student GP 1    37589588391 HC PT THER PROC EA 15 MIN 7/14/2020 Waleska Qiu PTA Student GP 1    66997823792 HC GAIT TRAINING EA 15 MIN 7/14/2020 Waleska Qiu PTA Student GP 1    05273723637 HC PT THERAPEUTIC ACT EA 15 MIN 7/14/2020 Waleska Qiu PTA Student GP 1          PT G-Codes  Outcome Measure Options: AM-PAC 6 Clicks Basic Mobility (PT)  AM-PAC 6 Clicks Score (PT): 15    Waleska Qiu PTA Student  7/14/2020

## 2020-07-15 LAB
AMYLASE SERPL-CCNC: 77 U/L (ref 28–100)
ANION GAP SERPL CALCULATED.3IONS-SCNC: 11 MMOL/L (ref 5–15)
BASOPHILS # BLD AUTO: 0.11 10*3/MM3 (ref 0–0.2)
BASOPHILS NFR BLD AUTO: 1.3 % (ref 0–1.5)
BUN SERPL-MCNC: 11 MG/DL (ref 8–23)
BUN/CREAT SERPL: 15.3 (ref 7–25)
CALCIUM SPEC-SCNC: 9.8 MG/DL (ref 8.2–9.6)
CHLORIDE SERPL-SCNC: 105 MMOL/L (ref 98–107)
CO2 SERPL-SCNC: 27 MMOL/L (ref 22–29)
CREAT SERPL-MCNC: 0.72 MG/DL (ref 0.57–1)
DEPRECATED RDW RBC AUTO: 42.7 FL (ref 37–54)
EOSINOPHIL # BLD AUTO: 0.21 10*3/MM3 (ref 0–0.4)
EOSINOPHIL NFR BLD AUTO: 2.5 % (ref 0.3–6.2)
ERYTHROCYTE [DISTWIDTH] IN BLOOD BY AUTOMATED COUNT: 13.2 % (ref 12.3–15.4)
GFR SERPL CREATININE-BSD FRML MDRD: 75 ML/MIN/1.73
GLUCOSE SERPL-MCNC: 113 MG/DL (ref 65–99)
HCT VFR BLD AUTO: 41.9 % (ref 34–46.6)
HGB BLD-MCNC: 13.9 G/DL (ref 12–15.9)
IMM GRANULOCYTES # BLD AUTO: 0.21 10*3/MM3 (ref 0–0.05)
IMM GRANULOCYTES NFR BLD AUTO: 2.5 % (ref 0–0.5)
LIPASE SERPL-CCNC: 70 U/L (ref 13–60)
LYMPHOCYTES # BLD AUTO: 1.41 10*3/MM3 (ref 0.7–3.1)
LYMPHOCYTES NFR BLD AUTO: 16.8 % (ref 19.6–45.3)
MCH RBC QN AUTO: 30 PG (ref 26.6–33)
MCHC RBC AUTO-ENTMCNC: 33.2 G/DL (ref 31.5–35.7)
MCV RBC AUTO: 90.5 FL (ref 79–97)
MONOCYTES # BLD AUTO: 0.69 10*3/MM3 (ref 0.1–0.9)
MONOCYTES NFR BLD AUTO: 8.2 % (ref 5–12)
NEUTROPHILS NFR BLD AUTO: 5.77 10*3/MM3 (ref 1.7–7)
NEUTROPHILS NFR BLD AUTO: 68.7 % (ref 42.7–76)
NRBC BLD AUTO-RTO: 0 /100 WBC (ref 0–0.2)
PLATELET # BLD AUTO: 231 10*3/MM3 (ref 140–450)
PMV BLD AUTO: 9.9 FL (ref 6–12)
POTASSIUM SERPL-SCNC: 3.5 MMOL/L (ref 3.5–5.2)
RBC # BLD AUTO: 4.63 10*6/MM3 (ref 3.77–5.28)
SODIUM SERPL-SCNC: 143 MMOL/L (ref 136–145)
WBC # BLD AUTO: 8.4 10*3/MM3 (ref 3.4–10.8)

## 2020-07-15 PROCEDURE — 80048 BASIC METABOLIC PNL TOTAL CA: CPT | Performed by: FAMILY MEDICINE

## 2020-07-15 PROCEDURE — 99222 1ST HOSP IP/OBS MODERATE 55: CPT | Performed by: CLINICAL NURSE SPECIALIST

## 2020-07-15 PROCEDURE — 83690 ASSAY OF LIPASE: CPT | Performed by: FAMILY MEDICINE

## 2020-07-15 PROCEDURE — 82150 ASSAY OF AMYLASE: CPT | Performed by: FAMILY MEDICINE

## 2020-07-15 PROCEDURE — 99231 SBSQ HOSP IP/OBS SF/LOW 25: CPT | Performed by: FAMILY MEDICINE

## 2020-07-15 PROCEDURE — 25010000002 ENOXAPARIN PER 10 MG: Performed by: FAMILY MEDICINE

## 2020-07-15 PROCEDURE — 85025 COMPLETE CBC W/AUTO DIFF WBC: CPT | Performed by: FAMILY MEDICINE

## 2020-07-15 PROCEDURE — 97110 THERAPEUTIC EXERCISES: CPT

## 2020-07-15 PROCEDURE — 97116 GAIT TRAINING THERAPY: CPT

## 2020-07-15 RX ADMIN — DICLOFENAC 4 G: 10 GEL TOPICAL at 17:00

## 2020-07-15 RX ADMIN — POTASSIUM CHLORIDE 20 MEQ: 750 CAPSULE, EXTENDED RELEASE ORAL at 08:32

## 2020-07-15 RX ADMIN — DICLOFENAC 4 G: 10 GEL TOPICAL at 22:21

## 2020-07-15 RX ADMIN — FERROUS SULFATE TAB 325 MG (65 MG ELEMENTAL FE) 325 MG: 325 (65 FE) TAB at 08:32

## 2020-07-15 RX ADMIN — BRIMONIDINE TARTRATE, TIMOLOL MALEATE 1 DROP: 2; 5 SOLUTION/ DROPS OPHTHALMIC at 08:31

## 2020-07-15 RX ADMIN — BRIMONIDINE TARTRATE, TIMOLOL MALEATE 1 DROP: 2; 5 SOLUTION/ DROPS OPHTHALMIC at 22:18

## 2020-07-15 RX ADMIN — CITALOPRAM 20 MG: 20 TABLET, FILM COATED ORAL at 08:32

## 2020-07-15 RX ADMIN — DICLOFENAC 4 G: 10 GEL TOPICAL at 08:31

## 2020-07-15 RX ADMIN — POTASSIUM CHLORIDE, DEXTROSE MONOHYDRATE AND SODIUM CHLORIDE 50 ML/HR: 150; 5; 450 INJECTION, SOLUTION INTRAVENOUS at 03:18

## 2020-07-15 RX ADMIN — POTASSIUM CHLORIDE 20 MEQ: 750 CAPSULE, EXTENDED RELEASE ORAL at 17:00

## 2020-07-15 RX ADMIN — AMLODIPINE BESYLATE 10 MG: 10 TABLET ORAL at 08:32

## 2020-07-15 RX ADMIN — ENOXAPARIN SODIUM 40 MG: 40 INJECTION SUBCUTANEOUS at 17:00

## 2020-07-15 RX ADMIN — MEGESTROL ACETATE 800 MG: 40 SUSPENSION ORAL at 08:32

## 2020-07-15 RX ADMIN — DOCUSATE SODIUM 100 MG: 100 CAPSULE ORAL at 08:32

## 2020-07-15 RX ADMIN — ACETAMINOPHEN 650 MG: 325 TABLET, FILM COATED ORAL at 08:32

## 2020-07-15 RX ADMIN — LATANOPROST 1 DROP: 50 SOLUTION OPHTHALMIC at 22:18

## 2020-07-15 NOTE — PLAN OF CARE
Problem: Skin Injury Risk (Adult)  Goal: Skin Health and Integrity  7/15/2020 0514 by Janeth Rocha RN  Outcome: Ongoing (interventions implemented as appropriate)  7/15/2020 0512 by Janeth Rocha RN  Outcome: Ongoing (interventions implemented as appropriate)     Problem: Patient Care Overview  Goal: Plan of Care Review  7/15/2020 0514 by Janeth Rocha RN  Outcome: Ongoing (interventions implemented as appropriate)  Flowsheets  Taken 7/15/2020 0514  Progress: no change  Taken 7/14/2020 2000  Plan of Care Reviewed With: patient  Taken 7/15/2020 0512  Outcome Summary: VSS. No c/o pain. Pt. does not seem as confused as she has been in previous shifts I have had her. Was able to let us know when she needed to use the bathroom, up to bedside with assist x 2. Remains very weak. Rested through night and seems much calmer. Plans for d/c to [place] soon ? Carrillo continue to monitor.  7/15/2020 0512 by Janeth Rocha RN  Outcome: Ongoing (interventions implemented as appropriate)  Flowsheets  Taken 7/15/2020 0512  Progress: no change  Outcome Summary: VSS. No c/o pain. Pt. does not seem as confused as she has been in previous shifts I have had her. Was able to let us know when she needed to use the bathroom, up to bedside with assist x 2. Remains very weak. Rested through night and seems much calmer. Plans for d/c to [place] soon ? Carrillo continue to monitor.  Taken 7/14/2020 2000  Plan of Care Reviewed With: patient  Goal: Individualization and Mutuality  7/15/2020 0514 by Janeth Rocha RN  Outcome: Ongoing (interventions implemented as appropriate)  7/15/2020 0512 by Janeth Rocha RN  Outcome: Ongoing (interventions implemented as appropriate)  Goal: Discharge Needs Assessment  7/15/2020 0514 by Janeth Rocha RN  Outcome: Ongoing (interventions implemented as appropriate)  7/15/2020 0512 by Janeth Rocha RN  Outcome: Ongoing (interventions implemented as appropriate)  Goal: Interprofessional Rounds/Family  Conf  7/15/2020 0514 by Janeth Rocha RN  Outcome: Ongoing (interventions implemented as appropriate)  7/15/2020 0512 by Janeth Rocha RN  Outcome: Ongoing (interventions implemented as appropriate)     Problem: Pneumonia (Adult)  Goal: Signs and Symptoms of Listed Potential Problems Will be Absent, Minimized or Managed (Pneumonia)  7/15/2020 0514 by Janeth Rocha RN  Outcome: Ongoing (interventions implemented as appropriate)  7/15/2020 0512 by Janeth Rocha RN  Outcome: Ongoing (interventions implemented as appropriate)     Problem: Nutrition, Imbalanced: Inadequate Oral Intake (Adult)  Goal: Improved Oral Intake  7/15/2020 0514 by Janeth Rocha RN  Outcome: Ongoing (interventions implemented as appropriate)  7/15/2020 0512 by Janeth Rocha RN  Outcome: Ongoing (interventions implemented as appropriate)  Goal: Prevent Further Weight Loss  7/15/2020 0514 by Janeth Rocha RN  Outcome: Ongoing (interventions implemented as appropriate)  7/15/2020 0512 by Janeth Rocha RN  Outcome: Ongoing (interventions implemented as appropriate)

## 2020-07-15 NOTE — PLAN OF CARE
Problem: Patient Care Overview  Goal: Plan of Care Review  Flowsheets (Taken 7/15/2020 1123)  Outcome Summary: Pt up in chair, Sit-stand Min A x 1 w/ RW. Amb around room 15' x 2 CGA w/ verbal cues. Pt sat EOB for AROM BLE x 15. PT sit-supine min x1. Pt easiily distracted this AM. Pt would benefit from cont PT for endurance and strength .

## 2020-07-15 NOTE — PROGRESS NOTES
Family is reporting she is complaining of abdominal pain--denies any diarrhea BUT SHE IS NOT EATING  Ct of abd/pelvis at [facility] without contrast was negative  Review of Systems   Constitutional: Positive for appetite change.   HENT: Negative.    Eyes: Negative.    Respiratory: Negative.    Cardiovascular: Negative.    Gastrointestinal: Positive for abdominal pain.   Endocrine: Negative.    Genitourinary: Negative.    Musculoskeletal: Negative.    Skin: Negative.    Allergic/Immunologic: Negative.    Neurological: Negative.    Hematological: Negative.    Psychiatric/Behavioral: Negative.      Temp:  [97.6 °F (36.4 °C)-98.5 °F (36.9 °C)] 97.6 °F (36.4 °C)  Heart Rate:  [76-89] 78  Resp:  [18] 18  BP: (134-165)/(73-86) 134/78  I/O last 3 completed shifts:  In: 1000 [I.V.:950; IV Piggyback:50]  Out: 200 [Urine:200]  No intake/output data recorded.    Physical Exam   Constitutional: She is oriented to person, place, and time. She appears well-developed and well-nourished.   HENT:   Head: Normocephalic and atraumatic.   Right Ear: External ear normal.   Left Ear: External ear normal.   Nose: Nose normal.   Mouth/Throat: Oropharynx is clear and moist.   Eyes: Pupils are equal, round, and reactive to light. Conjunctivae and EOM are normal.   Neck: Normal range of motion. Neck supple.   Cardiovascular: Normal rate, regular rhythm, normal heart sounds and intact distal pulses.   Pulmonary/Chest: Effort normal and breath sounds normal.   Abdominal: Soft. Bowel sounds are normal.   Musculoskeletal: Normal range of motion.   Neurological: She is alert and oriented to person, place, and time.   Skin: Skin is warm. Capillary refill takes less than 2 seconds.   Psychiatric: She has a normal mood and affect. Her behavior is normal. Judgment and thought content normal.   Nursing note and vitals reviewed.        Sepsis (CMS/HCC)    Pneumonia due to infectious organism    At risk for falls    Hypokalemia    Because of the abdominal pain  and lack of appetite, will ask gi to see--monitor labs---d/w with her son this evening

## 2020-07-15 NOTE — CONSULTS
[facility] GASTROENTEROLOGY              Initial Inpatient Consult Note  Sunny Lacey  9/28/1921    Referring Provider: No ref. provider found    Admission: 7/8/2020  Consult date: 7/15/2020  Chief complaint: abdominal pain     Subjective     History of present illness:  Pt is a 89 year old female admitted after presenting to [facility] ER with fever and tachypnea with sepsis. Ct confirmed pneumonia. Transferred to [facility] for further care. Yesterday the family reports abdominal pain and patient not eating. CT of the abdomen from [place] 7/7/20 without contrast shows diverticulosis, findings consistent with possible pneumonia.     Past Medical History:  Past Medical History:   Diagnosis Date   • Anemia    • Anxiety    • Arthritis    • Hypertension    • Skin cancer        Past Surgical History:  Past Surgical History:   Procedure Laterality Date   • SKIN CANCER EXCISION         Social History:   Social History     Tobacco Use   • Smoking status: Never Smoker   Substance Use Topics   • Alcohol use: Never     Frequency: Never        Family History:  History reviewed. No pertinent family history.    Home Meds:  Medications Prior to Admission   Medication Sig Dispense Refill Last Dose   • acetaminophen (TYLENOL) 325 MG tablet Take 650 mg by mouth 2 (two) times a day.   7/7/2020 at Unknown time   • ALPRAZolam (XANAX) 0.25 MG tablet Take 0.25 mg by mouth every night at bedtime.   7/7/2020 at Unknown time   • amLODIPine (NORVASC) 5 MG tablet Take 5 mg by mouth Daily.   7/7/2020 at Unknown time   • bimatoprost (LUMIGAN) 0.01 % ophthalmic drops Administer 1 drop to both eyes Every Night.   7/7/2020 at Unknown time   • brimonidine-timolol (COMBIGAN) 0.2-0.5 % ophthalmic solution Administer 1 drop to the right eye Every 12 (Twelve) Hours.   7/7/2020 at Unknown time   • Calcium Carbonate-Vit D-Min (CALCIUM 1200 PO) Take 600 mg by mouth 2 (two) times a day.   7/7/2020 at Unknown time   • cefdinir (OMNICEF) 300 MG capsule  Take 300 mg by mouth 2 (Two) Times a Day. X 5 days   7/7/2020 at Unknown time   • cetirizine (zyrTEC) 10 MG tablet Take 10 mg by mouth Daily.   7/7/2020 at Unknown time   • citalopram (CeleXA) 20 MG tablet Take 20 mg by mouth Daily.   7/7/2020 at Unknown time   • diclofenac (VOLTAREN) 1 % gel gel Apply 4 g topically to the appropriate area as directed 4 (Four) Times a Day As Needed.   7/7/2020 at Unknown time   • docusate sodium (COLACE) 100 MG capsule Take 100 mg by mouth Daily.   7/7/2020 at Unknown time   • ferrous sulfate 325 (65 FE) MG tablet Take 325 mg by mouth Daily With Breakfast.   7/7/2020 at Unknown time   • furosemide (LASIX) 20 MG tablet Take 20 mg by mouth 2 (Two) Times a Day.   7/7/2020 at Unknown time   • HYDROcodone-acetaminophen (NORCO)  MG per tablet Take 1 tablet by mouth Every 4 (Four) Hours As Needed (Q4-6H PRN, max 3 per day).   7/7/2020 at Unknown time   • potassium chloride (K-DUR,KLOR-CON) 10 MEQ CR tablet Take 10 mEq by mouth Daily.   7/7/2020 at Unknown time   • pregabalin (LYRICA) 100 MG capsule Take 100 mg by mouth 3 (Three) Times a Day.   7/7/2020 at Unknown time   • albuterol sulfate  (90 Base) MCG/ACT inhaler Inhale 2 puffs Every 4 (Four) Hours As Needed for Wheezing or Shortness of Air.   Unknown at Unknown time   • ALPRAZolam (XANAX) 0.25 MG tablet Take 0.25 mg by mouth Daily As Needed for Anxiety.   Unknown at Unknown time   • lidocaine (LMX) 4 % cream Apply 1 application topically to the appropriate area as directed 4 (Four) Times a Day As Needed for Mild Pain .   Unknown at Unknown time       Current Meds:   Hospital Medications (active)       Dose Frequency Start End    acetaminophen (TYLENOL) tablet 650 mg 650 mg 2 times daily 7/11/2020     Admin Instructions: Do not exceed 4 grams of acetaminophen in a 24 hr period.<BR><BR>If given for pain, use the following pain scale: <BR>Mild Pain = Pain Score of 1-3, CPOT 1-2<BR>Moderate Pain = Pain Score of 4-6, CPOT  3-4<BR>Severe Pain = Pain Score of 7-10, CPOT 5-8    Route: Oral    acetaminophen (TYLENOL) tablet 650 mg 650 mg Every 6 Hours PRN 7/13/2020     Admin Instructions: Do not exceed 4 grams of acetaminophen in a 24 hr period.<BR><BR>If given for pain, use the following pain scale: <BR>Mild Pain = Pain Score of 1-3, CPOT 1-2<BR>Moderate Pain = Pain Score of 4-6, CPOT 3-4<BR>Severe Pain = Pain Score of 7-10, CPOT 5-8    Route: Oral    albuterol (PROVENTIL) nebulizer solution 0.083% 2.5 mg/3mL 2.5 mg Every 6 Hours PRN 7/11/2020     Route: Nebulization    ALPRAZolam (XANAX) tablet 0.25 mg 0.25 mg Daily PRN 7/11/2020     Admin Instructions:  Caution: Look alike/sound alike drug alert.   Avoid grapefruit juice    Route: Oral    ALPRAZolam (XANAX) tablet 0.25 mg 0.25 mg Nightly 7/13/2020     Admin Instructions:  Caution: Look alike/sound alike drug alert.   Avoid grapefruit juice    Route: Oral    amLODIPine (NORVASC) tablet 10 mg 10 mg Daily 7/14/2020     Admin Instructions: Caution: Look alike/sound alike drug alert. Avoid grapefruit juice.    Route: Oral    brimonidine-timolol (COMBIGAN) 0.2-0.5 % ophthalmic solution 1 drop 1 drop Every 12 Hours 7/11/2020     Route: Right Eye    citalopram (CeleXA) tablet 20 mg 20 mg Daily 7/8/2020     Admin Instructions: Caution: Look alike/sound alike drug alert.    Route: Oral    dextrose 5 % and sodium chloride 0.45 % with KCl 20 mEq/L infusion 50 mL/hr Continuous 7/12/2020     Route: Intravenous    diclofenac (VOLTAREN) 1 % gel 4 g 4 g 4 Times Daily 7/11/2020     Admin Instructions: Apply to affected joints.<BR>Do not cover area with occlusive dressing or apply any other med to affected area. Do not wash affected area for 1 hr after applying. Use dosing card for correct dose measurement.    Route: Topical    docusate sodium (COLACE) capsule 100 mg 100 mg Daily 7/11/2020     Admin Instructions: Swallow whole.  Do not open, crush, or chew capsule.    Route: Oral    enoxaparin (LOVENOX)  syringe 40 mg 40 mg Every 24 Hours 7/12/2020     Admin Instructions: Give subcutaneous in abdomen only. Do not massage site after injection.    Route: Subcutaneous    ferrous sulfate tablet 325 mg 325 mg Daily With Breakfast 7/11/2020     Admin Instructions: Swallow whole. Do not crush, split, or chew. Take with food if GI upset occurs.    Route: Oral    glycerin (ADULT) rectal suppository 2 g 2 g Daily PRN 7/10/2020     Route: Rectal    latanoprost (XALATAN) 0.005 % ophthalmic solution 1 drop 1 drop Nightly 7/11/2020     Route: Both Eyes    lidocaine (LMX) 4 % cream  4 Times Daily PRN 7/11/2020     Admin Instructions: Apply to affected area.<BR>If given for pain, use the following pain scale:<BR>Mild Pain = Pain Score of 1-3, CPOT 1-2<BR>Moderate Pain = Pain Score of 4-6, CPOT 3-4<BR>Severe Pain = Pain Score of 7-10, CPOT 5-8    Route: Topical    megestrol (MEGACE) 40 MG/ML suspension 800 mg 800 mg Daily 7/14/2020     Admin Instructions: Shake well before use.<BR>HAZARDOUS - Handle with care    Route: Oral    ondansetron (ZOFRAN) injection 4 mg 4 mg Every 6 Hours PRN 7/13/2020     Route: Intravenous    Pharmacy to Dose enoxaparin (LOVENOX)  Continuous PRN 7/12/2020     Route: Does not apply    potassium chloride (MICRO-K) CR capsule 20 mEq 20 mEq 2 Times Daily With Meals 7/12/2020     Admin Instructions: Do not crush. Take with food.    Route: Oral    sodium chloride 0.9 % bolus 1,464 mL 30 mL/kg × 48.8 kg As Needed 7/8/2020     Route: Intravenous    sodium chloride 0.9 % flush 10 mL 10 mL Every 12 Hours Scheduled 7/8/2020     Route: Intravenous    sodium chloride 0.9 % flush 10 mL 10 mL As Needed 7/8/2020     Route: Intravenous          Allergies:  No Known Allergies    Review of Systems  Review of Systems   Psychiatric/Behavioral: Positive for confusion.        Objective     Vital Signs  Temp:  [97.6 °F (36.4 °C)-98.5 °F (36.9 °C)] 97.6 °F (36.4 °C)  Heart Rate:  [76-91] 91  Resp:  [18] 18  BP: (134-165)/(73-92)  165/92  Body mass index is 26 kg/m².    Physical Exam:  General Appearance:    Alert, cooperative, in no acute distress   Head:    Normocephalic, without obvious abnormality, atraumatic   Eyes:            Lids and lashes normal, conjunctivae and sclerae normal, no   Icterus, conjunctival pallor   Throat:   No oral lesions, no thrush, oral mucosa moist, posterior oropharynx clear   Neck:   No adenopathy, supple, trachea midline, no thyromegaly, no   carotid bruit, no JVD   Lungs:     Clear to auscultation,respirations regular, even and                   unlabored    Heart:    Regular rhythm and normal rate, normal S1 and S2, no            murmur   Chest Wall:    No abnormalities observed   Abdomen:     Normal bowel sounds, no masses, no organomegaly, soft        non-tender, non-distended, no guarding, no rebound                 tenderness   Rectal:     Deferred   Extremities:   no edema, no cyanosis   Skin:   No open lesions, bruising or rash   Lymph nodes:   No palpable cervical adenopathy   Psychiatric:  Judgement and insight: normal   Orientation to person place and time: normal   Mood and affect: normal     Results Review:  Results from last 7 days   Lab Units 07/15/20  0537 07/14/20  0407 07/13/20  0403   WBC 10*3/mm3 8.40 8.06 8.51   HEMOGLOBIN g/dL 13.9 12.8 13.6   HEMATOCRIT % 41.9 39.1 41.5   PLATELETS 10*3/mm3 231 184 168       Results from last 7 days   Lab Units 07/15/20  0537 07/14/20  0407 07/13/20  0403   SODIUM mmol/L 143 148* 145   POTASSIUM mmol/L 3.5 3.4* 3.0*   CHLORIDE mmol/L 105 111* 106   CO2 mmol/L 27.0 28.0 28.0   BUN mg/dL 11 11 8   CREATININE mg/dL 0.72 0.75 0.66   CALCIUM mg/dL 9.8* 9.3 9.5   GLUCOSE mg/dL 113* 122* 133*              Radiology Review:  Imaging Results (Last 72 Hours)     Procedure Component Value Units Date/Time    CT Head Without Contrast [[id number]] Collected:  07/12/20 1007     Updated:  07/12/20 1012    Narrative:       EXAMINATION:   CT HEAD WO CONTRAST-  7/12/2020  10:07 AM CDT     HISTORY: CT BRAIN without contrast 7/12/2020 9:46 AM CDT     HISTORY: Neuro deficit     COMPARISON: None      DLP: 688 mGy cm     TECHNIQUE: Serial axial tomographic images of the brain were obtained  without the use of intravenous contrast.      FINDINGS:   The midline structures are nondisplaced. There is mild cerebral and  cerebellar volume loss, with an associated increase in the prominence of  the ventricles and sulci. The basilar cisterns are normal in size and  configuration. There is no evidence of intracranial hemorrhage or  mass-effect. There is low attenuation in the periventricular white  matter, consistent with chronic ischemic change.     Old lacunar infarctions present right basal ganglia region.     There are no abnormal extra-axial fluid collections. There is no  evidence of tonsillar herniation.      The included orbits and their contents are unremarkable. The visualized  paranasal sinuses, mastoid air cells and middle ear cavities are clear.  The visualized osseous structures and overlying soft tissues of the  skull and face are intact.        Impression:       Mild cerebral and cerebellar volume loss with chronic microvascular  disease but no evidence of acute intracranial process.        This report was finalized on 07/12/2020 10:09 by Dr. Dimas Arcos MD.          Assessment/Plan         Sepsis (CMS/[facility])    Pneumonia due to infectious organism    At risk for falls    Hypokalemia      1. Abdominal pain   2. Loss in appetite  3. pneunomia    Labs reviewed WBC normal. H/H normal. Lipase normal. Today she says that she does not have nausea or abdominal pain. She has a bitter taste in her mouth and the nurse reports that cleaning her dentures helped. She ate 100% of her breakfast. She denies any problems this afternoon. Please call if anything further needed. No plans for Endoscopic evaluation at this time given her age.     EMR Dragon/transcription disclaimer: Much of this  encounter note is electronic transcription/translation of spoken language to printed text. The electronic translation of spoken language may be erroneous, or at times, nonsensical words or phrases may be inadvertently transcribed. Although I have reviewed the note for such errors, some may still exist.  Keisha Darling [facility] Gastroenterology  07/15/20  09:10      EMR Dragon/transcription disclaimer: Much of this encounter note is an electronic transcription/translation of spoken language to printed text.  The electronic translation of spoken language may permit erroneous, or at times, nonsensical words or phrases to be inadvertently transcribed.  Although I have reviewed the note for such errors, some may still exist.      Jignesh Perez MD  13:10  7/15/2020

## 2020-07-15 NOTE — THERAPY TREATMENT NOTE
Acute Care - Physical Therapy Treatment Note  [facility]     Patient Name: Sunny Lacey  : 1921  MRN: [id number]  Today's Date: 7/15/2020             Admit Date: 2020    Visit Dx:    ICD-10-CM ICD-9-CM   1. Dysphagia, unspecified type R13.10 787.20   2. Impaired mobility Z74.09 799.89     Patient Active Problem List   Diagnosis   • Sepsis (CMS/HCC)   • Pneumonia due to infectious organism   • At risk for falls   • Hypokalemia       Therapy Treatment    Rehabilitation Treatment Summary     Row Name 07/15/20 1045             Treatment Time/Intention    Discipline  physical therapy assistant  (Pended)   -SA      Document Type  therapy note (daily note)  (Pended)   -SA      Subjective Information  complains of;nausea/vomiting;weakness  (Pended)   -SA2      Existing Precautions/Restrictions  fall  (Pended)   -SA2      Recorded by [SA] Waleska Qiu PTA Student 07/15/20 1048  [SA2] Waleska Qiu PTA Student 07/15/20 1119      Row Name 07/15/20 1045             Safety Issues, Functional Mobility    Safety Issues Affecting Function (Mobility)  ability to follow commands;awareness of need for assistance  (Pended)   -SA      Impairments Affecting Function (Mobility)  endurance/activity tolerance;strength  (Pended)   -SA      Recorded by [SA] Waleska Qiu PTA Student 07/15/20 1119      Row Name 07/15/20 1045             Bed Mobility Assessment/Treatment    Supine-Sit Snyder (Bed Mobility)  --  (Pended)  in chair  -SA      Sit-Supine [place] (Bed Mobility)  minimum assist (75% patient effort);1 person assist  (Pended)   -SA      Assistive Device (Bed Mobility)  bed rails;head of bed elevated  (Pended)   -SA      Recorded by [SA] Waleska Qiu PTA Student 07/15/20 1119      Row Name 07/15/20 1045             Transfer Assessment/Treatment    Transfer Assessment/Treatment  stand-sit transfer  (Pended)   -SA      Recorded by [SA] Waleska Qiu PTA Student 07/15/20 1119      Row Name  07/15/20 1045             Sit-Stand Transfer    Sit-Stand [place] (Transfers)  contact guard;1 person assist;verbal cues  (Pended)   -SA      Recorded by [SA] Waleska Qiu PTA Student 07/15/20 1119      Row Name 07/15/20 1045             Stand-Sit Transfer    Stand-Sit [place] (Transfers)  minimum assist (75% patient effort);verbal cues;1 person assist  (Pended)   -SA      Recorded by [SA] Waleska Qiu PTA Student 07/15/20 1123      Row Name 07/15/20 1045             Gait/Stairs Assessment/Training    Manderson Level (Gait)  contact guard;verbal cues;1 person assist  (Pended)   -SA      Assistive Device (Gait)  walker, front-wheeled  (Pended)   -SA      Distance in Feet (Gait)  15' x 2  (Pended)   -SA      Pattern (Gait)  step-to  (Pended)   -SA      Deviations/Abnormal Patterns (Gait)  stride length decreased;gait speed decreased;base of support, wide  (Pended)   -SA      Bilateral Gait Deviations  forward flexed posture;heel strike decreased  (Pended)   -SA      Comment (Gait/Stairs)  easily distracted this am  (Pended)   -SA      Recorded by [SA] Waleska Qiu PTA Student 07/15/20 1123      Row Name 07/15/20 1045             Therapeutic Exercise    Lower Extremity (Therapeutic Exercise)  heel slides, bilateral;LAQ (long arc quad), bilateral;marching while seated  (Pended)   -SA      Lower Extremity Range of Motion (Therapeutic Exercise)  ankle dorsiflexion/plantar flexion, bilateral;hip abduction/adduction, bilateral  (Pended)   -SA      Exercise Type (Therapeutic Exercise)  AROM (active range of motion)  (Pended)   -SA      Position (Therapeutic Exercise)  seated  (Pended)  EOB  -SA      Sets/Reps (Therapeutic Exercise)  x 15  (Pended)   -SA      Recorded by [SA] Waleska Qiu PTA Student 07/15/20 1123      Row Name 07/15/20 1045             Static Sitting Balance    Level of Manderson (Unsupported Sitting, Static Balance)  standby assist  (Pended)   -SA      Sitting Position  (Unsupported Sitting, Static Balance)  sitting on edge of bed  (Pended)   -SA      Recorded by [SA] Waleska Qiu PTA Student 07/15/20 1123      Row Name 07/15/20 1045             Positioning and Restraints    Pre-Treatment Position  sitting in chair/recliner  (Pended)   -SA      Post Treatment Position  bed  (Pended)   -SA      In Bed  call light within reach;encouraged to call for assist;exit alarm on;fowlers;with nsg  (Pended)   -SA      Recorded by [SA] Waleska Qiu PTA Student 07/15/20 1123      Row Name 07/15/20 1045             Pain Scale: Numbers Pre/Post-Treatment    Pain Scale: Numbers, Pretreatment  0/10 - no pain  (Pended)   -SA      Pain Scale: Numbers, Post-Treatment  0/10 - no pain  (Pended)   -SA      Pre/Post Treatment Pain Comment  c/o belly ache/Nausea notified nsg  (Pended)   -SA      Recorded by [SA] Waleska Qiu PTA Student 07/15/20 1123        User Key  (r) = Recorded By, (t) = Taken By, (c) = Cosigned By    Initials Name Effective Dates Discipline    SA Waleska Qiu PTA Student 06/23/20 -  PT                   Physical Therapy Education                 Title: PT OT SLP Therapies (Done)     Topic: Physical Therapy (Done)     Point: Mobility training (Done)     Description:   Instruct learner(s) on safety and technique for assisting patient out of bed, chair or wheelchair.  Instruct in the proper use of assistive devices, such as walker, crutches, cane or brace.              Patient Friendly Description:   It's important to get you on your feet again, but we need to do so in a way that is safe for you. Falling has serious consequences, and your personal safety is the most important thing of all.        When it's time to get out of bed, one of us or a family member will sit next to you on the bed to give you support.     If your doctor or nurse tells you to use a walker, crutches, a cane, or a brace, be sure you use it every time you get out of bed, even if you think you don't  need it.    Learning Progress Summary           Patient Acceptance, E,D, DU,VU by  at 7/13/2020 1039    Comment:  benefits of PT and POC, call for A OOB                   Point: Precautions (Done)     Description:   Instruct learner(s) on prescribed precautions during mobility and gait tasks              Learning Progress Summary           Patient Acceptance, E,D, DU,VU by  at 7/13/2020 1039    Comment:  benefits of PT and POC, call for A OOB                               User Key     Initials Effective Dates Name Provider Type [facility] 08/02/16 -  Michel Sanchez, PT Physical Therapist PT                PT Recommendation and Plan     Plan of Care Reviewed With: patient  Progress: improving  Outcome Summary: (P) Pt up in chair, Sit-stand Min A x 1 w/ RW. Amb around room 15' x 2 CGA w/ verbal cues. Pt sat EOB for AROM BLE x 15. PT sit-supine min x1. Pt easiily distracted this AM. Pt would benefit from cont PT for endurance and strength .     Time Calculation:   PT Charges     Row Name 07/15/20 1127             Time Calculation    Start Time  1045  (Pended)   -SA      Stop Time  1110  (Pended)   -      Time Calculation (min)  25 min  (Pended)   -      PT Received On  07/15/20  (Pended)   -      PT Goal Re-Cert Due Date  07/23/20  (Pended)   -         Time Calculation- PT    Total Timed Code Minutes- PT  25 minute(s)  (Pended)   -         Timed Charges    36259 - PT Therapeutic Exercise Minutes  10  (Pended)   -SA      09505 - Gait Training Minutes   15  (Pended)   -        User Key  (r) = Recorded By, (t) = Taken By, (c) = Cosigned By    Initials Name Provider Type     Waleska Qiu PTA Student PTA Student        Therapy Charges for Today     Code Description Service Date Service Provider Modifiers Qty    87458993034 HC GAIT TRAINING EA 15 MIN 7/14/2020 Waleska Qiu PTA Student GP 1    81702184295 HC PT THERAPEUTIC ACT EA 15 MIN 7/14/2020 Waleska Qiu PTA Student GP 1    [id number]  HC PT THER PROC EA 15 MIN 7/14/2020 Waleska Qiu PTA Student GP 1    49602633118 HC GAIT TRAINING EA 15 MIN 7/14/2020 Waleska Qiu, PTA Student GP 1    69577695311 HC PT THERAPEUTIC ACT EA 15 MIN 7/14/2020 Waleska Qiu, PTA Student GP 1    35012152606 HC PT THER PROC EA 15 MIN 7/15/2020 Waleska Qiu, JASBIR Student GP 1    55532534784 HC GAIT TRAINING EA 15 MIN 7/15/2020 Waleska Qiu PTA Student GP 1          PT G-Codes  Outcome Measure Options: AM-PAC 6 Clicks Basic Mobility (PT)  AM-PAC 6 Clicks Score (PT): 15    Waleska Qiu PTA Student  7/15/2020

## 2020-07-16 LAB
ANION GAP SERPL CALCULATED.3IONS-SCNC: 11 MMOL/L (ref 5–15)
BASOPHILS # BLD AUTO: 0.11 10*3/MM3 (ref 0–0.2)
BASOPHILS NFR BLD AUTO: 1.2 % (ref 0–1.5)
BUN SERPL-MCNC: 14 MG/DL (ref 8–23)
BUN/CREAT SERPL: 19.4 (ref 7–25)
CALCIUM SPEC-SCNC: 9.6 MG/DL (ref 8.2–9.6)
CHLORIDE SERPL-SCNC: 110 MMOL/L (ref 98–107)
CO2 SERPL-SCNC: 25 MMOL/L (ref 22–29)
CREAT SERPL-MCNC: 0.72 MG/DL (ref 0.57–1)
DEPRECATED RDW RBC AUTO: 43.6 FL (ref 37–54)
EOSINOPHIL # BLD AUTO: 0.11 10*3/MM3 (ref 0–0.4)
EOSINOPHIL NFR BLD AUTO: 1.2 % (ref 0.3–6.2)
ERYTHROCYTE [DISTWIDTH] IN BLOOD BY AUTOMATED COUNT: 13.4 % (ref 12.3–15.4)
GFR SERPL CREATININE-BSD FRML MDRD: 75 ML/MIN/1.73
GLUCOSE SERPL-MCNC: 113 MG/DL (ref 65–99)
HCT VFR BLD AUTO: 41 % (ref 34–46.6)
HGB BLD-MCNC: 13.6 G/DL (ref 12–15.9)
IMM GRANULOCYTES # BLD AUTO: 0.2 10*3/MM3 (ref 0–0.05)
IMM GRANULOCYTES NFR BLD AUTO: 2.1 % (ref 0–0.5)
LYMPHOCYTES # BLD AUTO: 1.41 10*3/MM3 (ref 0.7–3.1)
LYMPHOCYTES NFR BLD AUTO: 14.9 % (ref 19.6–45.3)
MCH RBC QN AUTO: 30 PG (ref 26.6–33)
MCHC RBC AUTO-ENTMCNC: 33.2 G/DL (ref 31.5–35.7)
MCV RBC AUTO: 90.3 FL (ref 79–97)
MONOCYTES # BLD AUTO: 0.68 10*3/MM3 (ref 0.1–0.9)
MONOCYTES NFR BLD AUTO: 7.2 % (ref 5–12)
NEUTROPHILS NFR BLD AUTO: 6.94 10*3/MM3 (ref 1.7–7)
NEUTROPHILS NFR BLD AUTO: 73.4 % (ref 42.7–76)
NRBC BLD AUTO-RTO: 0 /100 WBC (ref 0–0.2)
PLATELET # BLD AUTO: 262 10*3/MM3 (ref 140–450)
PMV BLD AUTO: 10.1 FL (ref 6–12)
POTASSIUM SERPL-SCNC: 3.6 MMOL/L (ref 3.5–5.2)
RBC # BLD AUTO: 4.54 10*6/MM3 (ref 3.77–5.28)
SODIUM SERPL-SCNC: 146 MMOL/L (ref 136–145)
VIT B6 SERPL-MCNC: 2.7 UG/L (ref 2–32.8)
WBC # BLD AUTO: 9.45 10*3/MM3 (ref 3.4–10.8)

## 2020-07-16 PROCEDURE — 80048 BASIC METABOLIC PNL TOTAL CA: CPT | Performed by: FAMILY MEDICINE

## 2020-07-16 PROCEDURE — 25010000002 ENOXAPARIN PER 10 MG: Performed by: FAMILY MEDICINE

## 2020-07-16 PROCEDURE — 92526 ORAL FUNCTION THERAPY: CPT

## 2020-07-16 PROCEDURE — 85025 COMPLETE CBC W/AUTO DIFF WBC: CPT | Performed by: FAMILY MEDICINE

## 2020-07-16 PROCEDURE — 99231 SBSQ HOSP IP/OBS SF/LOW 25: CPT | Performed by: FAMILY MEDICINE

## 2020-07-16 PROCEDURE — 97110 THERAPEUTIC EXERCISES: CPT

## 2020-07-16 RX ORDER — MIRTAZAPINE 15 MG/1
15 TABLET, FILM COATED ORAL NIGHTLY
Status: DISCONTINUED | OUTPATIENT
Start: 2020-07-16 | End: 2020-07-20 | Stop reason: HOSPADM

## 2020-07-16 RX ADMIN — LATANOPROST 1 DROP: 50 SOLUTION OPHTHALMIC at 20:32

## 2020-07-16 RX ADMIN — ACETAMINOPHEN 650 MG: 325 TABLET, FILM COATED ORAL at 08:12

## 2020-07-16 RX ADMIN — BRIMONIDINE TARTRATE, TIMOLOL MALEATE 1 DROP: 2; 5 SOLUTION/ DROPS OPHTHALMIC at 20:32

## 2020-07-16 RX ADMIN — SODIUM CHLORIDE, PRESERVATIVE FREE 10 ML: 5 INJECTION INTRAVENOUS at 08:12

## 2020-07-16 RX ADMIN — MEGESTROL ACETATE 800 MG: 40 SUSPENSION ORAL at 08:12

## 2020-07-16 RX ADMIN — CITALOPRAM 20 MG: 20 TABLET, FILM COATED ORAL at 08:12

## 2020-07-16 RX ADMIN — ALPRAZOLAM 0.25 MG: 0.25 TABLET ORAL at 20:33

## 2020-07-16 RX ADMIN — DICLOFENAC 4 G: 10 GEL TOPICAL at 17:09

## 2020-07-16 RX ADMIN — DOCUSATE SODIUM 100 MG: 100 CAPSULE ORAL at 08:12

## 2020-07-16 RX ADMIN — BRIMONIDINE TARTRATE, TIMOLOL MALEATE 1 DROP: 2; 5 SOLUTION/ DROPS OPHTHALMIC at 08:12

## 2020-07-16 RX ADMIN — POTASSIUM CHLORIDE 20 MEQ: 750 CAPSULE, EXTENDED RELEASE ORAL at 08:12

## 2020-07-16 RX ADMIN — ACETAMINOPHEN 650 MG: 325 TABLET, FILM COATED ORAL at 20:32

## 2020-07-16 RX ADMIN — ENOXAPARIN SODIUM 40 MG: 40 INJECTION SUBCUTANEOUS at 17:09

## 2020-07-16 RX ADMIN — POTASSIUM CHLORIDE 20 MEQ: 750 CAPSULE, EXTENDED RELEASE ORAL at 17:09

## 2020-07-16 RX ADMIN — FERROUS SULFATE TAB 325 MG (65 MG ELEMENTAL FE) 325 MG: 325 (65 FE) TAB at 08:12

## 2020-07-16 RX ADMIN — DICLOFENAC 4 G: 10 GEL TOPICAL at 08:11

## 2020-07-16 RX ADMIN — AMLODIPINE BESYLATE 10 MG: 10 TABLET ORAL at 08:12

## 2020-07-16 NOTE — PROGRESS NOTES
Nursing staff reports Ms Lacey is not eating despite appetite stimulant    Review of Systems   Constitutional: Positive for appetite change.   HENT: Negative.    Eyes: Negative.    Respiratory: Negative.    Cardiovascular: Negative.    Gastrointestinal: Negative.    Endocrine: Negative.    Genitourinary: Negative.    Musculoskeletal: Negative.    Skin: Negative.    Allergic/Immunologic: Negative.    Neurological: Negative.    Hematological: Negative.    Psychiatric/Behavioral: Positive for confusion and decreased concentration.     Temp:  [97.6 °F (36.4 °C)-98.1 °F (36.7 °C)] 97.6 °F (36.4 °C)  Heart Rate:  [80-94] 86  Resp:  [18] 18  BP: (142-159)/(68-92) 144/71  I/O last 3 completed shifts:  In: 360 [P.O.:360]  Out: 1200 [Urine:1200]  I/O this shift:  In: -   Out: 350 [Urine:350]    Physical Exam   Constitutional: She appears well-developed and well-nourished.   HENT:   Head: Normocephalic and atraumatic.   Right Ear: External ear normal.   Left Ear: External ear normal.   Nose: Nose normal.   Mouth/Throat: Oropharynx is clear and moist.   Eyes: Pupils are equal, round, and reactive to light. Conjunctivae and EOM are normal.   Neck: Normal range of motion. Neck supple.   Cardiovascular: Normal rate and regular rhythm.   Pulmonary/Chest: Effort normal and breath sounds normal.   Abdominal: Soft.   Musculoskeletal: Normal range of motion.   Neurological: She is alert.   Skin: Skin is warm. Capillary refill takes less than 2 seconds.   Psychiatric: She has a normal mood and affect. Her behavior is normal. Thought content normal.   Nursing note and vitals reviewed.        Sepsis (CMS/HCC)    Pneumonia due to infectious organism    At risk for falls    Hypokalemia    Will add remeron for additional appetite stimulation---will ask palliative care to assess---does POA want feeding tube?--comfort care or hospice at nursing facility?

## 2020-07-16 NOTE — PLAN OF CARE
Problem: Patient Care Overview  Goal: Plan of Care Review  Flowsheets (Taken 7/16/2020 1124)  Plan of Care Reviewed With: patient  Outcome Summary: Pt up in chair. AROM BLE x 15. Pt c/o pain in knees and fatigued. Pt said she just got comfortable in chair and didn't want to move yet. Pt would benefit from cont PT for endurance and strength.

## 2020-07-16 NOTE — PROGRESS NOTES
She ate 100%bvreakfast this am, no lunch and family helping with dinner---?intake    Review of Systems   Constitutional: Positive for activity change and appetite change.   HENT: Negative.    Eyes: Negative.    Respiratory: Negative.    Cardiovascular: Negative.    Gastrointestinal: Negative.    Endocrine: Negative.    Genitourinary: Negative.    Musculoskeletal: Negative.    Skin: Negative.    Allergic/Immunologic: Negative.    Neurological: Negative.    Hematological: Negative.    Psychiatric/Behavioral: Negative.      Temp:  [97.6 °F (36.4 °C)-97.9 °F (36.6 °C)] 97.9 °F (36.6 °C)  Heart Rate:  [78-92] 92  Resp:  [18] 18  BP: (134-165)/(69-92) 148/69  I/O last 3 completed shifts:  In: 360 [P.O.:360]  Out: -   No intake/output data recorded.    Physical Exam   Constitutional: She appears well-developed and well-nourished.   HENT:   Head: Normocephalic and atraumatic.   Eyes: Pupils are equal, round, and reactive to light. Conjunctivae and EOM are normal.   Neck: Normal range of motion. Neck supple.   Cardiovascular: Normal rate and regular rhythm.   Pulmonary/Chest: Effort normal and breath sounds normal.   Abdominal: Soft. Bowel sounds are normal.   Musculoskeletal: Normal range of motion.   Neurological: She is alert.   Skin: Skin is warm and dry. Capillary refill takes less than 2 seconds.   Psychiatric: She has a normal mood and affect. Her behavior is normal. Judgment and thought content normal.   Nursing note and vitals reviewed.        Sepsis (CMS/HCC)    Pneumonia due to infectious organism    At risk for falls    Hypokalemia    Encourage po---discahrge to nh when taking enough po

## 2020-07-16 NOTE — PLAN OF CARE
Problem: Patient Care Overview  Goal: Plan of Care Review  Flowsheets (Taken 7/16/2020 1063)  Outcome Summary: Pt. cont to have a very poor appetite, consuming <25% of meals. She is followed by ST. She requires a soft texture, ground meat diet with nectar thick liquids. Gradual weight loss noted. Will adjust nutrition supplements to Boost Plus (nectar thick) with breakfast and dinner, Boost pudding with breakfast, magic cup with lunch and dinner. She is receiving megace as well. Extensive list of food prefences in the diet messages for kitchen staff. Will cont to follow for nutrition POC.

## 2020-07-16 NOTE — PLAN OF CARE
Problem: Patient Care Overview  Goal: Plan of Care Review  Outcome: Ongoing (interventions implemented as appropriate)  Flowsheets (Taken 7/16/2020 4993)  Progress: no change  Plan of Care Reviewed With: patient  Outcome Summary: Pt reports that she still gets choked with thin water. She took trials of ice chips with no overt s/s of aspiration. Encouraged pt to eat ice chips if she is too nervous to drink water between meals to help with hydration and dry mouth. Pt still has inconsistent intake per report. Discussed specific food item options with pt. Pt was vague with her preferences. Continue a mechanical soft solid diet and nectar thick liquids.

## 2020-07-16 NOTE — THERAPY TREATMENT NOTE
Acute Care - Physical Therapy Treatment Note  [facility]     Patient Name: Sunny Lacey  : 1921  MRN: [id number]  Today's Date: 2020             Admit Date: 2020    Visit Dx:    ICD-10-CM ICD-9-CM   1. Dysphagia, unspecified type R13.10 787.20   2. Impaired mobility Z74.09 799.89     Patient Active Problem List   Diagnosis   • Sepsis (CMS/HCC)   • Pneumonia due to infectious organism   • At risk for falls   • Hypokalemia       Therapy Treatment    Rehabilitation Treatment Summary     Row Name 20 1045             Treatment Time/Intention    Discipline  physical therapy assistant  (Pended)   -SA      Document Type  therapy note (daily note)  (Pended)   -SA      Subjective Information  complains of;weakness;pain  (Pended)   -SA      Existing Precautions/Restrictions  fall  (Pended)   -SA      Recorded by [SA] Waleska Qiu PTA Student 20 1106      Row Name 20 1045             Vital Signs    Pre SpO2 (%)  98  (Pended)   -SA      O2 Delivery Pre Treatment  supplemental O2  (Pended)   -SA      Post SpO2 (%)  98  (Pended)   -SA      O2 Delivery Post Treatment  supplemental O2  (Pended)   -SA      Pre Patient Position  Sitting  (Pended)   -SA      Post Patient Position  Sitting  (Pended)   -SA      Recorded by [SA] Waleska Qiu PTA Student 20 1106      Row Name 20 1045             Safety Issues, Functional Mobility    Safety Issues Affecting Function (Mobility)  ability to follow commands  (Pended)   -SA      Impairments Affecting Function (Mobility)  endurance/activity tolerance;strength  (Pended)   -SA      Recorded by [SA] Waleska Qiu PTA Student 20 1122      Row Name 20 1045             Bed Mobility Assessment/Treatment    Comment (Bed Mobility)  nsg just got her up in chair   (Pended)   -SA      Recorded by [SA] Waleska Qiu PTA Student 20 1122      Row Name 20 1045             Gait/Stairs Assessment/Training    Comment  (Gait/Stairs)  hurting in knee and just got in chair  (Pended)   -SA      Recorded by [SA] Waleska Qiu PTA Student 07/16/20 1122      Row Name 07/16/20 1045             Therapeutic Exercise    Lower Extremity (Therapeutic Exercise)  heel slides, bilateral;LAQ (long arc quad), bilateral;marching while seated;SLR (straight leg raise), bilateral  (Pended)   -SA      Lower Extremity Range of Motion (Therapeutic Exercise)  hip abduction/adduction, bilateral;ankle dorsiflexion/plantar flexion, bilateral  (Pended)   -SA      Exercise Type (Therapeutic Exercise)  AROM (active range of motion)  (Pended)   -SA      Position (Therapeutic Exercise)  seated  (Pended)  reclined  -SA      Sets/Reps (Therapeutic Exercise)  x 15  (Pended)   -SA      Recorded by [SA] Waleska Qiu PTA Student 07/16/20 1122      Row Name 07/16/20 1045             Positioning and Restraints    Pre-Treatment Position  sitting in chair/recliner  (Pended)   -SA      Post Treatment Position  chair  (Pended)   -SA      In Chair  exit alarm on;encouraged to call for assist;call light within reach;notified nsg;reclined  (Pended)   -SA      Recorded by [SA] Waleska Qiu PTA Student 07/16/20 1122      Row Name 07/16/20 1045             Pain Scale: Numbers Pre/Post-Treatment    Pain Location  knee  (Pended)   -SA      Recorded by [SA] Waleska Qiu PTA Student 07/16/20 1122      Row Name 07/16/20 1045             Pain Scale: FACES Pre/Post-Treatment    Pain: FACES Scale, Pretreatment  4-->hurts little more  (Pended)   -SA      Pain: FACES Scale, Post-Treatment  4-->hurts little more  (Pended)   -SA      Recorded by [SA] Waleska Qiu PTA Student 07/16/20 1122        User Key  (r) = Recorded By, (t) = Taken By, (c) = Cosigned By    Initials Name Effective Dates Discipline    SA Waleska Qiu PTA Student 06/23/20 -  PT                   Physical Therapy Education                 Title: PT OT SLP Therapies (Done)     Topic: Physical Therapy  (Done)     Point: Mobility training (Done)     Description:   Instruct learner(s) on safety and technique for assisting patient out of bed, chair or wheelchair.  Instruct in the proper use of assistive devices, such as walker, crutches, cane or brace.              Patient Friendly Description:   It's important to get you on your feet again, but we need to do so in a way that is safe for you. Falling has serious consequences, and your personal safety is the most important thing of all.        When it's time to get out of bed, one of us or a family member will sit next to you on the bed to give you support.     If your doctor or nurse tells you to use a walker, crutches, a cane, or a brace, be sure you use it every time you get out of bed, even if you think you don't need it.    Learning Progress Summary           Patient Acceptance, E,D, TATY,VU by  at 7/13/2020 1039    Comment:  benefits of PT and POC, call for A OOB                   Point: Precautions (Done)     Description:   Instruct learner(s) on prescribed precautions during mobility and gait tasks              Learning Progress Summary           Patient Acceptance, E,D, DU,VU by  at 7/13/2020 1039    Comment:  benefits of PT and POC, call for A OOB                               User Key     Initials Effective Dates Name Provider Type Discipline     08/02/16 -  Michel Sanchez, PT Physical Therapist PT                PT Recommendation and Plan     Plan of Care Reviewed With: (P) patient  Progress: improving  Outcome Summary: (P) Pt up in chair. AROM BLE x 15. Pt c/o pain in knees and fatigued. Pt said she just got comfortable in chair and didn't want to move yet. Pt would benefit from cont PT for endurance and strength.  Outcome Measures     Row Name 07/16/20 1100             How much help from another person do you currently need...    Turning from your back to your side while in flat bed without using bedrails?  3  (Pended)   -SA      Moving from lying on  back to sitting on the side of a flat bed without bedrails?  3  (Pended)   -SA      Moving to and from a bed to a chair (including a wheelchair)?  3  (Pended)   -SA      Standing up from a chair using your arms (e.g., wheelchair, bedside chair)?  3  (Pended)   -SA      Climbing 3-5 steps with a railing?  1  (Pended)   -SA      To walk in hospital room?  3  (Pended)   -SA      AM-PAC 6 Clicks Score (PT)  16  (Pended)   -         Functional Assessment    Outcome Measure Options  AM-PAC 6 Clicks Basic Mobility (PT)  (Pended)   -        User Key  (r) = Recorded By, (t) = Taken By, (c) = Cosigned By    Initials Name Provider Type    Waleska Wise PTA Student PTA Student         Time Calculation:   PT Charges     Row Name 07/16/20 1127             Time Calculation    Start Time  1047  (Pended)   -      Stop Time  1101  (Pended)   -      Time Calculation (min)  14 min  (Pended)   -      PT Received On  07/16/20  (Pended)   -      PT Goal Re-Cert Due Date  07/23/20  (Pended)   -         Time Calculation- PT    Total Timed Code Minutes- PT  14 minute(s)  (Pended)   -         Timed Charges    67770 - PT Therapeutic Exercise Minutes  14  (Pended)   -        User Key  (r) = Recorded By, (t) = Taken By, (c) = Cosigned By    Initials Name Provider Type    Waleska Wise PTA Student PTA Student        Therapy Charges for Today     Code Description Service Date Service Provider Modifiers Qty    61730229068 HC PT THER PROC EA 15 MIN 7/15/2020 Waleska Qiu PTA Student GP 1    76946353699 HC GAIT TRAINING EA 15 MIN 7/15/2020 Waleska Qiu PTA Student GP 1    19456518518 HC PT THER PROC EA 15 MIN 7/15/2020 Waleska Qiu PTA Student GP 1    93393347861 HC PT THER PROC EA 15 MIN 7/16/2020 Waleska Qiu PTA Student GP 1          PT G-Codes  Outcome Measure Options: (P) AM-PAC 6 Clicks Basic Mobility (PT)  AM-PAC 6 Clicks Score (PT): (P) 16    JASBIR Plasencia  7/16/2020

## 2020-07-16 NOTE — PLAN OF CARE
Problem: Patient Care Overview  Goal: Plan of Care Review  Flowsheets  Taken 7/15/2020 0514 by Janeth Rocha, RN  Progress: no change  Taken 7/16/2020 0406 by David Lynne RN  Plan of Care Reviewed With: patient  Note:   Has been A&O only x1-2 confused regarding many things r/t hospitalization - refused nighttime medications (including xanax) has not slept a wink all night thus far, no acute issues to go to SNF will cont to monitor

## 2020-07-16 NOTE — THERAPY TREATMENT NOTE
Acute Care - Speech Language Pathology   Swallow Treatment Note [facility]     Patient Name: Sunny Lacey  : 1921  MRN: [id number]  Today's Date: 2020               Admit Date: 2020  Pt reports that she still gets choked with thin water. She took trials of ice chips with no overt s/s of aspiration. Encouraged pt to eat ice chips if she is too nervous to drink water between meals to help with hydration and dry mouth. Pt still has inconsistent intake per report. Discussed specific food item options with pt. Pt was vague with her preferences. Continue a mechanical soft solid diet and nectar thick liquids.  Anjelica Gonzales CCC-SLP 2020 15:38    Visit Dx:      ICD-10-CM ICD-9-CM   1. Dysphagia, unspecified type R13.10 787.20   2. Impaired mobility Z74.09 799.89     Patient Active Problem List   Diagnosis   • Sepsis (CMS/HCC)   • Pneumonia due to infectious organism   • At risk for falls   • Hypokalemia       Therapy Treatment  Rehabilitation Treatment Summary     Row Name 20 1523 20 1459 20 1045       Treatment Time/Intention    Discipline  physical therapy assistant  (Pended)   -SA  speech language pathologist  -MB  physical therapy assistant  -SB,SA,SB2    Document Type  therapy note (daily note)  (Pended)   -SA  therapy note (daily note)  -MB  therapy note (daily note)  -SB,SA,SB2    Subjective Information  --  no complaints  -MB  complains of;weakness;pain  -SB,SA,SB2    Mode of Treatment  --  speech-language pathology  -MB  --    Patient/Family Observations  --  No family present  -MB  --    Patient Effort  --  adequate  -MB  --    Existing Precautions/Restrictions  --  --  fall  -SB,SA,SB2    Recorded by [SA] Waleska Qiu PTA Student 20 1523 [MB] Anjelica Gonzales CCC-SLP 20 1533 [SB,SA,SB2] Diamond Torres, PT DPT (r) Waleska Qiu PTA Student (t) Diamond Torres, PT DPT (c) 20 1337    Row Name 20 1045             Vital Signs    Pre  SpO2 (%)  98  -SB,SA,SB2      O2 Delivery Pre Treatment  supplemental O2  -SB,SA,SB2      Post SpO2 (%)  98  -SB,SA,SB2      O2 Delivery Post Treatment  supplemental O2  -SB,SA,SB2      Pre Patient Position  Sitting  -SB,SA,SB2      Post Patient Position  Sitting  -SB,SA,SB2      Recorded by [SB,SA,SB2] Diamond Torres, PT DPT (r) Waleska Qiu PTA Student (t) Diamond Torres, PT DPT (c) 07/16/20 1337      Row Name 07/16/20 1045             Safety Issues, Functional Mobility    Safety Issues Affecting Function (Mobility)  ability to follow commands  -SB,SA,SB2      Impairments Affecting Function (Mobility)  endurance/activity tolerance;strength  -SB,SA,SB2      Recorded by [SB,SA,SB2] Diamond Torres, PT DPT (r) Waleska Qiu PTA Student (t) Diamond Torres, PT DPT (c) 07/16/20 1337      Row Name 07/16/20 1045             Bed Mobility Assessment/Treatment    Comment (Bed Mobility)  nsg just got her up in chair   -SB,SA,SB2      Recorded by [SB,SA,SB2] Diamond Torres, PT DPT (r) Waleska Qui PTA Student (t) Diamond Torres, PT DPT (c) 07/16/20 1337      Row Name 07/16/20 1045             Gait/Stairs Assessment/Training    Comment (Gait/Stairs)  hurting in knee and just got in chair  -SB,SA,SB2      Recorded by [SB,SA,SB2] Diamond Torres, PT DPT (r) Waleska Qiu PTA Student (t) Diamond Torres, PT DPT (c) 07/16/20 1337      Row Name 07/16/20 1045             Therapeutic Exercise    Lower Extremity (Therapeutic Exercise)  heel slides, bilateral;LAQ (long arc quad), bilateral;marching while seated;SLR (straight leg raise), bilateral  -SB,SA,SB2      Lower Extremity Range of Motion (Therapeutic Exercise)  hip abduction/adduction, bilateral;ankle dorsiflexion/plantar flexion, bilateral  -SB,SA,SB2      Exercise Type (Therapeutic Exercise)  AROM (active range of motion)  -SB,SA,SB2      Position (Therapeutic Exercise)  seated reclined  -SB,SA,SB2      Sets/Reps (Therapeutic Exercise)  x 15  -SB,SA,SB2      Recorded  by [SB,SA,SB2] Diamond Torres, PT DPT (r) Waleska Qiu PTA Student (t) Diamond Torres, PT DPT (c) 07/16/20 1337      Row Name 07/16/20 1046             Positioning and Restraints    Pre-Treatment Position  sitting in chair/recliner  -SB,SA,SB2      Post Treatment Position  chair  -SB,SA,SB2      In Chair  exit alarm on;encouraged to call for assist;call light within reach;notified nsg;reclined  -SB,SA,SB2      Recorded by [SB,SA,SB2] Diamond Torres, PT DPT (r) Waleska Qiu PTA Student (t) Diamond Torres, PT DPT (c) 07/16/20 1337      Row Name 07/16/20 104             Pain Scale: Numbers Pre/Post-Treatment    Pain Location  knee  -SB,SA,SB2      Recorded by [SB,SA,SB2] Diamond Torres, PT DPT (r) Waleska Qiu PTA Student (t) Diamond Torres, PT DPT (c) 07/16/20 1337      Row Name 07/16/20 1459 07/16/20 1042          Pain Scale: FACES Pre/Post-Treatment    Pain: FACES Scale, Pretreatment  0-->no hurt  -MB  4-->hurts little more  -SB,SA,SB2     Pain: FACES Scale, Post-Treatment  --  4-->hurts little more  -SB,SA,SB2     Recorded by [MB] Anjelica Gonzales CCC-SLP 07/16/20 1533 [SB,SA,SB2] Diamond Torres, PT DPT (r) Waleska Qiu PTA Student (t) Diamond Torres, PT DPT (c) 07/16/20 1337     Row Name 07/16/20 1453             Outcome Summary/Treatment Plan (SLP)    Daily Summary of Progress (SLP)  progress toward functional goals as expected  -MB      Barriers to Overall Progress (SLP)  none  -MB      Plan for Continued Treatment (SLP)  Continue to follow  -MB      Anticipated Dischage Disposition (SLP)  assisted living facility [facility]  -MB      Recorded by [MB] Anjelica Gonzales CCC-SLP 07/16/20 1533        User Key  (r) = Recorded By, (t) = Taken By, (c) = Cosigned By    Initials Name Effective Dates Discipline    Anjelica Barron, CCC-SLP 08/02/16 -  SLP    Diamond Santana, PT DPT 10/31/19 -  PT    Waleska Wise, JASBIR Student 06/23/20 -  PT          Outcome Summary  Outcome Summary/Treatment  Plan (SLP)  Daily Summary of Progress (SLP): progress toward functional goals as expected (07/16/20 1459 : Anjelica Gonzales CCC-SLP)  Barriers to Overall Progress (SLP): none (07/16/20 1459 : Anjelica Gonzales CCC-SLP)  Plan for Continued Treatment (SLP): Continue to follow (07/16/20 1459 : Anjelica Gonzales CCC-SLP)  Anticipated Dischage Disposition (SLP): assisted living facility ([facility]) (07/16/20 1459 : Anjelica Gonzales CCC-SLP)      SLP GOALS     Row Name 07/16/20 1459 07/14/20 1536          Oral Nutrition/Hydration Goal 1 (SLP)    Oral Nutrition/Hydration Goal 1, SLP  Pt will tolerate LRD without s/s of aspiration  -MB  Pt will tolerate LRD without s/s of aspiration  -MB     Time Frame (Oral Nutrition/Hydration Goal 1, SLP)  short term goal (STG);by discharge  -MB  short term goal (STG);by discharge  -MB     Barriers (Oral Nutrition/Hydration Goal 1, SLP)  none  -MB  none  -MB     Progress/Outcomes (Oral Nutrition/Hydration Goal 1, SLP)  continuing progress toward goal  -MB  continuing progress toward goal  -MB       User Key  (r) = Recorded By, (t) = Taken By, (c) = Cosigned By    Initials Name Provider Type    Anjelica Barron CCC-SLP Speech and Language Pathologist          EDUCATION  The patient has been educated in the following areas:   Dysphagia (Swallowing Impairment).    SLP Recommendation and Plan  Daily Summary of Progress (SLP): progress toward functional goals as expected  Barriers to Overall Progress (SLP): none  Plan for Continued Treatment (SLP): Continue to follow  Anticipated Dischage Disposition (SLP): assisted [facility] facility ([facility])                    Time Calculation:   Time Calculation- SLP     Row Name 07/16/20 1538             Time Calculation- SLP    SLP Start Time  1459  -MB      SLP Stop Time  1517  -MB      SLP Time Calculation (min)  18 min  -MB      SLP Received On  07/16/20  -MB        User Key  (r) = Recorded By, (t) = Taken By, (c) = Cosigned By    Initials Name  Provider Type    Anjelica Barron CCC-SLP Speech and Language Pathologist          Therapy Charges for Today     Code Description Service Date Service Provider Modifiers Qty    32550325009 HC ST TREATMENT SWALLOW 1 7/16/2020 Anjelica Gonzales CCC-SLP GN 1                 TERRY Tyler  7/16/2020

## 2020-07-16 NOTE — PLAN OF CARE
"  Problem: Patient Care Overview  Goal: Plan of Care Review  Outcome: Ongoing (interventions implemented as appropriate)  Flowsheets  Taken 7/16/2020 1721 by Padmini Bauer RN  Progress: no change  Taken 7/16/2020 1534 by Anjelica Gonzales CCC-SLP  Plan of Care Reviewed With: patient  Note:   Pt eating less than 25% of meals. Encouraged to eat the boost, but complains of the taste. C/o food and drink tasting \"bitter\". Safety maintained. Up to the bsc with one assist. Dietitian consulted for assistance with nutrition. Vss.     "

## 2020-07-17 PROCEDURE — 97110 THERAPEUTIC EXERCISES: CPT

## 2020-07-17 PROCEDURE — 97165 OT EVAL LOW COMPLEX 30 MIN: CPT | Performed by: OCCUPATIONAL THERAPIST

## 2020-07-17 PROCEDURE — 25010000002 ENOXAPARIN PER 10 MG: Performed by: FAMILY MEDICINE

## 2020-07-17 PROCEDURE — 99231 SBSQ HOSP IP/OBS SF/LOW 25: CPT | Performed by: FAMILY MEDICINE

## 2020-07-17 PROCEDURE — 97116 GAIT TRAINING THERAPY: CPT

## 2020-07-17 RX ADMIN — POTASSIUM CHLORIDE 20 MEQ: 750 CAPSULE, EXTENDED RELEASE ORAL at 09:39

## 2020-07-17 RX ADMIN — BRIMONIDINE TARTRATE, TIMOLOL MALEATE 1 DROP: 2; 5 SOLUTION/ DROPS OPHTHALMIC at 20:41

## 2020-07-17 RX ADMIN — AMLODIPINE BESYLATE 10 MG: 10 TABLET ORAL at 09:39

## 2020-07-17 RX ADMIN — ACETAMINOPHEN 650 MG: 325 TABLET, FILM COATED ORAL at 09:39

## 2020-07-17 RX ADMIN — DICLOFENAC 4 G: 10 GEL TOPICAL at 17:08

## 2020-07-17 RX ADMIN — LATANOPROST 1 DROP: 50 SOLUTION OPHTHALMIC at 20:41

## 2020-07-17 RX ADMIN — ACETAMINOPHEN 650 MG: 325 TABLET, FILM COATED ORAL at 20:41

## 2020-07-17 RX ADMIN — DOCUSATE SODIUM 100 MG: 100 CAPSULE ORAL at 09:39

## 2020-07-17 RX ADMIN — MIRTAZAPINE 15 MG: 15 TABLET, FILM COATED ORAL at 20:40

## 2020-07-17 RX ADMIN — MEGESTROL ACETATE 800 MG: 40 SUSPENSION ORAL at 09:39

## 2020-07-17 RX ADMIN — ALPRAZOLAM 0.25 MG: 0.25 TABLET ORAL at 20:40

## 2020-07-17 RX ADMIN — FERROUS SULFATE TAB 325 MG (65 MG ELEMENTAL FE) 325 MG: 325 (65 FE) TAB at 09:39

## 2020-07-17 RX ADMIN — SODIUM CHLORIDE, PRESERVATIVE FREE 10 ML: 5 INJECTION INTRAVENOUS at 09:40

## 2020-07-17 RX ADMIN — DICLOFENAC 4 G: 10 GEL TOPICAL at 20:40

## 2020-07-17 RX ADMIN — SODIUM CHLORIDE, PRESERVATIVE FREE 10 ML: 5 INJECTION INTRAVENOUS at 20:40

## 2020-07-17 RX ADMIN — POTASSIUM CHLORIDE 20 MEQ: 750 CAPSULE, EXTENDED RELEASE ORAL at 17:08

## 2020-07-17 RX ADMIN — MIRTAZAPINE 15 MG: 15 TABLET, FILM COATED ORAL at 02:49

## 2020-07-17 RX ADMIN — BRIMONIDINE TARTRATE, TIMOLOL MALEATE 1 DROP: 2; 5 SOLUTION/ DROPS OPHTHALMIC at 09:40

## 2020-07-17 RX ADMIN — ENOXAPARIN SODIUM 40 MG: 40 INJECTION SUBCUTANEOUS at 17:08

## 2020-07-17 NOTE — PROGRESS NOTES
Continued Stay Note   [facility]     Patient Name: Sunny Lacey  MRN: [id number]  Today's Date: 7/17/2020    Admit Date: 7/8/2020    Discharge Plan     Row Name 07/17/20 0821       Plan    Plan  [facility]    Patient/Family in Agreement with Plan  yes    Plan Comments  [facility] still waiting for pt to be admitted there.  Pt does have medicaid so would have hospice benefits if needed. Will follow. [phone]        Discharge Codes    No documentation.             ESTELA Romeor

## 2020-07-17 NOTE — PLAN OF CARE
Problem: Patient Care Overview  Goal: Plan of Care Review  Flowsheets (Taken 7/17/2020 0936)  Outcome Summary: Pt up in chair. A-AAROM BUE and BLE x 15 sitting in chair. Pt amb around room 20 ft CGA w/ RW x1, back to chair. Pt c/o knees hurtinng when amb. Pt would benefit from cont PT for endurance, strength, and pain.

## 2020-07-17 NOTE — THERAPY EVALUATION
Acute Care - Occupational Therapy Initial Evaluation  [facility]     Patient Name: uSnny Lacey  : 1921  MRN: [id number]  Today's Date: 2020  Onset of Illness/Injury or Date of Surgery: 20  Date of Referral to OT: 07/15/20  Referring Physician: Dr. Joseph    Admit Date: 2020       ICD-10-CM ICD-9-CM   1. Dysphagia, unspecified type R13.10 787.20   2. Impaired mobility Z74.09 799.89   3. Impaired mobility and ADLs Z74.09 V49.89    Z78.9      Patient Active Problem List   Diagnosis   • Sepsis (CMS/HCC)   • Pneumonia due to infectious organism   • At risk for falls   • Hypokalemia     Past Medical History:   Diagnosis Date   • Anemia    • Anxiety    • Arthritis    • Hypertension    • Skin cancer      Past Surgical History:   Procedure Laterality Date   • SKIN CANCER EXCISION            OT ASSESSMENT FLOWSHEET (last 12 hours)      Occupational Therapy Evaluation     Row Name 20 0930                   OT Evaluation Time/Intention    Subjective Information  complains of;fatigue;weakness  -MM        Document Type  evaluation;other (see comments) see MAR  -MM        Mode of Treatment  occupational therapy  -MM           General Information    Patient Profile Reviewed?  yes  -MM        Onset of Illness/Injury or Date of Surgery  20  -MM        Referring Physician  Dr. Joseph  -MM        Patient Observations  alert;cooperative;agree to therapy  -MM        Patient/Family Observations  no family present  -MM        General Observations of Patient  reclined in chair, O2/NC, IV, no apparent distress  -MM        Prior Level of Function  independent:;all household mobility;community mobility;transfer;bed mobility;ADL's;feeding;grooming;dressing;bathing per pt  -MM        Pertinent History of Current Functional Problem  presented to [facility] w/ 103 fever, B pneumonia, sepsis, tachypnea.  -MM        Existing Precautions/Restrictions  fall  -MM        Limitations/Impairments  safety/cognitive  -MM         Risks Reviewed  patient:;LOB;nausea/vomiting;dizziness;increased discomfort;change in vital signs;increased drainage;lines disloged  -MM        Benefits Reviewed  patient:;improve function;increase independence;increase strength;increase balance;decrease pain;decrease risk of DVT;improve skin integrity;increase knowledge  -MM        Barriers to Rehab  hearing deficit  -MM           Relationship/Environment    Lives With  facility resident  -MM           Resource/Environmental Concerns    Current Living Arrangements  independent/assisted living facility  -MM           Cognitive Assessment/Interventions    Additional Documentation  Cognitive Assessment/Intervention (Group)  -MM           Cognitive Assessment/Intervention- PT/OT    Affect/Mental Status (Cognitive)  WNL  -MM        Orientation Status (Cognition)  oriented x 4;verbal cues/prompts needed for orientation  -MM        Follows Commands (Cognition)  50-74% accuracy;delayed response/completion;increased processing time needed;initiation impaired;physical/tactile prompts required;repetition of directions required;verbal cues/prompting required  -MM        Safety Deficit (Cognitive)  mild deficit;moderate deficit;ability to follow commands;at risk behavior observed;awareness of need for assistance;insight into deficits/self awareness;judgment;problem solving;safety precautions awareness;safety precautions follow-through/compliance  -MM        Personal Safety Interventions  elopement precautions initiated;fall prevention program maintained;gait belt;muscle strengthening facilitated;nonskid shoes/slippers when out of bed;supervised activity  -MM           Safety Issues, Functional Mobility    Safety Issues Affecting Function (Mobility)  ability to follow commands;at risk behavior observed;friction/shear risk;insight into deficits/self awareness;judgment;problem solving;safety precaution awareness;safety precautions follow-through/compliance;awareness of need  for assistance  -MM        Impairments Affecting Function (Mobility)  endurance/activity tolerance;strength  -MM           Bed Mobility Assessment/Treatment    Comment (Bed Mobility)  up in chair  -MM           Transfer Assessment/Treatment    Transfer Assessment/Treatment  sit-stand transfer;stand-sit transfer  -MM           Sit-Stand Transfer    Sit-Stand [place] (Transfers)  minimum assist (75% patient effort);moderate assist (50% patient effort);verbal cues  -MM        Assistive Device (Sit-Stand Transfers)  other (see comments) HHA  -MM           Stand-Sit Transfer    Stand-Sit [place] (Transfers)  minimum assist (75% patient effort);moderate assist (50% patient effort);verbal cues  -MM        Assistive Device (Stand-Sit Transfers)  other (see comments) HHA  -MM           ADL Assessment/Intervention    BADL Assessment/Intervention  lower body dressing;grooming  -MM           Lower Body Dressing Assessment/Training    Lower Body Dressing [place] Level  maximum assist (25% patient effort);verbal cues adjust socks  -MM        Lower Body Dressing Position  supported sitting in chair  -MM           Grooming Assessment/Training    [place] Level (Grooming)  conditional independence;set up;wash face, hands  -MM        Grooming Position  supported sitting in chair  -MM           BADL Safety/Performance    Impairments, BADL Safety/Performance  endurance/activity tolerance;strength  -MM           General ROM    GENERAL ROM COMMENTS  BUE AROM WFL  -MM           MMT (Manual Muscle Testing)    General MMT Comments  4/5  -MM           Positioning and Restraints    Pre-Treatment Position  sitting in chair/recliner  -MM        Post Treatment Position  chair  -MM        In Chair  reclined;encouraged to call for assist;exit alarm on;call light within reach  -MM           Pain Assessment    Additional Documentation  Pain Scale: Numbers Pre/Post-Treatment (Group)  -MM           Pain Scale: Numbers  Pre/Post-Treatment    Pain Scale: Numbers, Pretreatment  0/10 - no pain  -MM        Pain Scale: Numbers, Post-Treatment  0/10 - no pain  -MM        Pre/Post Treatment Pain Comment  States when she has pain it's in her knees  -MM        Pain Intervention(s)  Medication (See MAR);Repositioned;Ambulation/increased activity  -MM           Plan of Care Review    Plan of Care Reviewed With  patient  -MM        Progress  no change  -MM        Outcome Summary  OT eval completed. Pt alert and oriented x4 with verbal cues and options given. Pt follows 50-75% of 1 step commands. Pt is easily distracted and requires verbal cues to redirect. Pt reports weakness and fatigue from just working with PT and being interrupted during her nap. Pt is confused at times. Pt was cond. indpendent with set up to wash face. Pt was max A to adjust socks. Skilled OT recommended to address adls, functional mobility and endurance. Recommended d/c SNF.  -MM           Clinical Impression (OT)    Date of Referral to OT  07/15/20  -MM        OT Diagnosis  impaired mobility and adls  -MM        Prognosis (OT Eval)  good  -MM        Functional Level at Time of Evaluation (OT Eval)  adequate  -MM        Patient/Family Goals Statement (OT Eval)  return home  -MM        Criteria for Skilled Therapeutic Interventions Met (OT Eval)  yes;treatment indicated  -MM        Rehab Potential (OT Eval)  good, to achieve stated therapy goals  -MM        Therapy Frequency (OT Eval)  5 times/wk  -MM        Predicted Duration of Therapy Intervention (Therapy Eval)  until d/c  -MM        Care Plan Review (OT)  evaluation/treatment results reviewed;care plan/treatment goals reviewed;risks/benefits reviewed;current/potential barriers reviewed;patient/other agree to care plan  -MM        Anticipated Discharge Disposition (OT)  skilled nursing facility  -MM           Planned OT Interventions    Planned Therapy Interventions (OT Eval)  activity tolerance training;adaptive  equipment training;BADL retraining;functional balance retraining;occupation/activity based interventions;passive ROM/stretching;patient/caregiver education/training;ROM/therapeutic exercise;strengthening exercise;transfer/mobility retraining  -MM           OT Goals    Bathing Goal Selection (OT)  bathing, OT goal 1  -MM        Dressing Goal Selection (OT)  dressing, OT goal 1  -MM        Toileting Goal Selection (OT)  toileting, OT goal 1  -MM           Bathing Goal 1 (OT)    Activity/Assistive Device (Bathing Goal 1, OT)  bathing skills, all  -MM        [place] Level/Cues Needed (Bathing Goal 1, OT)  minimum assist (75% or more patient effort);set-up required;verbal cues required  -MM        Time Frame (Bathing Goal 1, OT)  10 days  -MM        Progress/Outcomes (Bathing Goal 1, OT)  goal ongoing  -MM           Dressing Goal 1 (OT)    Activity/Assistive Device (Dressing Goal 1, OT)  dressing skills, all  -MM        [place]/Cues Needed (Dressing Goal 1, OT)  minimum assist (75% or more patient effort);set-up required;verbal cues required  -MM        Time Frame (Dressing Goal 1, OT)  10 days  -MM        Progress/Outcome (Dressing Goal 1, OT)  goal ongoing  -MM           Toileting Goal 1 (OT)    Activity/Device (Toileting Goal 1, OT)  toileting skills, all  -MM        [place] Level/Cues Needed (Toileting Goal 1, OT)  minimum assist (75% or more patient effort);set-up required;verbal cues required  -MM        Time Frame (Toileting Goal 1, OT)  10 days  -MM        Progress/Outcome (Toileting Goal 1, OT)  goal ongoing  -MM           Living Environment    Home Accessibility  wheelchair accessible  -MM          User Key  (r) = Recorded By, (t) = Taken By, (c) = Cosigned By    Initials Name Effective Dates    John De La O OTR/L 07/05/20 -          Occupational Therapy Education                 Title: PT OT SLP Therapies (In Progress)     Topic: Occupational Therapy (In Progress)     Point: ADL  training (Done)     Description:   Instruct learner(s) on proper safety adaptation and remediation techniques during self care or transfers.   Instruct in proper use of assistive devices.              Learning Progress Summary           Patient Acceptance, E, VU by MM at 7/17/2020 1038    Comment:  OT role, benefits, POC, d/c planning                   Point: Home exercise program (Not Started)     Description:   Instruct learner(s) on appropriate technique for monitoring, assisting and/or progressing therapeutic exercises/activities.              Learner Progress:   Not documented in this visit.          Point: Precautions (Done)     Description:   Instruct learner(s) on prescribed precautions during self-care and functional transfers.              Learning Progress Summary           Patient Acceptance, E, VU by MM at 7/17/2020 1038    Comment:  OT role, benefits, POC, d/c planning                   Point: Body mechanics (Done)     Description:   Instruct learner(s) on proper positioning and spine alignment during self-care, functional mobility activities and/or exercises.              Learning Progress Summary           Patient Acceptance, E, VU by MM at 7/17/2020 1038    Comment:  OT role, benefits, POC, d/c planning                               User Key     Initials Effective Dates Name Provider Type Discipline     07/05/20 -  John Ordoñez, OTR/L Occupational Therapist OT                  OT Recommendation and Plan  Outcome Summary/Treatment Plan (OT)  Anticipated Discharge Disposition (OT): skilled nursing facility  Planned Therapy Interventions (OT Eval): activity tolerance training, adaptive equipment training, BADL retraining, functional balance retraining, occupation/activity based interventions, passive ROM/stretching, patient/caregiver education/training, ROM/therapeutic exercise, strengthening exercise, transfer/mobility retraining  Therapy Frequency (OT Eval): 5 times/wk  Plan of Care  Review  Plan of Care Reviewed With: patient  Plan of Care Reviewed With: patient  Outcome Summary: OT abdiazizal completed. Pt alert and oriented x4 with verbal cues and options given. Pt follows 50-75% of 1 step commands. Pt is easily distracted and requires verbal cues to redirect. Pt reports weakness and fatigue from just working with PT and being interrupted during her nap. Pt is confused at times. Pt was cond. indpendent with set up to wash face. Pt was max A to adjust socks. Skilled OT recommended to address adls, functional mobility and endurance. Recommended d/c SNF.    Outcome Measures     Row Name 07/17/20 1000 07/17/20 0900 07/16/20 1100       How much help from another person do you currently need...    Turning from your back to your side while in flat bed without using bedrails?  --  3  -SB (r) SA (t) SB (c)  3  -SB (r) SA (t) SB (c)    Moving from lying on back to sitting on the side of a flat bed without bedrails?  --  3  -SB (r) SA (t) SB (c)  3  -SB (r) SA (t) SB (c)    Moving to and from a bed to a chair (including a wheelchair)?  --  3  -SB (r) SA (t) SB (c)  3  -SB (r) SA (t) SB (c)    Standing up from a chair using your arms (e.g., wheelchair, bedside chair)?  --  3  -SB (r) SA (t) SB (c)  3  -SB (r) SA (t) SB (c)    Climbing 3-5 steps with a railing?  --  1  -SB (r) SA (t) SB (c)  1  -SB (r) SA (t) SB (c)    To walk in hospital room?  --  2  -SB (r) SA (t) SB (c)  3  -SB (r) SA (t) SB (c)    AM-PAC 6 Clicks Score (PT)  --  15  -SB (r) SA (t)  16  -SB (r) SA (t)       How much help from another is currently needed...    Putting on and taking off regular lower body clothing?  2  -MM  --  --    Bathing (including washing, rinsing, and drying)  2  -MM  --  --    Toileting (which includes using toilet bed pan or urinal)  2  -MM  --  --    Putting on and taking off regular upper body clothing  3  -MM  --  --    Taking care of personal grooming (such as brushing teeth)  3  -MM  --  --    Eating meals  3   -MM  --  --    AM-PAC 6 Clicks Score (OT)  15  -MM  --  --       Functional Assessment    Outcome Measure Options  AM-PAC 6 Clicks Daily Activity (OT)  -MM  AM-PAC 6 Clicks Basic Mobility (PT)  -SB (r) SA (t) SB (c)  AM-PAC 6 Clicks Basic Mobility (PT)  -SB (r) SA (t) SB (c)      User Key  (r) = Recorded By, (t) = Taken By, (c) = Cosigned By    Initials Name Provider Type    John De La O, OTR/L Occupational Therapist    Diamond Santana, PT DPT Physical Therapist    Waleska Wise, PTA Student PTA Student          Time Calculation:   Time Calculation- OT     Row Name 07/17/20 0939 07/17/20 0930          Time Calculation- OT    OT Start Time  --  0930 +15 min chart review  -MM     OT Stop Time  --  1016  -MM     OT Time Calculation (min)  --  46 min  -MM     OT Received On  --  07/17/20  -MM     OT Goal Re-Cert Due Date  --  07/27/20  -MM        Timed Charges    00227 - Gait Training Minutes   9  -SB (r) SA (t) SB (c)  --       User Key  (r) = Recorded By, (t) = Taken By, (c) = Cosigned By    Initials Name Provider Type    John De La O, OTR/L Occupational Therapist    Diamond Santana, PT DPT Physical Therapist    Waleska Wise, PTA Student PTA Student        Therapy Charges for Today     Code Description Service Date Service Provider Modifiers Qty    [id number] HC OT EVAL LOW COMPLEXITY 4 7/17/2020 John Ordoñez, OTR/L GO 1               John Ordoñez OTR/JOSH  7/17/2020

## 2020-07-17 NOTE — PLAN OF CARE
Problem: Patient Care Overview  Goal: Plan of Care Review  Outcome: Ongoing (interventions implemented as appropriate)   OT eval completed. Pt alert and oriented x4 with verbal cues and options given. Pt follows 50-75% of 1 step commands. Pt is easily distracted and requires verbal cues to redirect. Pt reports weakness and fatigue from just working with PT and being interrupted during her nap. Pt is confused at times. Pt was cond. indpendent with set up to wash face. Pt was max A to adjust socks. Skilled OT recommended to address adls, functional mobility and endurance. Recommended d/c SNF.

## 2020-07-17 NOTE — NURSING NOTE
"Spoke with Cristino GOEL, about my concern over the patient not eating. I explained that we don't feel comfortable sending her to the nursing home when she's not taking in nutrition PO. He states that she \"probably wouldn't want a feeding tube of any type\" but that him and pt \"younger son\" would be in either tonight or tomorrow to talk to her and \"give her an ultimatum\". Pt complains that she feels like she's going to choke when eating. I encouraged boost pudding and nutritional drinks and she still refuses to eat them saying they taste \"bitter\".   "

## 2020-07-17 NOTE — PLAN OF CARE
Problem: Patient Care Overview  Goal: Plan of Care Review  Outcome: Ongoing (interventions implemented as appropriate)  Flowsheets (Taken 7/17/2020 1844)  Progress: no change  Plan of Care Reviewed With: patient; family  Note:   Continues confused A&Ox1 - was not going to take newly ordered remeron did take scheduled xanax 0.25 and tylenol (now PRN was routine) per Grand-daughter feeding her crushed in applesauce slept some ~3H - finally was convinced of remeron - had 1 incont episode thus far but for the most part has been continent up to BSC slowly but x1 assist telem has been SR/ST  - still with numerous concerns re: swallowing and afraid of potential nausea and her arthritic knee hurts at times - pt expects DC today to her Grand-daughter's house ([place]) - still may have bed at [facility] - no acute issues - will cont to monitor

## 2020-07-17 NOTE — THERAPY TREATMENT NOTE
Acute Care - Physical Therapy Treatment Note  [facility]     Patient Name: Sunny Lacey  : 1921  MRN: [id number]  Today's Date: 2020             Admit Date: 2020    Visit Dx:    ICD-10-CM ICD-9-CM   1. Dysphagia, unspecified type R13.10 787.20   2. Impaired mobility Z74.09 799.89     Patient Active Problem List   Diagnosis   • Sepsis (CMS/[facility])   • Pneumonia due to infectious organism   • At risk for falls   • Hypokalemia       Therapy Treatment    Rehabilitation Treatment Summary     Row Name 2008             Treatment Time/Intention    Discipline  physical therapy assistant  (Pended)   -SA      Document Type  therapy note (daily note)  (Pended)   -SA      Subjective Information  complains of;fatigue;weakness  (Pended)   -SA      Existing Precautions/Restrictions  fall  (Pended)   -SA      Recorded by [SA] Waleska Qiu PTA Student 2032      Row Name 2008             Vital Signs    Pre SpO2 (%)  99  (Pended)   -SA      O2 Delivery Pre Treatment  supplemental O2  (Pended)   -SA      Post SpO2 (%)  98  (Pended)   -SA      O2 Delivery Post Treatment  supplemental O2  (Pended)   -SA      Pre Patient Position  Sitting  (Pended)   -SA      Post Patient Position  Sitting  (Pended)   -SA      Recorded by [SA] Waleska Qiu PTA Student 2032      Row Name 2008             Safety Issues, Functional Mobility    Safety Issues Affecting Function (Mobility)  ability to follow commands;positioning of assistive device  (Pended)   -SA      Impairments Affecting Function (Mobility)  endurance/activity tolerance;strength  (Pended)   -SA      Recorded by [SA] Waleska Qiu PTA Student 20 0932      Row Name 2008             Bed Mobility Assessment/Treatment    Comment (Bed Mobility)  in chair  (Pended)   -SA      Recorded by [SA] Waleska Qiu PTA Student 2032      Row Name 2008             Transfer Assessment/Treatment     Transfer Assessment/Treatment  sit-stand transfer;stand-sit transfer  (Pended)   -SA      Recorded by [SA] Waleska Qiu PTA Student 07/17/20 0932      Row Name 07/17/20 0908             Sit-Stand Transfer    Sit-Stand [place] (Transfers)  contact guard;verbal cues  (Pended)   -SA      Assistive Device (Sit-Stand Transfers)  walker, front-wheeled  (Pended)   -SA      Recorded by [SA] Waleska Qiu PTA Student 07/17/20 0932      Row Name 07/17/20 0908             Stand-Sit Transfer    Stand-Sit [place] (Transfers)  contact guard;verbal cues  (Pended)   -SA      Assistive Device (Stand-Sit Transfers)  walker, front-wheeled  (Pended)   -SA      Recorded by [SA] Waleska Qiu PTA Student 07/17/20 0932      Row Name 07/17/20 0908             Gait/Stairs Assessment/Training    [place] Level (Gait)  contact guard;verbal cues;1 person to manage equipment  (Pended)   -SA      Assistive Device (Gait)  walker, front-wheeled  (Pended)   -SA      Distance in Feet (Gait)  20'  (Pended)   -SA2      Pattern (Gait)  step-through  (Pended)   -SA2      Deviations/Abnormal Patterns (Gait)  gait speed decreased;stride length decreased;angel decreased;antalgic  (Pended)   -SA2      Bilateral Gait Deviations  forward flexed posture  (Pended)   -SA2      Comment (Gait/Stairs)  hurting in knees  (Pended)   -SA2      Recorded by [SA] Waleska Qiu PTA Student 07/17/20 0932  [SA2] Waleska Qiu PTA Student 07/17/20 0936      Row Name 07/17/20 0908             Therapeutic Exercise    Upper Extremity Range of Motion (Therapeutic Exercise)  elbow flexion/extension, bilateral;shoulder flexion/extension, bilateral;shoulder abduction/adduction, bilateral  (Pended)   -SA      Lower Extremity (Therapeutic Exercise)  marching while seated;LAQ (long arc quad), bilateral  (Pended)   -SA      Lower Extremity Range of Motion (Therapeutic Exercise)  ankle dorsiflexion/plantar flexion, bilateral;hip abduction/adduction,  bilateral  (Pended)   -SA      Exercise Type (Therapeutic Exercise)  AROM (active range of motion);AAROM (active assistive range of motion)  (Pended)   -SA      Position (Therapeutic Exercise)  seated  (Pended)   -SA      Sets/Reps (Therapeutic Exercise)  x 15. needed verbal cues to cont. pt distracted  (Pended)   -SA      Recorded by [SA] Waleska Qiu PTA Student 07/17/20 0936      Row Name 07/17/20 0908             Positioning and Restraints    Pre-Treatment Position  sitting in chair/recliner  (Pended)   -SA      Post Treatment Position  chair  (Pended)   -SA      In Chair  reclined;encouraged to call for assist;exit alarm on;call light within reach  (Pended)   -SA      Recorded by [SA] Waleska Qiu PTA Student 07/17/20 0936      Row Name 07/17/20 0908             Pain Scale: Numbers Pre/Post-Treatment    Pain Scale: Numbers, Pretreatment  2/10  (Pended)   -SA      Pain Scale: Numbers, Post-Treatment  2/10  (Pended)   -SA      Pain Location  knee  (Pended)   -SA      Pain Intervention(s)  Rest  (Pended)   -SA      Recorded by [SA] Waleska Qiu PTA Student 07/17/20 0936        User Key  (r) = Recorded By, (t) = Taken By, (c) = Cosigned By    Initials Name Effective Dates Discipline    SA Waleska Qiu PTA Student 06/23/20 -  PT                   Physical Therapy Education                 Title: PT OT SLP Therapies (Done)     Topic: Physical Therapy (Done)     Point: Mobility training (Done)     Description:   Instruct learner(s) on safety and technique for assisting patient out of bed, chair or wheelchair.  Instruct in the proper use of assistive devices, such as walker, crutches, cane or brace.              Patient Friendly Description:   It's important to get you on your feet again, but we need to do so in a way that is safe for you. Falling has serious consequences, and your personal safety is the most important thing of all.        When it's time to get out of bed, one of us or a family member  will sit next to you on the bed to give you support.     If your doctor or nurse tells you to use a walker, crutches, a cane, or a brace, be sure you use it every time you get out of bed, even if you think you don't need it.    Learning Progress Summary           Patient Acceptance, E,D, TATY,VU by  at 7/13/2020 1039    Comment:  benefits of PT and POC, call for A OOB                   Point: Precautions (Done)     Description:   Instruct learner(s) on prescribed precautions during mobility and gait tasks              Learning Progress Summary           Patient Acceptance, E,D, TATY,VU by  at 7/13/2020 1039    Comment:  benefits of PT and POC, call for A OOB                               User Key     Initials Effective Dates Name Provider Type Discipline     08/02/16 -  Michel Sanchez, PT Physical Therapist PT                PT Recommendation and Plan     Plan of Care Reviewed With: patient  Progress: improving  Outcome Summary: (P) Pt up in chair. A-AAROM BUE and BLE x 15 sitting in chair. Pt amb around room 20 ft CGA w/ RW x1, back to chair. Pt c/o knees hurtinng when amb. Pt would benefit from cont PT for endurance, strength, and pain.  Outcome Measures     Row Name 07/17/20 0900 07/16/20 1100          How much help from another person do you currently need...    Turning from your back to your side while in flat bed without using bedrails?  3  (Pended)   -SA  3  -SB (r) SA (t) SB (c)     Moving from lying on back to sitting on the side of a flat bed without bedrails?  3  (Pended)   -SA  3  -SB (r) SA (t) SB (c)     Moving to and from a bed to a chair (including a wheelchair)?  3  (Pended)   -SA  3  -SB (r) SA (t) SB (c)     Standing up from a chair using your arms (e.g., wheelchair, bedside chair)?  3  (Pended)   -SA  3  -SB (r) SA (t) SB (c)     Climbing 3-5 steps with a railing?  1  (Pended)   -SA  1  -SB (r) SA (t) SB (c)     To walk in hospital room?  2  (Pended)   -SA  3  -SB (r) SA (t) SB (c)     AM-PAC 6  Clicks Score (PT)  15  (Pended)   -  16  -SB (r) SA (t)        Functional Assessment    Outcome Measure Options  AM-PAC 6 Clicks Basic Mobility (PT)  (Pended)   -SA  AM-PAC 6 Clicks Basic Mobility (PT)  -SB (r) SA (t) SB (c)       User Key  (r) = Recorded By, (t) = Taken By, (c) = Cosigned By    Initials Name Provider Type    SB Diamond Torres, PT DPT Physical Therapist    SA Waleska Qiu PTA Student PTA Student         Time Calculation:   PT Charges     Row Name 07/17/20 0939             Time Calculation    Start Time  0908  (Pended)   -      Stop Time  0932  (Pended)   -      Time Calculation (min)  24 min  (Pended)   -      PT Received On  07/17/20  (Pended)   -      PT Goal Re-Cert Due Date  07/23/20  (Pended)   -         Time Calculation- PT    Total Timed Code Minutes- PT  24 minute(s)  (Pended)   -         Timed Charges    00815 - PT Therapeutic Exercise Minutes  15  (Pended)   -      93448 - Gait Training Minutes   9  (Pended)   -        User Key  (r) = Recorded By, (t) = Taken By, (c) = Cosigned By    Initials Name Provider Type    SA Waleska Qiu PTA Student PTA Student        Therapy Charges for Today     Code Description Service Date Service Provider Modifiers Qty    91635829754 HC PT THER PROC EA 15 MIN 7/16/2020 Waleska Qiu, PTA Student GP 1    59438718529 HC PT THER PROC EA 15 MIN 7/16/2020 Waleska Qiu, PTA Student GP 1    18228666256 HC PT THER PROC EA 15 MIN 7/17/2020 Waleska Qiu, PTA Student GP 1    40624349838 HC GAIT TRAINING EA 15 MIN 7/17/2020 Waleska Qiu, PTA Student GP 1          PT G-Codes  Outcome Measure Options: (P) AM-PAC 6 Clicks Basic Mobility (PT)  AM-PAC 6 Clicks Score (PT): (P) 15    JASBIR Plasencia  7/17/2020

## 2020-07-17 NOTE — CONSULTS
"Palliative Medicine Consult    Attending Physician: Opal  Consulting Physician: Opal  Reason for Consult: [facility]    CC: \"not very hungry:    History:  Sunny Lacey is a 89 y.o. female who presents today for evaluation of the above problems.  She notes she has been doing reasonably well and has no specific complaints, though admits she is not very hungry.  She was admitted 9 days ago with sepsis related to pneumonia.  She was living in [facility] assisted living in [place], [place] when she developed fever, tachypnea, and had CT that confirmed bilateral pneumonia.  Since coming here with antibiotic treatment, she has improved, though her main issue currently is intermittent dysphagia and poor appetite.  She has been placed on Megace and mirtazapine without much stimulation of her appetite.  She details that at home she did not eat a lot, but would have oatmeal with cookies and coffee for breakfast and that her assisted-living facility did the rest of her cooking.  She does not feel she has had much weight loss recently.  Review of records here indicates that she has refused even taking her medications in applesauce and has had poor p.o. intake overall.  She had GARY when she came to the hospital, though her creatinine has drifted back down to normal, though she does continue on continuous IVF.    ROS:  Review of Systems   Constitutional: Negative for chills and fever.   HENT: Negative for congestion and sore throat.    Eyes: Negative for visual disturbance.   Respiratory: Negative for cough and shortness of breath.    Cardiovascular: Negative for chest pain and palpitations.   Gastrointestinal: Negative for abdominal pain, constipation, nausea and vomiting.   Endocrine: Negative for cold intolerance and heat intolerance.   Genitourinary: Negative for difficulty urinating and frequency.   Musculoskeletal: Negative for arthralgias and back pain.   Skin: Negative for rash.   Neurological: Positive for weakness. " Negative for dizziness and headaches.   Psychiatric/Behavioral: Negative for dysphoric mood. The patient is not nervous/anxious.        No Known Allergies  Past Medical History:   Diagnosis Date   • Anemia    • Anxiety    • Arthritis    • Hypertension    • Skin cancer      Past Surgical History:   Procedure Laterality Date   • SKIN CANCER EXCISION       History reviewed. No pertinent family history.   reports that she has never smoked. She does not have any smokeless tobacco history on file. She reports that she does not drink alcohol or use drugs.      Current Facility-Administered Medications:   •  acetaminophen (TYLENOL) tablet 650 mg, 650 mg, Oral, BID, Phong Joseph MD, 650 mg at 07/17/20 0939  •  acetaminophen (TYLENOL) tablet 650 mg, 650 mg, Oral, Q6H PRN, Phong Joseph MD  •  albuterol (PROVENTIL) nebulizer solution 0.083% 2.5 mg/3mL, 2.5 mg, Nebulization, Q6H PRN, Phong Joseph MD  •  ALPRAZolam (XANAX) tablet 0.25 mg, 0.25 mg, Oral, Daily PRN, Phong Joseph MD  •  ALPRAZolam (XANAX) tablet 0.25 mg, 0.25 mg, Oral, Nightly, Phong Joseph MD, 0.25 mg at 07/16/20 2033  •  amLODIPine (NORVASC) tablet 10 mg, 10 mg, Oral, Daily, Phong Joseph MD, 10 mg at 07/17/20 0939  •  brimonidine-timolol (COMBIGAN) 0.2-0.5 % ophthalmic solution 1 drop, 1 drop, Right Eye, Q12H, Phong Joseph MD, 1 drop at 07/17/20 0940  •  dextrose 5 % and sodium chloride 0.45 % with KCl 20 mEq/L infusion, 30 mL/hr, Intravenous, Continuous, Phong Joseph MD, Last Rate: 30 mL/hr at 07/15/20 2038, 30 mL/hr at 07/15/20 2038  •  diclofenac (VOLTAREN) 1 % gel 4 g, 4 g, Topical, 4x Daily, Phong Joseph MD, 4 g at 07/17/20 1708  •  docusate sodium (COLACE) capsule 100 mg, 100 mg, Oral, Daily, Phong Joseph MD, 100 mg at 07/17/20 0925  •  enoxaparin (LOVENOX) syringe 40 mg, 40 mg, Subcutaneous, Q24H, Phong Joseph MD, 40 mg at 07/17/20 1702  •  ferrous  "sulfate tablet 325 mg, 325 mg, Oral, Daily With Breakfast, Phong Joseph MD, 325 mg at 07/17/20 0939  •  glycerin (ADULT) rectal suppository 2 g, 2 g, Rectal, Daily PRN, Phong Joseph MD, 2 g at 07/10/20 1128  •  latanoprost (XALATAN) 0.005 % ophthalmic solution 1 drop, 1 drop, Both Eyes, Nightly, Phong Joseph MD, 1 drop at 07/16/20 2032  •  lidocaine (LMX) 4 % cream, , Topical, 4x Daily PRN, Phong Joseph MD  •  megestrol (MEGACE) 40 MG/ML suspension 800 mg, 800 mg, Oral, Daily, Phong Joseph MD, 800 mg at 07/17/20 0939  •  mirtazapine (REMERON) tablet 15 mg, 15 mg, Oral, Nightly, Phong Joseph MD, 15 mg at 07/17/20 0249  •  ondansetron (ZOFRAN) injection 4 mg, 4 mg, Intravenous, Q6H PRN, Phong Joseph MD, 4 mg at 07/14/20 2126  •  Pharmacy to Dose enoxaparin (LOVENOX), , Does not apply, Continuous PRN, Phong Joseph MD  •  potassium chloride (MICRO-K) CR capsule 20 mEq, 20 mEq, Oral, BID With Meals, Oliver Starkey MD, 20 mEq at 07/17/20 1708  •  sodium chloride 0.9 % bolus 1,464 mL, 30 mL/kg, Intravenous, PRN, Phong Joseph MD  •  sodium chloride 0.9 % flush 10 mL, 10 mL, Intravenous, Q12H, Phong Joseph MD, 10 mL at 07/17/20 0940  •  sodium chloride 0.9 % flush 10 mL, 10 mL, Intravenous, PRN, Phong Joseph MD    OBJECTIVE:  /69 (BP Location: Right arm, Patient Position: Sitting)   Pulse 109   Temp 98.7 °F (37.1 °C) (Oral)   Resp 20   Ht 170.2 cm (67\")   Wt 74.6 kg (164 lb 6 oz)   SpO2 95%   BMI 25.74 kg/m²    Physical Exam   Constitutional: She is oriented to person, place, and time. She appears well-developed and well-nourished. No distress.   HENT:   Head: Normocephalic and atraumatic.   Right Ear: External ear normal.   Left Ear: External ear normal.   Nose: Nose normal.   Eyes: EOM are normal. No scleral icterus.   Neck: Normal range of motion. No tracheal deviation present. "   Cardiovascular: Normal rate, regular rhythm and normal heart sounds.   No murmur heard.  Pulmonary/Chest: Effort normal and breath sounds normal. No accessory muscle usage. No respiratory distress. She has no wheezes.   Abdominal: Soft. Bowel sounds are normal. She exhibits no distension. There is no tenderness.   Musculoskeletal: Normal range of motion. She exhibits no edema (trace in BLE).   Neurological: She is alert and oriented to person, place, and time. Coordination and gait normal.   Skin: Skin is warm and dry. No cyanosis. Nails show no clubbing.   No jaundice   Psychiatric: She has a normal mood and affect. Her mood appears not anxious. She does not exhibit a depressed mood.       Pertinent labs and imaging reviewed including, but not limited to the following:    Assessment    1.  Bilateral pneumonia likely secondary to aspiration-resolving  2. Gait disturbance  3. Hypernatremia & Hyperchloremia  4. HTN    PPS  50%     Symptoms    1.  Dysphagia  2. Debility  3. Decreased appetite    Plan    1.  Her biggest barrier to discharge is currently her dysphasia and questionable aspiration.  Review of speech-language pathology notes indicate that her dysphasia has been somewhat persistent, but waxes and wanes with different consistencies during this admission.  It is likely that she has protected herself by choosing thicker foods at home.  Her appetite is also poor and she has been vague with her preferences for food.  I am curious how much of her poor p.o. intake is related to symptoms/fear of aspiration or simple poor appetite.  Her chest x-ray on admission showed right basilar opacities, which does favor aspiration.  Will defer to SLP and attending, though a modified barium swallow may be helpful to further delineate what consistencies of food and fluids actually create aspiration. I further wonder what a dedicated course of SLP therapy could offer her in terms of long-term stability.  2.  In speaking with her  son, he states her weight has been stable and she has even complained about gaining weight recently.  BMI on admission was 25, which does not indicate a chronic picture of malnutrition.  Because of this, I would likely recommend further investigation and/or SLP before pursuing feeding tube.  Both she and her son indicate a feeding tube is not something she has looked on fondly in the past.  3.  She certainly has advanced age as she approaches her 89th birthday and her son states she has had thoughts previously about whether she would want to go on with aggressive treatments, but she has never made a decision regarding her CODE STATUS or overall course of treatment.  However, he is realistic about her age and long-term prognosis.  He plans to visit tomorrow in order to further delineate with her what her wishes would be.  In speaking with him, I learned that her  made most of her weighty decisions through her life and that she has never had to make many difficult decisions on her own.  4. Regarding electrolyte abnormalities and poor PO intake, consider d/c IVF to determine what path this may take her down in terms of moving toward a baseline of electrolytes and renal function.     I spoke with patient in person face-to-face in her room for 20 minutes.  I subsequently spoke with her son, Cristino for 15 minutes via phone given visitor restrictions.  In total, 23 minutes were spent in advanced care planning independent of medical decision making as above.      Thank you for this consult. Please contact me with any questions.         Leon Hernandez D.O. 7/17/2020

## 2020-07-18 LAB — VIT B1 BLD-SCNC: 143.2 NMOL/L (ref 66.5–200)

## 2020-07-18 PROCEDURE — 99231 SBSQ HOSP IP/OBS SF/LOW 25: CPT | Performed by: FAMILY MEDICINE

## 2020-07-18 PROCEDURE — 25010000002 ENOXAPARIN PER 10 MG: Performed by: FAMILY MEDICINE

## 2020-07-18 PROCEDURE — 97116 GAIT TRAINING THERAPY: CPT

## 2020-07-18 PROCEDURE — 97110 THERAPEUTIC EXERCISES: CPT

## 2020-07-18 RX ADMIN — FERROUS SULFATE TAB 325 MG (65 MG ELEMENTAL FE) 325 MG: 325 (65 FE) TAB at 09:52

## 2020-07-18 RX ADMIN — LATANOPROST 1 DROP: 50 SOLUTION OPHTHALMIC at 20:19

## 2020-07-18 RX ADMIN — DICLOFENAC 4 G: 10 GEL TOPICAL at 17:51

## 2020-07-18 RX ADMIN — POTASSIUM CHLORIDE 20 MEQ: 750 CAPSULE, EXTENDED RELEASE ORAL at 17:51

## 2020-07-18 RX ADMIN — DICLOFENAC 4 G: 10 GEL TOPICAL at 09:52

## 2020-07-18 RX ADMIN — ALPRAZOLAM 0.25 MG: 0.25 TABLET ORAL at 14:16

## 2020-07-18 RX ADMIN — ACETAMINOPHEN 650 MG: 325 TABLET, FILM COATED ORAL at 09:51

## 2020-07-18 RX ADMIN — DOCUSATE SODIUM 100 MG: 100 CAPSULE ORAL at 09:52

## 2020-07-18 RX ADMIN — ALPRAZOLAM 0.25 MG: 0.25 TABLET ORAL at 20:19

## 2020-07-18 RX ADMIN — BRIMONIDINE TARTRATE, TIMOLOL MALEATE 1 DROP: 2; 5 SOLUTION/ DROPS OPHTHALMIC at 09:52

## 2020-07-18 RX ADMIN — DICLOFENAC 4 G: 10 GEL TOPICAL at 13:26

## 2020-07-18 RX ADMIN — SODIUM CHLORIDE, PRESERVATIVE FREE 10 ML: 5 INJECTION INTRAVENOUS at 09:53

## 2020-07-18 RX ADMIN — POTASSIUM CHLORIDE 20 MEQ: 750 CAPSULE, EXTENDED RELEASE ORAL at 09:52

## 2020-07-18 RX ADMIN — ACETAMINOPHEN 650 MG: 325 TABLET, FILM COATED ORAL at 20:18

## 2020-07-18 RX ADMIN — AMLODIPINE BESYLATE 10 MG: 10 TABLET ORAL at 09:51

## 2020-07-18 RX ADMIN — SODIUM CHLORIDE, PRESERVATIVE FREE 10 ML: 5 INJECTION INTRAVENOUS at 20:18

## 2020-07-18 RX ADMIN — DICLOFENAC 4 G: 10 GEL TOPICAL at 20:19

## 2020-07-18 RX ADMIN — MIRTAZAPINE 15 MG: 15 TABLET, FILM COATED ORAL at 20:18

## 2020-07-18 RX ADMIN — MEGESTROL ACETATE 800 MG: 40 SUSPENSION ORAL at 09:52

## 2020-07-18 RX ADMIN — BRIMONIDINE TARTRATE, TIMOLOL MALEATE 1 DROP: 2; 5 SOLUTION/ DROPS OPHTHALMIC at 20:19

## 2020-07-18 RX ADMIN — ENOXAPARIN SODIUM 40 MG: 40 INJECTION SUBCUTANEOUS at 17:51

## 2020-07-18 NOTE — PLAN OF CARE
Problem: Patient Care Overview  Goal: Plan of Care Review  Outcome: Ongoing (interventions implemented as appropriate)  Flowsheets (Taken 7/18/2020 8511)  Outcome Summary: ASSIST X2 TO BEDSIDE COMMODE. PT TAKES PILLS WELL IN APPLESAUCE. S/ST  ON TELE. SAFETY MAINTAINED, BED ALARM SET. CONTINUE TO MONITOR. WILL NOTIFY MD OF ANY CHANGES.

## 2020-07-18 NOTE — PROGRESS NOTES
Still not eating,,palliatve care consulted to help with decisions with patient/family    Review of Systems   Constitutional: Positive for activity change.   HENT: Negative.    Eyes: Negative.    Respiratory: Negative.    Cardiovascular: Negative.    Gastrointestinal: Negative.    Endocrine: Negative.    Genitourinary: Negative.    Musculoskeletal: Negative.    Skin: Negative.    Allergic/Immunologic: Negative.    Neurological: Positive for weakness.   Hematological: Negative.    Psychiatric/Behavioral: Negative.      Temp:  [97.4 °F (36.3 °C)-98.7 °F (37.1 °C)] 98.7 °F (37.1 °C)  Heart Rate:  [] 109  Resp:  [18-20] 20  BP: (114-152)/(66-76) 114/69  I/O last 3 completed shifts:  In: 600 [P.O.:600]  Out: 750 [Urine:750]  No intake/output data recorded.    Physical Exam   Constitutional: She appears well-developed and well-nourished.   HENT:   Head: Normocephalic and atraumatic.   Right Ear: External ear normal.   Left Ear: External ear normal.   Nose: Nose normal.   Mouth/Throat: Oropharynx is clear and moist.   Eyes: Pupils are equal, round, and reactive to light. Conjunctivae and EOM are normal.   Neck: Normal range of motion. Neck supple.   Cardiovascular: Normal rate, regular rhythm, normal heart sounds and intact distal pulses.   Pulmonary/Chest: Effort normal and breath sounds normal.   Abdominal: Soft. Bowel sounds are normal.   Musculoskeletal: Normal range of motion.   Neurological: She is alert.   Skin: Skin is warm. Capillary refill takes less than 2 seconds.   Psychiatric: She has a normal mood and affect. Her behavior is normal. Judgment and thought content normal.   Nursing note and vitals reviewed.        Sepsis (CMS/[facility])    Pneumonia due to infectious organism    At risk for falls    Hypokalemia  appreciate help of palliative care and their complete note/assessment---

## 2020-07-18 NOTE — PLAN OF CARE
Problem: Patient Care Overview  Goal: Plan of Care Review  Flowsheets (Taken 7/18/2020 4583)  Plan of Care Reviewed With: patient  Outcome Summary: pt denies pain this shift. VSS. S/ST , PAC'S. assist x2 to bedside commode. pt takes pills in applesauce well. bed and chair alarm set. Pt ate good portions of her meals this shift. will continue to monitor.

## 2020-07-19 PROCEDURE — 97110 THERAPEUTIC EXERCISES: CPT

## 2020-07-19 PROCEDURE — 25010000002 ENOXAPARIN PER 10 MG: Performed by: FAMILY MEDICINE

## 2020-07-19 PROCEDURE — 97116 GAIT TRAINING THERAPY: CPT

## 2020-07-19 PROCEDURE — 99231 SBSQ HOSP IP/OBS SF/LOW 25: CPT | Performed by: FAMILY MEDICINE

## 2020-07-19 RX ADMIN — LATANOPROST 1 DROP: 50 SOLUTION OPHTHALMIC at 20:24

## 2020-07-19 RX ADMIN — AMLODIPINE BESYLATE 10 MG: 10 TABLET ORAL at 08:35

## 2020-07-19 RX ADMIN — BRIMONIDINE TARTRATE, TIMOLOL MALEATE 1 DROP: 2; 5 SOLUTION/ DROPS OPHTHALMIC at 08:35

## 2020-07-19 RX ADMIN — DICLOFENAC 4 G: 10 GEL TOPICAL at 12:31

## 2020-07-19 RX ADMIN — DICLOFENAC 4 G: 10 GEL TOPICAL at 20:24

## 2020-07-19 RX ADMIN — ALPRAZOLAM 0.25 MG: 0.25 TABLET ORAL at 20:24

## 2020-07-19 RX ADMIN — DICLOFENAC 4 G: 10 GEL TOPICAL at 08:35

## 2020-07-19 RX ADMIN — ACETAMINOPHEN 650 MG: 325 TABLET, FILM COATED ORAL at 08:35

## 2020-07-19 RX ADMIN — SODIUM CHLORIDE, PRESERVATIVE FREE 10 ML: 5 INJECTION INTRAVENOUS at 08:36

## 2020-07-19 RX ADMIN — DOCUSATE SODIUM 100 MG: 100 CAPSULE ORAL at 08:35

## 2020-07-19 RX ADMIN — POTASSIUM CHLORIDE 20 MEQ: 750 CAPSULE, EXTENDED RELEASE ORAL at 17:27

## 2020-07-19 RX ADMIN — MEGESTROL ACETATE 800 MG: 40 SUSPENSION ORAL at 08:35

## 2020-07-19 RX ADMIN — BRIMONIDINE TARTRATE, TIMOLOL MALEATE 1 DROP: 2; 5 SOLUTION/ DROPS OPHTHALMIC at 20:24

## 2020-07-19 RX ADMIN — MIRTAZAPINE 15 MG: 15 TABLET, FILM COATED ORAL at 20:23

## 2020-07-19 RX ADMIN — FERROUS SULFATE TAB 325 MG (65 MG ELEMENTAL FE) 325 MG: 325 (65 FE) TAB at 08:36

## 2020-07-19 RX ADMIN — DICLOFENAC 4 G: 10 GEL TOPICAL at 17:27

## 2020-07-19 RX ADMIN — ENOXAPARIN SODIUM 40 MG: 40 INJECTION SUBCUTANEOUS at 17:27

## 2020-07-19 RX ADMIN — ACETAMINOPHEN 650 MG: 325 TABLET, FILM COATED ORAL at 20:23

## 2020-07-19 RX ADMIN — POTASSIUM CHLORIDE 20 MEQ: 750 CAPSULE, EXTENDED RELEASE ORAL at 08:35

## 2020-07-19 RX ADMIN — SODIUM CHLORIDE, PRESERVATIVE FREE 10 ML: 5 INJECTION INTRAVENOUS at 20:24

## 2020-07-19 NOTE — PLAN OF CARE
Problem: Patient Care Overview  Goal: Plan of Care Review  Outcome: Ongoing (interventions implemented as appropriate)  Flowsheets (Taken 7/19/2020 1204)  Outcome Summary: Pt. stands with CGA. Minimal assist from toilet. Ambulates w forward flexed posture, decreased step length, decreased speed. Ambulated 30' in room. Actively works thru LE ex's. Will benefit from strengthening and safety activities.

## 2020-07-19 NOTE — PROGRESS NOTES
Resting comfortablyno nursing concerns    Review of Systems   Constitutional: Positive for appetite change.   HENT: Negative.    Eyes: Negative.    Respiratory: Negative.    Cardiovascular: Negative.    Gastrointestinal: Negative.    Endocrine: Negative.    Genitourinary: Negative.    Musculoskeletal: Negative.    Allergic/Immunologic: Negative.    Neurological: Negative.    Hematological: Negative.    Psychiatric/Behavioral: Negative.      Temp:  [97.7 °F (36.5 °C)-98.2 °F (36.8 °C)] 98.2 °F (36.8 °C)  Heart Rate:  [] 88  Resp:  [14-20] 18  BP: (110-156)/(55-88) 131/82  I/O last 3 completed shifts:  In: 1320 [P.O.:1320]  Out: 800 [Urine:800]  I/O this shift:  In: -   Out: 650 [Urine:650]    Physical Exam   Constitutional: She is oriented to person, place, and time. She appears well-developed and well-nourished.   HENT:   Head: Normocephalic and atraumatic.   Right Ear: External ear normal.   Left Ear: External ear normal.   Nose: Nose normal.   Mouth/Throat: Oropharynx is clear and moist.   Eyes: Pupils are equal, round, and reactive to light. Conjunctivae and EOM are normal.   Neck: Normal range of motion. Neck supple.   Cardiovascular: Normal rate, regular rhythm, normal heart sounds and intact distal pulses.   Pulmonary/Chest: Effort normal and breath sounds normal.   Abdominal: Soft. Bowel sounds are normal.   Musculoskeletal: Normal range of motion.   Neurological: She is alert and oriented to person, place, and time.   Skin: Skin is warm. Capillary refill takes less than 2 seconds.   Psychiatric: She has a normal mood and affect. Her behavior is normal. Judgment and thought content normal.   Nursing note and vitals reviewed.        Sepsis (CMS/[facility])    Pneumonia due to infectious organism    At risk for falls    Hypokalemia    Watching po intake ----

## 2020-07-19 NOTE — PLAN OF CARE
Problem: Patient Care Overview  Goal: Plan of Care Review  Outcome: Ongoing (interventions implemented as appropriate)  Flowsheets (Taken 7/19/2020 1527)  Plan of Care Reviewed With: patient  Note:   Patient denies pain.Patient up with assist of 1 to bsc and back too bed with walker. BM this shift.Awaiting approval for bed at [place]. Safety maintained,vss, will follow.

## 2020-07-19 NOTE — PLAN OF CARE
Problem: Patient Care Overview  Goal: Plan of Care Review  Outcome: Ongoing (interventions implemented as appropriate)  Flowsheets (Taken 7/19/2020 7418)  Progress: no change  Plan of Care Reviewed With: patient  Outcome Summary: Patient has no c/o pain. Patient up x2 to BSC. Patient turned every 2 hours. VSS. Safety maintained. Will continue to monitor.

## 2020-07-19 NOTE — PROGRESS NOTES
Ate 75% breakfast, 40%lunch and 75% of supper per nursing staff---no new nursing staff concerns--resting comfortably    Review of Systems   Constitutional: Positive for appetite change. Negative for activity change.   HENT: Negative.    Eyes: Negative.    Respiratory: Negative.    Cardiovascular: Negative.    Gastrointestinal: Negative.    Endocrine: Negative.    Genitourinary: Negative.    Musculoskeletal: Negative.    Skin: Negative.    Allergic/Immunologic: Negative.    Neurological: Negative.    Hematological: Negative.    Psychiatric/Behavioral: Positive for confusion and decreased concentration.     Temp:  [97.7 °F (36.5 °C)-98.2 °F (36.8 °C)] 98.2 °F (36.8 °C)  Heart Rate:  [] 88  Resp:  [14-20] 18  BP: (110-156)/(55-88) 131/82  I/O last 3 completed shifts:  In: 1320 [P.O.:1320]  Out: 800 [Urine:800]  I/O this shift:  In: -   Out: 650 [Urine:650]    Physical Exam   Constitutional: She is oriented to person, place, and time. She appears well-developed and well-nourished.   HENT:   Head: Normocephalic and atraumatic.   Right Ear: External ear normal.   Left Ear: External ear normal.   Nose: Nose normal.   Mouth/Throat: Oropharynx is clear and moist.   Eyes: Pupils are equal, round, and reactive to light. Conjunctivae and EOM are normal.   Neck: Normal range of motion. Neck supple.   Cardiovascular: Normal rate, regular rhythm, normal heart sounds and intact distal pulses.   Pulmonary/Chest: Effort normal and breath sounds normal.   Abdominal: Soft. Bowel sounds are normal.   Musculoskeletal: Normal range of motion.   Neurological: She is alert and oriented to person, place, and time.   Skin: Skin is warm. Capillary refill takes less than 2 seconds.   Psychiatric: She has a normal mood and affect. Her behavior is normal. Judgment and thought content normal.   Nursing note and vitals reviewed.        Sepsis (CMS/HCC)    Pneumonia due to infectious organism    At risk for falls    Hypokalemia    Eating  better with appetite stimulants..---? To [facility] tomorrow?

## 2020-07-20 VITALS
DIASTOLIC BLOOD PRESSURE: 48 MMHG | BODY MASS INDEX: 26.38 KG/M2 | HEIGHT: 67 IN | WEIGHT: 168.1 LBS | RESPIRATION RATE: 28 BRPM | HEART RATE: 102 BPM | SYSTOLIC BLOOD PRESSURE: 106 MMHG | TEMPERATURE: 97.8 F | OXYGEN SATURATION: 95 %

## 2020-07-20 LAB
ALBUMIN SERPL-MCNC: 3.9 G/DL (ref 3.5–5.2)
ALBUMIN/GLOB SERPL: 1.4 G/DL
ALP SERPL-CCNC: 66 U/L (ref 39–117)
ALT SERPL W P-5'-P-CCNC: 9 U/L (ref 1–33)
ANION GAP SERPL CALCULATED.3IONS-SCNC: 13 MMOL/L (ref 5–15)
AST SERPL-CCNC: 14 U/L (ref 1–32)
BILIRUB SERPL-MCNC: 0.5 MG/DL (ref 0–1.2)
BUN SERPL-MCNC: 21 MG/DL (ref 8–23)
BUN/CREAT SERPL: 28 (ref 7–25)
CALCIUM SPEC-SCNC: 10.2 MG/DL (ref 8.2–9.6)
CHLORIDE SERPL-SCNC: 107 MMOL/L (ref 98–107)
CO2 SERPL-SCNC: 21 MMOL/L (ref 22–29)
CREAT SERPL-MCNC: 0.75 MG/DL (ref 0.57–1)
DEPRECATED RDW RBC AUTO: 45.1 FL (ref 37–54)
ERYTHROCYTE [DISTWIDTH] IN BLOOD BY AUTOMATED COUNT: 13.8 % (ref 12.3–15.4)
GFR SERPL CREATININE-BSD FRML MDRD: 71 ML/MIN/1.73
GLOBULIN UR ELPH-MCNC: 2.7 GM/DL
GLUCOSE SERPL-MCNC: 92 MG/DL (ref 65–99)
HCT VFR BLD AUTO: 40.4 % (ref 34–46.6)
HGB BLD-MCNC: 13.2 G/DL (ref 12–15.9)
MCH RBC QN AUTO: 29.9 PG (ref 26.6–33)
MCHC RBC AUTO-ENTMCNC: 32.7 G/DL (ref 31.5–35.7)
MCV RBC AUTO: 91.6 FL (ref 79–97)
PLATELET # BLD AUTO: 235 10*3/MM3 (ref 140–450)
PMV BLD AUTO: 11 FL (ref 6–12)
POTASSIUM SERPL-SCNC: 5 MMOL/L (ref 3.5–5.2)
PROT SERPL-MCNC: 6.6 G/DL (ref 6–8.5)
RBC # BLD AUTO: 4.41 10*6/MM3 (ref 3.77–5.28)
SODIUM SERPL-SCNC: 141 MMOL/L (ref 136–145)
WBC # BLD AUTO: 10.61 10*3/MM3 (ref 3.4–10.8)

## 2020-07-20 PROCEDURE — 80053 COMPREHEN METABOLIC PANEL: CPT | Performed by: FAMILY MEDICINE

## 2020-07-20 PROCEDURE — 85027 COMPLETE CBC AUTOMATED: CPT | Performed by: FAMILY MEDICINE

## 2020-07-20 PROCEDURE — 97116 GAIT TRAINING THERAPY: CPT

## 2020-07-20 PROCEDURE — 97110 THERAPEUTIC EXERCISES: CPT

## 2020-07-20 PROCEDURE — 99238 HOSP IP/OBS DSCHRG MGMT 30/<: CPT | Performed by: FAMILY MEDICINE

## 2020-07-20 PROCEDURE — 92526 ORAL FUNCTION THERAPY: CPT

## 2020-07-20 RX ORDER — POTASSIUM CHLORIDE 750 MG/1
20 CAPSULE, EXTENDED RELEASE ORAL 2 TIMES DAILY WITH MEALS
Start: 2020-07-20

## 2020-07-20 RX ORDER — MEGESTROL ACETATE 40 MG/ML
800 SUSPENSION ORAL DAILY
Start: 2020-07-21

## 2020-07-20 RX ORDER — MIRTAZAPINE 15 MG/1
15 TABLET, FILM COATED ORAL NIGHTLY
Start: 2020-07-20

## 2020-07-20 RX ADMIN — ACETAMINOPHEN 650 MG: 325 TABLET, FILM COATED ORAL at 08:13

## 2020-07-20 RX ADMIN — FERROUS SULFATE TAB 325 MG (65 MG ELEMENTAL FE) 325 MG: 325 (65 FE) TAB at 08:12

## 2020-07-20 RX ADMIN — DICLOFENAC 4 G: 10 GEL TOPICAL at 11:42

## 2020-07-20 RX ADMIN — DICLOFENAC 4 G: 10 GEL TOPICAL at 08:11

## 2020-07-20 RX ADMIN — SODIUM CHLORIDE, PRESERVATIVE FREE 10 ML: 5 INJECTION INTRAVENOUS at 08:13

## 2020-07-20 RX ADMIN — DOCUSATE SODIUM 100 MG: 100 CAPSULE ORAL at 08:12

## 2020-07-20 RX ADMIN — ALPRAZOLAM 0.25 MG: 0.25 TABLET ORAL at 14:45

## 2020-07-20 RX ADMIN — POTASSIUM CHLORIDE 20 MEQ: 750 CAPSULE, EXTENDED RELEASE ORAL at 08:12

## 2020-07-20 RX ADMIN — MEGESTROL ACETATE 800 MG: 40 SUSPENSION ORAL at 08:12

## 2020-07-20 RX ADMIN — BRIMONIDINE TARTRATE, TIMOLOL MALEATE 1 DROP: 2; 5 SOLUTION/ DROPS OPHTHALMIC at 08:11

## 2020-07-20 RX ADMIN — AMLODIPINE BESYLATE 10 MG: 10 TABLET ORAL at 08:12

## 2020-07-20 NOTE — THERAPY TREATMENT NOTE
Acute Care - Occupational Therapy Treatment Note  [facility]     Patient Name: Sunny Lacey  : 1921  MRN: [id number]  Today's Date: 2020  Onset of Illness/Injury or Date of Surgery: 20  Date of Referral to OT: 07/15/20  Referring Physician: Dr. Joseph    Admit Date: 2020       ICD-10-CM ICD-9-CM   1. Dysphagia, unspecified type R13.10 787.20   2. Impaired mobility Z74.09 799.89   3. Impaired mobility and ADLs Z74.09 V49.89    Z78.9      Patient Active Problem List   Diagnosis   • Sepsis (CMS/HCC)   • Pneumonia due to infectious organism   • At risk for falls   • Hypokalemia     Past Medical History:   Diagnosis Date   • Anemia    • Anxiety    • Arthritis    • Hypertension    • Skin cancer      Past Surgical History:   Procedure Laterality Date   • SKIN CANCER EXCISION         Therapy Treatment    Rehabilitation Treatment Summary     Row Name 20 1047 20 1026 20 0932       Treatment Time/Intention    Discipline  physical therapy assistant  -NW  occupational therapy assistant  -CJ  speech language pathologist  -MB    Document Type  therapy note (daily note)  -NW  therapy note (daily note)  -CJ  therapy note (daily note)  -MB    Subjective Information  no complaints  -NW2  complains of;weakness;fatigue  -CJ  no complaints  -MB    Mode of Treatment  --  occupational therapy  -CJ  speech-language pathology  -MB    Patient/Family Observations  --  --  No family present  -MB    Patient Effort  --  adequate  -CJ  adequate  -MB    Existing Precautions/Restrictions  fall  -NW2  fall  -CJ  --    Recorded by [NW] Nohemy Fraire, PTA 20 1048  [NW2] Nohemy Fraire, PTA 20 1114 [CJ] Lacho Edwards COTA/JOSH 20 1144 [MB] Anjelica Gonzales CCC-SLP 20 0943    Row Name 20 1047             Vital Signs    Intra SpO2 (%)  95  -NW      O2 Delivery Intra Treatment  room air  -NW      Post SpO2 (%)  98  -NW      O2 Delivery Post Treatment  room air  -NW       Intra Patient Position  Standing  -NW      Post Patient Position  Sitting  -NW      Recorded by [NW] Nohemy Fraire, [facility] 07/20/20 1114      Row Name 07/20/20 1047             Safety Issues, Functional Mobility    Safety Issues Affecting Function (Mobility)  ability to follow commands;awareness of need for assistance  -NW      Impairments Affecting Function (Mobility)  endurance/activity tolerance;pain;strength  -NW      Recorded by [NW] Nohemy Fraire, [facility] 07/20/20 1114      Row Name 07/20/20 1047 07/20/20 1026          Bed Mobility Assessment/Treatment    Comment (Bed Mobility)  up in chair  -NW  sitting in chair!  -CJ     Recorded by [NW] Nohemy Fraire, [facility] 07/20/20 1114 [CJ] Lacho Edwards COTA/L 07/20/20 1144     Row Name 07/20/20 1047             Sit-Stand Transfer    Sit-Stand [place] (Transfers)  verbal cues;contact guard  -NW      Assistive Device (Sit-Stand Transfers)  walker, front-wheeled  -NW      Recorded by [NW] Nohemy Fraire [facility] 07/20/20 1114      Row Name 07/20/20 1047             Stand-Sit Transfer    Stand-Sit [place] (Transfers)  verbal cues;contact guard  -NW      Assistive Device (Stand-Sit Transfers)  walker, front-wheeled  -NW      Recorded by [NW] Nohemy Fraire, [facility] 07/20/20 1114      Row Name 07/20/20 1047             Toilet Transfer    Type (Toilet Transfer)  stand-sit;sit-stand  -NW      [place] Level (Toilet Transfer)  contact guard;verbal cues  -NW      Assistive Device (Toilet Transfer)  walker, front-wheeled  -NW      Recorded by [NW] Nohemy Fraire, [facility] 07/20/20 1114      Row Name 07/20/20 1047             Gait/Stairs Assessment/Training    [place] Level (Gait)  contact guard  -NW      Assistive Device (Gait)  walker, front-wheeled  -NW      Distance in Feet (Gait)  15 x 2  -NW      Pattern (Gait)  step-through  -NW      Deviations/Abnormal Patterns (Gait)  gait speed decreased;stride length decreased  -NW      Bilateral Gait Deviations   forward flexed posture  -NW      Recorded by [NW] Nohemy Fraire, PTA 07/20/20 1114      Row Name 07/20/20 1026             Motor Skills Assessment/Interventions    Additional Documentation  Therapeutic Exercise (Group)  -CJ      Recorded by [CJ] Lacho Edwards COTA/L 07/20/20 1144      Row Name 07/20/20 1047 07/20/20 1026          Therapeutic Exercise    Upper Extremity (Therapeutic Exercise)  --  bicep curl, bilateral;wand exercises  -CJ     Upper Extremity Range of Motion (Therapeutic Exercise)  --  elbow flexion/extension, bilateral;wrist flexion/extension, bilateral  -CJ     Lower Extremity (Therapeutic Exercise)  marching while seated;LAQ (long arc quad), bilateral  -NW  --     Lower Extremity Range of Motion (Therapeutic Exercise)  ankle dorsiflexion/plantar flexion, bilateral;hip abduction/adduction, bilateral  -NW  --     Weight/Resistance (Therapeutic Exercise)  --  2 pounds;3 pounds  -CJ     Exercise Type (Therapeutic Exercise)  AROM (active range of motion)  -NW  AROM (active range of motion)  -CJ     Position (Therapeutic Exercise)  seated  -NW  seated  -CJ     Sets/Reps (Therapeutic Exercise)  --  2 sets x 15 reps!  -CJ     Equipment (Therapeutic Exercise)  --  dowel shahid/wand;free weight, barbell  -CJ     Expected Outcome (Therapeutic Exercise)  --  facilitate normal movement patterns;improve functional stability;improve functional tolerance, self-care activity;improve motor control;improve neuromuscular control;improve object manipulation, self-care activity;improve performance, BADLs;improve performance, fine motor coordination skills;improve performance, gait skills;improve performance, transfer skills;improve postural control;increase active range of motion  -CJ     Recorded by [NW] Nohemy Fraire, PTA 07/20/20 1114 [CJ] Lacho Edwards COTA/L 07/20/20 1144     Row Name 07/20/20 1047 07/20/20 1026          Positioning and Restraints    Pre-Treatment Position  sitting in chair/recliner   -NW  sitting in chair/recliner  -CJ     Post Treatment Position  chair  -NW  chair  -CJ     In Chair  call light within reach;exit alarm on;encouraged to call for assist;reclined  -NW  reclined;sitting;call light within reach;encouraged to call for assist;legs elevated  -CJ     Recorded by [NW] Nohemy Fraire, PTA 07/20/20 1114 [CJ] Lacho Edwards, MEDRANO/L 07/20/20 1144     Row Name 07/20/20 1026             Pain Assessment    Additional Documentation  Pain Scale 2: Word Pre/Post-Treatment (Group)  -CJ      Recorded by [CJ] Lacho Edwards MEDRANO/L 07/20/20 1144      Row Name 07/20/20 1047 07/20/20 1026          Pain Scale: Numbers Pre/Post-Treatment    Pain Scale: Numbers, Pretreatment  0/10 - no pain  -NW  0/10 - no pain  -CJ     Pain Scale: Numbers, Post-Treatment  0/10 - no pain  -NW  0/10 - no pain  -CJ     Pain Intervention(s)  --  Repositioned;Rest  -CJ     Recorded by [NW] Nohemy Fraire, PTA 07/20/20 1114 [CJ] Lacho Edwards MEDRANO/L 07/20/20 1144     Row Name 07/20/20 0932             Pain Scale: FACES Pre/Post-Treatment    Pain: FACES Scale, Pretreatment  0-->no hurt  -MB      Recorded by [MB] Anjelica Gonzales CCC-SLP 07/20/20 0943      Row Name 07/20/20 1026             Outcome Summary/Treatment Plan (OT)    Daily Summary of Progress (OT)  progress towards functional goals is fair  -CJ      Barriers to Overall Progress (OT)  Fall  -CJ      Plan for Continued Treatment (OT)  continue with ot poc!  -CJ      Recorded by [CJ] Lacho Edwards MEDRANO/L 07/20/20 1144      Row Name 07/20/20 0932             Outcome Summary/Treatment Plan (SLP)    Daily Summary of Progress (SLP)  progress toward functional goals as expected  -MB      Barriers to Overall Progress (SLP)  Poor appetite  -MB      Plan for Continued Treatment (SLP)  Continue to follow  -MB      Anticipated Dischage Disposition (SLP)  assisted living facility ([facility])  -MB      Recorded by [MB] Anjelica Gonzales CCC-SLP 07/20/20 0917         User Key  (r) = Recorded By, (t) = Taken By, (c) = Cosigned By    Initials Name Effective Dates Discipline    Anjelica Barron, CCC-SLP 08/02/16 -  SLP    Lacho Red, MEDRANO/L 08/02/16 -  OT    NW Nohemy Fraire, PTA 08/02/16 -  PT           Rehab Goal Summary     Row Name 07/20/20 0932             Oral Nutrition/Hydration Goal 1 (SLP)    Oral Nutrition/Hydration Goal 1, SLP  Pt will tolerate LRD without s/s of aspiration  -MB      Time Frame (Oral Nutrition/Hydration Goal 1, SLP)  short term goal (STG);by discharge  -MB      Barriers (Oral Nutrition/Hydration Goal 1, SLP)  Poor appetite  -MB      Progress/Outcomes (Oral Nutrition/Hydration Goal 1, SLP)  continuing progress toward goal  -MB        User Key  (r) = Recorded By, (t) = Taken By, (c) = Cosigned By    Initials Name Provider Type Discipline    Anjelica Barron, CCC-SLP Speech and Language Pathologist SLP        Occupational Therapy Education                 Title: PT OT SLP Therapies (Done)     Topic: Occupational Therapy (Done)     Point: ADL training (Done)     Description:   Instruct learner(s) on proper safety adaptation and remediation techniques during self care or transfers.   Instruct in proper use of assistive devices.              Learning Progress Summary           Patient Acceptance, E, VU by MM at 7/17/2020 1038    Comment:  OT role, benefits, POC, d/c planning                   Point: Home exercise program (Done)     Description:   Instruct learner(s) on appropriate technique for monitoring, assisting and/or progressing therapeutic exercises/activities.              Learning Progress Summary           Patient Acceptance, E,TB, VU,DU,NR by JOHN at 7/20/2020 1144    Comment:  Pt. performed ue exs with frequent rests !                   Point: Precautions (Done)     Description:   Instruct learner(s) on prescribed precautions during self-care and functional transfers.              Learning Progress Summary            Patient Acceptance, E,TB, VU,DU,NR by  at 7/20/2020 1144    Comment:  Pt. performed ue exs with frequent rests !    Acceptance, E, VU by  at 7/17/2020 1038    Comment:  OT role, benefits, POC, d/c planning                   Point: Body mechanics (Done)     Description:   Instruct learner(s) on proper positioning and spine alignment during self-care, functional mobility activities and/or exercises.              Learning Progress Summary           Patient Acceptance, E,TB, VU,DU,NR by  at 7/20/2020 1144    Comment:  Pt. performed ue exs with frequent rests !    Acceptance, E, VU by MM at 7/17/2020 1038    Comment:  OT role, benefits, POC, d/c planning                               User Key     Initials Effective Dates Name Provider Type Discipline     08/02/16 -  Lacho Edwards COTA/L Occupational Therapy Assistant OT     07/05/20 -  John Ordoñez, BERNY/L Occupational Therapist OT                OT Recommendation and Plan  Outcome Summary/Treatment Plan (OT)  Daily Summary of Progress (OT): progress towards functional goals is fair  Barriers to Overall Progress (OT): Fall  Plan for Continued Treatment (OT): continue with ot poc!  Daily Summary of Progress (OT): progress towards functional goals is fair  Plan of Care Review  Plan of Care Reviewed With: patient  Plan of Care Reviewed With: patient  Outcome Measures     Row Name 07/20/20 1026             How much help from another is currently needed...    Putting on and taking off regular lower body clothing?  2  -CJ      Bathing (including washing, rinsing, and drying)  2  -CJ      Toileting (which includes using toilet bed pan or urinal)  2  -CJ      Putting on and taking off regular upper body clothing  3  -CJ      Taking care of personal grooming (such as brushing teeth)  3  -CJ      Eating meals  3  -CJ      AM-PAC 6 Clicks Score (OT)  15  -CJ         Functional Assessment    Outcome Measure Options  AM-PAC 6 Clicks Daily Activity (OT)  -CJ         User Key  (r) = Recorded By, (t) = Taken By, (c) = Cosigned By    Initials Name Provider Type    Lacho Red COTA/L Occupational Therapy Assistant           Time Calculation:   Time Calculation- OT     Row Name 07/20/20 1117 07/20/20 1026          Time Calculation- OT    OT Start Time  --  1026  -     OT Stop Time  --  1053  -     OT Time Calculation (min)  --  27 min  -     Total Timed Code Minutes- OT  --  27 minute(s)  -     TCU Minutes- OT  --  27 min  -     OT Received On  --  07/20/20  -     OT Goal Re-Cert Due Date  --  07/27/20  -        Timed Charges    60763 - Gait Training Minutes   13  -NW  --       User Key  (r) = Recorded By, (t) = Taken By, (c) = Cosigned By    Initials Name Provider Type     Lacho Edwards COTA/L Occupational Therapy Assistant    NW Nohemy Fraire, PTA Physical Therapy Assistant        Therapy Charges for Today     Code Description Service Date Service Provider Modifiers Qty    69070157884 HC OT THER PROC EA 15 MIN 7/20/2020 Lacho Edwards COTA/L GO 2               TENA Hopper  7/20/2020

## 2020-07-20 NOTE — PROGRESS NOTES
Continued Stay Note   [place]     Patient Name: Sunny Lacey  MRN: [id number]  Today's Date: 7/20/2020    Admit Date: 7/8/2020    Discharge Plan     Row Name 07/20/20 1404       Plan    Plan  [facility]    Patient/Family in Agreement with Plan  yes    Plan Comments  --    Final Note  Informed son Cristino [phone] of pt's acceptance at [facility] unit today, they want pt there by 5pm if possible.  [facility] would not offer pt a bed.  Will inform son that we have no d/c yet but trying to inform MD to complete.     Row Name 07/20/20 7766       Plan    Plan  Patient has been accepted at [facility]. Nurses can call report to [phone].  Fax DC information to [phone]. If patient still on Voltarin gel she will need to bring that with her.         Discharge Codes    No documentation.       Expected Discharge Date and Time     Expected Discharge Date Expected Discharge Time    Jul 20, 2020             ESTELA Romero

## 2020-07-20 NOTE — DISCHARGE SUMMARY
"[facility]   DISCHARGE SUMMARY       Date of Admission: 7/8/2020  Date of Discharge:  7/20/2020  Primary Care Physician: Nevaeh Thornton PA    Presenting Problem/History of Present Illness:  Sepsis (CMS/HCC) [A41.9]     Final Discharge Diagnoses:  Active Hospital Problems    Diagnosis   • At risk for falls   • Hypokalemia   • Sepsis (CMS/HCC)   • Pneumonia due to infectious organism       Consults: neurology, gastroenterology, inf dz, palliative care    Procedures Performed: none    Pertinent Test Results: see chart    Chief Complaint on Day of Discharge: \"im ok\"    History of Present Illness on Day of Discharge: recent poor appetitie improved     Hospital Course:  The patient is a 89 y.o. female who presented to [facility] with signs of sepsis with mental status changes, 103 temp and evidence of pneumonia on ct ct scan. She did respond to antbx with diminished temp and improved mental status. She was seen by inf disease and her antbx were diurected by them. Her appetite was poor and so she was tx with appetitie stimulants with improvement in her po intake. She was seen by gi without recs as well as neurology. Palliative saw the patient and was helpful in guiding the family in making decisions regarding feeding tube and code status. They poa desired swing bed placement and she was accpeted by [facility] swing bed.      Condition on Discharge:  stable    Physical Exam on Discharge:  /48 (BP Location: Left arm, Patient Position: Lying) Comment: nurse notified  Pulse 102   Temp 97.8 °F (36.6 °C) (Oral)   Resp 28 Comment: nurse notified  Ht 170.2 cm (67\")   Wt 76.2 kg (168 lb 1.6 oz)   SpO2 95%   BMI 26.33 kg/m²   Physical Exam   Nursing note and vitals reviewed.        Discharge Disposition:  Rehab Facility or Unit (DC - External)    Discharge Medications:     Discharge Medications      New Medications      Instructions Start Date   megestrol 40 MG/ML suspension  Commonly " known as:  MEGACE   800 mg, Oral, Daily   Start Date:  July 21, 2020     mirtazapine 15 MG tablet  Commonly known as:  REMERON   15 mg, Oral, Nightly      potassium chloride 10 MEQ CR capsule  Commonly known as:  MICRO-K   20 mEq, Oral, 2 Times Daily With Meals         Changes to Medications      Instructions Start Date   ALPRAZolam 0.25 MG tablet  Commonly known as:  XANAX  What changed:  Another medication with the same name was removed. Continue taking this medication, and follow the directions you see here.   0.25 mg, Oral, Every Night at Bedtime         Continue These Medications      Instructions Start Date   acetaminophen 325 MG tablet  Commonly known as:  TYLENOL   650 mg, Oral, 2 times daily      albuterol sulfate  (90 Base) MCG/ACT inhaler  Commonly known as:  PROVENTIL HFA;VENTOLIN HFA;PROAIR HFA   2 puffs, Inhalation, Every 4 Hours PRN      amLODIPine 5 MG tablet  Commonly known as:  NORVASC   5 mg, Oral, Daily      bimatoprost 0.01 % ophthalmic drops  Commonly known as:  LUMIGAN   1 drop, Both Eyes, Nightly      brimonidine-timolol 0.2-0.5 % ophthalmic solution  Commonly known as:  COMBIGAN   1 drop, Right Eye, Every 12 Hours      CALCIUM 1200 PO   600 mg, Oral, 2 times daily      diclofenac 1 % gel gel  Commonly known as:  VOLTAREN   4 g, Topical, 4 Times Daily PRN      docusate sodium 100 MG capsule  Commonly known as:  COLACE   100 mg, Oral, Daily      ferrous sulfate 325 (65 FE) MG tablet   325 mg, Oral, Daily With Breakfast      lidocaine 4 % cream  Commonly known as:  LMX   1 application, Topical, 4 Times Daily PRN         Stop These Medications    cefdinir 300 MG capsule  Commonly known as:  OMNICEF     cetirizine 10 MG tablet  Commonly known as:  zyrTEC     citalopram 20 MG tablet  Commonly known as:  CeleXA     furosemide 20 MG tablet  Commonly known as:  LASIX     HYDROcodone-acetaminophen  MG per tablet  Commonly known as:  NORCO     potassium chloride 10 MEQ CR tablet  Commonly  known as:  CHAI PEÑA     pregabalin 100 MG capsule  Commonly known as:  LYRICA            Discharge Diet:   As tolerated  Activity at Discharge:   As toleratd  Discharge Care Plan/Instructions: take meds as prescribed--suggest cbc and cmp one week    Follow-up Appointments:   No future appointments.    Test Results Pending at Discharge: none    Phong Joseph MD  07/20/20  14:31    Time: 2:38pm

## 2020-07-20 NOTE — DISCHARGE PLACEMENT REQUEST
"Deann Srivastava Y (89 y.o. Female)     Date of Birth Social Security Number Address Home Phone MRN    09/28/1921  [facility] [id number] [phone] [id number]    [employer] Marital Status          Orthodox        Admission Date Admission Type Admitting Provider Attending Provider Department, Room/Bed    7/8/20 Urgent Phong Joseph MD Staton, Thomas Waldon, MD [facility] 4B, 408/1    Discharge Date Discharge Disposition Discharge Destination         Rehab Facility or Unit (DC - External)              Attending Provider:  Phong Joseph MD    Allergies:  No Known Allergies    Isolation:  None   Infection:  None   Code Status:  No CPR    Ht:  170.2 cm (67\")   Wt:  76.2 kg (168 lb 1.6 oz)    Admission Cmt:  None   Principal Problem:  None                Active Insurance as of 7/8/2020     Primary Coverage     Payor Plan Insurance Group Employer/Plan Group    MEDICARE MEDICARE A & B      Payor Plan Address Payor Plan Phone Number Payor Plan Fax Number Effective Dates    [address] [phone]  9/1/1986 - None Entered    [facility] [id number]       Subscriber Name Subscriber Birth Date Member ID       DEANN SOUSA Y 9/28/1921 [id number]           Secondary Coverage     Payor Plan Insurance Group Employer/Plan Group    [facility] MEDICAID      Payor Plan Address Payor Plan Phone Number Payor Plan Fax Number Effective Dates    [address] [address] [phone]  7/7/2020 - None Entered    [facility] [address]       Subscriber Name Subscriber Birth Date Member ID       DEANN SOUSA Y 9/28/1921 [id number]                 Emergency Contacts      (Rel.) Home Phone Work Phone Mobile Phone    DeepthiCristino (Power of ) -- -- [phone]    Dominic Sousa (Son) [phone] -- [phone]               Discharge Summary      Phong Joseph MD at 07/20/20 1431          [facility]   DISCHARGE SUMMARY " "      Date of Admission: 7/8/2020  Date of Discharge:  7/20/2020  Primary Care Physician: Nevaeh Thornton PA    Presenting Problem/History of Present Illness:  Sepsis (CMS/[facility]) [A41.9]     Final Discharge Diagnoses:  Active Hospital Problems    Diagnosis   • At risk for falls   • Hypokalemia   • Sepsis (CMS/[facility])   • Pneumonia due to infectious organism       Consults: neurology, gastroenterology, inf dz, palliative care    Procedures Performed: none    Pertinent Test Results: see chart    Chief Complaint on Day of Discharge: \"im ok\"    History of Present Illness on Day of Discharge: recent poor appetitie improved     Hospital Course:  The patient is a 89 y.o. female who presented to [facility] with signs of sepsis with mental status changes, 103 temp and evidence of pneumonia on ct ct scan. She did respond to antbx with diminished temp and improved mental status. She was seen by inf disease and her antbx were diurected by them. Her appetite was poor and so she was tx with appetitie stimulants with improvement in her po intake. She was seen by gi without recs as well as neurology. Palliative saw the patient and was helpful in guiding the family in making decisions regarding feeding tube and code status. They poa desired swing bed placement and she was accpeted by [facility] swing bed.      Condition on Discharge:  stable    Physical Exam on Discharge:  /48 (BP Location: Left arm, Patient Position: Lying) Comment: nurse notified  Pulse 102   Temp 97.8 °F (36.6 °C) (Oral)   Resp 28 Comment: nurse notified  Ht 170.2 cm (67\")   Wt 76.2 kg (168 lb 1.6 oz)   SpO2 95%   BMI 26.33 kg/m²    Physical Exam   Nursing note and vitals reviewed.        Discharge Disposition:  Rehab Facility or Unit (DC - External)    Discharge Medications:     Discharge Medications      New Medications      Instructions Start Date   megestrol 40 MG/ML suspension  Commonly known as:  MEGACE   800 mg, Oral, Daily   " Start Date:  July 21, 2020     mirtazapine 15 MG tablet  Commonly known as:  REMERON   15 mg, Oral, Nightly      potassium chloride 10 MEQ CR capsule  Commonly known as:  MICRO-K   20 mEq, Oral, 2 Times Daily With Meals         Changes to Medications      Instructions Start Date   ALPRAZolam 0.25 MG tablet  Commonly known as:  XANAX  What changed:  Another medication with the same name was removed. Continue taking this medication, and follow the directions you see here.   0.25 mg, Oral, Every Night at Bedtime         Continue These Medications      Instructions Start Date   acetaminophen 325 MG tablet  Commonly known as:  TYLENOL   650 mg, Oral, 2 times daily      albuterol sulfate  (90 Base) MCG/ACT inhaler  Commonly known as:  PROVENTIL HFA;VENTOLIN HFA;PROAIR HFA   2 puffs, Inhalation, Every 4 Hours PRN      amLODIPine 5 MG tablet  Commonly known as:  NORVASC   5 mg, Oral, Daily      bimatoprost 0.01 % ophthalmic drops  Commonly known as:  LUMIGAN   1 drop, Both Eyes, Nightly      brimonidine-timolol 0.2-0.5 % ophthalmic solution  Commonly known as:  COMBIGAN   1 drop, Right Eye, Every 12 Hours      CALCIUM 1200 PO   600 mg, Oral, 2 times daily      diclofenac 1 % gel gel  Commonly known as:  VOLTAREN   4 g, Topical, 4 Times Daily PRN      docusate sodium 100 MG capsule  Commonly known as:  COLACE   100 mg, Oral, Daily      ferrous sulfate 325 (65 FE) MG tablet   325 mg, Oral, Daily With Breakfast      lidocaine 4 % cream  Commonly known as:  LMX   1 application, Topical, 4 Times Daily PRN         Stop These Medications    cefdinir 300 MG capsule  Commonly known as:  OMNICEF     cetirizine 10 MG tablet  Commonly known as:  zyrTEC     citalopram 20 MG tablet  Commonly known as:  CeleXA     furosemide 20 MG tablet  Commonly known as:  LASIX     HYDROcodone-acetaminophen  MG per tablet  Commonly known as:  NORCO     potassium chloride 10 MEQ CR tablet  Commonly known as:  K-DUR,KLOR-CON     pregabalin  100 MG capsule  Commonly known as:  LYRICA            Discharge Diet:   As tolerated  Activity at Discharge:   As toleratd  Discharge Care Plan/Instructions: take meds as prescribed--suggest cbc and cmp one week    Follow-up Appointments:   No future appointments.    Test Results Pending at Discharge: none    Phong Joseph MD  07/20/20  14:31    Time: 2:38pm            Electronically signed by Phong Joseph MD at 07/20/20 2182

## 2020-07-20 NOTE — PROGRESS NOTES
Continued Stay Note  [facility]     Patient Name: Sunny Lacey  MRN: [id number]  Today's Date: 7/20/2020    Admit Date: 7/8/2020    Discharge Plan     Row Name 07/20/20 0824       Plan    Plan  [facility]- Will give referral to [facility] at [facility]    Patient/Family in Agreement with Plan  yes    Plan Comments  Pt will return to [facility] at discharge.  Pt can either go skilled or hospice. Will follow. [phone] 0827- Order received for [facility] or [facility], will send referral and inform if bed offered.     1008- [facility] offered pt a bed, informed Son- Cristino Lacey [phone].  They want answer from [facility] and [facility] bed before taking bed.  Informed son this SW wished she had known wishes to change mind before today so plan could be worked on sooner.  Left message for Elayne at [facility] to call back ASAP and spoke with Ramonita from [facility] which still does not plan to offer a bed but will call back decision. Will follow.         Discharge Codes    No documentation.             ESTLEA Romero

## 2020-07-20 NOTE — DISCHARGE PLACEMENT REQUEST
"  Jossy Bejarano [phone]  Deann Sousa Y (89 y.o. Female)     Date of Birth Social Security Number Address Home Phone MRN    09/28/1921  [facility] [address] [phone] [id number]    [employer] Marital Status          Voodoo        Admission Date Admission Type Admitting Provider Attending Provider Department, Room/Bed    7/8/20 Urgent Phong Joseph MD [place], Thomas Waldon, MD [facility] 4B, 408/1    Discharge Date Discharge Disposition Discharge Destination                       Attending Provider:  Phong Joseph MD    Allergies:  No Known Allergies    Isolation:  None   Infection:  None   Code Status:  No CPR    Ht:  170.2 cm (67\")   Wt:  76.2 kg (168 lb 1.6 oz)    Admission Cmt:  None   Principal Problem:  None                Active Insurance as of 7/8/2020     Primary Coverage     Payor Plan Insurance Group Employer/Plan Group    MEDICARE MEDICARE A & B      Payor Plan Address Payor Plan Phone Number Payor Plan Fax Number Effective Dates    [address] [phone]  9/1/1986 - None Entered    [facility] [id number]       Subscriber Name Subscriber Birth Date Member ID       DEANN SOUSA Y 9/28/1921 [id number]           Secondary Coverage     Payor Plan Insurance Group Employer/Plan Group    [place]      Payor Plan Address Payor Plan Phone Number Payor Plan Fax Number Effective Dates    [address] [address] [phone]  7/7/2020 - None Entered    [facility] [address]       Subscriber Name Subscriber Birth Date Member ID       DEANN SOUSA Y 9/28/1921 [id number]                 Emergency Contacts      (Rel.) Home Phone Work Phone Mobile Phone    Cristino Sousa (Power of ) -- -- [phone]    Dominic Sousa (Son) [phone] -- [phone]               History & Physical      Phong Joseph MD at 07/08/20 0741          [facility]  HISTORY AND PHYSICAL    Date of Admission: " "7/8/2020  Primary Care Physician: Nevaeh Thornton PA    Subjective    Chief Complaint: \"she has pneumonia\"    This is a 89 yr old lady resident of assisted living in [facility] who presentd to the [facility] ed with fever 103, tachypnea and evidence of sepsis. Ct chest confirmed bilateral pneumonia and she was transferred her for sepsis care. Covid 19 testing was neg.      Review of Systems   Constitutional: Positive for activity change, chills, fatigue and fever.   HENT: Negative.    Eyes: Negative.    Respiratory: Positive for cough.    Cardiovascular: Negative.    Gastrointestinal: Negative.    Endocrine: Negative.    Genitourinary: Negative.    Musculoskeletal: Negative.    Skin: Negative.    Allergic/Immunologic: Negative.    Neurological: Negative.    Hematological: Negative.    Psychiatric/Behavioral: Negative.         Otherwise complete ROS reviewed and negative except as mentioned in the HPI.      Past Medical History:   Past Medical History:   Diagnosis Date   • Anemia    • Anxiety    • Arthritis    • Hypertension    • Skin cancer        Past Surgical History:  Past Surgical History:   Procedure Laterality Date   • SKIN CANCER EXCISION         Social History:  reports that she has never smoked. She does not have any smokeless tobacco history on file. She reports that she does not drink alcohol or use drugs.    Family History: family history is not on file.     Allergies:  No Known Allergies    Medications:  Prior to Admission medications    Medication Sig Start Date End Date Taking? Authorizing Provider   acetaminophen (TYLENOL) 500 MG tablet Take 650 mg by mouth.   Yes Milton Diaz MD   ALPRAZolam (XANAX) 0.5 MG tablet Take 0.5 mg by mouth Daily.   Yes Milton Diaz MD   amLODIPine (NORVASC) 5 MG tablet Take 5 mg by mouth Daily.   Yes Milton Diaz MD   bimatoprost (LUMIGAN) 0.01 % ophthalmic drops Administer 1 drop to both eyes Every Night.   Yes Milton Diaz MD   " "  brimonidine-timolol (COMBIGAN) 0.2-0.5 % ophthalmic solution    Yes Milton Diaz MD   Calcium Carbonate-Vit D-Min (CALCIUM 1200 PO) Take 600 mg by mouth 2 (two) times a day.   Yes Provider, Historical, MD   capsaicin (ZOSTRIX) 0.075 % topical cream Apply  topically to the appropriate area as directed 3 (Three) Times a Day.   Yes Provider, Historical, MD   citalopram (CeleXA) 20 MG tablet Take 20 mg by mouth Daily.   Yes Provider, Historical, MD   furosemide (LASIX) 20 MG tablet Take 20 mg by mouth 2 (Two) Times a Day.   Yes Provider, Historical, MD   HYDROcodone-acetaminophen (NORCO) 5-325 MG per tablet Take 1 tablet by mouth Every 6 (Six) Hours As Needed.   Yes Provider, Historical, MD   potassium chloride (K-DUR,KLOR-CON) 10 MEQ CR tablet Take 10 mEq by mouth Daily.   Yes Provider, Historical, MD   pregabalin (LYRICA) 100 MG capsule Take 100 mg by mouth 3 (Three) Times a Day.   Yes Milton Diaz MD       Objective    Vital Signs: /50 (BP Location: Right arm, Patient Position: Lying)   Pulse 99   Temp 98.1 °F (36.7 °C) (Oral)   Resp 18   Ht 170.2 cm (67\")   Wt 81.7 kg (180 lb 3.2 oz)   SpO2 95%   BMI 28.22 kg/m²    Physical Exam   Constitutional: She is oriented to person, place, and time. She appears well-developed and well-nourished.   HENT:   Head: Normocephalic and atraumatic.   Right Ear: External ear normal.   Left Ear: External ear normal.   Nose: Nose normal.   Mouth/Throat: Oropharynx is clear and moist.   Eyes: Pupils are equal, round, and reactive to light. Conjunctivae and EOM are normal.   Neck: Normal range of motion. Neck supple.   Cardiovascular: Regular rhythm, normal heart sounds and intact distal pulses.   Pulmonary/Chest: Effort normal and breath sounds normal.   Abdominal: Soft. Bowel sounds are normal.   Musculoskeletal: Normal range of motion.   Neurological: She is alert and oriented to person, place, and time.   Skin: Skin is warm and dry. Capillary refill " takes less than 2 seconds.   Psychiatric: She has a normal mood and affect. Her behavior is normal. Judgment and thought content normal.   Nursing note and vitals reviewed.      Results Reviewed:  Lab Results (last 24 hours)     Procedure Component Value Units Date/Time    Respiratory Panel, PCR - Swab, Nasopharynx [[id number]]  (Normal) Collected:  07/08/20 0505    Specimen:  Swab from Nasopharynx Updated:  07/08/20 0652     ADENOVIRUS, PCR Not Detected     Coronavirus 229E Not Detected     Coronavirus HKU1 Not Detected     Coronavirus NL63 Not Detected     Coronavirus OC43 Not Detected     Human Metapneumovirus Not Detected     Human Rhinovirus/Enterovirus Not Detected     Influenza B PCR Not Detected     Parainfluenza Virus 1 Not Detected     Parainfluenza Virus 2 Not Detected     Parainfluenza Virus 3 Not Detected     Parainfluenza Virus 4 Not Detected     Bordetella pertussis pcr Not Detected     Influenza A H1 2009 PCR Not Detected     Chlamydophila pneumoniae PCR Not Detected     Mycoplasma pneumo by PCR Not Detected     Influenza A PCR Not Detected     Influenza A H3 Not Detected     Influenza A H1 Not Detected     RSV, PCR Not Detected     Bordetella parapertussis PCR Not Detected    Narrative:       The coronavirus on the RVP is NOT COVID-19 and is NOT indicative of infection with COVID-19.     CBC & Differential [[id number]] Collected:  07/08/20 0445    Specimen:  Blood Updated:  07/08/20 0520    Narrative:       The following orders were created for panel order CBC & Differential.  Procedure                               Abnormality         Status                     ---------                               -----------         ------                     CBC Auto Differential[[id number]]        Abnormal            Final result                 Please view results for these tests on the individual orders.    CBC Auto Differential [[id number]]  (Abnormal) Collected:  07/08/20 0445    Specimen:  Blood Updated:   07/08/20 0520     WBC 14.94 10*3/mm3      RBC 3.93 10*6/mm3      Hemoglobin 11.9 g/dL      Hematocrit 37.5 %      MCV 95.4 fL      MCH 30.3 pg      MCHC 31.7 g/dL      RDW 13.3 %      RDW-SD 46.8 fl      MPV 11.1 fL      Platelets 90 10*3/mm3     Manual Differential [[id number]]  (Abnormal) Collected:  07/08/20 0445    Specimen:  Blood Updated:  07/08/20 0520     Neutrophil % 61.0 %      Lymphocyte % 3.0 %      Monocyte % 5.0 %      Bands %  24.0 %      Metamyelocyte % 6.0 %      Atypical Lymphocyte % 1.0 %      Neutrophils Absolute 12.70 10*3/mm3      Lymphocytes Absolute 0.45 10*3/mm3      Monocytes Absolute 0.75 10*3/mm3      Polychromasia Slight/1+     WBC Morphology Normal     Platelet Estimate Decreased    Basic Metabolic Panel [[id number]]  (Abnormal) Collected:  07/08/20 0445    Specimen:  Blood Updated:  07/08/20 0508     Glucose 114 mg/dL      BUN 20 mg/dL      Creatinine 1.26 mg/dL      Sodium 145 mmol/L      Potassium 3.5 mmol/L      Chloride 105 mmol/L      CO2 30.0 mmol/L      Calcium 9.2 mg/dL      eGFR Non African Amer 39 mL/min/1.73      BUN/Creatinine Ratio 15.9     Anion Gap 10.0 mmol/L     Narrative:       GFR Normal >60  Chronic Kidney Disease <60  Kidney Failure <15      Lactic Acid, Plasma [[id number]]  (Normal) Collected:  07/08/20 0445    Specimen:  Blood Updated:  07/08/20 0508     Lactate 1.6 mmol/L     Blood Culture - Blood, Hand, Right [[id number]] Collected:  07/08/20 0445    Specimen:  Blood from Hand, Right Updated:  07/08/20 0455    Blood Culture - Blood, Arm, Right [[id number]] Collected:  07/08/20 0445    Specimen:  Blood from Arm, Right Updated:  07/08/20 0454    COVID PRE-OP / PRE-PROCEDURE SCREENING ORDER (NO ISOLATION) - Swab, Nasopharynx [[id number]] Collected:  07/08/20 0055    Specimen:  Swab from Nasopharynx Updated:  07/08/20 0154    Narrative:       The following orders were created for panel order COVID PRE-OP / PRE-PROCEDURE SCREENING ORDER (NO ISOLATION) - Swab,  Nasopharynx.  Procedure                               Abnormality         Status                     ---------                               -----------         ------                     COVID-19,CEPHEID,COR/LUIS/...[[id number]]  Normal              Final result                 Please view results for these tests on the individual orders.    COVID-19,CEPHEID,COR/LUIS/PAD IN-HOUSE(OR EMERGENT/ADD-ON),NP SWAB IN TRANSPORT MEDIA 3-4 HR TAT - Swab, Nasopharynx [[id number]]  (Normal) Collected:  07/08/20 0055    Specimen:  Swab from Nasopharynx Updated:  07/08/20 0154     COVID19 Not Detected    Narrative:       Fact sheet for providers: https://www.fda.gov/media/195771/download     Fact sheet for patients: https://www.fda.gov/media/948225/download        Imaging Results (Last 24 Hours)     ** No results found for the last 24 hours. **            Active Hospital Problems    Diagnosis   • Sepsis (CMS/[facility])   • Pneumonia due to infectious organism       Assessment / Plan  Antbx, monitor sepsis markers urine output, discussion with son regarding code status      Code Status: full code     I discussed the patient's findings and my recommendations with the son..    Estimated length of stay 4 days.    Phong Joseph MD   07/08/20   07:44    Electronically signed by Pohng Joseph MD at 07/08/20 0745         Current Facility-Administered Medications   Medication Dose Route Frequency Provider Last Rate Last Dose   • acetaminophen (TYLENOL) tablet 650 mg  650 mg Oral BID Phong Joseph MD   650 mg at 07/20/20 0813   • acetaminophen (TYLENOL) tablet 650 mg  650 mg Oral Q6H PRN Phong Joseph MD       • albuterol (PROVENTIL) nebulizer solution 0.083% 2.5 mg/3mL  2.5 mg Nebulization Q6H PRN Phong Joseph MD       • ALPRAZolam (XANAX) tablet 0.25 mg  0.25 mg Oral Daily PRN Phong Joseph MD   0.25 mg at 07/18/20 1416   • ALPRAZolam (XANAX) tablet 0.25 mg  0.25 mg Oral Nightly Phong Joseph  MD Ernesto   0.25 mg at 07/19/20 2024   • amLODIPine (NORVASC) tablet 10 mg  10 mg Oral Daily Phong Joseph MD   10 mg at 07/20/20 0812   • brimonidine-timolol (COMBIGAN) 0.2-0.5 % ophthalmic solution 1 drop  1 drop Right Eye Q12H Phong Joseph MD   1 drop at 07/20/20 0811   • diclofenac (VOLTAREN) 1 % gel 4 g  4 g Topical 4x Daily Phong Joseph MD   4 g at 07/20/20 0811   • docusate sodium (COLACE) capsule 100 mg  100 mg Oral Daily Phong Joseph MD   100 mg at 07/20/20 0812   • enoxaparin (LOVENOX) syringe 40 mg  40 mg Subcutaneous Q24H Phong Joseph MD   40 mg at 07/19/20 1727   • ferrous sulfate tablet 325 mg  325 mg Oral Daily With Breakfast Phong Joseph MD   325 mg at 07/20/20 0812   • glycerin (ADULT) rectal suppository 2 g  2 g Rectal Daily PRN Phong Joseph MD   2 g at 07/10/20 1128   • latanoprost (XALATAN) 0.005 % ophthalmic solution 1 drop  1 drop Both Eyes Nightly Phong Joseph MD   1 drop at 07/19/20 2024   • lidocaine (LMX) 4 % cream   Topical 4x Daily PRN Phong Joseph MD       • megestrol (MEGACE) 40 MG/ML suspension 800 mg  800 mg Oral Daily Phong Joseph MD   800 mg at 07/20/20 0812   • mirtazapine (REMERON) tablet 15 mg  15 mg Oral Nightly Phong Joseph MD   15 mg at 07/19/20 2023   • ondansetron (ZOFRAN) injection 4 mg  4 mg Intravenous Q6H PRN Phong Joseph MD   4 mg at 07/14/20 2126   • Pharmacy to Dose enoxaparin (LOVENOX)   Does not apply Continuous PRN Phong Joseph MD       • potassium chloride (MICRO-K) CR capsule 20 mEq  20 mEq Oral BID With Meals Oliver Starkey MD   20 mEq at 07/20/20 0812   • sodium chloride 0.9 % bolus 1,464 mL  30 mL/kg Intravenous PRN Phong Joseph MD       • sodium chloride 0.9 % flush 10 mL  10 mL Intravenous Q12H Phong Joseph MD   10 mL at 07/20/20 0813   • sodium chloride 0.9 % flush 10 mL  10 mL Intravenous PRN Opal  Phong Orozco MD         Operative/Procedure Notes (last 24 hours) (Notes from 07/19/20 0838 through 07/20/20 0838)    No notes of this type exist for this encounter.         Physician Progress Notes (last 24 hours) (Notes from 07/19/20 0839 through 07/20/20 0839)    No notes of this type exist for this encounter.         Consult Notes (last 24 hours) (Notes from 07/19/20 0839 through 07/20/20 0839)    No notes of this type exist for this encounter.         Nutrition Notes (last 24 hours) (Notes from 07/19/20 0839 through 07/20/20 0839)    No notes exist for this encounter.            Physical Therapy Notes (last 24 hours) (Notes from 07/19/20 0839 through 07/20/20 0839)      Bhumi Oakley PTA at 07/19/20 1205  Version 1 of 1         Problem: Patient Care Overview  Goal: Plan of Care Review  Outcome: Ongoing (interventions implemented as appropriate)  Flowsheets (Taken 7/19/2020 1204)  Outcome Summary: Pt. stands with CGA. Minimal assist from toilet. Ambulates w forward flexed posture, decreased step length, decreased speed. Ambulated 30' in room. Actively works thru [employer]. Will benefit from strengthening and safety activities.       Electronically signed by Bhumi Oakley PTA at 07/19/20 1205       Occupational Therapy Notes (last 24 hours) (Notes from 07/19/20 0839 through 07/20/20 0839)    No notes exist for this encounter.         Speech Language Pathology Notes (last 24 hours) (Notes from 07/19/20 0839 through 07/20/20 0839)    No notes exist for this encounter.         Respiratory Therapy Notes (last 24 hours) (Notes from 07/19/20 0839 through 07/20/20 0839)    No notes exist for this encounter.

## 2020-07-20 NOTE — DISCHARGE PLACEMENT REQUEST
"Deann Srivastava Y (89 y.o. Female)     Date of Birth Social Security Number Address Home Phone MRN    09/28/1921  [facility] [id number] [phone] [id number]    Yarsani Marital Status          Alevism        Admission Date Admission Type Admitting Provider Attending Provider Department, Room/Bed    7/8/20 Urgent Phong Joseph MD [place], Thomas Waldon, MD [facility] 4B, 408/1    Discharge Date Discharge Disposition Discharge Destination                       Attending Provider:  Phong Joseph MD    Allergies:  No Known Allergies    Isolation:  None   Infection:  None   Code Status:  No CPR    Ht:  170.2 cm (67\")   Wt:  76.2 kg (168 lb 1.6 oz)    Admission Cmt:  None   Principal Problem:  None                Active Insurance as of 7/8/2020     Primary Coverage     Payor Plan Insurance Group Employer/Plan Group    MEDICARE MEDICARE A & B      Payor Plan Address Payor Plan Phone Number Payor Plan Fax Number Effective Dates    [address] [phone]  9/1/1986 - None Entered    [facility] [id number]       Subscriber Name Subscriber Birth Date Member ID       DEANN LACEY Y 9/28/1921 [id number]           Secondary Coverage     Payor Plan Insurance Group Employer/Plan Group    [place]      Payor Plan Address Payor Plan Phone Number Payor Plan Fax Number Effective Dates    [address] [address] [phone]  7/7/2020 - None Entered    [facility] [id number]       Subscriber Name Subscriber Birth Date Member ID       DEANN LACEY Y 9/28/1921 [id number]                 Emergency Contacts      (Rel.) Home Phone Work Phone Mobile Phone    DiegoCristino dickey (Power of ) -- -- [phone]    Dominic Lacey (Son) [phone] -- [phone]        Waleska Qiu PTA Student   PTA Student   Physical Therapy   Plan of Care   Signed   Date of Service:  07/17/20 0938   Creation Time:  07/17/20 0938            "   Signed             Show:Clear all  []Manual[x]Template[]Copied    Added by:  [x]Waleska Qiu PTA Student    []Renzo for details     Problem: Patient Care Overview  Goal: Plan of Care Review  Flowsheets (Taken 7/17/2020 0936)  Outcome Summary: Pt up in chair. A-AAROM BUE and BLE x 15 sitting in chair. Pt amb around room 20 ft CGA w/ RW x1, back to chair. Pt c/o knees hurtinng when amb. Pt would benefit from cont PT for endurance, strength, and pain.               Cosigned by: Diamond Torres, PT DPT at 07/17/20 1002   Revision History

## 2020-07-20 NOTE — PLAN OF CARE
Problem: Patient Care Overview  Goal: Plan of Care Review  Flowsheets (Taken 7/20/2020 1115)  Progress: improving  Plan of Care Reviewed With: patient  Outcome Summary: Pt up in chair. AROM BLE x 20 sitting in chair. Sit-stand CGA with RW. Pt amb around room 15 x 2 CGA w/ RW x1. Pt sback to chair. Pt would benefit from cont PT for endurance, strength, and pain.

## 2020-07-20 NOTE — PLAN OF CARE
Problem: Patient Care Overview  Goal: Plan of Care Review  Outcome: Ongoing (interventions implemented as appropriate)  Flowsheets (Taken 7/20/2020 6360)  Outcome Summary: VSS. NO C/O PAIN THIS SHIFT. PT TURNED EVERY 2 HOURS. ASSIST X1 TO BEDSIDE COMMODE.  PT WAS ABLE TO DRINK PART OF HER PROTEIN SHAKE. S/ST  ON TELE. SAFETY MAINTAINED. CONTINUE TO MONITOR. WILL NOTIFY MD OF ANY CHANGES.

## 2020-07-20 NOTE — PLAN OF CARE
Problem: Patient Care Overview  Goal: Plan of Care Review  Outcome: Ongoing (interventions implemented as appropriate)  Flowsheets (Taken 7/20/2020 7644)  Progress: no change  Plan of Care Reviewed With: patient  Outcome Summary: Pt still has poor appetite, but agreed to one trial of mechanical soft solid and multiple trials of nectar thick liquids. Pt had decreased rotary chew with the soft solid, but doesn't have her bottom denture. She did have adequate clearance of oral residue. No overt s/s of aspiration were observed with nectar. She did state that she couldn't drink too much or she would get choked. Ok to continue mechanical soft solids and nectar thick liquids.

## 2020-07-20 NOTE — PLAN OF CARE
Problem: Patient Care Overview  Goal: Plan of Care Review  Outcome: Ongoing (interventions implemented as appropriate)  Flowsheets (Taken 7/20/2020 1145)  Progress: improving  Plan of Care Reviewed With: patient  Note:   Pt. Reclined in chair, states that she's cold! This gonzalez/l gets her a blanket! Pt. Requires frequent rests and vc's to complete tasks sec. Sob and fatigue! No issues, pt. Performs tasks with time/rests!

## 2020-07-20 NOTE — PROGRESS NOTES
"Palliative Care Daily Progress Note     other Chart review    Code Status:   Code Status and Medical Interventions:   Ordered at: 07/18/20 1923     Limited Support to NOT Include:    Artificial Nutrition    Cardioversion/Defibrillation    Dialysis    Intubation     Level Of Support Discussed With:    Health Care Surrogate    Next of Kin (If No Surrogate)    Patient     Code Status:    No CPR     Medical Interventions (Level of Support Prior to Arrest):    Limited     Comments:    sonCristino      Advanced Directives: Advance Directive Status: Patient does not have advance directive   Goals of Care: Ongoing.     S: Medical record reviewed. Events noted.  Seen by Dr. Hernandez, palliative care provider on 7/17, note reviewed. Appetite seems to be improving, no overt signs of aspiration per speech note.  Working with therapy.  CODE STATUS changes over the weekend.  Awaiting placement per social work note possibly [facility] versus [facility] bed versus back [facility].    O:       Intake/Output Summary (Last 24 hours) at 7/20/2020 1240  Last data filed at 7/20/2020 0536  Gross per 24 hour   Intake 480 ml   Output 650 ml   Net -170 ml       Diagnostics: Reviewed    Patient Active Problem List   Diagnosis   • Sepsis (CMS/[facility])   • Pneumonia due to infectious organism   • At risk for falls   • Hypokalemia       Physical Exam:    /48 (BP Location: Left arm, Patient Position: Lying) Comment: nurse notified  Pulse 102   Temp 97.8 °F (36.6 °C) (Oral)   Resp 28 Comment: nurse notified  Ht 170.2 cm (67\")   Wt 76.2 kg (168 lb 1.6 oz)   SpO2 95%   BMI 26.33 kg/m²   Assessment     1.  Bilateral pneumonia likely secondary to aspiration-resolving  2. Gait disturbance  3. Hypernatremia & Hyperchloremia  4. HTN     PPS  50%      Symptoms     1.  Dysphagia  2. Debility  3. Decreased appetite     Plan     1.  7/20 Appetite improving. Working with therapy. Awaiting discharge placement per SW.     -Palliative care will " follow peripherally and as needed.      7/17-Per Dr. Hernandez note-Her biggest barrier to discharge is currently her dysphasia and questionable aspiration.  Review of speech-language pathology notes indicate that her dysphasia has been somewhat persistent, but waxes and wanes with different consistencies during this admission.  It is likely that she has protected herself by choosing thicker foods at home.  Her appetite is also poor and she has been vague with her preferences for food.  I am curious how much of her poor p.o. intake is related to symptoms/fear of aspiration or simple poor appetite.  Her chest x-ray on admission showed right basilar opacities, which does favor aspiration.  Will defer to SLP and attending, though a modified barium swallow may be helpful to further delineate what consistencies of food and fluids actually create aspiration. I further wonder what a dedicated course of SLP therapy could offer her in terms of long-term stability.  2.  In speaking with her son, he states her weight has been stable and she has even complained about gaining weight recently.  BMI on admission was 25, which does not indicate a chronic picture of malnutrition.  Because of this, I would likely recommend further investigation and/or SLP before pursuing feeding tube.  Both she and her son indicate a feeding tube is not something she has looked on fondly in the past.  3.  She certainly has advanced age as she approaches her 89th birthday and her son states she has had thoughts previously about whether she would want to go on with aggressive treatments, but she has never made a decision regarding her CODE STATUS or overall course of treatment.  However, he is realistic about her age and long-term prognosis.  He plans to visit tomorrow in order to further delineate with her what her wishes would be.  In speaking with him, I learned that her  made most of her weighty decisions through her life and that she has never  had to make many difficult decisions on her own.  4. Regarding electrolyte abnormalities and poor PO intake, consider d/c IVF to determine what path this may take her down in terms of moving toward a baseline of electrolytes and renal function.         Thank you for allowing us to participate in patient's plan of care.     Time spent: 15 minutes spent reviewing medical and medication records, assessing and examining patient, discussing with family, answering questions, providing some guidance about a plan and documentation of care, and coordinating care with other healthcare members, with > 50% time spent face to face.       Anisha Dove, APRN  7/20/2020

## 2020-07-20 NOTE — THERAPY TREATMENT NOTE
Acute Care - Physical Therapy Treatment Note  [facility]     Patient Name: Sunny Lacey  : 1921  MRN: [id number]  Today's Date: 2020  Onset of Illness/Injury or Date of Surgery: 20     Referring Physician: Dr. Joseph    Admit Date: 2020    Visit Dx:    ICD-10-CM ICD-9-CM   1. Dysphagia, unspecified type R13.10 787.20   2. Impaired mobility Z74.09 799.89   3. Impaired mobility and ADLs Z74.09 V49.89    Z78.9      Patient Active Problem List   Diagnosis   • Sepsis (CMS/HCC)   • Pneumonia due to infectious organism   • At risk for falls   • Hypokalemia       Therapy Treatment    Rehabilitation Treatment Summary     Row Name 20 1047 20 0932          Treatment Time/Intention    Discipline  physical therapy assistant  -NW  speech language pathologist  -MB     Document Type  therapy note (daily note)  -NW  therapy note (daily note)  -MB     Subjective Information  no complaints  -NW2  no complaints  -MB     Mode of Treatment  --  speech-language pathology  -MB     Patient/Family Observations  --  No family present  -MB     Patient Effort  --  adequate  -MB     Existing Precautions/Restrictions  fall  -NW2  --     Recorded by [NW] Nohemy Fraire, PTA 20 1048  [NW2] Nohemy Fraire, PTA 20 1114 [MB] Anjelica Gonzales CCC-SLP 20 0943     Row Name 20 1047             Vital Signs    Intra SpO2 (%)  95  -NW      O2 Delivery Intra Treatment  room air  -NW      Post SpO2 (%)  98  -NW      O2 Delivery Post Treatment  room air  -NW      Intra Patient Position  Standing  -NW      Post Patient Position  Sitting  -NW      Recorded by [NW] Nohemy Fraire PTA 20 1114      Row Name 20 1047             Safety Issues, Functional Mobility    Safety Issues Affecting Function (Mobility)  ability to follow commands;awareness of need for assistance  -NW      Impairments Affecting Function (Mobility)  endurance/activity tolerance;pain;strength  -NW      Recorded  by [NW] Nohemy Fraire, PTA 07/20/20 1114      Row Name 07/20/20 1047             Bed Mobility Assessment/Treatment    Comment (Bed Mobility)  up in chair  -NW      Recorded by [NW] Nohemy Fraire, PTA 07/20/20 1114      Row Name 07/20/20 1047             Sit-Stand Transfer    Sit-Stand [place] (Transfers)  verbal cues;contact guard  -NW      Assistive Device (Sit-Stand Transfers)  walker, front-wheeled  -NW      Recorded by [NW] Nohemy Fraire, [facility] 07/20/20 1114      Row Name 07/20/20 1047             Stand-Sit Transfer    Stand-Sit [place] (Transfers)  verbal cues;contact guard  -NW      Assistive Device (Stand-Sit Transfers)  walker, front-wheeled  -NW      Recorded by [NW] Nohemy Fraire, [facility] 07/20/20 1114      Row Name 07/20/20 1047             Toilet Transfer    Type (Toilet Transfer)  stand-sit;sit-stand  -NW      [place] Level (Toilet Transfer)  contact guard;verbal cues  -NW      Assistive Device (Toilet Transfer)  walker, front-wheeled  -NW      Recorded by [NW] Nohemy Fraire, [facility] 07/20/20 1114      Row Name 07/20/20 1047             Gait/Stairs Assessment/Training    [place] Level (Gait)  contact guard  -NW      Assistive Device (Gait)  walker, front-wheeled  -NW      Distance in Feet (Gait)  15 x 2  -NW      Pattern (Gait)  step-through  -NW      Deviations/Abnormal Patterns (Gait)  gait speed decreased;stride length decreased  -NW      Bilateral Gait Deviations  forward flexed posture  -NW      Recorded by [NW] Nohemy Fraire, PTA 07/20/20 1114      Row Name 07/20/20 1047             Therapeutic Exercise    Lower Extremity (Therapeutic Exercise)  marching while seated;LAQ (long arc quad), bilateral  -NW      Lower Extremity Range of Motion (Therapeutic Exercise)  ankle dorsiflexion/plantar flexion, bilateral;hip abduction/adduction, bilateral  -NW      Exercise Type (Therapeutic Exercise)  AROM (active range of motion)  -NW      Position (Therapeutic Exercise)  seated   -NW      Recorded by [NW] Nohemy Frarie, [facility] 07/20/20 1114      Row Name 07/20/20 1047             Positioning and Restraints    Pre-Treatment Position  sitting in chair/recliner  -NW      Post Treatment Position  chair  -NW      In Chair  call light within reach;exit alarm on;encouraged to call for assist;reclined  -NW      Recorded by [NW] Nohemy Fraire, [facility] 07/20/20 1114      Row Name 07/20/20 1047             Pain Scale: Numbers Pre/Post-Treatment    Pain Scale: Numbers, Pretreatment  0/10 - no pain  -NW      Pain Scale: Numbers, Post-Treatment  0/10 - no pain  -NW      Recorded by [NW] Nohemy Fraire, [facility] 07/20/20 1114      Row Name 07/20/20 0932             Pain Scale: FACES Pre/Post-Treatment    Pain: FACES Scale, Pretreatment  0-->no hurt  -MB      Recorded by [MB] Anjelica Gonzales CCC-SLP 07/20/20 0943      Row Name 07/20/20 0932             Outcome Summary/Treatment Plan (SLP)    Daily Summary of Progress (SLP)  progress toward functional goals as expected  -MB      Barriers to Overall Progress (SLP)  Poor appetite  -MB      Plan for Continued Treatment (SLP)  Continue to follow  -MB      Anticipated Dischage Disposition (SLP)  assisted living facility (senior living)  -MB      Recorded by [MB] Anjelica Gonzales CCC-SLP 07/20/20 0943        User Key  (r) = Recorded By, (t) = Taken By, (c) = Cosigned By    Initials Name Effective Dates Discipline    Anjelica Barron CCC-SLP 08/02/16 -  SLP    NW Nohemy Fraire, [facility] 08/02/16 -  PT               Rehab Goal Summary     Row Name 07/20/20 0932             Oral Nutrition/Hydration Goal 1 (SLP)    Oral Nutrition/Hydration Goal 1, SLP  Pt will tolerate LRD without s/s of aspiration  -MB      Time Frame (Oral Nutrition/Hydration Goal 1, SLP)  short term goal (STG);by discharge  -MB      Barriers (Oral Nutrition/Hydration Goal 1, SLP)  Poor appetite  -MB      Progress/Outcomes (Oral Nutrition/Hydration Goal 1, SLP)  continuing progress toward goal  -MB         User Key  (r) = Recorded By, (t) = Taken By, (c) = Cosigned By    Initials Name Provider Type Discipline    Anjelica Barron, CCC-SLP Speech and Language Pathologist SLP          Physical Therapy Education                 Title: PT OT SLP Therapies (In Progress)     Topic: Physical Therapy (Done)     Point: Mobility training (Done)     Description:   Instruct learner(s) on safety and technique for assisting patient out of bed, chair or wheelchair.  Instruct in the proper use of assistive devices, such as walker, crutches, cane or brace.              Patient Friendly Description:   It's important to get you on your feet again, but we need to do so in a way that is safe for you. Falling has serious consequences, and your personal safety is the most important thing of all.        When it's time to get out of bed, one of us or a family member will sit next to you on the bed to give you support.     If your doctor or nurse tells you to use a walker, crutches, a cane, or a brace, be sure you use it every time you get out of bed, even if you think you don't need it.    Learning Progress Summary           Patient Acceptance, E,D, DU,VU by  at 7/13/2020 1039    Comment:  benefits of PT and POC, call for A OOB                   Point: Precautions (Done)     Description:   Instruct learner(s) on prescribed precautions during mobility and gait tasks              Learning Progress Summary           Patient Acceptance, E,D, DU,VU by  at 7/13/2020 1039    Comment:  benefits of PT and POC, call for A OOB                               User Key     Initials Effective Dates Name Provider Type Discipline     08/02/16 -  Michel Sanchez, PT Physical Therapist PT                PT Recommendation and Plan     Plan of Care Reviewed With: patient  Progress: improving  Outcome Summary: Pt up in chair. AROM BLE x 20 sitting in chair. Sit-stand CGA with RW. Pt amb around room 15 x 2 CGA w/ RW x1. Pt sback to chair. Pt would  benefit from cont PT for endurance, strength, and pain.     Time Calculation:   PT Charges     Row Name 07/20/20 1117             Time Calculation    Start Time  1047  -NW      Stop Time  1115  -NW      Time Calculation (min)  28 min  -NW      PT Received On  07/20/20  -NW      PT Goal Re-Cert Due Date  07/23/20  -NW         Time Calculation- PT    Total Timed Code Minutes- PT  28 minute(s)  -NW         Timed Charges    56525 - PT Therapeutic Exercise Minutes  15  -NW      74851 - Gait Training Minutes   13  -NW        User Key  (r) = Recorded By, (t) = Taken By, (c) = Cosigned By    Initials Name Provider Type    NW Nohemy Fraire PTA Physical Therapy Assistant        Therapy Charges for Today     Code Description Service Date Service Provider Modifiers Qty    32585669761 HC PT THER PROC EA 15 MIN 7/20/2020 Nohemy Fraire PTA GP 1    06985946153 HC GAIT TRAINING EA 15 MIN 7/20/2020 Nohemy Fraire PTA GP 1          PT G-Codes  Outcome Measure Options: AM-PAC 6 Clicks Daily Activity (OT)  AM-PAC 6 Clicks Score (PT): 15  AM-PAC 6 Clicks Score (OT): 15    Nohemy Fraire PTA  7/20/2020

## 2020-07-20 NOTE — THERAPY TREATMENT NOTE
Acute Care - Speech Language Pathology   Swallow Treatment Note [facility]     Patient Name: Sunny Lacey  : 1921  MRN: [id number]  Today's Date: 2020  Onset of Illness/Injury or Date of Surgery: 20     Referring Physician: Dr. Joseph      Admit Date: 2020  Pt still has poor appetite, but agreed to one trial of mechanical soft solid and multiple trials of nectar thick liquids. Pt had decreased rotary chew with the soft solid, but doesn't have her bottom denture. She did have adequate clearance of oral residue. No overt s/s of aspiration were observed with nectar. She did state that she couldn't drink too much or she would get choked. Ok to continue mechanical soft solids and nectar thick liquids.  Anjelica Gonzales CCC-SLP 2020 09:46    Visit Dx:      ICD-10-CM ICD-9-CM   1. Dysphagia, unspecified type R13.10 787.20   2. Impaired mobility Z74.09 799.89   3. Impaired mobility and ADLs Z74.09 V49.89    Z78.9      Patient Active Problem List   Diagnosis   • Sepsis (CMS/HCC)   • Pneumonia due to infectious organism   • At risk for falls   • Hypokalemia       Therapy Treatment  Rehabilitation Treatment Summary     Row Name 20 0932             Treatment Time/Intention    Discipline  speech language pathologist  -MB      Document Type  therapy note (daily note)  -MB      Subjective Information  no complaints  -MB      Mode of Treatment  speech-language pathology  -MB      Patient/Family Observations  No family present  -MB      Patient Effort  adequate  -MB      Recorded by [MB] Anjelica Gonzales CCC-SLP 2043      Row Name 2032             Pain Scale: FACES Pre/Post-Treatment    Pain: FACES Scale, Pretreatment  0-->no hurt  -MB      Recorded by [MB] Anjelica Gonzales CCC-SLP 20      Row Name 2032             Outcome Summary/Treatment Plan (SLP)    Daily Summary of Progress (SLP)  progress toward functional goals as expected  -MB       Barriers to Overall Progress (SLP)  Poor appetite  -MB      Plan for Continued Treatment (SLP)  Continue to follow  -MB      Anticipated Dischage Disposition (SLP)  assisted living facility ([facility])  -MB      Recorded by [MB] Anjelica Gonzales CCC-SLP 07/20/20 0943        User Key  (r) = Recorded By, (t) = Taken By, (c) = Cosigned By    Initials Name Effective Dates Discipline    Anjelica Barron CCC-SLP 08/02/16 -  SLP          Outcome Summary  Outcome Summary/Treatment Plan (SLP)  Daily Summary of Progress (SLP): progress toward functional goals as expected (07/20/20 0932 : Anjelica Gonzales CCC-SLP)  Barriers to Overall Progress (SLP): Poor appetite (07/20/20 0932 : Anjelica Gonzales CCC-SLP)  Plan for Continued Treatment (SLP): Continue to follow (07/20/20 0932 : Anjelica Gonzales CCC-SLP)  Anticipated Dischage Disposition (SLP): assisted living facility ([facility]) (07/20/20 0932 : Anjelica Gonzales CCC-SLP)      SLP GOALS     Row Name 07/20/20 0932             Oral Nutrition/Hydration Goal 1 (SLP)    Oral Nutrition/Hydration Goal 1, SLP  Pt will tolerate LRD without s/s of aspiration  -MB      Time Frame (Oral Nutrition/Hydration Goal 1, SLP)  short term goal (STG);by discharge  -MB      Barriers (Oral Nutrition/Hydration Goal 1, SLP)  Poor appetite  -MB      Progress/Outcomes (Oral Nutrition/Hydration Goal 1, SLP)  continuing progress toward goal  -MB        User Key  (r) = Recorded By, (t) = Taken By, (c) = Cosigned By    Initials Name Provider Type    Anjelica Barron CCC-SLP Speech and Language Pathologist          EDUCATION  The patient has been educated in the following areas:   Dysphagia (Swallowing Impairment).    SLP Recommendation and Plan  Daily Summary of Progress (SLP): progress toward functional goals as expected  Barriers to Overall Progress (SLP): Poor appetite  Plan for Continued Treatment (SLP): Continue to follow  Anticipated Dischage Disposition (SLP): assisted living facility  ([facility])                    Time Calculation:   Time Calculation- SLP     Row Name 07/20/20 0946             Time Calculation- SLP    SLP Start Time  0932  -MB      SLP Stop Time  0941  -MB      SLP Time Calculation (min)  9 min  -MB      SLP Received On  07/20/20  -MB        User Key  (r) = Recorded By, (t) = Taken By, (c) = Cosigned By    Initials Name Provider Type    Anjelica Barron CCC-SLP Speech and Language Pathologist          Therapy Charges for Today     Code Description Service Date Service Provider Modifiers Qty    [id number] HC ST TREATMENT SWALLOW 1 7/20/2020 Anjelica Gonzales CCC-SLP GN 1                 TERRY Tyler  7/20/2020

## 2020-07-20 NOTE — DISCHARGE PLACEMENT REQUEST
"Jossy Bejarano [phone]  Deann Sousa Y (89 y.o. Female)     Date of Birth Social Security Number Address Home Phone MRN    09/28/1921  [facility] [id number] [phone] [id number]    Christian Marital Status          Mandaen        Admission Date Admission Type Admitting Provider Attending Provider Department, Room/Bed    7/8/20 Urgent Phong Joseph MD [place], Thomas Waldon, MD [facility] 4B, 408/1    Discharge Date Discharge Disposition Discharge Destination                       Attending Provider:  Phong Joseph MD    Allergies:  No Known Allergies    Isolation:  None   Infection:  None   Code Status:  No CPR    Ht:  170.2 cm (67\")   Wt:  76.2 kg (168 lb 1.6 oz)    Admission Cmt:  None   Principal Problem:  None                Active Insurance as of 7/8/2020     Primary Coverage     Payor Plan Insurance Group Employer/Plan Group    MEDICARE MEDICARE A & B      Payor Plan Address Payor Plan Phone Number Payor Plan Fax Number Effective Dates    [address] [phone]  9/1/1986 - None Entered    [facility] [id number]       Subscriber Name Subscriber Birth Date Member ID       DEANN SOUSA Y 9/28/1921 [id number]           Secondary Coverage     Payor Plan Insurance Group Employer/Plan Group    [place]      Payor Plan Address Payor Plan Phone Number Payor Plan Fax Number Effective Dates    [address] [address] [phone]  7/7/2020 - None Entered    [facility] [id number]       Subscriber Name Subscriber Birth Date Member ID       DEANN SOUSA Y 9/28/1921 [id number]                 Emergency Contacts      (Rel.) Home Phone Work Phone Mobile Phone    Cristino Sousa (Power of ) -- -- [phone]    Dominic Sousa (Son) [phone] -- [phone]              Lab Results (last 24 hours)     Procedure Component Value Units Date/Time    Comprehensive Metabolic Panel [[id number]]  (Abnormal) Collected:  " 07/20/20 0535    Specimen:  Blood Updated:  07/20/20 0710     Glucose 92 mg/dL      BUN 21 mg/dL      Creatinine 0.75 mg/dL      Sodium 141 mmol/L      Potassium 5.0 mmol/L      Comment: Specimen hemolyzed.  Results may be affected.        Chloride 107 mmol/L      CO2 21.0 mmol/L      Calcium 10.2 mg/dL      Total Protein 6.6 g/dL      Albumin 3.90 g/dL      ALT (SGPT) 9 U/L      AST (SGOT) 14 U/L      Comment: Specimen hemolyzed.  Results may be affected.        Alkaline Phosphatase 66 U/L      Total Bilirubin 0.5 mg/dL      eGFR Non African Amer 71 mL/min/1.73      Globulin 2.7 gm/dL      A/G Ratio 1.4 g/dL      BUN/Creatinine Ratio 28.0     Anion Gap 13.0 mmol/L     Narrative:       GFR Normal >60  Chronic Kidney Disease <60  Kidney Failure <15      CBC (No Diff) [[id number]]  (Normal) Collected:  07/20/20 0411    Specimen:  Blood Updated:  07/20/20 0440     WBC 10.61 10*3/mm3      RBC 4.41 10*6/mm3      Hemoglobin 13.2 g/dL      Hematocrit 40.4 %      MCV 91.6 fL      MCH 29.9 pg      MCHC 32.7 g/dL      RDW 13.8 %      RDW-SD 45.1 fl      MPV 11.0 fL      Platelets 235 10*3/mm3            Physician Progress Notes (last 48 hours) (Notes from 07/18/20 1024 through 07/20/20 1024)      Phong Joseph MD at 07/19/20 0658        Ate 75% breakfast, 40%lunch and 75% of supper per nursing staff---no new nursing staff concerns--resting comfortably    Review of Systems   Constitutional: Positive for appetite change. Negative for activity change.   HENT: Negative.    Eyes: Negative.    Respiratory: Negative.    Cardiovascular: Negative.    Gastrointestinal: Negative.    Endocrine: Negative.    Genitourinary: Negative.    Musculoskeletal: Negative.    Skin: Negative.    Allergic/Immunologic: Negative.    Neurological: Negative.    Hematological: Negative.    Psychiatric/Behavioral: Positive for confusion and decreased concentration.     Temp:  [97.7 °F (36.5 °C)-98.2 °F (36.8 °C)] 98.2 °F (36.8 °C)  Heart Rate:   [] 88  Resp:  [14-20] 18  BP: (110-156)/(55-88) 131/82  I/O last 3 completed shifts:  In: 1320 [P.O.:1320]  Out: 800 [Urine:800]  I/O this shift:  In: -   Out: 650 [Urine:650]    Physical Exam   Constitutional: She is oriented to person, place, and time. She appears well-developed and well-nourished.   HENT:   Head: Normocephalic and atraumatic.   Right Ear: External ear normal.   Left Ear: External ear normal.   Nose: Nose normal.   Mouth/Throat: Oropharynx is clear and moist.   Eyes: Pupils are equal, round, and reactive to light. Conjunctivae and EOM are normal.   Neck: Normal range of motion. Neck supple.   Cardiovascular: Normal rate, regular rhythm, normal heart sounds and intact distal pulses.   Pulmonary/Chest: Effort normal and breath sounds normal.   Abdominal: Soft. Bowel sounds are normal.   Musculoskeletal: Normal range of motion.   Neurological: She is alert and oriented to person, place, and time.   Skin: Skin is warm. Capillary refill takes less than 2 seconds.   Psychiatric: She has a normal mood and affect. Her behavior is normal. Judgment and thought content normal.   Nursing note and vitals reviewed.        Sepsis (CMS/HCC)    Pneumonia due to infectious organism    At risk for falls    Hypokalemia    Eating better with appetite stimulants..---? To [employer] tomorrow?            Electronically signed by Phong Joseph MD at 07/19/20 0702     Phong Joseph MD at 07/18/20 1026        Resting comfortablyno nursing concerns    Review of Systems   Constitutional: Positive for appetite change.   HENT: Negative.    Eyes: Negative.    Respiratory: Negative.    Cardiovascular: Negative.    Gastrointestinal: Negative.    Endocrine: Negative.    Genitourinary: Negative.    Musculoskeletal: Negative.    Allergic/Immunologic: Negative.    Neurological: Negative.    Hematological: Negative.    Psychiatric/Behavioral: Negative.      Temp:  [97.7 °F (36.5 °C)-98.2 °F (36.8 °C)] 98.2 °F  (36.8 °C)  Heart Rate:  [] 88  Resp:  [14-20] 18  BP: (110-156)/(55-88) 131/82  I/O last 3 completed shifts:  In: 1320 [P.O.:1320]  Out: 800 [Urine:800]  I/O this shift:  In: -   Out: 650 [Urine:650]    Physical Exam   Constitutional: She is oriented to person, place, and time. She appears well-developed and well-nourished.   HENT:   Head: Normocephalic and atraumatic.   Right Ear: External ear normal.   Left Ear: External ear normal.   Nose: Nose normal.   Mouth/Throat: Oropharynx is clear and moist.   Eyes: Pupils are equal, round, and reactive to light. Conjunctivae and EOM are normal.   Neck: Normal range of motion. Neck supple.   Cardiovascular: Normal rate, regular rhythm, normal heart sounds and intact distal pulses.   Pulmonary/Chest: Effort normal and breath sounds normal.   Abdominal: Soft. Bowel sounds are normal.   Musculoskeletal: Normal range of motion.   Neurological: She is alert and oriented to person, place, and time.   Skin: Skin is warm. Capillary refill takes less than 2 seconds.   Psychiatric: She has a normal mood and affect. Her behavior is normal. Judgment and thought content normal.   Nursing note and vitals reviewed.        Sepsis (CMS/HCC)    Pneumonia due to infectious organism    At risk for falls    Hypokalemia    Watching po intake ----      Electronically signed by Phong Joseph MD at 07/19/20 0655       Consult Notes (last 48 hours) (Notes from 07/18/20 1024 through 07/20/20 1024)    No notes of this type exist for this encounter.          Physical Therapy Notes (last 72 hours) (Notes from 07/17/20 1024 through 07/20/20 1024)      Bhumi Oakley PTA at 07/19/20 1205  Version 1 of 1         Problem: Patient Care Overview  Goal: Plan of Care Review  Outcome: Ongoing (interventions implemented as appropriate)  Flowsheets (Taken 7/19/2020 1204)  Outcome Summary: Pt. stands with CGA. Minimal assist from toilet. Ambulates w forward flexed posture, decreased step length,  decreased speed. Ambulated 30' in room. Actively works thru LE ex's. Will benefit from strengthening and safety activities.       Electronically signed by Bhumi Oakley PTA at 20 1205          Speech Language Pathology Notes (last 48 hours) (Notes from 20 1024 through 20 1024)      Anjelica Gonzales CCC-SLP at 20 0946          Acute Care - Speech Language Pathology   Swallow Treatment Note [facility]     Patient Name: Sunny Lacey  : 1921  MRN: [id number]  Today's Date: 2020  Onset of Illness/Injury or Date of Surgery: 20     Referring Physician: Dr. Joseph      Admit Date: 2020  Pt still has poor appetite, but agreed to one trial of mechanical soft solid and multiple trials of nectar thick liquids. Pt had decreased rotary chew with the soft solid, but doesn't have her bottom denture. She did have adequate clearance of oral residue. No overt s/s of aspiration were observed with nectar. She did state that she couldn't drink too much or she would get choked. Ok to continue mechanical soft solids and nectar thick liquids.  TERRY Tyler 2020 09:46    Visit Dx:      ICD-10-CM ICD-9-CM   1. Dysphagia, unspecified type R13.10 787.20   2. Impaired mobility Z74.09 799.89   3. Impaired mobility and ADLs Z74.09 V49.89    Z78.9      Patient Active Problem List   Diagnosis   • Sepsis (CMS/HCC)   • Pneumonia due to infectious organism   • At risk for falls   • Hypokalemia       Therapy Treatment  Rehabilitation Treatment Summary     Row Name 20 0932             Treatment Time/Intention    Discipline  speech language pathologist  -MB      Document Type  therapy note (daily note)  -MB      Subjective Information  no complaints  -MB      Mode of Treatment  speech-language pathology  -MB      Patient/Family Observations  No family present  -MB      Patient Effort  adequate  -MB      Recorded by [MB] Anjelica Gonzales CCC-SLP 20 0923      Row  Name 07/20/20 0932             Pain Scale: FACES Pre/Post-Treatment    Pain: FACES Scale, Pretreatment  0-->no hurt  -MB      Recorded by [MB] Anjelica Gonzales CCC-SLP 07/20/20 0943      Row Name 07/20/20 0932             Outcome Summary/Treatment Plan (SLP)    Daily Summary of Progress (SLP)  progress toward functional goals as expected  -MB      Barriers to Overall Progress (SLP)  Poor appetite  -MB      Plan for Continued Treatment (SLP)  Continue to follow  -MB      Anticipated Dischage Disposition (SLP)  assisted living facility (nursing home)  -MB      Recorded by [MB] Anjelica Gonzales CCC-SLP 07/20/20 0943        User Key  (r) = Recorded By, (t) = Taken By, (c) = Cosigned By    Initials Name Effective Dates Discipline    Anjelica Barron CCC-SLP 08/02/16 -  SLP          Outcome Summary  Outcome Summary/Treatment Plan (SLP)  Daily Summary of Progress (SLP): progress toward functional goals as expected (07/20/20 0932 : Anjelica Gonzales CCC-SLP)  Barriers to Overall Progress (SLP): Poor appetite (07/20/20 0932 : Anjelica Gonzales, CCC-SLP)  Plan for Continued Treatment (SLP): Continue to follow (07/20/20 0932 : Anjelica Gonzales, CCC-SLP)  Anticipated Dischage Disposition (SLP): assisted living facility (nursing home) (07/20/20 0932 : Anjelica Gonzales CCC-SLP)      SLP GOALS     Row Name 07/20/20 0932             Oral Nutrition/Hydration Goal 1 (SLP)    Oral Nutrition/Hydration Goal 1, SLP  Pt will tolerate LRD without s/s of aspiration  -MB      Time Frame (Oral Nutrition/Hydration Goal 1, SLP)  short term goal (STG);by discharge  -MB      Barriers (Oral Nutrition/Hydration Goal 1, SLP)  Poor appetite  -MB      Progress/Outcomes (Oral Nutrition/Hydration Goal 1, SLP)  continuing progress toward goal  -MB        User Key  (r) = Recorded By, (t) = Taken By, (c) = Cosigned By    Initials Name Provider Type    Anjelica Barron, CCC-SLP Speech and Language Pathologist          EDUCATION  The patient has been  educated in the following areas:   Dysphagia (Swallowing Impairment).    SLP Recommendation and Plan  Daily Summary of Progress (SLP): progress toward functional goals as expected  Barriers to Overall Progress (SLP): Poor appetite  Plan for Continued Treatment (SLP): Continue to follow  Anticipated Dischage Disposition (SLP): assisted living facility (halfway)                    Time Calculation:   Time Calculation- SLP     Row Name 07/20/20 0946             Time Calculation- SLP    SLP Start Time  0932  -MB      SLP Stop Time  0941  -MB      SLP Time Calculation (min)  9 min  -MB      SLP Received On  07/20/20  -MB        User Key  (r) = Recorded By, (t) = Taken By, (c) = Cosigned By    Initials Name Provider Type    Anjelica Barron CCC-SLP Speech and Language Pathologist          Therapy Charges for Today     Code Description Service Date Service Provider Modifiers Qty    11317143541 HC ST TREATMENT SWALLOW 1 7/20/2020 Anjelica Gonzales CCC-SLP GN 1                 Anjelica MASTERSON. TERRY Gonzales  7/20/2020    Electronically signed by Anjelica Gonzales CCC-SLP at 07/20/20 0946     Anjelica Gonzales CCC-SLP at 07/20/20 0946          Problem: Patient Care Overview  Goal: Plan of Care Review  Outcome: Ongoing (interventions implemented as appropriate)  Flowsheets (Taken 7/20/2020 0944)  Progress: no change  Plan of Care Reviewed With: patient  Outcome Summary: Pt still has poor appetite, but agreed to one trial of mechanical soft solid and multiple trials of nectar thick liquids. Pt had decreased rotary chew with the soft solid, but doesn't have her bottom denture. She did have adequate clearance of oral residue. No overt s/s of aspiration were observed with nectar. She did state that she couldn't drink too much or she would get choked. Ok to continue mechanical soft solids and nectar thick liquids.       Electronically signed by Anjelica Gonzales CCC-SLP at 07/20/20 0946

## 2020-07-21 NOTE — THERAPY DISCHARGE NOTE
Acute Care - Occupational Therapy Discharge Summary  [facility]     Patient Name: Sunny Lacey  : 1921  MRN: [id number]    Today's Date: 2020  Onset of Illness/Injury or Date of Surgery: 20    Date of Referral to OT: 07/15/20  Referring Physician: Dr. Joseph      Admit Date: 2020        OT Recommendation and Plan    Visit Dx:    ICD-10-CM ICD-9-CM   1. Dysphagia, unspecified type R13.10 787.20   2. Impaired mobility Z74.09 799.89   3. Impaired mobility and ADLs Z74.09 V49.89    Z78.9                Rehab Goal Summary     Row Name 20 1600 20 1511 20 1500       Bed Mobility Goal 1 (PT)    Activity/Assistive Device (Bed Mobility Goal 1, PT)  --  sit to supine/supine to sit  -AE  --    [place] Level/Cues Needed (Bed Mobility Goal 1, PT)  --  standby assist  -AE  --    Time Frame (Bed Mobility Goal 1, PT)  --  by discharge  -AE  --    Progress/Outcomes (Bed Mobility Goal 1, PT)  --  goal not met  -AE  --       Transfer Goal 1 (PT)    Activity/Assistive Device (Transfer Goal 1, PT)  --  sit-to-stand/stand-to-sit  -AE  --    [place] Level/Cues Needed (Transfer Goal 1, PT)  --  standby assist  -AE  --    Time Frame (Transfer Goal 1, PT)  --  by discharge  -AE  --    Progress/Outcome (Transfer Goal 1, PT)  --  goal not met  -AE  --       Gait Training Goal 1 (PT)    Activity/Assistive Device (Gait Training Goal 1, PT)  --  gait (walking locomotion);assistive device use;decrease fall risk;increase endurance/gait distance  -AE  --    [place] Level (Gait Training Goal 1, PT)  --  contact guard assist  -AE  --    Distance (Gait Goal 1, PT)  --  40ft  -AE  --    Time Frame (Gait Training Goal 1, PT)  --  by discharge  -AE  --    Progress/Outcome (Gait Training Goal 1, PT)  --  goal not met  -AE  --       Occupational Therapy Goals    Bathing Goal Selection (OT)  --  bathing, OT goal 1  -AE  --    Dressing Goal Selection (OT)  --  dressing, OT goal 1  -AE  --     Toileting Goal Selection (OT)  --  toileting, OT goal 1  -AE  --       Bathing Goal 1 (OT)    Activity/Assistive Device (Bathing Goal 1, OT)  bathing skills, all  -CS  bathing skills, all  -AE  --    [place] Level/Cues Needed (Bathing Goal 1, OT)  minimum assist (75% or more patient effort);set-up required;verbal cues required  -CS  minimum assist (75% or more patient effort);set-up required;verbal cues required  -AE  --    Time Frame (Bathing Goal 1, OT)  10 days  -CS  10 days  -AE  --    Progress/Outcomes (Bathing Goal 1, OT)  goal not met  -CS  goal ongoing  -AE  --       Dressing Goal 1 (OT)    Activity/Assistive Device (Dressing Goal 1, OT)  dressing skills, all  -CS  dressing skills, all  -AE  --    [place]/Cues Needed (Dressing Goal 1, OT)  minimum assist (75% or more patient effort);set-up required;verbal cues required  -CS  minimum assist (75% or more patient effort);set-up required;verbal cues required  -AE  --    Time Frame (Dressing Goal 1, OT)  10 days  -CS  10 days  -AE  --    Progress/Outcome (Dressing Goal 1, OT)  goal not met  -CS  goal ongoing  -AE  --       Toileting Goal 1 (OT)    Activity/Device (Toileting Goal 1, OT)  toileting skills, all  -CS  toileting skills, all  -AE  --    [place] Level/Cues Needed (Toileting Goal 1, OT)  minimum assist (75% or more patient effort);set-up required;verbal cues required  -CS  minimum assist (75% or more patient effort);set-up required;verbal cues required  -AE  --    Time Frame (Toileting Goal 1, OT)  10 days  -CS  10 days  -AE  --    Progress/Outcome (Toileting Goal 1, OT)  goal not met  -CS  goal ongoing  -AE  --       Swallow Goals (SLP)    Oral Nutrition/Hydration Goal Selection (SLP)  --  oral nutrition/hydration, SLP goal 1  -AE  --       Oral Nutrition/Hydration Goal 1 (SLP)    Oral Nutrition/Hydration Goal 1, SLP  --  Pt will tolerate LRD without s/s of aspiration  -AE  Pt will tolerate LRD without s/s of aspiration  -MB    Time Frame  (Oral Nutrition/Hydration Goal 1, SLP)  --  short term goal (STG);by discharge  -AE  short term goal (STG);by discharge  -MB    Barriers (Oral Nutrition/Hydration Goal 1, SLP)  --  Poor appetite  -AE  Poor appetite  -MB    Progress/Outcomes (Oral Nutrition/Hydration Goal 1, SLP)  --  goal partially met  -AE  goal partially met  -MB      User Key  (r) = Recorded By, (t) = Taken By, (c) = Cosigned By    Initials Name Provider Type Discipline    Anjelica Barron, CCC-SLP Speech and Language Pathologist SLP    Megan Higgins, PTA Physical Therapy Assistant PT    Lauren Ramirez OTR/L, CNT Occupational Therapist OT          Outcome Measures     Row Name 07/20/20 1026             How much help from another is currently needed...    Putting on and taking off regular lower body clothing?  2  -CJ      Bathing (including washing, rinsing, and drying)  2  -CJ      Toileting (which includes using toilet bed pan or urinal)  2  -CJ      Putting on and taking off regular upper body clothing  3  -CJ      Taking care of personal grooming (such as brushing teeth)  3  -CJ      Eating meals  3  -CJ      AM-PAC 6 Clicks Score (OT)  15  -CJ         Functional Assessment    Outcome Measure Options  AM-PAC 6 Clicks Daily Activity (OT)  -CJ        User Key  (r) = Recorded By, (t) = Taken By, (c) = Cosigned By    Initials Name Provider Type    Lacho Red COTA/L Occupational Therapy Assistant          Therapy Suggested Charges     Code   Minutes Charges    None                 OT Discharge Summary  Anticipated Discharge Disposition (OT): transitional care  Reason for Discharge: Discharge from facility  Outcomes Achieved: Refer to plan of care for updates on goals achieved  Discharge Destination: TCU      Lauren Montero, BERNY/L, PHU  7/21/2020

## 2020-07-21 NOTE — THERAPY DISCHARGE NOTE
Acute Care - Speech Language Pathology Discharge Summary  [facility]       Patient Name: Sunny Lacey  : 1921  MRN: [id number]    Today's Date: 2020  Onset of Illness/Injury or Date of Surgery: 20       Referring Physician: Dr. Joseph        Admit Date: 2020      SLP Recommendation and Plan  Mechanical soft solids and nectar thick liquids    Visit Dx:    ICD-10-CM ICD-9-CM   1. Dysphagia, unspecified type R13.10 787.20   2. Impaired mobility Z74.09 799.89   3. Impaired mobility and ADLs Z74.09 V49.89    Z78.9                SLP GOALS     Row Name 20 1500 20 0932          Oral Nutrition/Hydration Goal 1 (SLP)    Oral Nutrition/Hydration Goal 1, SLP  Pt will tolerate LRD without s/s of aspiration  -MB  Pt will tolerate LRD without s/s of aspiration  -MB     Time Frame (Oral Nutrition/Hydration Goal 1, SLP)  short term goal (STG);by discharge  -MB  short term goal (STG);by discharge  -MB     Barriers (Oral Nutrition/Hydration Goal 1, SLP)  Poor appetite  -MB  Poor appetite  -MB     Progress/Outcomes (Oral Nutrition/Hydration Goal 1, SLP)  goal partially met  -MB  continuing progress toward goal  -MB       User Key  (r) = Recorded By, (t) = Taken By, (c) = Cosigned By    Initials Name Provider Type    Anjelica Barron CCC-SLP Speech and Language Pathologist            Therapy Charges for Today     Code Description Service Date Service Provider Modifiers Qty    61368328412 HC ST TREATMENT SWALLOW 1 2020 Anjelica Gonzales CCC-SLP GN 1            SLP Discharge Summary  Anticipated Dischage Disposition (SLP): assisted living facility (nursing home)  Reason for Discharge: discharge from this facility  Progress Toward Achieving Short/long Term Goals: goals partially met within established timelines  Discharge Destination: other (see comments)(rehab facility)      TERRY Tyler  2020

## 2020-07-21 NOTE — THERAPY DISCHARGE NOTE
Acute Care - Physical Therapy Discharge Summary  [facility]       Patient Name: Sunny Lacey  : 1921  MRN: [id number]    Today's Date: 2020  Onset of Illness/Injury or Date of Surgery: 20       Referring Physician: Dr. Joseph      Admit Date: 2020      PT Recommendation and Plan    Visit Dx:    ICD-10-CM ICD-9-CM   1. Dysphagia, unspecified type R13.10 787.20   2. Impaired mobility Z74.09 799.89   3. Impaired mobility and ADLs Z74.09 V49.89    Z78.9        Outcome Measures     Row Name 20 1026             How much help from another is currently needed...    Putting on and taking off regular lower body clothing?  2  -CJ      Bathing (including washing, rinsing, and drying)  2  -CJ      Toileting (which includes using toilet bed pan or urinal)  2  -CJ      Putting on and taking off regular upper body clothing  3  -CJ      Taking care of personal grooming (such as brushing teeth)  3  -CJ      Eating meals  3  -CJ      AM-PAC 6 Clicks Score (OT)  15  -CJ         Functional Assessment    Outcome Measure Options  AM-PAC 6 Clicks Daily Activity (OT)  -        User Key  (r) = Recorded By, (t) = Taken By, (c) = Cosigned By    Initials Name Provider Type    Lacho Red COTA/L Occupational Therapy Assistant              Rehab Goal Summary     Row Name 20 1511 20 1500          Bed Mobility Goal 1 (PT)    Activity/Assistive Device (Bed Mobility Goal 1, PT)  sit to supine/supine to sit  -AE  --     [place] Level/Cues Needed (Bed Mobility Goal 1, PT)  standby assist  -AE  --     Time Frame (Bed Mobility Goal 1, PT)  by discharge  -AE  --     Progress/Outcomes (Bed Mobility Goal 1, PT)  goal not met  -AE  --        Transfer Goal 1 (PT)    Activity/Assistive Device (Transfer Goal 1, PT)  sit-to-stand/stand-to-sit  -AE  --     [place] Level/Cues Needed (Transfer Goal 1, PT)  standby assist  -AE  --     Time Frame (Transfer Goal 1, PT)  by discharge  -AE  --      Progress/Outcome (Transfer Goal 1, PT)  goal not met  -AE  --        Gait Training Goal 1 (PT)    Activity/Assistive Device (Gait Training Goal 1, PT)  gait (walking locomotion);assistive device use;decrease fall risk;increase endurance/gait distance  -AE  --     [place] Level (Gait Training Goal 1, PT)  contact guard assist  -AE  --     Distance (Gait Goal 1, PT)  40ft  -AE  --     Time Frame (Gait Training Goal 1, PT)  by discharge  -AE  --     Progress/Outcome (Gait Training Goal 1, PT)  goal not met  -AE  --        Occupational Therapy Goals    Bathing Goal Selection (OT)  bathing, OT goal 1  -AE  --     Dressing Goal Selection (OT)  dressing, OT goal 1  -AE  --     Toileting Goal Selection (OT)  toileting, OT goal 1  -AE  --        Bathing Goal 1 (OT)    Activity/Assistive Device (Bathing Goal 1, OT)  bathing skills, all  -AE  --     [place] Level/Cues Needed (Bathing Goal 1, OT)  minimum assist (75% or more patient effort);set-up required;verbal cues required  -AE  --     Time Frame (Bathing Goal 1, OT)  10 days  -AE  --     Progress/Outcomes (Bathing Goal 1, OT)  goal ongoing  -AE  --        Dressing Goal 1 (OT)    Activity/Assistive Device (Dressing Goal 1, OT)  dressing skills, all  -AE  --     [place]/Cues Needed (Dressing Goal 1, OT)  minimum assist (75% or more patient effort);set-up required;verbal cues required  -AE  --     Time Frame (Dressing Goal 1, OT)  10 days  -AE  --     Progress/Outcome (Dressing Goal 1, OT)  goal ongoing  -AE  --        Toileting Goal 1 (OT)    Activity/Device (Toileting Goal 1, OT)  toileting skills, all  -AE  --     [place] Level/Cues Needed (Toileting Goal 1, OT)  minimum assist (75% or more patient effort);set-up required;verbal cues required  -AE  --     Time Frame (Toileting Goal 1, OT)  10 days  -AE  --     Progress/Outcome (Toileting Goal 1, OT)  goal ongoing  -AE  --        Swallow Goals (SLP)    Oral Nutrition/Hydration Goal Selection (SLP)  oral  nutrition/hydration, SLP goal 1  -AE  --        Oral Nutrition/Hydration Goal 1 (SLP)    Oral Nutrition/Hydration Goal 1, SLP  Pt will tolerate LRD without s/s of aspiration  -AE  Pt will tolerate LRD without s/s of aspiration  -MB     Time Frame (Oral Nutrition/Hydration Goal 1, SLP)  short term goal (STG);by discharge  -AE  short term goal (STG);by discharge  -MB     Barriers (Oral Nutrition/Hydration Goal 1, SLP)  Poor appetite  -AE  Poor appetite  -MB     Progress/Outcomes (Oral Nutrition/Hydration Goal 1, SLP)  goal partially met  -AE  goal partially met  -MB       User Key  (r) = Recorded By, (t) = Taken By, (c) = Cosigned By    Initials Name Provider Type Discipline    Anjelica Barron, CCC-SLP Speech and Language Pathologist SLP    Megan Higgins, PTA Physical Therapy Assistant PT              PT Discharge Summary  Anticipated Discharge Disposition (PT): skilled nursing facility  Reason for Discharge: Discharge from facility  Outcomes Achieved: Patient able to partially acheive established goals  Discharge Destination: SNF      Megan Alejandre PTA   7/21/2020

## 2022-08-26 ENCOUNTER — HOSPITAL ENCOUNTER (EMERGENCY)
Facility: HOSPITAL | Age: 87
Discharge: HOME | End: 2022-08-26
Admitting: EMERGENCY MEDICINE

## 2022-08-26 ENCOUNTER — APPOINTMENT (OUTPATIENT)
Dept: GENERAL RADIOLOGY | Facility: HOSPITAL | Age: 87
End: 2022-08-26

## 2022-08-26 ENCOUNTER — APPOINTMENT (OUTPATIENT)
Dept: CT IMAGING | Facility: HOSPITAL | Age: 87
End: 2022-08-26

## 2022-08-26 VITALS
SYSTOLIC BLOOD PRESSURE: 136 MMHG | HEART RATE: 92 BPM | DIASTOLIC BLOOD PRESSURE: 75 MMHG | WEIGHT: 154 LBS | RESPIRATION RATE: 20 BRPM | OXYGEN SATURATION: 100 % | BODY MASS INDEX: 26.29 KG/M2 | HEIGHT: 64 IN | TEMPERATURE: 98.3 F

## 2022-08-26 DIAGNOSIS — N39.0 URINARY TRACT INFECTION WITHOUT HEMATURIA, SITE UNSPECIFIED: Primary | ICD-10-CM

## 2022-08-26 LAB
ALBUMIN SERPL-MCNC: 3.9 G/DL (ref 3.5–5.2)
ALBUMIN/GLOB SERPL: 1.8 G/DL
ALP SERPL-CCNC: 73 U/L (ref 39–117)
ALT SERPL W P-5'-P-CCNC: 11 U/L (ref 1–33)
ANION GAP SERPL CALCULATED.3IONS-SCNC: 9 MMOL/L (ref 5–15)
AST SERPL-CCNC: 20 U/L (ref 1–32)
BACTERIA UR QL AUTO: ABNORMAL /HPF
BASOPHILS # BLD AUTO: 0.04 10*3/MM3 (ref 0–0.2)
BASOPHILS NFR BLD AUTO: 0.5 % (ref 0–1.5)
BILIRUB SERPL-MCNC: 0.3 MG/DL (ref 0–1.2)
BILIRUB UR QL STRIP: NEGATIVE
BUN SERPL-MCNC: 11 MG/DL (ref 8–23)
BUN/CREAT SERPL: 14.1 (ref 7–25)
CALCIUM SPEC-SCNC: 9.9 MG/DL (ref 8.2–9.6)
CHLORIDE SERPL-SCNC: 104 MMOL/L (ref 98–107)
CLARITY UR: CLEAR
CO2 SERPL-SCNC: 28 MMOL/L (ref 22–29)
COLOR UR: YELLOW
CREAT SERPL-MCNC: 0.78 MG/DL (ref 0.57–1)
CRP SERPL-MCNC: <0.3 MG/DL (ref 0–0.5)
D-LACTATE SERPL-SCNC: 1 MMOL/L (ref 0.5–2)
DEPRECATED RDW RBC AUTO: 43.3 FL (ref 37–54)
EGFRCR SERPLBLD CKD-EPI 2021: 67.9 ML/MIN/1.73
EOSINOPHIL # BLD AUTO: 1.83 10*3/MM3 (ref 0–0.4)
EOSINOPHIL NFR BLD AUTO: 20.8 % (ref 0.3–6.2)
ERYTHROCYTE [DISTWIDTH] IN BLOOD BY AUTOMATED COUNT: 12.8 % (ref 12.3–15.4)
GLOBULIN UR ELPH-MCNC: 2.2 GM/DL
GLUCOSE SERPL-MCNC: 112 MG/DL (ref 65–99)
GLUCOSE UR STRIP-MCNC: NEGATIVE MG/DL
HCT VFR BLD AUTO: 41 % (ref 34–46.6)
HGB BLD-MCNC: 14 G/DL (ref 12–15.9)
HGB UR QL STRIP.AUTO: NEGATIVE
HYALINE CASTS UR QL AUTO: ABNORMAL /LPF
IMM GRANULOCYTES # BLD AUTO: 0.1 10*3/MM3 (ref 0–0.05)
IMM GRANULOCYTES NFR BLD AUTO: 1.1 % (ref 0–0.5)
INR PPP: 1.16 (ref 0.91–1.09)
KETONES UR QL STRIP: NEGATIVE
LEUKOCYTE ESTERASE UR QL STRIP.AUTO: ABNORMAL
LIPASE SERPL-CCNC: 30 U/L (ref 13–60)
LYMPHOCYTES # BLD AUTO: 1.46 10*3/MM3 (ref 0.7–3.1)
LYMPHOCYTES NFR BLD AUTO: 16.6 % (ref 19.6–45.3)
MCH RBC QN AUTO: 31.7 PG (ref 26.6–33)
MCHC RBC AUTO-ENTMCNC: 34.1 G/DL (ref 31.5–35.7)
MCV RBC AUTO: 93 FL (ref 79–97)
MONOCYTES # BLD AUTO: 0.67 10*3/MM3 (ref 0.1–0.9)
MONOCYTES NFR BLD AUTO: 7.6 % (ref 5–12)
NEUTROPHILS NFR BLD AUTO: 4.71 10*3/MM3 (ref 1.7–7)
NEUTROPHILS NFR BLD AUTO: 53.4 % (ref 42.7–76)
NITRITE UR QL STRIP: NEGATIVE
NRBC BLD AUTO-RTO: 0 /100 WBC (ref 0–0.2)
PH UR STRIP.AUTO: 5.5 [PH] (ref 5–8)
PLATELET # BLD AUTO: 154 10*3/MM3 (ref 140–450)
PMV BLD AUTO: 9.9 FL (ref 6–12)
POTASSIUM SERPL-SCNC: 3.5 MMOL/L (ref 3.5–5.2)
PROCALCITONIN SERPL-MCNC: 0.05 NG/ML (ref 0–0.25)
PROT SERPL-MCNC: 6.1 G/DL (ref 6–8.5)
PROT UR QL STRIP: NEGATIVE
PROTHROMBIN TIME: 14.4 SECONDS (ref 11.9–14.6)
RBC # BLD AUTO: 4.41 10*6/MM3 (ref 3.77–5.28)
RBC # UR STRIP: ABNORMAL /HPF
REF LAB TEST METHOD: ABNORMAL
SARS-COV-2 RNA PNL SPEC NAA+PROBE: NOT DETECTED
SODIUM SERPL-SCNC: 141 MMOL/L (ref 136–145)
SP GR UR STRIP: 1.01 (ref 1–1.03)
SQUAMOUS #/AREA URNS HPF: ABNORMAL /HPF
UROBILINOGEN UR QL STRIP: ABNORMAL
WBC # UR STRIP: ABNORMAL /HPF
WBC NRBC COR # BLD: 8.81 10*3/MM3 (ref 3.4–10.8)

## 2022-08-26 PROCEDURE — 80053 COMPREHEN METABOLIC PANEL: CPT | Performed by: NURSE PRACTITIONER

## 2022-08-26 PROCEDURE — 36415 COLL VENOUS BLD VENIPUNCTURE: CPT

## 2022-08-26 PROCEDURE — 85610 PROTHROMBIN TIME: CPT | Performed by: NURSE PRACTITIONER

## 2022-08-26 PROCEDURE — 71045 X-RAY EXAM CHEST 1 VIEW: CPT

## 2022-08-26 PROCEDURE — 99284 EMERGENCY DEPT VISIT MOD MDM: CPT

## 2022-08-26 PROCEDURE — 84145 PROCALCITONIN (PCT): CPT | Performed by: NURSE PRACTITIONER

## 2022-08-26 PROCEDURE — 83690 ASSAY OF LIPASE: CPT | Performed by: NURSE PRACTITIONER

## 2022-08-26 PROCEDURE — 86140 C-REACTIVE PROTEIN: CPT | Performed by: NURSE PRACTITIONER

## 2022-08-26 PROCEDURE — 81001 URINALYSIS AUTO W/SCOPE: CPT | Performed by: NURSE PRACTITIONER

## 2022-08-26 PROCEDURE — P9612 CATHETERIZE FOR URINE SPEC: HCPCS

## 2022-08-26 PROCEDURE — 93010 ELECTROCARDIOGRAM REPORT: CPT | Performed by: INTERNAL MEDICINE

## 2022-08-26 PROCEDURE — 87040 BLOOD CULTURE FOR BACTERIA: CPT | Performed by: NURSE PRACTITIONER

## 2022-08-26 PROCEDURE — 70450 CT HEAD/BRAIN W/O DYE: CPT

## 2022-08-26 PROCEDURE — 93005 ELECTROCARDIOGRAM TRACING: CPT | Performed by: STUDENT IN AN ORGANIZED HEALTH CARE EDUCATION/TRAINING PROGRAM

## 2022-08-26 PROCEDURE — 85025 COMPLETE CBC W/AUTO DIFF WBC: CPT | Performed by: NURSE PRACTITIONER

## 2022-08-26 PROCEDURE — 87635 SARS-COV-2 COVID-19 AMP PRB: CPT | Performed by: NURSE PRACTITIONER

## 2022-08-26 PROCEDURE — 25010000002 CEFTRIAXONE PER 250 MG: Performed by: NURSE PRACTITIONER

## 2022-08-26 PROCEDURE — 83605 ASSAY OF LACTIC ACID: CPT | Performed by: NURSE PRACTITIONER

## 2022-08-26 PROCEDURE — 93005 ELECTROCARDIOGRAM TRACING: CPT

## 2022-08-26 PROCEDURE — 96365 THER/PROPH/DIAG IV INF INIT: CPT

## 2022-08-26 RX ORDER — SODIUM CHLORIDE 0.9 % (FLUSH) 0.9 %
10 SYRINGE (ML) INJECTION AS NEEDED
Status: DISCONTINUED | OUTPATIENT
Start: 2022-08-26 | End: 2022-08-27 | Stop reason: HOSPADM

## 2022-08-26 RX ORDER — CEFDINIR 300 MG/1
300 CAPSULE ORAL 2 TIMES DAILY
Qty: 20 CAPSULE | Refills: 0 | Status: SHIPPED | OUTPATIENT
Start: 2022-08-26 | End: 2022-09-05

## 2022-08-26 RX ADMIN — SODIUM CHLORIDE, POTASSIUM CHLORIDE, SODIUM LACTATE AND CALCIUM CHLORIDE 1000 ML: 600; 310; 30; 20 INJECTION, SOLUTION INTRAVENOUS at 18:49

## 2022-08-26 RX ADMIN — SODIUM CHLORIDE 1 G: 9 INJECTION, SOLUTION INTRAVENOUS at 21:08

## 2022-08-28 LAB
QT INTERVAL: 362 MS
QTC INTERVAL: 435 MS

## 2022-08-31 LAB — BACTERIA SPEC AEROBE CULT: NORMAL

## 2023-03-15 ENCOUNTER — APPOINTMENT (OUTPATIENT)
Dept: CT IMAGING | Facility: HOSPITAL | Age: 88
End: 2023-03-15
Payer: MEDICARE

## 2023-03-15 ENCOUNTER — HOSPITAL ENCOUNTER (EMERGENCY)
Facility: HOSPITAL | Age: 88
Discharge: HOME | End: 2023-03-16
Attending: STUDENT IN AN ORGANIZED HEALTH CARE EDUCATION/TRAINING PROGRAM | Admitting: STUDENT IN AN ORGANIZED HEALTH CARE EDUCATION/TRAINING PROGRAM
Payer: MEDICARE

## 2023-03-15 DIAGNOSIS — E87.6 HYPOKALEMIA: ICD-10-CM

## 2023-03-15 DIAGNOSIS — R42 DIZZINESS: Primary | ICD-10-CM

## 2023-03-15 LAB
ALBUMIN SERPL-MCNC: 4.2 G/DL (ref 3.5–5.2)
ALBUMIN/GLOB SERPL: 2.1 G/DL
ALP SERPL-CCNC: 82 U/L (ref 39–117)
ALT SERPL W P-5'-P-CCNC: 8 U/L (ref 1–33)
ANION GAP SERPL CALCULATED.3IONS-SCNC: 12 MMOL/L (ref 5–15)
AST SERPL-CCNC: 17 U/L (ref 1–32)
BASOPHILS # BLD AUTO: 0.05 10*3/MM3 (ref 0–0.2)
BASOPHILS NFR BLD AUTO: 0.5 % (ref 0–1.5)
BILIRUB SERPL-MCNC: 0.3 MG/DL (ref 0–1.2)
BILIRUB UR QL STRIP: NEGATIVE
BUN SERPL-MCNC: 10 MG/DL (ref 8–23)
BUN/CREAT SERPL: 14.5 (ref 7–25)
CALCIUM SPEC-SCNC: 9.8 MG/DL (ref 8.2–9.6)
CHLORIDE SERPL-SCNC: 105 MMOL/L (ref 98–107)
CLARITY UR: CLEAR
CO2 SERPL-SCNC: 27 MMOL/L (ref 22–29)
COLOR UR: YELLOW
CREAT SERPL-MCNC: 0.69 MG/DL (ref 0.57–1)
D-LACTATE SERPL-SCNC: 1.2 MMOL/L (ref 0.5–2)
DEPRECATED RDW RBC AUTO: 44.2 FL (ref 37–54)
EGFRCR SERPLBLD CKD-EPI 2021: 77.1 ML/MIN/1.73
EOSINOPHIL # BLD AUTO: 2.28 10*3/MM3 (ref 0–0.4)
EOSINOPHIL NFR BLD AUTO: 21.9 % (ref 0.3–6.2)
ERYTHROCYTE [DISTWIDTH] IN BLOOD BY AUTOMATED COUNT: 13.1 % (ref 12.3–15.4)
GLOBULIN UR ELPH-MCNC: 2 GM/DL
GLUCOSE SERPL-MCNC: 101 MG/DL (ref 65–99)
GLUCOSE UR STRIP-MCNC: NEGATIVE MG/DL
HCT VFR BLD AUTO: 43 % (ref 34–46.6)
HGB BLD-MCNC: 14.1 G/DL (ref 12–15.9)
HGB UR QL STRIP.AUTO: NEGATIVE
IMM GRANULOCYTES # BLD AUTO: 0.09 10*3/MM3 (ref 0–0.05)
IMM GRANULOCYTES NFR BLD AUTO: 0.9 % (ref 0–0.5)
KETONES UR QL STRIP: NEGATIVE
LEUKOCYTE ESTERASE UR QL STRIP.AUTO: NEGATIVE
LIPASE SERPL-CCNC: 34 U/L (ref 13–60)
LYMPHOCYTES # BLD AUTO: 1.96 10*3/MM3 (ref 0.7–3.1)
LYMPHOCYTES NFR BLD AUTO: 18.9 % (ref 19.6–45.3)
MCH RBC QN AUTO: 30.5 PG (ref 26.6–33)
MCHC RBC AUTO-ENTMCNC: 32.8 G/DL (ref 31.5–35.7)
MCV RBC AUTO: 92.9 FL (ref 79–97)
MONOCYTES # BLD AUTO: 0.62 10*3/MM3 (ref 0.1–0.9)
MONOCYTES NFR BLD AUTO: 6 % (ref 5–12)
NEUTROPHILS NFR BLD AUTO: 5.39 10*3/MM3 (ref 1.7–7)
NEUTROPHILS NFR BLD AUTO: 51.8 % (ref 42.7–76)
NITRITE UR QL STRIP: NEGATIVE
NRBC BLD AUTO-RTO: 0 /100 WBC (ref 0–0.2)
PH UR STRIP.AUTO: 6.5 [PH] (ref 5–8)
PLATELET # BLD AUTO: 161 10*3/MM3 (ref 140–450)
PMV BLD AUTO: 10.1 FL (ref 6–12)
POTASSIUM SERPL-SCNC: 3.3 MMOL/L (ref 3.5–5.2)
PROT SERPL-MCNC: 6.2 G/DL (ref 6–8.5)
PROT UR QL STRIP: NEGATIVE
RBC # BLD AUTO: 4.63 10*6/MM3 (ref 3.77–5.28)
SODIUM SERPL-SCNC: 144 MMOL/L (ref 136–145)
SP GR UR STRIP: 1.02 (ref 1–1.03)
UROBILINOGEN UR QL STRIP: NORMAL
WBC NRBC COR # BLD: 10.39 10*3/MM3 (ref 3.4–10.8)

## 2023-03-15 PROCEDURE — 99284 EMERGENCY DEPT VISIT MOD MDM: CPT

## 2023-03-15 PROCEDURE — 83605 ASSAY OF LACTIC ACID: CPT | Performed by: STUDENT IN AN ORGANIZED HEALTH CARE EDUCATION/TRAINING PROGRAM

## 2023-03-15 PROCEDURE — 85025 COMPLETE CBC W/AUTO DIFF WBC: CPT | Performed by: STUDENT IN AN ORGANIZED HEALTH CARE EDUCATION/TRAINING PROGRAM

## 2023-03-15 PROCEDURE — 81003 URINALYSIS AUTO W/O SCOPE: CPT | Performed by: STUDENT IN AN ORGANIZED HEALTH CARE EDUCATION/TRAINING PROGRAM

## 2023-03-15 PROCEDURE — 80053 COMPREHEN METABOLIC PANEL: CPT | Performed by: STUDENT IN AN ORGANIZED HEALTH CARE EDUCATION/TRAINING PROGRAM

## 2023-03-15 PROCEDURE — 70496 CT ANGIOGRAPHY HEAD: CPT

## 2023-03-15 PROCEDURE — 70498 CT ANGIOGRAPHY NECK: CPT

## 2023-03-15 PROCEDURE — 70450 CT HEAD/BRAIN W/O DYE: CPT

## 2023-03-15 PROCEDURE — 99283 EMERGENCY DEPT VISIT LOW MDM: CPT

## 2023-03-15 PROCEDURE — 83690 ASSAY OF LIPASE: CPT | Performed by: STUDENT IN AN ORGANIZED HEALTH CARE EDUCATION/TRAINING PROGRAM

## 2023-03-15 PROCEDURE — 25510000001 IOPAMIDOL PER 1 ML: Performed by: STUDENT IN AN ORGANIZED HEALTH CARE EDUCATION/TRAINING PROGRAM

## 2023-03-15 RX ORDER — SODIUM CHLORIDE 0.9 % (FLUSH) 0.9 %
10 SYRINGE (ML) INJECTION AS NEEDED
Status: DISCONTINUED | OUTPATIENT
Start: 2023-03-15 | End: 2023-03-16 | Stop reason: HOSPADM

## 2023-03-15 RX ORDER — POTASSIUM CHLORIDE 750 MG/1
40 CAPSULE, EXTENDED RELEASE ORAL ONCE
Status: COMPLETED | OUTPATIENT
Start: 2023-03-15 | End: 2023-03-15

## 2023-03-15 RX ADMIN — SODIUM CHLORIDE, POTASSIUM CHLORIDE, SODIUM LACTATE AND CALCIUM CHLORIDE 500 ML: 600; 310; 30; 20 INJECTION, SOLUTION INTRAVENOUS at 22:21

## 2023-03-15 RX ADMIN — IOPAMIDOL 85 ML: 755 INJECTION, SOLUTION INTRAVENOUS at 20:53

## 2023-03-15 RX ADMIN — POTASSIUM CHLORIDE 40 MEQ: 10 CAPSULE, COATED, EXTENDED RELEASE ORAL at 22:23

## 2023-03-16 VITALS
BODY MASS INDEX: 23.32 KG/M2 | SYSTOLIC BLOOD PRESSURE: 146 MMHG | WEIGHT: 140 LBS | DIASTOLIC BLOOD PRESSURE: 74 MMHG | RESPIRATION RATE: 18 BRPM | HEART RATE: 96 BPM | OXYGEN SATURATION: 96 % | TEMPERATURE: 97.8 F | HEIGHT: 65 IN

## 2023-03-16 RX ORDER — MECLIZINE HYDROCHLORIDE 25 MG/1
25 TABLET ORAL 3 TIMES DAILY PRN
Qty: 15 TABLET | Refills: 0 | Status: SHIPPED | OUTPATIENT
Start: 2023-03-16 | End: 2023-03-21

## 2023-03-16 NOTE — DISCHARGE INSTRUCTIONS
Your mom was seen for symptoms.  As we discussed her symptoms are most consistent with benign positional vertigo.  She has evidence of a prior stroke on her CT scan.  I want her to follow closely with her primary care provider for further management.  I wrote a prescription for meclizine for trial to help with her vertigo.  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  Please call our neurosurgery schedule appointment within 1 week.  Fever symptoms worsen prior please return the emergency department immediately.

## 2023-03-16 NOTE — ED PROVIDER NOTES
"EMERGENCY DEPARTMENT ATTENDING NOTE    Patient Name: Sunny Lacey    Chief Complaint   Patient presents with   • Dizziness   • Altered Mental Status       PATIENT PRESENTATION:  Sunny Lacey is a very pleasant 89 y.o. female with history of hypertension presents to the emergency department due to reported dizziness brought in by family.    On my initial evaluation of the patient her family is not currently present.  Patient states that she feels well she request to go home.  She denies any headache any vision changes any hearing changes any numbness weakness in the arms or leg.  Denies any chest pain shortness of breath.  Denies any nausea vomiting abdominal pain.  States she has been drinking so less than normal.      PHYSICAL EXAM:   VS: /74   Pulse 96   Temp 97.8 °F (36.6 °C)   Resp 18   Ht 165.1 cm (65\")   Wt 63.5 kg (140 lb)   SpO2 96%   BMI 23.30 kg/m²   GENERAL: Chronically ill-appearing elderly woman sitting up in stretcher no acute distress well-nourished, well-developed, awake, alert, no acute distress, nontoxic appearing, comfortable  EYES: PERRL, sclerae anicteric, extraocular movements grossly intact, symmetric lids  EARS, NOSE, MOUTH, THROAT: atraumatic external nose and ears, moist mucous membranes  NECK: symmetric, trachea midline  RESPIRATORY: unlabored respiratory effort, clear to auscultation bilaterally, good air movement  CARDIOVASCULAR: no murmurs, peripheral pulses 2+ and equal in all extremities  GI: soft, nontender, nondistended  MUSCULOSKELETAL/EXTREMITIES: extremities without obvious deformity  SKIN: warm and dry with no obvious rashes  NEUROLOGIC: moving all 4 extremities symmetrically, CN II-XII grossly intact; normal finger-nose-finger bilaterally; alert and oriented x3 able to state name birthdate current locatio  PSYCHIATRIC: alert, pleasant and cooperative. Appropriate mood and affect.      MEDICAL DECISION MAKING:    Sunny Lacey is a 89 y.o. female who " presents emerged from due to family's concerns for altered mental status and reported dizziness.  Initial exam with no focal deficits patient GCS 15 patient herself reporting no symptoms although family is not present to corroborate history.    Differential Diagnosis Considered: Urinary tract infection, delirium, intracranial bleeding, stroke    Labs Ordered:  Labs Reviewed   COMPREHENSIVE METABOLIC PANEL - Abnormal; Notable for the following components:       Result Value    Glucose 101 (*)     Potassium 3.3 (*)     Calcium 9.8 (*)     All other components within normal limits    Narrative:     GFR Normal >60                  Chronic Kidney Disease <60                  Kidney Failure <15                                    The GFR formula is only valid for adults with stable renal function between ages 18 and 70.   CBC WITH AUTO DIFFERENTIAL - Abnormal; Notable for the following components:    Lymphocyte % 18.9 (*)     Eosinophil % 21.9 (*)     Immature Grans % 0.9 (*)     Eosinophils, Absolute 2.28 (*)     Immature Grans, Absolute 0.09 (*)     All other components within normal limits   URINALYSIS W/ CULTURE IF INDICATED - Normal    Narrative:     In absence of clinical symptoms, the presence of pyuria, bacteria, and/or nitrites on the urinalysis result does not correlate with infection.                  Urine microscopic not indicated.   LIPASE - Normal   LACTIC ACID, PLASMA - Normal   CBC AND DIFFERENTIAL    Narrative:     The following orders were created for panel order CBC & Differential.                  Procedure                               Abnormality         Status                                     ---------                               -----------         ------                                     CBC Auto Differential[[id number]]        Abnormal            Final result                                                 Please view results for these tests on the individual orders.        Imaging  Ordered:  CT Head Without Contrast   Final Result   1. No acute intracranial abnormality, mild diffuse atrophy and chronic   small vessel white matter ischemic changes appear stable. Small stable   chronic lacunar infarct in the right basal ganglia.   2. Right-sided mastoid effusion, acute mastoiditis is not excluded.       This report was finalized on 03/15/2023 21:07 by Dr. Matthew Hernandez MD.      CT Angiogram Head   Final Result   1. No evidence of LVO, or significant intracranial arterial   steno-occlusive disease is identified. There is heavy calcified plaque   at the cavernous portion of both distal ICAs.   2. 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 There are other possible etiologies, for more   definitive diagnosis, follow-up MRI of the brain with and without   contrast could be performed.           This report was finalized on 03/15/2023 21:27 by Dr. Matthew Hernandez MD.      CT Angiogram Neck   Final Result   1. No evidence of carotid, vertebral dissection or occlusion is   identified, there is heavy calcified plaque at the carotid bifurcations   bilaterally, with less than 50% stenosis of the ICAs. Patency of both   vertebral arteries, the right vertebral artery is dominant compared to   the right.           This report was finalized on 03/15/2023 21:13 by Dr. Matthew Hernandez MD.          Internal chart review:   Past Medical History:   Diagnosis Date   • Anemia    • Anxiety    • Arthritis    • Hypertension    • Skin cancer        Past Surgical History:   Procedure Laterality Date   • SKIN CANCER EXCISION         No Known Allergies    No current facility-administered medications for this encounter.    Current Outpatient Medications:   •  acetaminophen (TYLENOL) 325 MG tablet, Take 650 mg by mouth 2 (two) times a day., Disp: , Rfl:   •  albuterol sulfate  (90 Base) MCG/ACT  inhaler, Inhale 2 puffs Every 4 (Four) Hours As Needed for Wheezing or Shortness of Air., Disp: , Rfl:   •  ALPRAZolam (XANAX) 0.25 MG tablet, Take 0.25 mg by mouth every night at bedtime., Disp: , Rfl:   •  amLODIPine (NORVASC) 5 MG tablet, Take 5 mg by mouth Daily., Disp: , Rfl:   •  bimatoprost (LUMIGAN) 0.01 % ophthalmic drops, Administer 1 drop to both eyes Every Night., Disp: , Rfl:   •  brimonidine-timolol (COMBIGAN) 0.2-0.5 % ophthalmic solution, Administer 1 drop to the right eye Every 12 (Twelve) Hours., Disp: , Rfl:   •  Calcium Carbonate-Vit D-Min (CALCIUM 1200 PO), Take 600 mg by mouth 2 (two) times a day., Disp: , Rfl:   •  diclofenac (VOLTAREN) 1 % gel gel, Apply 4 g topically to the appropriate area as directed 4 (Four) Times a Day As Needed., Disp: , Rfl:   •  docusate sodium (COLACE) 100 MG capsule, Take 100 mg by mouth Daily., Disp: , Rfl:   •  ferrous sulfate 325 (65 FE) MG tablet, Take 325 mg by mouth Daily With Breakfast., Disp: , Rfl:   •  lidocaine (LMX) 4 % cream, Apply 1 application topically to the appropriate area as directed 4 (Four) Times a Day As Needed for Mild Pain ., Disp: , Rfl:   •  meclizine (ANTIVERT) 25 MG tablet, Take 1 tablet by mouth 3 (Three) Times a Day As Needed for Dizziness for up to 5 days., Disp: 15 tablet, Rfl: 0  •  megestrol (MEGACE) 40 MG/ML suspension, Take 20 mL by mouth Daily., Disp: , Rfl:   •  mirtazapine (REMERON) 15 MG tablet, Take 1 tablet by mouth Every Night., Disp: , Rfl:   •  potassium chloride (MICRO-K) 10 MEQ CR capsule, Take 2 capsules by mouth 2 (Two) Times a Day With Meals., Disp: , Rfl:     My lab interpretation: Normal white blood second hemoglobin.  Normal lipase.  Urinalysis no evidence of infection.  Her lactate.  Only slight hypokalemia 3.3.    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  Evidence of chronic prior right basal ganglia infarct as well  as chronic ischemic changes.    ED Course and Re-evaluation: 89yo F with history of hypertension presents emergency department due to sinus concerns for altered mental status and dizziness.  On initial interview with the patient she is really asymptomatic she has no complaints she is alert and oriented x3 GCS 15 nonfocal neurologic exam.  CT was ordered for evaluation as well as labs.  Slight hypokalemia but otherwise fairly unremarkable labs.  Patient given low-dose IV fluids and some potassium repletion.    Later in patient's care patient son arrived was able to provide additional history.  Reportedly she has no formal diagnosis of dementia although she is 89 years old.  He states her altered mental status was quite brief in nature she had a urinary tract infection in the past in setting of similar symptoms which is why he really brought her to the emergency department.  She is been dealing with some dizziness for several weeks has not really discussed this with her primary care provider so no management has yet been pursued.    CT imaging with no acute findings.    Unclear exact etiology the patient's presentation.  Suspect positional vertigo given additional history from patient's son.  Appears is mostly with position changes.  Would consider posterior circulation stroke given patient's age and clear evidence of some chronic vascular changes but she really has no focal deficit on exam no ataxia finger-nose-finger able to ambulate with a walker without difficulty emergency department so I lower suspicion for this.    Discussed results with patient on the bedside he felt like her mental status was completely back to normal.  Cannot elicit significant dizziness from patient on exams.  Had patient ambulate with a walker emergency department son and stated that that she is completely at her baseline patient is asymptomatic so she was ultimately discharged home.  Discussed possible medication administration options  for vertigo to which son stated they would like to try meclizine so I wrote a prescription for meclizine.  Discussed the incidental finding of a possible angioma which will need further follow-up so given the number for neurosurgery to which son expressed agreement and understanding patient is discharged home with plan to follow close with her primary care provider within 2 days for further management given turn precautions to the emergency department for worsening symptoms    ED Diagnosis:  Dizziness; Hypokalemia    Disposition: to home  Follow up plan: PCP follow up within 2 days, return to ED immediately if symptoms worsen        Signed:  Carmelo Bynum MD  Emergency Medicine Physician    Please note that portions of this note were completed with a voice recognition program.      Carmelo Bynum MD  03/16/23 0751

## 2023-10-10 NOTE — PLAN OF CARE
Problem: Patient Care Overview  Goal: Plan of Care Review  Flowsheets (Taken 7/14/2020 1102)  Progress: improving  Plan of Care Reviewed With: patient  Outcome Summary: Pt supine-sit min x1 with verbal cues, and draw sheet. Pt sit- stand min x1 w/ RW amb to bedside commode w/ CGA and verbal cues for walker placement. Pt amb 10 ft x 2 w/ CGA and verbal cues back to chair. AROM BLE x 15 sitting in chair w/ verbal cues, pt easily distracted. Pt would benefit from cont Pt for endurance and strength.      Detail Level: Simple Additional Notes: DX DIFFUSE DERMAL ANGIOLIPOMATOSUS. PT TO START USE OF DUODERM PATCHES, DISCUSSED WOUND CARE, AND FOLLOW UP WITH PCP FOR ATHEROSCLEROSIS WORK UP. Render Risk Assessment In Note?: no

## 2025-06-25 ENCOUNTER — HOSPITAL ENCOUNTER (INPATIENT)
Facility: HOSPITAL | Age: OVER 89
LOS: 5 days | Discharge: NURSING, REHAB OR LONG-TERM CARE | End: 2025-07-02
Attending: STUDENT IN AN ORGANIZED HEALTH CARE EDUCATION/TRAINING PROGRAM
Payer: MEDICARE

## 2025-06-25 ENCOUNTER — APPOINTMENT (OUTPATIENT)
Dept: CT IMAGING | Facility: HOSPITAL | Age: OVER 89
End: 2025-06-25
Payer: MEDICARE

## 2025-06-25 DIAGNOSIS — Z74.09 IMPAIRED MOBILITY: ICD-10-CM

## 2025-06-25 DIAGNOSIS — S32.9XXA CLOSED DISPLACED FRACTURE OF PELVIS, UNSPECIFIED PART OF PELVIS, INITIAL ENCOUNTER: Primary | ICD-10-CM

## 2025-06-25 LAB
ALBUMIN SERPL-MCNC: 3.8 G/DL (ref 3.5–5.2)
ALBUMIN/GLOB SERPL: 1.5 G/DL
ALP SERPL-CCNC: 101 U/L (ref 39–117)
ALT SERPL W P-5'-P-CCNC: 14 U/L (ref 1–33)
ANION GAP SERPL CALCULATED.3IONS-SCNC: 11 MMOL/L (ref 5–15)
AST SERPL-CCNC: 29 U/L (ref 1–32)
BASOPHILS # BLD AUTO: 0.04 10*3/MM3 (ref 0–0.2)
BASOPHILS NFR BLD AUTO: 0.6 % (ref 0–1.5)
BILIRUB SERPL-MCNC: 0.4 MG/DL (ref 0–1.2)
BUN SERPL-MCNC: 13.9 MG/DL (ref 8–23)
BUN/CREAT SERPL: 16.2 (ref 7–25)
CALCIUM SPEC-SCNC: 9.7 MG/DL (ref 8.2–9.6)
CHLORIDE SERPL-SCNC: 105 MMOL/L (ref 98–107)
CO2 SERPL-SCNC: 28 MMOL/L (ref 22–29)
CREAT SERPL-MCNC: 0.86 MG/DL (ref 0.57–1)
DEPRECATED RDW RBC AUTO: 45.4 FL (ref 37–54)
EGFRCR SERPLBLD CKD-EPI 2021: 59.3 ML/MIN/1.73
EOSINOPHIL # BLD AUTO: 0.08 10*3/MM3 (ref 0–0.4)
EOSINOPHIL NFR BLD AUTO: 1.2 % (ref 0.3–6.2)
ERYTHROCYTE [DISTWIDTH] IN BLOOD BY AUTOMATED COUNT: 12.7 % (ref 12.3–15.4)
GEN 5 1HR TROPONIN T REFLEX: 17 NG/L
GLOBULIN UR ELPH-MCNC: 2.5 GM/DL
GLUCOSE SERPL-MCNC: 102 MG/DL (ref 65–99)
HCT VFR BLD AUTO: 38.9 % (ref 34–46.6)
HGB BLD-MCNC: 12.8 G/DL (ref 12–15.9)
IMM GRANULOCYTES # BLD AUTO: 0.02 10*3/MM3 (ref 0–0.05)
IMM GRANULOCYTES NFR BLD AUTO: 0.3 % (ref 0–0.5)
LYMPHOCYTES # BLD AUTO: 1.31 10*3/MM3 (ref 0.7–3.1)
LYMPHOCYTES NFR BLD AUTO: 19.4 % (ref 19.6–45.3)
MCH RBC QN AUTO: 31.9 PG (ref 26.6–33)
MCHC RBC AUTO-ENTMCNC: 32.9 G/DL (ref 31.5–35.7)
MCV RBC AUTO: 97 FL (ref 79–97)
MONOCYTES # BLD AUTO: 0.68 10*3/MM3 (ref 0.1–0.9)
MONOCYTES NFR BLD AUTO: 10.1 % (ref 5–12)
NEUTROPHILS NFR BLD AUTO: 4.63 10*3/MM3 (ref 1.7–7)
NEUTROPHILS NFR BLD AUTO: 68.4 % (ref 42.7–76)
PLATELET # BLD AUTO: 119 10*3/MM3 (ref 140–450)
PMV BLD AUTO: 10.2 FL (ref 6–12)
POTASSIUM SERPL-SCNC: 3.7 MMOL/L (ref 3.5–5.2)
PROT SERPL-MCNC: 6.3 G/DL (ref 6–8.5)
RBC # BLD AUTO: 4.01 10*6/MM3 (ref 3.77–5.28)
SODIUM SERPL-SCNC: 144 MMOL/L (ref 136–145)
TROPONIN T % DELTA: -6
TROPONIN T NUMERIC DELTA: -1 NG/L
TROPONIN T SERPL HS-MCNC: 18 NG/L
WBC NRBC COR # BLD AUTO: 6.76 10*3/MM3 (ref 3.4–10.8)

## 2025-06-25 PROCEDURE — 70450 CT HEAD/BRAIN W/O DYE: CPT

## 2025-06-25 PROCEDURE — 99285 EMERGENCY DEPT VISIT HI MDM: CPT | Performed by: STUDENT IN AN ORGANIZED HEALTH CARE EDUCATION/TRAINING PROGRAM

## 2025-06-25 PROCEDURE — 84484 ASSAY OF TROPONIN QUANT: CPT | Performed by: STUDENT IN AN ORGANIZED HEALTH CARE EDUCATION/TRAINING PROGRAM

## 2025-06-25 PROCEDURE — 93005 ELECTROCARDIOGRAM TRACING: CPT | Performed by: STUDENT IN AN ORGANIZED HEALTH CARE EDUCATION/TRAINING PROGRAM

## 2025-06-25 PROCEDURE — 93010 ELECTROCARDIOGRAM REPORT: CPT | Performed by: STUDENT IN AN ORGANIZED HEALTH CARE EDUCATION/TRAINING PROGRAM

## 2025-06-25 PROCEDURE — 72192 CT PELVIS W/O DYE: CPT

## 2025-06-25 PROCEDURE — 85025 COMPLETE CBC W/AUTO DIFF WBC: CPT | Performed by: STUDENT IN AN ORGANIZED HEALTH CARE EDUCATION/TRAINING PROGRAM

## 2025-06-25 PROCEDURE — 73700 CT LOWER EXTREMITY W/O DYE: CPT

## 2025-06-25 PROCEDURE — 72125 CT NECK SPINE W/O DYE: CPT

## 2025-06-25 PROCEDURE — 36415 COLL VENOUS BLD VENIPUNCTURE: CPT

## 2025-06-25 PROCEDURE — 80053 COMPREHEN METABOLIC PANEL: CPT | Performed by: STUDENT IN AN ORGANIZED HEALTH CARE EDUCATION/TRAINING PROGRAM

## 2025-06-25 RX ORDER — ACETAMINOPHEN 500 MG
500 TABLET ORAL ONCE
Status: COMPLETED | OUTPATIENT
Start: 2025-06-25 | End: 2025-06-25

## 2025-06-25 RX ORDER — ACETAMINOPHEN 325 MG/1
650 TABLET ORAL EVERY 4 HOURS PRN
Status: DISCONTINUED | OUTPATIENT
Start: 2025-06-25 | End: 2025-07-02 | Stop reason: HOSPADM

## 2025-06-25 RX ORDER — ACETAMINOPHEN 650 MG/1
650 SUPPOSITORY RECTAL EVERY 4 HOURS PRN
Status: DISCONTINUED | OUTPATIENT
Start: 2025-06-25 | End: 2025-07-02 | Stop reason: HOSPADM

## 2025-06-25 RX ORDER — ALBUTEROL SULFATE 90 UG/1
2 INHALANT RESPIRATORY (INHALATION) EVERY 4 HOURS PRN
Status: DISCONTINUED | OUTPATIENT
Start: 2025-06-25 | End: 2025-06-25 | Stop reason: CLARIF

## 2025-06-25 RX ORDER — NITROGLYCERIN 0.4 MG/1
0.4 TABLET SUBLINGUAL
Status: DISCONTINUED | OUTPATIENT
Start: 2025-06-25 | End: 2025-07-02 | Stop reason: HOSPADM

## 2025-06-25 RX ORDER — AMLODIPINE BESYLATE 5 MG/1
5 TABLET ORAL DAILY
Status: DISCONTINUED | OUTPATIENT
Start: 2025-06-26 | End: 2025-07-02 | Stop reason: HOSPADM

## 2025-06-25 RX ORDER — SODIUM CHLORIDE 9 MG/ML
40 INJECTION, SOLUTION INTRAVENOUS AS NEEDED
Status: DISCONTINUED | OUTPATIENT
Start: 2025-06-25 | End: 2025-07-02 | Stop reason: HOSPADM

## 2025-06-25 RX ORDER — DOCUSATE SODIUM 100 MG/1
100 CAPSULE, LIQUID FILLED ORAL DAILY
Status: DISCONTINUED | OUTPATIENT
Start: 2025-06-26 | End: 2025-07-02 | Stop reason: HOSPADM

## 2025-06-25 RX ORDER — FERROUS SULFATE 325(65) MG
325 TABLET ORAL
Status: DISCONTINUED | OUTPATIENT
Start: 2025-06-26 | End: 2025-07-02 | Stop reason: HOSPADM

## 2025-06-25 RX ORDER — ACETAMINOPHEN 160 MG/5ML
650 SOLUTION ORAL EVERY 4 HOURS PRN
Status: DISCONTINUED | OUTPATIENT
Start: 2025-06-25 | End: 2025-07-02 | Stop reason: HOSPADM

## 2025-06-25 RX ORDER — SODIUM CHLORIDE 0.9 % (FLUSH) 0.9 %
10 SYRINGE (ML) INJECTION AS NEEDED
Status: DISCONTINUED | OUTPATIENT
Start: 2025-06-25 | End: 2025-07-02 | Stop reason: HOSPADM

## 2025-06-25 RX ORDER — AMOXICILLIN 250 MG
2 CAPSULE ORAL 2 TIMES DAILY PRN
Status: DISCONTINUED | OUTPATIENT
Start: 2025-06-25 | End: 2025-07-02 | Stop reason: HOSPADM

## 2025-06-25 RX ORDER — BISACODYL 5 MG/1
5 TABLET, DELAYED RELEASE ORAL DAILY PRN
Status: DISCONTINUED | OUTPATIENT
Start: 2025-06-25 | End: 2025-07-02 | Stop reason: HOSPADM

## 2025-06-25 RX ORDER — POLYETHYLENE GLYCOL 3350 17 G/17G
17 POWDER, FOR SOLUTION ORAL DAILY PRN
Status: DISCONTINUED | OUTPATIENT
Start: 2025-06-25 | End: 2025-07-02 | Stop reason: HOSPADM

## 2025-06-25 RX ORDER — ALBUTEROL SULFATE 0.83 MG/ML
2.5 SOLUTION RESPIRATORY (INHALATION) EVERY 4 HOURS PRN
Status: DISCONTINUED | OUTPATIENT
Start: 2025-06-25 | End: 2025-07-02 | Stop reason: HOSPADM

## 2025-06-25 RX ORDER — BISACODYL 10 MG
10 SUPPOSITORY, RECTAL RECTAL DAILY PRN
Status: DISCONTINUED | OUTPATIENT
Start: 2025-06-25 | End: 2025-07-02 | Stop reason: HOSPADM

## 2025-06-25 RX ORDER — SODIUM CHLORIDE 0.9 % (FLUSH) 0.9 %
10 SYRINGE (ML) INJECTION EVERY 12 HOURS SCHEDULED
Status: DISCONTINUED | OUTPATIENT
Start: 2025-06-25 | End: 2025-07-02 | Stop reason: HOSPADM

## 2025-06-25 RX ADMIN — ACETAMINOPHEN 500 MG: 500 TABLET, FILM COATED ORAL at 19:22

## 2025-06-25 NOTE — Clinical Note
Level of Care: Remote Telemetry [26]   Diagnosis: Pelvis fracture [889920]   Admitting Physician: BRYAN ROBBINS [[id number]]   Attending Physician: BRYAN ROBBINS [[id number]]   Certification: I Certify That Inpatient Hospital Services Are Medically Necessary For Greater Than 2 Midnights

## 2025-06-25 NOTE — ED PROVIDER NOTES
Subjective   History of Present Illness  The patient is a 89-year-old female who presents to the ED after a fall yesterday at her assisted living facility. According to her son, she was using her walker when she became dizzy and fell. She was initially evaluated at another facility where x-rays were performed. Initially, there was concern for a fracture of the femoral head, but upon further review, it was determined that nothing was broken and she was discharged home. However, she has continued to experience significant pain requiring assistance throughout the day and night, prompting today's visit for reevaluation. The patient confirms she was fully conscious during the fall but reports experiencing dizziness prior to falling. She states she had been reading and needed to get up to take her medications when the dizziness occurred. The patient has a history of bilateral hip replacements and has a shahid from a previous knee fracture. Per her son, she is in relatively good health for her age with no known diabetes. Her medical history includes hypertension, anemia, arthritis, and mild anxiety.        Review of Systems    Past Medical History:   Diagnosis Date    Anemia     Anxiety     Arthritis     Hypertension     Skin cancer        No Known Allergies    Past Surgical History:   Procedure Laterality Date    SKIN CANCER EXCISION         History reviewed. No pertinent family history.    Social History     Socioeconomic History    Marital status:    Tobacco Use    Smoking status: Never   Vaping Use    Vaping status: Never Used   Substance and Sexual Activity    Alcohol use: Never    Drug use: Never    Sexual activity: Defer           Objective   Physical Exam    Physical Exam:  Constitutional:  General: Patient is not in acute distress.  Appearance: Patient is not diaphoretic.    HENT:  Head: Normocephalic and atraumatic.  Right Ear: External ear normal.  Left Ear: External ear normal.  Nose: Nose  normal.    Eyes:  General: No scleral icterus.  Conjunctiva/sclera: Conjunctivae normal.    Cardiovascular:  Rate and Rhythm: Normal rate and regular rhythm.  Heart sounds: No friction rub.    Pulmonary:  Effort: Pulmonary effort is normal. No respiratory distress.  Breath sounds: No stridor. No wheezing. **Unable to auscultate clearly.**    Abdominal:  Palpations: Abdomen is soft.  Tenderness: There is no abdominal tenderness. There is no guarding or rebound.    Musculoskeletal:  General: No deformity. **Point tenderness noted in right hip area. Patient reports pain with palpation of right hip. As well as Mid femur.     Skin:  General: Skin is warm and dry. No rashes present. Normal color. Normal turgor.    Neurological:  General: No focal deficit present.  Mental Status: Alert and oriented.    Procedures           ED Course  ED Course as of 06/27/25 0442   Wed Jun 25, 2025 2029 Has pelvic fractures.  She will require admission as she is not able to bear weight.  She would likely require rehab. [SG]   2030 Will call Ortho for further recommendations. [SG]   2108 Spoke to Dr. Raines, non surgical case at this time. Toe to touch, non weight bearing. Case discussed with Dr. Pink who accepted admit. No chest pain, mild elevation of troponin.  [SG]      ED Course User Index  [SG] Kaleb Heck MD                                                       Medical Decision Making  89-year-old female presents with right hip pain following a fall yesterday with associated dizziness.    Given the patient's advanced age and history of fall with dizziness, a comprehensive workup is warranted. The patient requires evaluation for both the cause of her fall and assessment of potential injuries. Laboratory studies including CBC, CMP, and troponin were ordered to evaluate for underlying medical conditions that could have contributed to her dizziness and fall.    Imaging studies: CT scan of the brain and cervical spine were ordered  to rule out intracranial pathology or cervical spine injury that could have caused or resulted from the fall. CT of the pelvis was also ordered to evaluate for fracture or other injury to the hip area, particularly given her history of bilateral hip replacements and the location of her pain.    The patient has no spine tenderness on examination, but does have significant right hip pain. Given her age, inability to ambulate, and continued pain despite prior evaluation, admission for further management and possible rehabilitation is being considered pending results of the workup.    Initial pain management with Tylenol was initiated to avoid potential cognitive side effects from opioid medications in this elderly patient.    Problems Addressed:  Closed displaced fracture of pelvis, unspecified part of pelvis, initial encounter: complicated acute illness or injury    Amount and/or Complexity of Data Reviewed  Labs: ordered.  Radiology: ordered.  ECG/medicine tests: ordered.    Risk  OTC drugs.  Decision regarding hospitalization.        Final diagnoses:   Closed displaced fracture of pelvis, unspecified part of pelvis, initial encounter       ED Disposition  ED Disposition       ED Disposition   Decision to Admit    Condition   --    Comment   Level of Care: Remote Telemetry [26]   Diagnosis: Pelvis fracture [221135]   Admitting Physician: BRYAN ROBBINS [[id number]]                 No follow-up provider specified.       Medication List        ASK your doctor about these medications      mirtazapine 30 MG tablet  Commonly known as: REMERON  Ask about: Which instructions should I use?                 Kaleb Heck MD  06/27/25 8676
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
## 2025-06-26 LAB
ALBUMIN SERPL-MCNC: 3.8 G/DL (ref 3.5–5.2)
ALBUMIN/GLOB SERPL: 1.5 G/DL
ALP SERPL-CCNC: 104 U/L (ref 39–117)
ALT SERPL W P-5'-P-CCNC: 13 U/L (ref 1–33)
ANION GAP SERPL CALCULATED.3IONS-SCNC: 13 MMOL/L (ref 5–15)
AST SERPL-CCNC: 26 U/L (ref 1–32)
BILIRUB SERPL-MCNC: 0.6 MG/DL (ref 0–1.2)
BUN SERPL-MCNC: 11.9 MG/DL (ref 8–23)
BUN/CREAT SERPL: 16.8 (ref 7–25)
CALCIUM SPEC-SCNC: 9.5 MG/DL (ref 8.2–9.6)
CHLORIDE SERPL-SCNC: 103 MMOL/L (ref 98–107)
CO2 SERPL-SCNC: 27 MMOL/L (ref 22–29)
CREAT SERPL-MCNC: 0.71 MG/DL (ref 0.57–1)
DEPRECATED RDW RBC AUTO: 44.4 FL (ref 37–54)
EGFRCR SERPLBLD CKD-EPI 2021: 74.6 ML/MIN/1.73
ERYTHROCYTE [DISTWIDTH] IN BLOOD BY AUTOMATED COUNT: 12.6 % (ref 12.3–15.4)
GLOBULIN UR ELPH-MCNC: 2.6 GM/DL
GLUCOSE SERPL-MCNC: 103 MG/DL (ref 65–99)
HCT VFR BLD AUTO: 39.6 % (ref 34–46.6)
HGB BLD-MCNC: 13.3 G/DL (ref 12–15.9)
MAGNESIUM SERPL-MCNC: 2.1 MG/DL (ref 1.7–2.3)
MCH RBC QN AUTO: 32.1 PG (ref 26.6–33)
MCHC RBC AUTO-ENTMCNC: 33.6 G/DL (ref 31.5–35.7)
MCV RBC AUTO: 95.7 FL (ref 79–97)
PHOSPHATE SERPL-MCNC: 2.8 MG/DL (ref 2.5–4.5)
PLATELET # BLD AUTO: 122 10*3/MM3 (ref 140–450)
PMV BLD AUTO: 10.7 FL (ref 6–12)
POTASSIUM SERPL-SCNC: 3.2 MMOL/L (ref 3.5–5.2)
PROT SERPL-MCNC: 6.4 G/DL (ref 6–8.5)
RBC # BLD AUTO: 4.14 10*6/MM3 (ref 3.77–5.28)
SODIUM SERPL-SCNC: 143 MMOL/L (ref 136–145)
WBC NRBC COR # BLD AUTO: 7.25 10*3/MM3 (ref 3.4–10.8)

## 2025-06-26 PROCEDURE — 83735 ASSAY OF MAGNESIUM: CPT

## 2025-06-26 PROCEDURE — 99223 1ST HOSP IP/OBS HIGH 75: CPT

## 2025-06-26 PROCEDURE — 80053 COMPREHEN METABOLIC PANEL: CPT

## 2025-06-26 PROCEDURE — 36415 COLL VENOUS BLD VENIPUNCTURE: CPT

## 2025-06-26 PROCEDURE — 85027 COMPLETE CBC AUTOMATED: CPT

## 2025-06-26 PROCEDURE — 84100 ASSAY OF PHOSPHORUS: CPT

## 2025-06-26 PROCEDURE — G0378 HOSPITAL OBSERVATION PER HR: HCPCS

## 2025-06-26 RX ORDER — POTASSIUM CHLORIDE 1500 MG/1
40 TABLET, EXTENDED RELEASE ORAL ONCE
Status: COMPLETED | OUTPATIENT
Start: 2025-06-26 | End: 2025-06-26

## 2025-06-26 RX ORDER — GABAPENTIN 300 MG/1
300 CAPSULE ORAL NIGHTLY
COMMUNITY

## 2025-06-26 RX ORDER — MUPIROCIN 2 %
1 OINTMENT (GRAM) TOPICAL 3 TIMES DAILY
COMMUNITY
End: 2025-07-02 | Stop reason: HOSPADM

## 2025-06-26 RX ORDER — HYDROCODONE BITARTRATE AND ACETAMINOPHEN 5; 325 MG/1; MG/1
1 TABLET ORAL EVERY 6 HOURS PRN
COMMUNITY
End: 2025-07-02 | Stop reason: HOSPADM

## 2025-06-26 RX ORDER — DULOXETIN HYDROCHLORIDE 30 MG/1
30 CAPSULE, DELAYED RELEASE ORAL DAILY
COMMUNITY

## 2025-06-26 RX ORDER — MIRTAZAPINE 30 MG/1
30 TABLET, FILM COATED ORAL NIGHTLY
COMMUNITY
End: 2025-07-02 | Stop reason: HOSPADM

## 2025-06-26 RX ORDER — MECLIZINE HCL 12.5 MG 12.5 MG/1
12.5 TABLET ORAL NIGHTLY
COMMUNITY
End: 2025-07-02 | Stop reason: HOSPADM

## 2025-06-26 RX ORDER — DEXTROMETHORPHAN HYDROBROMIDE, GUAIFENESIN, AND PHENYLEPHRINE HYDROCHLORIDE 5; 100; 2.5 MG/5ML; MG/5ML; MG/5ML
20 SUSPENSION ORAL EVERY 4 HOURS PRN
COMMUNITY
End: 2025-07-02 | Stop reason: HOSPADM

## 2025-06-26 RX ORDER — HYDROCODONE BITARTRATE AND ACETAMINOPHEN 5; 325 MG/1; MG/1
1 TABLET ORAL EVERY 6 HOURS PRN
Refills: 0 | Status: COMPLETED | OUTPATIENT
Start: 2025-06-26 | End: 2025-06-26

## 2025-06-26 RX ADMIN — DOCUSATE SODIUM 100 MG: 100 CAPSULE, LIQUID FILLED ORAL at 09:26

## 2025-06-26 RX ADMIN — Medication 10 ML: at 01:06

## 2025-06-26 RX ADMIN — HYDROCODONE BITARTRATE AND ACETAMINOPHEN 1 TABLET: 5; 325 TABLET ORAL at 01:38

## 2025-06-26 RX ADMIN — Medication 10 ML: at 20:45

## 2025-06-26 RX ADMIN — FERROUS SULFATE TAB 325 MG (65 MG ELEMENTAL FE) 325 MG: 325 (65 FE) TAB at 09:26

## 2025-06-26 RX ADMIN — AMLODIPINE BESYLATE 5 MG: 5 TABLET ORAL at 13:29

## 2025-06-26 RX ADMIN — POTASSIUM CHLORIDE 40 MEQ: 1500 TABLET, EXTENDED RELEASE ORAL at 18:02

## 2025-06-26 RX ADMIN — Medication 10 ML: at 09:18

## 2025-06-26 RX ADMIN — HYDROCODONE BITARTRATE AND ACETAMINOPHEN 1 TABLET: 5; 325 TABLET ORAL at 13:26

## 2025-06-26 NOTE — CONSULTS
[facility]  Initial Consult    Attending Physician: Emanuel Laguerre*  Referring Provider: Emanuel Laguerre MD     Patient Name: Sunny Lacey  Date of Admission: 6/25/2025  Today's Date: 06/26/25     Reason for Referral: Goals of Care/Advance Care Planning    Code Status and Medical Interventions: CPR (Attempt to Resuscitate); Full Support   Ordered at: 06/26/25 0044     Code Status (Patient has no pulse and is not breathing):    CPR (Attempt to Resuscitate)     Medical Interventions (Patient has pulse or is breathing):    Full Support     Level Of Support Discussed With:    Patient      Subjective     HPI: 89 y.o. female with past medical history including anemia, anxiety, arthritis, hypertension, and skin cancer.  Additional past medical history listed below.  Patient presented to [facility] on 6/25/2025 related to hip pain following mechanical fall.  According to chart review she is a resident at assisted living facility and had a fall on 6/24/2025 and was evaluated at OSH which reportedly did not show acute fractures.  Continued to experience pain and therefore was brought to ED for evaluation.  ED note unavailable at this time.  Workup in ED revealed few abnormalities in labs including calcium 9.7 and platelets 119,000.  CT of cervical spine negative for acute osseous findings.  Visualized moderate multilevel cervical spine degenerative change.  CT of head negative for acute intracranial findings.  CT of right lower extremity visualized acute mildly displaced fracture at the right acetabulum, acute comminuted and displaced fracture of the right inferior pubic ramus however no evidence of left-sided pelvic or hip fracture.  Noted plans to transition to medical floor under observation once bed available.  Awaiting orthopedic evaluation however chart review notes no plans for surgical intervention.  Labs collected this morning reviewed.  Potassium  slightly low at 3.2 and platelets slightly low at 122,000.  Palliative care has been consulted to discuss goals of care/advance care planning.  She is lying in bed, awake and in no apparent distress.  She is oriented x 3 however has some intermittent confusion during conversation.  Reports she will be turning 40 years old this year.  Recalls fall and discomfort after fall.  Reports intermittent pain but mostly with movement.  Denies any shortness of breath.  No visitors present.  Discussed with nursing and hospitalist.     Advance Care Planning   Advanced Directives: Son, Cristino, reports he is POA.  Requested copy of document to place in EMR.     Advance Care Planning Discussion: Ms. Lacey is having intermittent confusion during discussion making it difficult to determine her understanding/ability to make complex medical decisions at time of exam.  Spoke with her son, Cristino, via telephone who is reportedly her POA.  He reflected on previous discussions.  Shared he was able to discuss wishes further.  We discussed medical priorities including CPR and intubation.  After further discussion Cristino expressed wishes to de-escalate CODE STATUS to include no CPR, intubation, and/or cardioversion.  Agreeable to remaining interventions at this time.  Requested copy of POA document to place on file.  Cristino was appreciative of discussion.  Shared he will be returning tomorrow to visit.  Briefly discussed completing MOST form.  Will plan to see if interested in completing tomorrow.  Denied any questions/concerns at this time.  Encouraged if arise.  Support provided.     The patient receives support from her children and extended family. Patient's son is her POA.    Due to the palliative care topics discussed including goals of care and medical priorities we will establish an advance care plan.      Review of Systems   Unable to perform ROS: Other     Pain Assessment  Preferred Pain Scale: number (Numeric Rating Pain Scale)  Pain  Location: hip  Past Medical History:   Diagnosis Date    Anemia     Anxiety     Arthritis     Hypertension     Skin cancer       Past Surgical History:   Procedure Laterality Date    SKIN CANCER EXCISION        Social History     Socioeconomic History    Marital status:    Tobacco Use    Smoking status: Never   Vaping Use    Vaping status: Never Used   Substance and Sexual Activity    Alcohol use: Never    Drug use: Never    Sexual activity: Defer     History reviewed. No pertinent family history.   No Known Allergies    Objective   Diagnostics: Reviewed  No intake or output data in the 24 hours ending 06/26/25 1401    Current medications patient is presently taking including all prescriptions, over-the-counter, herbals and vitamin/mineral/dietary (nutritional) supplements with reviewed including route, type, dose and frequency and are current per MAR at time of dictation.  Current Facility-Administered Medications   Medication Dose Route Frequency Provider Last Rate Last Admin    acetaminophen (TYLENOL) tablet 650 mg  650 mg Oral Q4H PRN Octaviano Pink MD        Or    acetaminophen (TYLENOL) 160 MG/5ML oral solution 650 mg  650 mg Oral Q4H PRN Octaviano Pink MD        Or    acetaminophen (TYLENOL) suppository 650 mg  650 mg Rectal Q4H PRN Octaviano Pink MD        albuterol (PROVENTIL) nebulizer solution 0.083% 2.5 mg/3mL  2.5 mg Nebulization Q4H PRN Octaviano Pink MD        amLODIPine (NORVASC) tablet 5 mg  5 mg Oral Daily Octaviano Pink MD   5 mg at 06/26/25 1329    sennosides-docusate (PERICOLACE) 8.6-50 MG per tablet 2 tablet  2 tablet Oral BID PRN Octaviano Pink MD        And    polyethylene glycol (MIRALAX) packet 17 g  17 g Oral Daily PRN Octaviano Pink MD        And    bisacodyl (DULCOLAX) EC tablet 5 mg  5 mg Oral Daily PRN Octaviano Pink MD        And    bisacodyl (DULCOLAX) suppository 10 mg  10 mg Rectal Daily PRN Octaviano Pink MD        docusate sodium (COLACE) capsule 100 mg  100 mg  Oral Daily Octaviano Pink MD   100 mg at 06/26/25 0926    ferrous sulfate tablet 325 mg  325 mg Oral Daily With Breakfast Octaviano Pink MD   325 mg at 06/26/25 0926    nitroglycerin (NITROSTAT) SL tablet 0.4 mg  0.4 mg Sublingual Q5 Min PRN Octaviano Pink MD        potassium chloride (KLOR-CON M20) CR tablet 40 mEq  40 mEq Oral Once Emanuel Laguerre MD        sodium chloride 0.9 % flush 10 mL  10 mL Intravenous Q12H Octaviano Pink MD   10 mL at 06/26/25 0918    sodium chloride 0.9 % flush 10 mL  10 mL Intravenous PRN Octaviano Pink MD        sodium chloride 0.9 % infusion 40 mL  40 mL Intravenous PRN Octaviano Pink MD         Current Outpatient Medications   Medication Sig Dispense Refill    bimatoprost (LUMIGAN) 0.01 % ophthalmic drops Administer 1 drop to both eyes Every Night.      DULoxetine (CYMBALTA) 30 MG capsule Take 1 capsule by mouth Daily.      gabapentin (NEURONTIN) 300 MG capsule Take 1 capsule by mouth Every Night.      meclizine (ANTIVERT) 12.5 MG tablet Take 1 tablet by mouth Every Night.      mupirocin (BACTROBAN) 2 % ointment Apply 1 Application topically to the appropriate area as directed 3 (Three) Times a Day.      potassium chloride (MICRO-K) 10 MEQ CR capsule Take 2 capsules by mouth 2 (Two) Times a Day With Meals. (Patient taking differently: Take 1 capsule by mouth Daily.)      pyridoxine (VITAMIN B-6) 100 MG tablet Take 1 tablet by mouth Daily.      HYDROcodone-acetaminophen (NORCO) 5-325 MG per tablet Take 1 tablet by mouth Every 6 (Six) Hours As Needed for Mild Pain or Moderate Pain.      mirtazapine (REMERON) 30 MG tablet Take 1 tablet by mouth Every Night.      Phenylephrine-DM-GG (Mucinex Cough & Congest Child) 2.5-5-100 MG/5ML liquid Take 20 mL by mouth Every 4 (Four) Hours As Needed (Congestion/Cough). Every 4 to 6 hours as needed          acetaminophen **OR** acetaminophen **OR** acetaminophen    albuterol    senna-docusate sodium **AND** polyethylene glycol **AND**  "bisacodyl **AND** bisacodyl    nitroglycerin    sodium chloride    sodium chloride  Assessment   /80 (BP Location: Left arm, Patient Position: Lying)   Pulse 83   Temp 98.1 °F (36.7 °C) (Oral)   Resp 16   Ht 165.1 cm (65\")   Wt 63.5 kg (139 lb 15.9 oz)   SpO2 93%   BMI 23.30 kg/m²     Physical Exam  Vitals and nursing note reviewed.   Constitutional:       General: She is not in acute distress.  HENT:      Head: Normocephalic and atraumatic.      Right Ear: Decreased hearing noted.      Left Ear: Decreased hearing noted.   Eyes:      General: Lids are normal.      Extraocular Movements: Extraocular movements intact.   Neck:      Vascular: No JVD.      Trachea: Trachea normal.   Cardiovascular:      Rate and Rhythm: Normal rate.   Pulmonary:      Effort: Pulmonary effort is normal.   Musculoskeletal:      Cervical back: Neck supple.   Skin:     General: Skin is warm and dry.   Neurological:      Mental Status: She is alert.   Psychiatric:         Behavior: Behavior is cooperative.     Functional status: Palliative Performance Scale Score: Performance 60% based on the following measures: Ambulation: Reduced, Activity and Evidence of Disease: Unable to do hobby or some work, significant evidence of disease, Self-Care: Occasional assist necessary,  Intake: Normal or reduced, LOC: Full or confusion.  Nutritional status: Albumin 3.8. Body mass index is 23.3 kg/m².  Patient status: Disease state: Controlled with current treatments.    Active Hospital Problems    Diagnosis     **Pelvis fracture     Fall      Impression/Problem List:  Pelvis fracture - Acute comminuted and displaced fracture of the right inferior pubic ramus   Acetabulum fracture, right - Acute mildly displaced per CT   Fall  Thrombocytopenia  Advanced age     Plan / Recommendations     Palliative Care Encounter   Ms. Lacey is having intermittent confusion during discussion making it difficult to determine her understanding/ability to make " complex medical decisions at time of exam.    Spoke with her son, Cristino, via telephone who is reportedly her POA.  Details of discussion above.   Medical priorities discussed.   Expressed wishes to de-escalate CODE STATUS to include no CPR, intubation, and/or cardioversion.    CODE STATUS changed to reflect wishes.   Will plan to see if interested in completing MOST form tomorrow.    Denied any questions/concerns at this time.  Encouraged if arise.  Support provided.      Thank you for allowing us to participate in patient's plan of care. Palliative Care Team will continue to follow patient.     Electronically signed by, PEGGY Cancino, 06/26/25.                 ambulatory

## 2025-06-26 NOTE — PROGRESS NOTES
[facility]  INPATIENT PROGRESS NOTE    Patient Name: Sunny Lacey  Date of Admission: 6/25/2025  Today's Date: 06/26/25  Length of Stay: 1  Primary Care Physician: Nevaeh Thornton PA    Subjective   Chief Complaint: f/u   HPI   Patient is 89-year-old woman from assisted living facility who had fallen and suffered from pelvic pain.  The event happened on June 24.  From admitting record patient was evaluated at another facility where x-ray done did not show fractures.    His troponins are flat  He has normal white count  CT imaging of the pelvis showed mildly displaced fracture of the right acetabulum, acute commuted displaced fracture of right inferior pubic ramus  No evidence of left-sided pelvic or hip fracture  Bilateral hip arthroplasty with no evidence of hardware malalignment    CT of the cervical spine showed no acute osseous findings, moderate multilevel cervical spine degenerative changes  Head CT scan did not show acute intracranial findings    Current CODE STATUS is placed as full code    We were asked to admit this patient.  ED contacted orthopedic and advised no need for surgery    ED note not yet available as of yet but this was a conversation documented by Dr. Pink    I have not seen that patient received potassium for a level of 3.2.    Seen the patient in ER 43  She is very hard of hearing  She did not seem to have noticed me as I come in her door.  I knocked before coming in.  I am not sure whether her left eye has an issue.  It appears bigger than the other eye but not necessarily the pupil itself.    She is very hard of hearing  She she is not aware where she is and why she is here  She said she is from  [place].  She does not really endorse any symptoms to me.    Review of Systems   All pertinent negatives and positives are as above. All other systems have been reviewed and are negative unless otherwise stated.     Objective    Temp:  " [98.1 °F (36.7 °C)] 98.1 °F (36.7 °C)  Heart Rate:  [76-90] 83  Resp:  [16] 16  BP: (121-160)/(64-99) 121/80  Physical Exam  Exam is limited because she expressed she was scared and asked me if there was lightning and thunder.  She told me she is scared about the glasses and that she points to the door in her room  She also mentioned about people but not necessarily present in the room.  She appears confused  Her hearing is impaired.  She was a little bit vigilant.  Therefore I could not listen to her chest nor checked her abdomen or uncover her lower extremities.  Otherwise she appears to be not in any distress and hemodynamically stable.          Results Review:  I have reviewed the labs, radiology results, and diagnostic studies.    Laboratory Data:   Results from last 7 days   Lab Units 06/26/25  0444 06/25/25  1917   WBC 10*3/mm3 7.25 6.76   HEMOGLOBIN g/dL 13.3 12.8   HEMATOCRIT % 39.6 38.9   PLATELETS 10*3/mm3 122* 119*        Results from last 7 days   Lab Units 06/26/25  0444 06/25/25  1917   SODIUM mmol/L 143 144   POTASSIUM mmol/L 3.2* 3.7   CHLORIDE mmol/L 103 105   CO2 mmol/L 27.0 28.0   BUN mg/dL 11.9 13.9   CREATININE mg/dL 0.71 0.86   CALCIUM mg/dL 9.5 9.7*   BILIRUBIN mg/dL 0.6 0.4   ALK PHOS U/L 104 101   ALT (SGPT) U/L 13 14   AST (SGOT) U/L 26 29   GLUCOSE mg/dL 103* 102*       Culture Data:   No results found for: \"BLOODCX\", \"URINECX\", \"WOUNDCX\", \"MRSACX\", \"RESPCX\", \"STOOLCX\"    Radiology Data:   Imaging Results (Last 24 Hours)       Procedure Component Value Units Date/Time    CT Pelvis Without Contrast [[id number]] Collected: 06/25/25 2011     Updated: 06/25/25 2020    Narrative:      EXAM/TECHNIQUE:  1. CT pelvis without contrast  2. CT right hip without contrast     INDICATION: R hip pain, negative XR yesterday at other facility  negative.     COMPARISON: None available.     DLP: 1262.69 mGy.cm. Automated exposure control was utilized to decrease  patient radiation dose.     FINDINGS:   "   Bilateral hip arthroplasty with streak artifact slightly limiting  evaluation. No acute mild displaced fracture of the right acetabulum  (pelvic CT series 4 image 60 and 70). There is an acute comminuted and  displaced fracture of the right inferior pubic ramus. No pubic symphysis  widening. No left pubic rami fracture or left acetabular fracture. No  evidence of hip arthroplasty hardware malalignment. No sacral fracture  identified. No evidence of proximal femoral fracture. Right knee  arthroplasty is noted, without evidence of complication.     Moderate volume stool in the rectum. Colonic diverticulosis without  diverticulitis. No bowel distention or active bowel inflammation in the  pelvis. No focal urinary bladder abnormality. Nonaneurysmal  atherosclerotic distal abdominal aorta. No pelvic lymphadenopathy. No  free pelvic fluid. No acute soft tissue abnormality.       Impression:         1. Acute mildly displaced fracture of the right acetabulum.     2. Acute comminuted and displaced fracture of the right inferior pubic  ramus.     3. No evidence of left-sided pelvic or hip fracture     4. Bilateral hip arthroplasty, with streak artifact from hardware  limiting evaluation. No evidence of hardware malalignment.        This report was signed and finalized on 6/25/2025 8:17 PM by Dr. Rafael Abel MD.       CT Lower Extremity Right Without Contrast [[id number]] Collected: 06/25/25 2011     Updated: 06/25/25 2020    Narrative:      EXAM/TECHNIQUE:  1. CT pelvis without contrast  2. CT right hip without contrast     INDICATION: R hip pain, negative XR yesterday at other facility  negative.     COMPARISON: None available.     DLP: 1262.69 mGy.cm. Automated exposure control was utilized to decrease  patient radiation dose.     FINDINGS:     Bilateral hip arthroplasty with streak artifact slightly limiting  evaluation. No acute mild displaced fracture of the right acetabulum  (pelvic CT series 4 image 60 and 70).  There is an acute comminuted and  displaced fracture of the right inferior pubic ramus. No pubic symphysis  widening. No left pubic rami fracture or left acetabular fracture. No  evidence of hip arthroplasty hardware malalignment. No sacral fracture  identified. No evidence of proximal femoral fracture. Right knee  arthroplasty is noted, without evidence of complication.     Moderate volume stool in the rectum. Colonic diverticulosis without  diverticulitis. No bowel distention or active bowel inflammation in the  pelvis. No focal urinary bladder abnormality. Nonaneurysmal  atherosclerotic distal abdominal aorta. No pelvic lymphadenopathy. No  free pelvic fluid. No acute soft tissue abnormality.       Impression:         1. Acute mildly displaced fracture of the right acetabulum.     2. Acute comminuted and displaced fracture of the right inferior pubic  ramus.     3. No evidence of left-sided pelvic or hip fracture     4. Bilateral hip arthroplasty, with streak artifact from hardware  limiting evaluation. No evidence of hardware malalignment.        This report was signed and finalized on 6/25/2025 8:17 PM by Dr. Rafael Abel MD.       CT Cervical Spine Without Contrast [[id number]] Collected: 06/25/25 2006     Updated: 06/25/25 2012    Narrative:      EXAM/TECHNIQUE: CT cervical spine without contrast     INDICATION: fall, cant rule out by Iranian C spine rule due to age     COMPARISON: None available.     DLP: 1262.69 mGy.cm. Automated exposure control was utilized to decrease  patient radiation dose.     FINDINGS:     Craniocervical relationships are maintained. Trace anterolisthesis of C7  on T1, likely degenerative in nature. Cervical vertebral body heights  are maintained. No acute fracture. Advanced multilevel cervical spine  degenerative change with multilevel neural foraminal stenosis.  Incomplete C1 arch, likely chronic/postsurgical. Paravertebral soft  tissues are unremarkable.       Impression:       1.  No acute osseous findings.  2.  Moderate multilevel cervical spine degenerative change.        This report was signed and finalized on 6/25/2025 8:09 PM by Dr. Rafael Abel MD.       CT Head Without Contrast [[id number]] Collected: 06/25/25 2003     Updated: 06/25/25 2009    Narrative:      EXAM/TECHNIQUE: CT head without contrast     INDICATION: fall, yesterday dizziness     COMPARISON: 3/15/2023     DLP: 1262.69 mGy.cm. Automated exposure control was utilized to decrease  patient radiation dose.     FINDINGS:     No evidence of intracranial hemorrhage. Gray-white differentiation is  maintained. 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 Global cerebral  volume loss and presumed chronic microvascular ischemic white matter  change. No midline shift or mass effect. Lateral ventricles are  nondilated. Basilar cisterns are patent. No acute orbital finding. Right  mastoid effusion. Left mastoid air cells are clear. Visualized paranasal  sinuses are clear. No acute osseous finding.       Impression:         No acute intracranial findings.        This report was signed and finalized on 6/25/2025 8:06 PM by Dr. Rafael Abel MD.               I have reviewed the patient's current medications.     Assessment/Plan   Assessment  Active Hospital Problems    Diagnosis     **Pelvis fracture     Fall              Medical Decision Making  Number and Complexity of problems:   Pelvic fracture  Fall  Resident of assisted living  Hypertension  Advanced age  Hyperlipidemia  History of bilateral hip arthroplasty  Hard of hearing  Confused possibly underlying cognitive impairment  Treatment Plan  Palliative care consult  Ortho consult was put in admission-reportedly no intervention  Pain control  DVT prophylaxis  PT OT      Medications reviewed  amLODIPine, 5 mg, Oral, Daily  docusate sodium, 100 mg, Oral, Daily  ferrous sulfate, 325 mg, Oral, Daily With Breakfast  potassium chloride, 40 mEq, Oral, Once  sodium chloride, 10  mL, Intravenous, Q12H          Conditions and Status     Fair     [facility] Data  External documents reviewed: Reviewed available information  Cardiac tracing (EKG, telemetry) interpretation: -  Radiology interpretation: y  Labs reviewed: y  Any tests that were considered but not ordered: None     Decision rules/scores evaluated (example [id number], [place], etc): -     Discussed with: Patient and palliative care, nursing staff     Care Planning  Shared decision making: Patient and family  Code status and discussions: Currently full code.  Will have to further delineate goals of care with the help of palliative care.    Disposition  Social Determinants of Health that impact treatment or disposition: None identified at this time  I expect the patient to be discharged to (?)           Electronically signed by Emanuel Laguerre MD, 06/26/25, 13:36 CDT.

## 2025-06-26 NOTE — H&P
[facility] Medicine Services  HISTORY AND PHYSICAL    Date of Admission: 6/25/2025  Primary Care Physician: Nevaeh Thornton PA    Subjective   Primary Historian: Patient    Chief Complaint: Fall and pelvic pain    History of Present Illness  This is a 89-year-old female patient who lives at assisted living facility coming for the evaluation of mechanical fall and hip pain.  As reported, patient had a fall yesterday and since then she was having significant pain in her pelvis.  She was evaluated at another facility, had some x-rays, reportedly did not show acute fractures, patient was discharged home.  However, patient continues to have pain.  So she presented to the ED for evaluation.  Patient denies having any chest pain, shortness of breath, nausea vomiting or diarrhea, fever or chills.        Review of Systems   Otherwise complete ROS reviewed and negative except as mentioned in the HPI.    Past Medical History:   Past Medical History:   Diagnosis Date    Anemia     Anxiety     Arthritis     Hypertension     Skin cancer      Past Surgical History:  Past Surgical History:   Procedure Laterality Date    SKIN CANCER EXCISION       Social History:  reports that she has never smoked. She does not have any smokeless tobacco history on file. She reports that she does not drink alcohol and does not use drugs.    Family History: family history is not on file.       Allergies:  No Known Allergies    Medications:  Prior to Admission medications    Medication Sig Start Date End Date Taking? Authorizing Provider   acetaminophen (TYLENOL) 325 MG tablet Take 650 mg by mouth 2 (two) times a day.    Milton Diaz MD   albuterol sulfate  (90 Base) MCG/ACT inhaler Inhale 2 puffs Every 4 (Four) Hours As Needed for Wheezing or Shortness of Air.    Milton Diaz MD   ALPRAZolam (XANAX) 0.25 MG tablet Take 0.25 mg by mouth every night at bedtime.    Milton Diaz MD  "  amLODIPine (NORVASC) 5 MG tablet Take 5 mg by mouth Daily.    Milton Diaz MD   bimatoprost (LUMIGAN) 0.01 % ophthalmic drops Administer 1 drop to both eyes Every Night.    Milton Diaz MD   brimonidine-timolol (COMBIGAN) 0.2-0.5 % ophthalmic solution Administer 1 drop to the right eye Every 12 (Twelve) Hours.    Milton Diaz MD   Calcium Carbonate-Vit D-Min (CALCIUM 1200 PO) Take 600 mg by mouth 2 (two) times a day.    Milton Diaz MD   diclofenac (VOLTAREN) 1 % gel gel Apply 4 g topically to the appropriate area as directed 4 (Four) Times a Day As Needed.    Milton Diaz MD   docusate sodium (COLACE) 100 MG capsule Take 100 mg by mouth Daily.    Milton Diaz MD   ferrous sulfate 325 (65 FE) MG tablet Take 325 mg by mouth Daily With Breakfast.    Milton Diaz MD   lidocaine (LMX) 4 % cream Apply 1 application topically to the appropriate area as directed 4 (Four) Times a Day As Needed for Mild Pain .    Milton Diaz MD   megestrol (MEGACE) 40 MG/ML suspension Take 20 mL by mouth Daily. 7/21/20   Phong Joseph MD   mirtazapine (REMERON) 15 MG tablet Take 1 tablet by mouth Every Night. 7/20/20   Phong Joseph MD   potassium chloride (MICRO-K) 10 MEQ CR capsule Take 2 capsules by mouth 2 (Two) Times a Day With Meals. 7/20/20   Phong Joseph MD     I have utilized all available immediate resources to obtain, update, or review the patient's current medications (including all prescriptions, over-the-counter products, herbals, cannabis/cannabidiol products, and vitamin/mineral/dietary (nutritional) supplements).    Objective     Vital Signs: /64 (BP Location: Left arm, Patient Position: Lying)   Pulse 82   Temp 98.1 °F (36.7 °C) (Oral)   Resp 16   Ht 165.1 cm (65\")   Wt 63.5 kg (139 lb 15.9 oz)   SpO2 95%   BMI 23.30 kg/m²   Physical Exam  Constitutional:       Appearance: She is ill-appearing. "   Cardiovascular:      Rate and Rhythm: Normal rate and regular rhythm.      Pulses: Normal pulses.      Heart sounds: Normal heart sounds.   Pulmonary:      Effort: Pulmonary effort is normal. No respiratory distress.      Breath sounds: Normal breath sounds. No wheezing or rales.   Abdominal:      General: Bowel sounds are normal. There is no distension.      Palpations: Abdomen is soft.      Tenderness: There is no abdominal tenderness. There is no guarding.   Musculoskeletal:      Right lower leg: No edema.      Left lower leg: No edema.   Skin:     General: Skin is warm.   Neurological:      Mental Status: She is alert and oriented to person, place, and time. Mental status is at baseline.              Results Reviewed:  Lab Results (last 24 hours)       Procedure Component Value Units Date/Time    High Sensitivity Troponin T 1Hr [[id number]]  (Abnormal) Collected: 06/25/25 2042    Specimen: Blood Updated: 06/25/25 2122     HS Troponin T 17 ng/L      Troponin T Numeric Delta -1 ng/L      Troponin T % Delta -6    Narrative:      High Sensitive Troponin T Reference Range:  <14.0 ng/L- Negative Female for AMI  <22.0 ng/L- Negative Male for AMI  >=14 - Abnormal Female indicating possible myocardial injury.  >=22 - Abnormal Male indicating possible myocardial injury.   Clinicians would have to utilize clinical acumen, EKG, Troponin, and serial changes to determine if it is an Acute Myocardial Infarction or myocardial injury due to an underlying chronic condition.         Comprehensive Metabolic Panel [[id number]]  (Abnormal) Collected: 06/25/25 1917    Specimen: Blood Updated: 06/25/25 1947     Glucose 102 mg/dL      BUN 13.9 mg/dL      Creatinine 0.86 mg/dL      Sodium 144 mmol/L      Potassium 3.7 mmol/L      Comment: Slight hemolysis detected by analyzer. Result may be falsely elevated.        Chloride 105 mmol/L      CO2 28.0 mmol/L      Calcium 9.7 mg/dL      Total Protein 6.3 g/dL      Albumin 3.8 g/dL      ALT  (SGPT) 14 U/L      AST (SGOT) 29 U/L      Alkaline Phosphatase 101 U/L      Total Bilirubin 0.4 mg/dL      Globulin 2.5 gm/dL      A/G Ratio 1.5 g/dL      BUN/Creatinine Ratio 16.2     Anion Gap 11.0 mmol/L      eGFR 59.3 mL/min/1.73     Narrative:      GFR Categories in Chronic Kidney Disease (CKD)              GFR Category          GFR (mL/min/1.73)    Interpretation  G1                    90 or greater        Normal or high (1)  G2                    60-89                Mild decrease (1)  G3a                   45-59                Mild to moderate decrease  G3b                   30-44                Moderate to severe decrease  G4                    15-29                Severe decrease  G5                    14 or less           Kidney failure    (1)In the absence of evidence of kidney disease, neither GFR category G1 or G2 fulfill the criteria for CKD.    eGFR calculation 2021 CKD-EPI creatinine equation, which does not include race as a factor    High Sensitivity Troponin T [[id number]]  (Abnormal) Collected: 06/25/25 1917    Specimen: Blood Updated: 06/25/25 1942     HS Troponin T 18 ng/L     Narrative:      High Sensitive Troponin T Reference Range:  <14.0 ng/L- Negative Female for AMI  <22.0 ng/L- Negative Male for AMI  >=14 - Abnormal Female indicating possible myocardial injury.  >=22 - Abnormal Male indicating possible myocardial injury.   Clinicians would have to utilize clinical acumen, EKG, Troponin, and serial changes to determine if it is an Acute Myocardial Infarction or myocardial injury due to an underlying chronic condition.         CBC & Differential [[id number]]  (Abnormal) Collected: 06/25/25 1917    Specimen: Blood Updated: 06/25/25 1928    Narrative:      The following orders were created for panel order CBC & Differential.  Procedure                               Abnormality         Status                     ---------                               -----------         ------                      CBC Auto Differential[[id number]]        Abnormal            Final result                 Please view results for these tests on the individual orders.    CBC Auto Differential [[id number]]  (Abnormal) Collected: 06/25/25 1917    Specimen: Blood Updated: 06/25/25 1928     WBC 6.76 10*3/mm3      RBC 4.01 10*6/mm3      Hemoglobin 12.8 g/dL      Hematocrit 38.9 %      MCV 97.0 fL      MCH 31.9 pg      MCHC 32.9 g/dL      RDW 12.7 %      RDW-SD 45.4 fl      MPV 10.2 fL      Platelets 119 10*3/mm3      Neutrophil % 68.4 %      Lymphocyte % 19.4 %      Monocyte % 10.1 %      Eosinophil % 1.2 %      Basophil % 0.6 %      Immature Grans % 0.3 %      Neutrophils, Absolute 4.63 10*3/mm3      Lymphocytes, Absolute 1.31 10*3/mm3      Monocytes, Absolute 0.68 10*3/mm3      Eosinophils, Absolute 0.08 10*3/mm3      Basophils, Absolute 0.04 10*3/mm3      Immature Grans, Absolute 0.02 10*3/mm3           Imaging Results (Last 24 Hours)       Procedure Component Value Units Date/Time    CT Pelvis Without Contrast [[id number]] Collected: 06/25/25 2011     Updated: 06/25/25 2020    Narrative:      EXAM/TECHNIQUE:  1. CT pelvis without contrast  2. CT right hip without contrast     INDICATION: R hip pain, negative XR yesterday at other facility  negative.     COMPARISON: None available.     DLP: 1262.69 mGy.cm. Automated exposure control was utilized to decrease  patient radiation dose.     FINDINGS:     Bilateral hip arthroplasty with streak artifact slightly limiting  evaluation. No acute mild displaced fracture of the right acetabulum  (pelvic CT series 4 image 60 and 70). There is an acute comminuted and  displaced fracture of the right inferior pubic ramus. No pubic symphysis  widening. No left pubic rami fracture or left acetabular fracture. No  evidence of hip arthroplasty hardware malalignment. No sacral fracture  identified. No evidence of proximal femoral fracture. Right knee  arthroplasty is noted, without evidence of  complication.     Moderate volume stool in the rectum. Colonic diverticulosis without  diverticulitis. No bowel distention or active bowel inflammation in the  pelvis. No focal urinary bladder abnormality. Nonaneurysmal  atherosclerotic distal abdominal aorta. No pelvic lymphadenopathy. No  free pelvic fluid. No acute soft tissue abnormality.       Impression:         1. Acute mildly displaced fracture of the right acetabulum.     2. Acute comminuted and displaced fracture of the right inferior pubic  ramus.     3. No evidence of left-sided pelvic or hip fracture     4. Bilateral hip arthroplasty, with streak artifact from hardware  limiting evaluation. No evidence of hardware malalignment.        This report was signed and finalized on 6/25/2025 8:17 PM by Dr. Rafael Abel MD.       CT Lower Extremity Right Without Contrast [[id number]] Collected: 06/25/25 2011     Updated: 06/25/25 2020    Narrative:      EXAM/TECHNIQUE:  1. CT pelvis without contrast  2. CT right hip without contrast     INDICATION: R hip pain, negative XR yesterday at other facility  negative.     COMPARISON: None available.     DLP: 1262.69 mGy.cm. Automated exposure control was utilized to decrease  patient radiation dose.     FINDINGS:     Bilateral hip arthroplasty with streak artifact slightly limiting  evaluation. No acute mild displaced fracture of the right acetabulum  (pelvic CT series 4 image 60 and 70). There is an acute comminuted and  displaced fracture of the right inferior pubic ramus. No pubic symphysis  widening. No left pubic rami fracture or left acetabular fracture. No  evidence of hip arthroplasty hardware malalignment. No sacral fracture  identified. No evidence of proximal femoral fracture. Right knee  arthroplasty is noted, without evidence of complication.     Moderate volume stool in the rectum. Colonic diverticulosis without  diverticulitis. No bowel distention or active bowel inflammation in the  pelvis. No focal  urinary bladder abnormality. Nonaneurysmal  atherosclerotic distal abdominal aorta. No pelvic lymphadenopathy. No  free pelvic fluid. No acute soft tissue abnormality.       Impression:         1. Acute mildly displaced fracture of the right acetabulum.     2. Acute comminuted and displaced fracture of the right inferior pubic  ramus.     3. No evidence of left-sided pelvic or hip fracture     4. Bilateral hip arthroplasty, with streak artifact from hardware  limiting evaluation. No evidence of hardware malalignment.        This report was signed and finalized on 6/25/2025 8:17 PM by Dr. Rafael Abel MD.       CT Cervical Spine Without Contrast [[id number]] Collected: 06/25/25 2006     Updated: 06/25/25 2012    Narrative:      EXAM/TECHNIQUE: CT cervical spine without contrast     INDICATION: fall, cant rule out by [place] C spine rule due to age     COMPARISON: None available.     DLP: 1262.69 mGy.cm. Automated exposure control was utilized to decrease  patient radiation dose.     FINDINGS:     Craniocervical relationships are maintained. Trace anterolisthesis of C7  on T1, likely degenerative in nature. Cervical vertebral body heights  are maintained. No acute fracture. Advanced multilevel cervical spine  degenerative change with multilevel neural foraminal stenosis.  Incomplete C1 arch, likely chronic/postsurgical. Paravertebral soft  tissues are unremarkable.       Impression:      1.  No acute osseous findings.  2.  Moderate multilevel cervical spine degenerative change.        This report was signed and finalized on 6/25/2025 8:09 PM by Dr. Rafael Abel MD.       CT Head Without Contrast [[id number]] Collected: 06/25/25 2003     Updated: 06/25/25 2009    Narrative:      EXAM/TECHNIQUE: CT head without contrast     INDICATION: fall, yesterday dizziness     COMPARISON: 3/15/2023     DLP: 1262.69 mGy.cm. Automated exposure control was utilized to decrease  patient radiation dose.     FINDINGS:     No  evidence of intracranial hemorrhage. Gray-white differentiation is  maintained. 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 Global cerebral  volume loss and presumed chronic microvascular ischemic white matter  change. No midline shift or mass effect. Lateral ventricles are  nondilated. Basilar cisterns are patent. No acute orbital finding. Right  mastoid effusion. Left mastoid air cells are clear. Visualized paranasal  sinuses are clear. No acute osseous finding.       Impression:         No acute intracranial findings.        This report was signed and finalized on 6/25/2025 8:06 PM by Dr. Rafael Abel MD.             I have personally reviewed and interpreted the radiology studies and ECG obtained at time of admission.     Assessment / Plan   Assessment:   Active Hospital Problems    Diagnosis     **Pelvis fracture     Fall        Treatment Plan  The patient will be admitted to my service here at [facility].     Assessment and plan:  #Mechanical fall.  #Pelvic fracture.    - Admitting to floor.  - ED contacted orthopedic, who agreed to consult.  Advised no need for surgery.  - PT and OT ordered  - Fall precautions ordered.  - DVT prophylaxis with SCDs.      Medical Decision Making  Number and Complexity of problems: 2 acute and moderate  Differential Diagnosis: As above    Conditions and Status        Condition is worsening.     MDM Data  External documents reviewed: Yes  Cardiac tracing (EKG, telemetry) interpretation: Yes  Radiology interpretation: Yes  Labs reviewed: Yes  Any tests that were considered but not ordered: No     Decision rules/scores evaluated (example [id number], [place], etc): No     Discussed with: Patient and ED provider     Care Planning  Code status and discussions: I asked the patient about code status and she was not sure what to say. I tried calling her son 3 times to discuss code status but no answer. Called her facility, spoke with the CNA, and they reviewed her chart,  and they confirmed that she is full code according to their records.    Disposition  Social Determinants of Health that impact treatment or disposition: Came from facility  Estimated length of stay is 2 to 3 days.     I confirmed that the patient's advanced care plan is present, code status is documented, and a surrogate decision maker is listed in the patient's medical record.     The patient was seen and examined by me on 6/25 at 9:15 PM.    Electronically signed by Octaviano Pink MD, 06/26/25, 00:50 CDT.

## 2025-06-26 NOTE — PLAN OF CARE
Goal Outcome Evaluation:  Plan of Care Reviewed With: patient, child        Progress: no change  Outcome Evaluation: Pt A&Ox2. Bedrest till therapy. Purwic. PPP. Denies n/t. No c/o. Son at BS. Call light in reach. Safety maintained.

## 2025-06-26 NOTE — CONSULTS
Orthopaedic Surgery Consult Note      6/26/2025   17:30 CDT    Name:  Sunny Lacey  MRN:    [id number]     Acct:     [id number]   Room:  [facility] Day: 1     Admit Date: 6/25/2025  PCP: Nevaeh Thornton PA        Subjective:     C/C: Pelvic fractures. We are seeing this patient in consultation for an orthopaedic evaluation.  The patient is 89-year-old female who reportedly sustained a mechanical fall 2 days ago resulting in right-sided hip and pelvic pain.  She was evaluated initially at a different facility with plain film imaging that reportedly did not demonstrate any obvious acute fracture.  Now presenting to [facility] emergency department, CT scan of the pelvis was obtained demonstrating a nondisplaced periprosthetic right acetabular fracture adjacent to a well-fixed acetabular component of a total hip arthroplasty construct as well as a comminuted inferior pubic rami fracture.  The patient was admitted for palliative care and placement.    Pain: 3 /10    Code Status:    Code Status and Medical Interventions: No CPR (Do Not Attempt to Resuscitate); Limited Support; No intubation (DNI), No cardioversion   Ordered at: 06/26/25 1438     Code Status (Patient has no pulse and is not breathing):    No CPR (Do Not Attempt to Resuscitate)     Medical Interventions (Patient has pulse or is breathing):    Limited Support     Medical Intervention Limits:    No intubation (DNI)       No cardioversion     Level Of Support Discussed With:    Next of Kin (If No Surrogate)         Past Medical History:    Past Medical History:   Diagnosis Date    Anemia     Anxiety     Arthritis     Hypertension     Skin cancer        Past Surgical History:    Past Surgical History:   Procedure Laterality Date    SKIN CANCER EXCISION         Current Medications:   Prior to Admission medications    Medication Sig Start Date End Date Taking? Authorizing Provider   bimatoprost (LUMIGAN) 0.01 % ophthalmic drops Administer 1 drop to  both eyes Every Night.   Yes Milton Diaz MD   DULoxetine (CYMBALTA) 30 MG capsule Take 1 capsule by mouth Daily.   Yes Milton Diaz MD   gabapentin (NEURONTIN) 300 MG capsule Take 1 capsule by mouth Every Night.   Yes Milton Diaz MD   meclizine (ANTIVERT) 12.5 MG tablet Take 1 tablet by mouth Every Night.   Yes Milton Diaz MD   mupirocin (BACTROBAN) 2 % ointment Apply 1 Application topically to the appropriate area as directed 3 (Three) Times a Day.   Yes Milton Diaz MD   potassium chloride (MICRO-K) 10 MEQ CR capsule Take 2 capsules by mouth 2 (Two) Times a Day With Meals.  Patient taking differently: Take 1 capsule by mouth Daily. 7/20/20  Yes Phong Joseph MD   pyridoxine (VITAMIN B-6) 100 MG tablet Take 1 tablet by mouth Daily.   Yes Milton Diaz MD   mirtazapine (REMERON) 15 MG tablet Take 1 tablet by mouth Every Night. 7/20/20 6/26/25 Yes Phong Joseph MD   HYDROcodone-acetaminophen (NORCO) 5-325 MG per tablet Take 1 tablet by mouth Every 6 (Six) Hours As Needed for Mild Pain or Moderate Pain.    Milton Diaz MD   mirtazapine (REMERON) 30 MG tablet Take 1 tablet by mouth Every Night.    Milton Diaz MD   Phenylephrine-DM-GG (Mucinex Cough & Congest Child) 2.5-5-100 MG/5ML liquid Take 20 mL by mouth Every 4 (Four) Hours As Needed (Congestion/Cough). Every 4 to 6 hours as needed    Milton Diaz MD   acetaminophen (TYLENOL) 325 MG tablet Take 650 mg by mouth 2 (two) times a day.  Patient not taking: Reported on 6/26/2025 6/26/25  Milton Diaz MD   albuterol sulfate  (90 Base) MCG/ACT inhaler Inhale 2 puffs Every 4 (Four) Hours As Needed for Wheezing or Shortness of Air.  Patient not taking: Reported on 6/26/2025 6/26/25  Milton Diaz MD   ALPRAZolam (XANAX) 0.25 MG tablet Take 0.25 mg by mouth every night at bedtime.  Patient not taking: Reported on 6/26/2025 6/26/25  Emily  MD Milton   amLODIPine (NORVASC) 5 MG tablet Take 5 mg by mouth Daily.  Patient not taking: Reported on 6/26/2025 6/26/25  Milton Diaz MD   brimonidine-timolol (COMBIGAN) 0.2-0.5 % ophthalmic solution Administer 1 drop to the right eye Every 12 (Twelve) Hours.  Patient not taking: Reported on 6/26/2025 6/26/25  Milton Diaz MD   Calcium Carbonate-Vit D-Min (CALCIUM 1200 PO) Take 600 mg by mouth 2 (two) times a day.  Patient not taking: Reported on 6/26/2025 6/26/25  Milton Diaz MD   diclofenac (VOLTAREN) 1 % gel gel Apply 4 g topically to the appropriate area as directed 4 (Four) Times a Day As Needed.  Patient not taking: Reported on 6/26/2025 6/26/25  Milton Diaz MD   docusate sodium (COLACE) 100 MG capsule Take 100 mg by mouth Daily.  Patient not taking: Reported on 6/26/2025 6/26/25  Milton Diaz MD   ferrous sulfate 325 (65 FE) MG tablet Take 325 mg by mouth Daily With Breakfast.  Patient not taking: Reported on 6/26/2025 6/26/25  Milton Diaz MD   lidocaine (LMX) 4 % cream Apply 1 application topically to the appropriate area as directed 4 (Four) Times a Day As Needed for Mild Pain .  Patient not taking: Reported on 6/26/2025 6/26/25  Milton Diaz MD   megestrol (MEGACE) 40 MG/ML suspension Take 20 mL by mouth Daily.  Patient not taking: Reported on 6/26/2025 7/21/20 6/26/25  Phong Joseph MD       Allergies:  Patient has no known allergies.    Social History:   Social History     Socioeconomic History    Marital status:    Tobacco Use    Smoking status: Never   Vaping Use    Vaping status: Never Used   Substance and Sexual Activity    Alcohol use: Never    Drug use: Never    Sexual activity: Defer       Family History:   History reviewed. No pertinent family history.        REVIEW OF SYSTEMS:    Review of systems from admission H&P are discussed with the patient and family member and noted to be  "unchanged.    Vitals:  /63 (BP Location: Left arm, Patient Position: Lying)   Pulse 95   Temp 98.3 °F (36.8 °C) (Oral)   Resp 18   Ht 165.1 cm (65\")   Wt 63.5 kg (139 lb 15.9 oz)   SpO2 95%   BMI 23.30 kg/m²   Temp (24hrs), Av.2 °F (36.8 °C), Min:98.1 °F (36.7 °C), Max:98.3 °F (36.8 °C)      I/O (24Hr):  No intake or output data in the 24 hours ending 25 1730    Labs:  Lab Results (last 72 hours)       Procedure Component Value Units Date/Time    Comprehensive Metabolic Panel [[id number]]  (Abnormal) Collected: 25    Specimen: Blood Updated: 25     Glucose 103 mg/dL      BUN 11.9 mg/dL      Creatinine 0.71 mg/dL      Sodium 143 mmol/L      Potassium 3.2 mmol/L      Chloride 103 mmol/L      CO2 27.0 mmol/L      Calcium 9.5 mg/dL      Total Protein 6.4 g/dL      Albumin 3.8 g/dL      ALT (SGPT) 13 U/L      AST (SGOT) 26 U/L      Alkaline Phosphatase 104 U/L      Total Bilirubin 0.6 mg/dL      Globulin 2.6 gm/dL      A/G Ratio 1.5 g/dL      BUN/Creatinine Ratio 16.8     Anion Gap 13.0 mmol/L      eGFR 74.6 mL/min/1.73     Narrative:      GFR Categories in Chronic Kidney Disease (CKD)              GFR Category          GFR (mL/min/1.73)    Interpretation  G1                    90 or greater        Normal or high (1)  G2                    60-89                Mild decrease (1)  G3a                   45-59                Mild to moderate decrease  G3b                   30-44                Moderate to severe decrease  G4                    15-29                Severe decrease  G5                    14 or less           Kidney failure    (1)In the absence of evidence of kidney disease, neither GFR category G1 or G2 fulfill the criteria for CKD.    eGFR calculation  CKD-EPI creatinine equation, which does not include race as a factor    Magnesium [[id number]]  (Normal) Collected: 25    Specimen: Blood Updated: 25     Magnesium 2.1 mg/dL     Phosphorus " [[id number]]  (Normal) Collected: 06/26/25 0444    Specimen: Blood Updated: 06/26/25 0557     Phosphorus 2.8 mg/dL     CBC (No Diff) [[id number]]  (Abnormal) Collected: 06/26/25 0444    Specimen: Blood Updated: 06/26/25 0527     WBC 7.25 10*3/mm3      RBC 4.14 10*6/mm3      Hemoglobin 13.3 g/dL      Hematocrit 39.6 %      MCV 95.7 fL      MCH 32.1 pg      MCHC 33.6 g/dL      RDW 12.6 %      RDW-SD 44.4 fl      MPV 10.7 fL      Platelets 122 10*3/mm3     High Sensitivity Troponin T 1Hr [[id number]]  (Abnormal) Collected: 06/25/25 2042    Specimen: Blood Updated: 06/25/25 2122     HS Troponin T 17 ng/L      Troponin T Numeric Delta -1 ng/L      Troponin T % Delta -6    Narrative:      High Sensitive Troponin T Reference Range:  <14.0 ng/L- Negative Female for AMI  <22.0 ng/L- Negative Male for AMI  >=14 - Abnormal Female indicating possible myocardial injury.  >=22 - Abnormal Male indicating possible myocardial injury.   Clinicians would have to utilize clinical acumen, EKG, Troponin, and serial changes to determine if it is an Acute Myocardial Infarction or myocardial injury due to an underlying chronic condition.         Comprehensive Metabolic Panel [[id number]]  (Abnormal) Collected: 06/25/25 1917    Specimen: Blood Updated: 06/25/25 1947     Glucose 102 mg/dL      BUN 13.9 mg/dL      Creatinine 0.86 mg/dL      Sodium 144 mmol/L      Potassium 3.7 mmol/L      Comment: Slight hemolysis detected by analyzer. Result may be falsely elevated.        Chloride 105 mmol/L      CO2 28.0 mmol/L      Calcium 9.7 mg/dL      Total Protein 6.3 g/dL      Albumin 3.8 g/dL      ALT (SGPT) 14 U/L      AST (SGOT) 29 U/L      Alkaline Phosphatase 101 U/L      Total Bilirubin 0.4 mg/dL      Globulin 2.5 gm/dL      A/G Ratio 1.5 g/dL      BUN/Creatinine Ratio 16.2     Anion Gap 11.0 mmol/L      eGFR 59.3 mL/min/1.73     Narrative:      GFR Categories in Chronic Kidney Disease (CKD)              GFR Category          GFR (mL/min/1.73)     Interpretation  G1                    90 or greater        Normal or high (1)  G2                    60-89                Mild decrease (1)  G3a                   45-59                Mild to moderate decrease  G3b                   30-44                Moderate to severe decrease  G4                    15-29                Severe decrease  G5                    14 or less           Kidney failure    (1)In the absence of evidence of kidney disease, neither GFR category G1 or G2 fulfill the criteria for CKD.    eGFR calculation 2021 CKD-EPI creatinine equation, which does not include race as a factor    High Sensitivity Troponin T [[id number]]  (Abnormal) Collected: 06/25/25 1917    Specimen: Blood Updated: 06/25/25 1942     HS Troponin T 18 ng/L     Narrative:      High Sensitive Troponin T Reference Range:  <14.0 ng/L- Negative Female for AMI  <22.0 ng/L- Negative Male for AMI  >=14 - Abnormal Female indicating possible myocardial injury.  >=22 - Abnormal Male indicating possible myocardial injury.   Clinicians would have to utilize clinical acumen, EKG, Troponin, and serial changes to determine if it is an Acute Myocardial Infarction or myocardial injury due to an underlying chronic condition.         CBC & Differential [[id number]]  (Abnormal) Collected: 06/25/25 1917    Specimen: Blood Updated: 06/25/25 1928    Narrative:      The following orders were created for panel order CBC & Differential.  Procedure                               Abnormality         Status                     ---------                               -----------         ------                     CBC Auto Differential[[id number]]        Abnormal            Final result                 Please view results for these tests on the individual orders.    CBC Auto Differential [[id number]]  (Abnormal) Collected: 06/25/25 1917    Specimen: Blood Updated: 06/25/25 1928     WBC 6.76 10*3/mm3      RBC 4.01 10*6/mm3      Hemoglobin 12.8 g/dL      Hematocrit  38.9 %      MCV 97.0 fL      MCH 31.9 pg      MCHC 32.9 g/dL      RDW 12.7 %      RDW-SD 45.4 fl      MPV 10.2 fL      Platelets 119 10*3/mm3      Neutrophil % 68.4 %      Lymphocyte % 19.4 %      Monocyte % 10.1 %      Eosinophil % 1.2 %      Basophil % 0.6 %      Immature Grans % 0.3 %      Neutrophils, Absolute 4.63 10*3/mm3      Lymphocytes, Absolute 1.31 10*3/mm3      Monocytes, Absolute 0.68 10*3/mm3      Eosinophils, Absolute 0.08 10*3/mm3      Basophils, Absolute 0.04 10*3/mm3      Immature Grans, Absolute 0.02 10*3/mm3             Radiology:  Imaging Results (Last 72 Hours)       Procedure Component Value Units Date/Time    CT Pelvis Without Contrast [[id number]] Collected: 06/25/25 2011     Updated: 06/25/25 2020    Narrative:      EXAM/TECHNIQUE:  1. CT pelvis without contrast  2. CT right hip without contrast     INDICATION: R hip pain, negative XR yesterday at other facility  negative.     COMPARISON: None available.     DLP: 1262.69 mGy.cm. Automated exposure control was utilized to decrease  patient radiation dose.     FINDINGS:     Bilateral hip arthroplasty with streak artifact slightly limiting  evaluation. No acute mild displaced fracture of the right acetabulum  (pelvic CT series 4 image 60 and 70). There is an acute comminuted and  displaced fracture of the right inferior pubic ramus. No pubic symphysis  widening. No left pubic rami fracture or left acetabular fracture. No  evidence of hip arthroplasty hardware malalignment. No sacral fracture  identified. No evidence of proximal femoral fracture. Right knee  arthroplasty is noted, without evidence of complication.     Moderate volume stool in the rectum. Colonic diverticulosis without  diverticulitis. No bowel distention or active bowel inflammation in the  pelvis. No focal urinary bladder abnormality. Nonaneurysmal  atherosclerotic distal abdominal aorta. No pelvic lymphadenopathy. No  free pelvic fluid. No acute soft tissue abnormality.        Impression:         1. Acute mildly displaced fracture of the right acetabulum.     2. Acute comminuted and displaced fracture of the right inferior pubic  ramus.     3. No evidence of left-sided pelvic or hip fracture     4. Bilateral hip arthroplasty, with streak artifact from hardware  limiting evaluation. No evidence of hardware malalignment.        This report was signed and finalized on 6/25/2025 8:17 PM by Dr. Rafael Abel MD.       CT Lower Extremity Right Without Contrast [[id number]] Collected: 06/25/25 2011     Updated: 06/25/25 2020    Narrative:      EXAM/TECHNIQUE:  1. CT pelvis without contrast  2. CT right hip without contrast     INDICATION: R hip pain, negative XR yesterday at other facility  negative.     COMPARISON: None available.     DLP: 1262.69 mGy.cm. Automated exposure control was utilized to decrease  patient radiation dose.     FINDINGS:     Bilateral hip arthroplasty with streak artifact slightly limiting  evaluation. No acute mild displaced fracture of the right acetabulum  (pelvic CT series 4 image 60 and 70). There is an acute comminuted and  displaced fracture of the right inferior pubic ramus. No pubic symphysis  widening. No left pubic rami fracture or left acetabular fracture. No  evidence of hip arthroplasty hardware malalignment. No sacral fracture  identified. No evidence of proximal femoral fracture. Right knee  arthroplasty is noted, without evidence of complication.     Moderate volume stool in the rectum. Colonic diverticulosis without  diverticulitis. No bowel distention or active bowel inflammation in the  pelvis. No focal urinary bladder abnormality. Nonaneurysmal  atherosclerotic distal abdominal aorta. No pelvic lymphadenopathy. No  free pelvic fluid. No acute soft tissue abnormality.       Impression:         1. Acute mildly displaced fracture of the right acetabulum.     2. Acute comminuted and displaced fracture of the right inferior pubic  ramus.     3. No  evidence of left-sided pelvic or hip fracture     4. Bilateral hip arthroplasty, with streak artifact from hardware  limiting evaluation. No evidence of hardware malalignment.        This report was signed and finalized on 6/25/2025 8:17 PM by Dr. Rafael Abel MD.       CT Cervical Spine Without Contrast [[id number]] Collected: 06/25/25 2006     Updated: 06/25/25 2012    Narrative:      EXAM/TECHNIQUE: CT cervical spine without contrast     INDICATION: fall, cant rule out by [place] C spine rule due to age     COMPARISON: None available.     DLP: 1262.69 mGy.cm. Automated exposure control was utilized to decrease  patient radiation dose.     FINDINGS:     Craniocervical relationships are maintained. Trace anterolisthesis of C7  on T1, likely degenerative in nature. Cervical vertebral body heights  are maintained. No acute fracture. Advanced multilevel cervical spine  degenerative change with multilevel neural foraminal stenosis.  Incomplete C1 arch, likely chronic/postsurgical. Paravertebral soft  tissues are unremarkable.       Impression:      1.  No acute osseous findings.  2.  Moderate multilevel cervical spine degenerative change.        This report was signed and finalized on 6/25/2025 8:09 PM by Dr. Rafael Abel MD.       CT Head Without Contrast [[id number]] Collected: 06/25/25 2003     Updated: 06/25/25 2009    Narrative:      EXAM/TECHNIQUE: CT head without contrast     INDICATION: fall, yesterday dizziness     COMPARISON: 3/15/2023     DLP: 1262.69 mGy.cm. Automated exposure control was utilized to decrease  patient radiation dose.     FINDINGS:     No evidence of intracranial hemorrhage. Gray-white differentiation is  maintained. 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 Global cerebral  volume loss and presumed chronic microvascular ischemic white matter  change. No midline shift or mass effect. Lateral ventricles are  nondilated. Basilar cisterns are patent. No acute orbital finding.  Right  mastoid effusion. Left mastoid air cells are clear. Visualized paranasal  sinuses are clear. No acute osseous finding.       Impression:         No acute intracranial findings.        This report was signed and finalized on 6/25/2025 8:06 PM by Dr. Rafael Abel MD.               Physical Exam:     PHYSICAL EXAM:      Physical Examination:  Vitals:   Vitals:    06/26/25 0708 06/26/25 0746 06/26/25 1109 06/26/25 1654   BP:  121/93 121/80 103/63   BP Location:  Left arm Left arm Left arm   Patient Position:  Lying Lying Lying   Pulse: 84 83  95   Resp:  16 16 18   Temp:   98.1 °F (36.7 °C) 98.3 °F (36.8 °C)   TempSrc:   Oral Oral   SpO2: 95% 93%  95%   Weight:       Height:         General:  Appears stated age, no distress.  Orientation:  Alert and oriented to time, place, and person.  Mood and Affect:  Cooperative and pleasant.  Gait:  Resting comfortably in bed.  Cardiovascular:  Symmetric 1-2 plus pulses in upper and lower extremities.  Lymph:  No cervical or inguinal lymphadenopathy noted.  Sensation:  Grossly intact to light touch.  DTR:  Normal, no pathologic reflexes.  Coordination/balance:  Normal    Musculoskeletal:  Right upper extremity exam:  There is no tenderness to palpation about the shoulder, elbow, wrist or hand.  Unrestricted full function motion is present.  Stability is normal with provocative tests, 5/5 strength, and skin is normal.      Left upper extremity exam:  There is no tenderness to palpation about the shoulder, elbow, wrist or hand.  Unrestricted full function motion is present.  Stability is normal with provocative tests, 5/5 strength, and skin is normal.     Right lower extremity exam:  There is no tenderness to palpation about the hip, knee, ankle or foot.  Unrestricted full function motion is present.  Stability is normal with provocative tests, 5/5 strength, and skin is normal.     Left lower extremity exam:  There is no tenderness to palpation about the hip, knee, ankle  or foot.  Unrestricted full function motion is present.  Stability is normal with provocative tests, 5/5 strength, and skin is normal.      Pelvis:  Patient reports mild discomfort with palpation over her right ischium and right iliac crest.  No palpable defect, crepitus or deformity is noted.  The overlying skin is intact.    Radiology Review  My review of patient's CT shows the patient to have a nondisplaced periprosthetic right acetabular fracture adjacent to a total hip arthroplasty construct with a well-fixed acetabular component as well as a comminuted right inferior pubic rami fracture    Assessment:     Primary Problem  Pelvis fracture - a nondisplaced periprosthetic right acetabular fracture adjacent to a total hip arthroplasty construct with a well-fixed acetabular component as well as a comminuted right inferior pubic rami fracture     Plan:   A prolonged conversation was had with the patient as well as family member at bedside regarding the patient's fractures.  They are pleased to learn that no surgical indication is warranted.  2.   Patient will pursue toe-touch weightbearing of the right lower extremity for up to 6 weeks  3.   PT/OT  4.   May follow-up in 6 weeks for follow-up imaging

## 2025-06-27 PROCEDURE — G0378 HOSPITAL OBSERVATION PER HR: HCPCS

## 2025-06-27 PROCEDURE — 97535 SELF CARE MNGMENT TRAINING: CPT

## 2025-06-27 PROCEDURE — 97165 OT EVAL LOW COMPLEX 30 MIN: CPT

## 2025-06-27 PROCEDURE — 97161 PT EVAL LOW COMPLEX 20 MIN: CPT | Performed by: PHYSICAL THERAPIST

## 2025-06-27 PROCEDURE — 97110 THERAPEUTIC EXERCISES: CPT

## 2025-06-27 PROCEDURE — 97530 THERAPEUTIC ACTIVITIES: CPT

## 2025-06-27 PROCEDURE — 97530 THERAPEUTIC ACTIVITIES: CPT | Performed by: PHYSICAL THERAPIST

## 2025-06-27 RX ADMIN — DOCUSATE SODIUM 100 MG: 100 CAPSULE, LIQUID FILLED ORAL at 09:04

## 2025-06-27 RX ADMIN — FERROUS SULFATE TAB 325 MG (65 MG ELEMENTAL FE) 325 MG: 325 (65 FE) TAB at 09:05

## 2025-06-27 RX ADMIN — AMLODIPINE BESYLATE 5 MG: 5 TABLET ORAL at 09:05

## 2025-06-27 NOTE — PLAN OF CARE
Called placed to patient's son, Cristino to discuss care goals and code status. Left voicemail and awaiting return call.     Elizabeth Campbellr, TAVO  6/27/2025

## 2025-06-27 NOTE — PROGRESS NOTES
[facility] Medicine Services  INPATIENT PROGRESS NOTE    Patient Name: Sunny Lacey  Date of Admission: 6/25/2025  Today's Date: 06/27/25  Length of Stay: 1  Primary Care Physician: Nevaeh Thornton PA    Subjective   Chief Complaint: f/u   HPI   Patient is 89-year-old woman from assisted living facility who had fallen and suffered from pelvic pain.  The event happened on June 24.  From admitting record patient was evaluated at another facility where x-ray done did not show fractures.    His troponins are flat  He has normal white count  CT imaging of the pelvis showed mildly displaced fracture of the right acetabulum, acute commuted displaced fracture of right inferior pubic ramus  No evidence of left-sided pelvic or hip fracture  Bilateral hip arthroplasty with no evidence of hardware malalignment    CT of the cervical spine showed no acute osseous findings, moderate multilevel cervical spine degenerative changes  Head CT scan did not show acute intracranial findings    Current CODE STATUS is placed as full code    We were asked to admit this patient.  ED contacted orthopedic and advised no need for surgery    ED note not yet available as of yet but this was a conversation documented by Dr. Pink    I have not seen that patient received potassium for a level of 3.2.    Seen the patient in ER 43  She is very hard of hearing  She did not seem to have noticed me as I come in her door.  I knocked before coming in.  I am not sure whether her left eye has an issue.  It appears bigger than the other eye but not necessarily the pupil itself.    She is very hard of hearing  She she is not aware where she is and why she is here  She said she is from  [place].  She does not really endorse any symptoms to me.    6/27.  Patient reportedly confused.  CODE STATUS been changed patient reportedly       Appreciate palliative care who had seen patient yesterday    Family at bedside.  Even  "the young boy in the room (great grand son) was impressively asking mature questions about grandparent.      Review of Systems   All pertinent negatives and positives are as above. All other systems have been reviewed and are negative unless otherwise stated.     Objective    Temp:  [98.1 °F (36.7 °C)-98.7 °F (37.1 °C)] 98.1 °F (36.7 °C)  Heart Rate:  [] 93  Resp:  [16-18] 16  BP: (103-143)/(58-70) 135/68  Physical Exam  Patient surprisingly more aware and alert today.  She remembers how she felt yesterday and told me how she remembers me.  She is more conversant and appropriate    Her hearing is impaired.  She probably feels more secure today than yesterday when she felt it was storming outside.  Normal respiratory effort  No distress    Otherwise she appears to be not in any distress and hemodynamically stable.          Results Review:  I have reviewed the labs, radiology results, and diagnostic studies.    Laboratory Data:   Results from last 7 days   Lab Units 06/26/25  0444 06/25/25  1917   WBC 10*3/mm3 7.25 6.76   HEMOGLOBIN g/dL 13.3 12.8   HEMATOCRIT % 39.6 38.9   PLATELETS 10*3/mm3 122* 119*        Results from last 7 days   Lab Units 06/26/25  0444 06/25/25  1917   SODIUM mmol/L 143 144   POTASSIUM mmol/L 3.2* 3.7   CHLORIDE mmol/L 103 105   CO2 mmol/L 27.0 28.0   BUN mg/dL 11.9 13.9   CREATININE mg/dL 0.71 0.86   CALCIUM mg/dL 9.5 9.7*   BILIRUBIN mg/dL 0.6 0.4   ALK PHOS U/L 104 101   ALT (SGPT) U/L 13 14   AST (SGOT) U/L 26 29   GLUCOSE mg/dL 103* 102*       Culture Data:   No results found for: \"BLOODCX\", \"URINECX\", \"WOUNDCX\", \"MRSACX\", \"RESPCX\", \"STOOLCX\"    Radiology Data:   Imaging Results (Last 24 Hours)       ** No results found for the last 24 hours. **            I have reviewed the patient's current medications.     Assessment/Plan   Assessment  Active Hospital Problems    Diagnosis     **Pelvis fracture     Fall              Medical Decision Making  Number and Complexity of problems: "   Pelvic fracture  Fall  Resident of assisted living  Hypertension - stable  Advanced age  Hyperlipidemia  History of bilateral hip arthroplasty  Hard of hearing  Confused possibly underlying cognitive impairment      Treatment Plan  Palliative care consult  Ortho consult was put in admission-reportedly no intervention  Pain control  DVT prophylaxis  PT OT  Social service on discharge planning.    Medications reviewed  amLODIPine, 5 mg, Oral, Daily  docusate sodium, 100 mg, Oral, Daily  ferrous sulfate, 325 mg, Oral, Daily With Breakfast  sodium chloride, 10 mL, Intravenous, Q12H          Conditions and Status     Fair     MDM Data  External documents reviewed: Reviewed available information  Cardiac tracing (EKG, telemetry) interpretation: -  Radiology interpretation: y  Labs reviewed: y  Any tests that were considered but not ordered: None     Decision rules/scores evaluated (example [id number], [place], etc): -     Discussed with: Patient and palliative care, nursing staff     Care Planning  Shared decision making: Patient and family  Code status and discussions: CODE STATUS been change as outlined above  Appreciate palliative care    Disposition  Social Determinants of Health that impact treatment or disposition: None identified at this time  I expect the patient to be discharged to (?)           Electronically signed by Emanuel Laguerre MD, 06/27/25, 14:14 CDT.

## 2025-06-27 NOTE — PLAN OF CARE
Goal Outcome Evaluation:  Plan of Care Reviewed With: patient, child        Progress: no change  Outcome Evaluation: PT eval completed. Pt presents in bed with son at bedside, alert and oriented to person and time. Pt is hard of hearing and requires a lot of repetition. Son reports pt was independent with all ADLs, ambulation, and household activities in her assistive living facility. Pt is currently TTWB on RLE for at least 6 weeks. Currently, supine>sit required min A with verbal and tactile cues for positioning. Sit<>stand transfers and stand pivot transfers from bed>BSC>chair with RW required mod Ax2 with consistent verbal and tactile cues. Cues were for safety, maintenance of TTWB on RLE, and positioning of RW during transfer. Recommend skilled PT to address balance deficits, activity tolerance, strength impairments, and functional mobility impairments. Recommend SNF at [facility].    Anticipated Discharge Disposition (PT): skilled nursing facility

## 2025-06-27 NOTE — THERAPY EVALUATION
Patient Name: Sunny Lacey  : 1921    MRN: [id number]                              Today's Date: 2025       Admit Date: 2025    Visit Dx:     ICD-10-CM ICD-9-CM   1. Closed displaced fracture of pelvis, unspecified part of pelvis, initial encounter  S32.9XXA 808.8     Patient Active Problem List   Diagnosis    Sepsis    Pneumonia due to infectious organism    At risk for falls    Hypokalemia    Pelvis fracture    Fall     Past Medical History:   Diagnosis Date    Anemia     Anxiety     Arthritis     Hypertension     Skin cancer      Past Surgical History:   Procedure Laterality Date    SKIN CANCER EXCISION        General Information       Row Name 25 0742          OT Time and Intention    Document Type evaluation  cc: fall with R hip and pelvic pain. Dx: Fall, Pelvic fx.  -LS     Mode of Treatment occupational therapy  -LS     Patient Effort good  -LS       Row Name 25 0742          General Information    Patient Profile Reviewed yes  -LS     Prior Level of Function independent:;ADL's;all household mobility;dependent:;home management;cooking;cleaning;driving;shopping  used rollator  -LS     Existing Precautions/Restrictions fall;right;weight bearing;other (see comments)  TTWB RLE  -LS     Barriers to Rehab previous functional deficit;cognitive status;hearing deficit  -LS       Row Name 25 0742          Occupational Profile    Environmental Supports and Barriers (Occupational Profile) Walk in shower with shower chair and grab bars. Grab bars next to toilet.  -LS       Row Name 25 0742          Living Environment    Current Living Arrangements assisted living facility  -LS     People in Home facility resident  -LS       Row Name 25 0742          Home Main Entrance    Number of Stairs, Main Entrance none  -LS       Row Name 25 0742          Stairs Within Home, Primary    Number of Stairs, Within Home, Primary none  -LS       Row Name 25 0742           Cognition    Orientation Status (Cognition) oriented to;person;time;disoriented to;place;situation  -       Row Name 06/27/25 0742          Safety Issues/Impairments Affecting Functional Mobility    Safety Issues Affecting Function (Mobility) ability to follow commands;awareness of need for assistance;friction/shear risk;insight into deficits/self-awareness;judgment;positioning of assistive device;problem-solving;safety precaution awareness;safety precautions follow-through/compliance;sequencing abilities  -     Impairments Affecting Function (Mobility) balance;cognition;endurance/activity tolerance;pain;strength  -     Cognitive Impairments, Mobility Safety/Performance attention;awareness, need for assistance;insight into deficits/self-awareness;judgment;problem-solving/reasoning;safety precaution awareness;safety precaution follow-through;sequencing abilities  -               User Key  (r) = Recorded By, (t) = Taken By, (c) = Cosigned By      Initials Name Provider Type     Candis Guzman OTR/L Occupational Therapist                     Mobility/ADL's       Row Name 06/27/25 0742          Bed Mobility    Bed Mobility supine-sit  -     Supine-Sit [place] (Bed Mobility) minimum assist (75% patient effort);verbal cues;nonverbal cues (demo/gesture)  -     Assistive Device (Bed Mobility) bed rails;head of bed elevated  -       Row Name 06/27/25 0742          Transfers    Transfers sit-stand transfer;stand-sit transfer;toilet transfer  -       Row Name 06/27/25 0742          Sit-Stand Transfer    Sit-Stand [place] (Transfers) moderate assist (50% patient effort);2 person assist;verbal cues;nonverbal cues (demo/gesture)  -     Assistive Device (Sit-Stand Transfers) walker, front-wheeled  -       Row Name 06/27/25 0742          Stand-Sit Transfer    Stand-Sit [place] (Transfers) moderate assist (50% patient effort);2 person assist;verbal cues;nonverbal cues (demo/gesture)  -      Assistive Device (Stand-Sit Transfers) walker, front-wheeled  -       Row Name 06/27/25 0742          Toilet Transfer    Type (Toilet Transfer) sit-stand;stand-sit;stand pivot/stand step  -     [place] Level (Toilet Transfer) moderate assist (50% patient effort);2 person assist;verbal cues;nonverbal cues (demo/gesture)  -     Assistive Device (Toilet Transfer) walker, front-wheeled;commode, bedside without drop arms  -       Row Name 06/27/25 0742          Activities of Daily Living    BADL Assessment/Intervention lower body dressing;toileting  -[facility] Name 06/27/25 0742          Mobility    Extremity Weight-bearing Status right lower extremity  -     Right Lower Extremity (Weight-bearing Status) toe touch weight-bearing (TTWB)  -[facility] Name 06/27/25 0742          Lower Body Dressing Assessment/Training    [place] Level (Lower Body Dressing) don;socks;dependent (less than 25% patient effort)  -     Position (Lower Body Dressing) edge of bed sitting  -       Row Name 06/27/25 0742          Toileting Assessment/Training    [place] Level (Toileting) toileting skills;adjust/manage clothing;perform perineal hygiene;dependent (less than 25% patient effort)  -     Position (Toileting) supported sitting;supported standing  -               User Key  (r) = Recorded By, (t) = Taken By, (c) = Cosigned By      Initials Name Provider Type     Candis Guzman OTR/L Occupational Therapist                   Obj/Interventions       Row Name 06/27/25 0742          Sensory Assessment (Somatosensory)    Sensory Assessment (Somatosensory) unable/difficult to assess  -[facility] Name 06/27/25 0742          Vision Assessment/Intervention    Visual Impairment/Limitations WFL;corrective lenses full-time  -       Row Name 06/27/25 0742          Range of Motion Comprehensive    General Range of Motion bilateral upper extremity ROM WFL  -       Row Name 06/27/25 0742          Strength  Comprehensive (MMT)    General Manual Muscle Testing (MMT) Assessment upper extremity strength deficits identified  -LS     Comment, General Manual Muscle Testing (MMT) Assessment No formal assessment due to Pt difficulty following commands  -       Row Name 06/27/25 0742          Balance    Balance Assessment sitting static balance;sitting dynamic balance;standing static balance;standing dynamic balance  -LS     Static Sitting Balance standby assist  -     Dynamic Sitting Balance contact guard  -LS     Position, Sitting Balance sitting edge of bed;unsupported  -     Static Standing Balance minimal assist;moderate assist;2-person assist;verbal cues;non-verbal cues (demo/gesture)  -LS     Dynamic Standing Balance moderate assist;2-person assist;verbal cues;non-verbal cues (demo/gesture)  -LS     Position/Device Used, Standing Balance supported;walker, front-wheeled  -               User Key  (r) = Recorded By, (t) = Taken By, (c) = Cosigned By      Initials Name Provider Type     Candis Guzman, OTR/L Occupational Therapist                   Goals/Plan       Row Name 06/27/25 0742          Transfer Goal 1 (OT)    Activity/Assistive Device (Transfer Goal 1, OT) commode, bedside without drop arms  -LS     [place] Level/Cues Needed (Transfer Goal 1, OT) minimum assist (75% or more patient effort)  -LS     Time Frame (Transfer Goal 1, OT) long term goal (LTG);10 days  -LS     Progress/Outcome (Transfer Goal 1, OT) new goal  -       Row Name 06/27/25 0742          Toileting Goal 1 (OT)    Activity/Device (Toileting Goal 1, OT) toileting skills, all;commode, bedside without drop arms  -LS     [place] Level/Cues Needed (Toileting Goal 1, OT) minimum assist (75% or more patient effort)  -LS     Time Frame (Toileting Goal 1, OT) long term goal (LTG);10 days  -LS     Progress/Outcome (Toileting Goal 1, OT) new goal  -       Row Name 06/27/25 0742          Problem Specific Goal 1 (OT)    Problem Specific  Goal 1 (OT) Pt will independently implement one pain management technique to decrease pain and improve functional adl performance.  -LS     Time Frame (Problem Specific Goal 1, OT) long term goal (LTG);by discharge  -LS     Progress/Outcome (Problem Specific Goal 1, OT) new goal  -LS       Row Name 06/27/25 0742          Therapy Assessment/Plan (OT)    Planned Therapy Interventions (OT) transfer/mobility retraining;strengthening exercise;patient/caregiver education/training;occupation/activity based interventions;functional balance retraining;activity tolerance training;BADL retraining;adaptive equipment training;cognitive/visual perception retraining;ROM/therapeutic exercise  -               User Key  (r) = Recorded By, (t) = Taken By, (c) = Cosigned By      Initials Name Provider Type    Candis Denton OTR/L Occupational Therapist                   Clinical Impression       Row Name 06/27/25 0742          Pain Assessment    Pain Location hip  -LS     Pain Side/Orientation right  -LS     Pain Management Interventions exercise or physical activity utilized  -LS     Additional Documentation Pain Scale: FACES Pre/Post-Treatment (Group)  -       Row Name 06/27/25 0742          Pain Scale: FACES Pre/Post-Treatment    Pain: FACES Scale, Pretreatment 0-->no hurt  -LS     Posttreatment Pain Rating 0-->no hurt  -LS     Pre/Posttreatment Pain Comment Increased to 4/10 on faces scale with movement  -LS       Row Name 06/27/25 0742          Plan of Care Review    Plan of Care Reviewed With patient;son  -     Progress no change  -LS     Outcome Evaluation OT eval completed. Pt in fowlers upon therapist arrival; A&O to person and time; No c/o pain at rest; Pt's son also present; Pt very [place]. Pt's son reports that the Pt was performing all BADLs including fxl ambulation with Mod I-supervision at baseline. Today, Pt was educated on TTWB RLE; Pt verbalized understanding. Pt performed supine>sit utilizing bedrail with HOB  elevated with Min A and verbal/visual/tactile cues for sequencing and positioning. Pt dependent to adjust B socks while seated EOB. Pt performed all sit<>stand transfers and pivoted to/from bed>BSC>chair utilizing rwx requiring Mod A x2 and constant verbal/visual/tactile cues for sequencing, safety awareness, maintenance of RLE TTWB, and positioning of AD. Pt performed toileting task utilizing BSC and was dependent for clothing management and posterior scout hygiene. Skilled OT intervention indicated in order to address deficits in fxl mobility, fxl activity tolerance, balance, strength, and use of adaptive techniques/equipment during performance of BADLs. Recommend SNF at discharge.  -       Row Name 06/27/25 0742          Therapy Assessment/Plan (OT)    Rehab Potential (OT) fair  -     Criteria for Skilled Therapeutic Interventions Met (OT) yes;skilled treatment is necessary  -     Therapy Frequency (OT) 5 times/wk  -       Row Name 06/27/25 0742          Therapy Plan Review/Discharge Plan (OT)    Anticipated Discharge Disposition (OT) skilled nursing facility  -       Row Name 06/27/25 0742          Positioning and Restraints    Pre-Treatment Position in bed  -LS     Post Treatment Position chair  -LS     In Chair reclined;call light within reach;encouraged to call for assist;legs elevated;with family/caregiver;notified [facility]  -               User Key  (r) = Recorded By, (t) = Taken By, (c) = Cosigned By      Initials Name Provider Type    Candis Denton, OTR/L Occupational Therapist                   Outcome Measures       Row Name 06/27/25 0742          How much help from another is currently needed...    Putting on and taking off regular lower body clothing? 1  -LS     Bathing (including washing, rinsing, and drying) 2  -LS     Toileting (which includes using toilet bed pan or urinal) 1  -LS     Putting on and taking off regular upper body clothing 2  -LS     Taking care of personal grooming (such  as brushing teeth) 2  -LS     Eating meals 3  -LS     AM-PAC 6 Clicks Score (OT) 11  -LS       Row Name 06/27/25 0742          Functional Assessment    Outcome Measure Options AM-PAC 6 Clicks Daily Activity (OT)  -               User Key  (r) = Recorded By, (t) = Taken By, (c) = Cosigned By      Initials Name Provider Type    LS Candis Guzman, OTR/L Occupational Therapist                    Occupational Therapy Education       Title: PT OT SLP Therapies (Done)       Topic: Occupational Therapy (Done)       Point: ADL training (Done)       Learning Progress Summary            Patient Acceptance, E, VU,NR by  at 6/27/2025 0857                      Point: Precautions (Done)       Learning Progress Summary            Patient Acceptance, E, VU,NR by  at 6/27/2025 0857                      Point: Body mechanics (Done)       Learning Progress Summary            Patient Acceptance, E, VU,NR by  at 6/27/2025 0857                                      User Key       Initials Effective Dates Name Provider Type [facility] 06/20/22 -  Candis Guzman, OTR/L Occupational Therapist OT                  OT Recommendation and Plan  Planned Therapy Interventions (OT): transfer/mobility retraining, strengthening exercise, patient/caregiver education/training, occupation/activity based interventions, functional balance retraining, activity tolerance training, BADL retraining, adaptive equipment training, cognitive/visual perception retraining, ROM/therapeutic exercise  Therapy Frequency (OT): 5 times/wk  Plan of Care Review  Plan of Care Reviewed With: patient, son  Progress: no change  Outcome Evaluation: OT eval completed. Pt in fowlers upon therapist arrival; A&O to person and time; No c/o pain at rest; Pt's son also present; Pt very [place]. Pt's son reports that the Pt was performing all BADLs including fxl ambulation with Mod I-supervision at baseline. Today, Pt was educated on TTWB RLE; Pt verbalized understanding. Pt  performed supine>sit utilizing bedrail with HOB elevated with Min A and verbal/visual/tactile cues for sequencing and positioning. Pt dependent to adjust B socks while seated EOB. Pt performed all sit<>stand transfers and pivoted to/from bed>BSC>chair utilizing rwx requiring Mod A x2 and constant verbal/visual/tactile cues for sequencing, safety awareness, maintenance of RLE TTWB, and positioning of AD. Pt performed toileting task utilizing BSC and was dependent for clothing management and posterior scout hygiene. Skilled OT intervention indicated in order to address deficits in fxl mobility, fxl activity tolerance, balance, strength, and use of adaptive techniques/equipment during performance of BADLs. Recommend SNF at discharge.     Time Calculation:         Time Calculation- OT       Row Name 06/27/25 0742             Time Calculation- OT    OT Start Time 0742  +10 minutes chart review  -LS      OT Stop Time 0842  -      OT Time Calculation (min) 60 min  -      Total Timed Code Minutes- OT 10 minute(s)  -      OT Non-Billable Time (min) 60 min  -      OT Received On 06/27/25  -      OT Goal Re-Cert Due Date 07/07/25  -                User Key  (r) = Recorded By, (t) = Taken By, (c) = Cosigned By      Initials Name Provider Type     Candis Guzman OTR/L Occupational Therapist                  Therapy Charges for Today       Code Description Service Date Service Provider Modifiers Qty    08876841856 HC OT EVAL LOW COMPLEXITY 4 6/27/2025 Candis Guzman OTR/L GO 1    [id number] HC OT SELF CARE/MGMT/TRAIN EA 15 MIN 6/27/2025 Candis Guzman OTR/L GO 1                 Candis Guzman OTR/JOSH  6/27/2025

## 2025-06-27 NOTE — PLAN OF CARE
Goal Outcome Evaluation:  Plan of Care Reviewed With: patient, son        Progress: no change  Outcome Evaluation: OT eval completed. Pt in fowlers upon therapist arrival; A&O to person and time; No c/o pain at rest; Pt's son also present; Pt very [place]. Pt's son reports that the Pt was performing all BADLs including fxl ambulation with Mod I-supervision at baseline. Today, Pt was educated on TTWB RLE; Pt verbalized understanding. Pt performed supine>sit utilizing bedrail with HOB elevated with Min A and verbal/visual/tactile cues for sequencing and positioning. Pt dependent to adjust B socks while seated EOB. Pt performed all sit<>stand transfers and pivoted to/from bed>BSC>chair utilizing rwx requiring Mod A x2 and constant verbal/visual/tactile cues for sequencing, safety awareness, maintenance of RLE TTWB, and positioning of AD. Pt performed toileting task utilizing BSC and was dependent for clothing management and posterior scout hygiene. Skilled OT intervention indicated in order to address deficits in fxl mobility, fxl activity tolerance, balance, strength, and use of adaptive techniques/equipment during performance of BADLs. Recommend SNF at discharge.    Anticipated Discharge Disposition (OT): skilled nursing facility

## 2025-06-27 NOTE — PLAN OF CARE
Goal Outcome Evaluation:  Plan of Care Reviewed With: patient        Progress: no change  Outcome Evaluation: Patient more confused at night; alert to self only. No IV site in place at this time; pt pulled it out last night. Voiding per [place]. Safety maintained.

## 2025-06-27 NOTE — PLAN OF CARE
Goal Outcome Evaluation:  Plan of Care Reviewed With: patient        Progress: no change  Outcome Evaluation: Pt A&Ox2. Up x2 to BSC. Preventative applied to coccyx as pt refuses to turn. PPP. Denies n/t. No c/o. Meds whole in applesauce. Son at BS. Call light in reach. Safety maintained.

## 2025-06-27 NOTE — THERAPY TREATMENT NOTE
Acute Care - Physical Therapy Treatment Note  [facility]     Patient Name: Sunny Lacey  : 1921  MRN: [id number]  Today's Date: 2025      Visit Dx:     ICD-10-CM ICD-9-CM   1. Closed displaced fracture of pelvis, unspecified part of pelvis, initial encounter  S32.9XXA 808.8   2. Impaired mobility [Z74.09]  Z74.09 799.89     Patient Active Problem List   Diagnosis    Sepsis    Pneumonia due to infectious organism    At risk for falls    Hypokalemia    Pelvis fracture    Fall     Past Medical History:   Diagnosis Date    Anemia     Anxiety     Arthritis     Hypertension     Skin cancer      Past Surgical History:   Procedure Laterality Date    SKIN CANCER EXCISION       PT Assessment (Last 12 Hours)       PT Evaluation and Treatment       Row Name 25 1500          Physical Therapy Time and Intention    Subjective Information complains of;pain  -LY     Document Type therapy note (daily note)  -LY     Mode of Treatment physical therapy  -LY       Row Name 25 1500          General Information    Existing Precautions/Restrictions fall;right;weight bearing;other (see comments)  TTWB RLE  -LY       Row Name 25 1500          Mobility    Extremity Weight-bearing Status right lower extremity  -LY     Right Lower Extremity (Weight-bearing Status) toe touch weight-bearing (TTWB)  -LY       Row Name 25 1500          Transfers    Maintains Weight-bearing Status (Transfers) verbal cues to maintain;nonverbal cues (demo/gesture) to maintain;unable to maintain  -LY     Comment, (Transfers) x2 reps  -LY       Row Name 25 1500          Sit-Stand Transfer    Sit-Stand [place] (Transfers) verbal cues;moderate assist (50% patient effort)  -LY     Assistive Device (Sit-Stand Transfers) walker, front-wheeled  -LY       Row Name 25 1500          Stand-Sit Transfer    Stand-Sit [place] (Transfers) verbal cues;moderate assist (50% patient effort)  -LY     Assistive Device  (Stand-Sit Transfers) walker, front-wheeled  -LY       Row Name 06/27/25 1422          Motor Skills    Comments, Therapeutic Exercise BLE AROM seated x15 reps  -LY     Additional Documentation Comments, Therapeutic Exercise (Row)  -LY       Row Name 06/27/25 1422          Plan of Care Review    Plan of Care Reviewed With patient  -LY     Progress improving  -LY       Row Name 06/27/25 1422          Positioning and Restraints    Pre-Treatment Position in bed  -LY     Post Treatment Position bed  -LY     In Bed fowlers;call light within reach;encouraged to call for assist;exit alarm on  -LY               User Key  (r) = Recorded By, (t) = Taken By, (c) = Cosigned By      Initials Name Provider Type    LY Candis Quiles, PTA Physical Therapist Assistant                    Physical Therapy Education       Title: PT OT SLP Therapies (Done)       Topic: Physical Therapy (Done)       Point: Mobility training (Done)       Learning Progress Summary            Patient Acceptance, E,D, VU,NR by  at 6/27/2025 0728    Comment: educated on RLE TTWB precaution, benefits of PT and mobility   Family Acceptance, E,D, VU,NR by  at 6/27/2025 0728    Comment: educated on RLE TTWB precaution, benefits of PT and mobility    Acceptance, E,TB, VU,NR by AR at 6/26/2025 1202                      Point: Precautions (Done)       Learning Progress Summary            Patient Acceptance, E,D, VU,NR by  at 6/27/2025 0728    Comment: educated on RLE TTWB precaution, benefits of PT and mobility   Family Acceptance, E,D, VU,NR by  at 6/27/2025 0728    Comment: educated on RLE TTWB precaution, benefits of PT and mobility    Acceptance, E,TB, VU,NR by AR at 6/26/2025 1202                                      User Key       Initials Effective Dates Name Provider Type Discipline    AR 12/02/24 -  Cynthia Nathan, RN Registered Nurse Nurse     04/21/25 -  Deb Renner, WILLIE Student PT Student PT                  PT Recommendation and Plan     Plan  of Care Reviewed With: patient  Progress: improving       Time Calculation:    PT Charges       Row Name 06/27/25 1549 06/27/25 0728          Time Calculation    Start Time 1500  -LY 0728  -MS (r) GM (t) MS (c)     Stop Time 1540  -LY 0842  -MS (r) GM (t) MS (c)     Time Calculation (min) 40 min  -LY 74 min  -MS (r) GM (t)     PT Received On 06/27/25  -LY 06/27/25  -MS (r) GM (t) MS (c)     PT Goal Re-Cert Due Date -- 07/07/25  -MS (r) GM (t) MS (c)        Time Calculation- PT    Total Timed Code Minutes- PT 40 minute(s)  -LY 14 minute(s)  -MS (r) GM (t) MS (c)        Timed Charges    06109 - PT Therapeutic Exercise Minutes 15  -LY --     87010 - PT Therapeutic Activity Minutes 25  -LY 14  -MS (r) GM (t) MS (c)        Untimed Charges    PT Eval/Re-eval Minutes -- 60  -MS (r) GM (t) MS (c)        Total Minutes    Timed Charges Total Minutes 40  -LY 14  -MS (r) GM (t)     Untimed Charges Total Minutes -- 60  -MS (r) GM (t)      Total Minutes 40  -LY 74  -MS (r) GM (t)               User Key  (r) = Recorded By, (t) = Taken By, (c) = Cosigned By      Initials Name Provider Type    Paradise Mello R, PT, DPT, NCS Physical Therapist    Candis Black PTA Physical Therapist Assistant    Deb Turcios, PT Student PT Student                  Therapy Charges for Today       Code Description Service Date Service Provider Modifiers Qty    22201703974 HC PT THER PROC EA 15 MIN 6/27/2025 Candis Quiles PTA GP 1    36087350562 HC PT THERAPEUTIC ACT EA 15 MIN 6/27/2025 Candis Quiles, JASBIR GP 2            PT G-Codes  Outcome Measure Options: AM-PAC 6 Clicks Daily Activity (OT)  AM-PAC 6 Clicks Score (PT): 11  AM-PAC 6 Clicks Score (OT): 11    Candis Quiles PTA  6/27/2025

## 2025-06-27 NOTE — THERAPY EVALUATION
Patient Name: Sunny Lacey  : 1921    MRN: [id number]                              Today's Date: 2025       Admit Date: 2025    Visit Dx:     ICD-10-CM ICD-9-CM   1. Closed displaced fracture of pelvis, unspecified part of pelvis, initial encounter  S32.9XXA 808.8   2. Impaired mobility [Z74.09]  Z74.09 799.89     Patient Active Problem List   Diagnosis    Sepsis    Pneumonia due to infectious organism    At risk for falls    Hypokalemia    Pelvis fracture    Fall     Past Medical History:   Diagnosis Date    Anemia     Anxiety     Arthritis     Hypertension     Skin cancer      Past Surgical History:   Procedure Laterality Date    SKIN CANCER EXCISION        General Information       Row Name 25 0728          Physical Therapy Time and Intention    Document Type evaluation  Pt presents with R hip pain following a R pelvic fracture d/t fall. Hx: B GREG. Dx: acute mildly displaced fx of R acetabulum, acute comminuted and displaced fx of R inferior pubic ramus. Pt is TTWB RLE for 6 weeks  -MS (r) GM (t) MS (c)     Mode of Treatment physical therapy  -MS (r) GM (t) MS (c)       Row Name 25 0728          General Information    Patient Profile Reviewed yes  -MS (r) GM (t) MS (c)     Prior Level of Function independent:;all household mobility;bed mobility;ADL's;home management;gait;transfer  -MS (r) GM (t) MS (c)     Existing Precautions/Restrictions fall;weight bearing;other (see comments)  TTWB RLE for 6 weeks  -MS (r) GM (t) MS (c)     Barriers to Rehab hearing deficit;physical barrier;cognitive status  -MS (r) GM (t) MS (c)       Row Name 25 0728          Living Environment    Current Living Arrangements assisted living facility  -MS (r) GM (t) MS (c)     People in Home facility resident  -MS (r) GM (t) MS (c)       Row Name 25 0728          Home Main Entrance    Number of Stairs, Main Entrance none  -MS (r) GM (t) MS (c)       Row Name 25 0728          Stairs Within  Home, Primary    Number of Stairs, Within Home, Primary none  -MS (r) GM (t) MS (c)       Row Name 06/27/25 0728          Cognition    Orientation Status (Cognition) oriented to;person;time;disoriented to;place;situation  -MS (r) GM (t) MS (c)       Row Name 06/27/25 0728          Safety Issues/Impairments Affecting Functional Mobility    Safety Issues Affecting Function (Mobility) ability to follow commands;awareness of need for assistance;friction/shear risk;insight into deficits/self-awareness;judgment;positioning of assistive device;problem-solving;safety precaution awareness;safety precautions follow-through/compliance;sequencing abilities  -MS (r) GM (t) MS (c)     Impairments Affecting Function (Mobility) balance;cognition;endurance/activity tolerance;pain;strength  -MS (r) GM (t) MS (c)     Cognitive Impairments, Mobility Safety/Performance attention;awareness, need for assistance;insight into deficits/self-awareness;judgment;problem-solving/reasoning;safety precaution awareness;safety precaution follow-through;sequencing abilities  -MS (r) GM (t) MS (c)               User Key  (r) = Recorded By, (t) = Taken By, (c) = Cosigned By      Initials Name Provider Type    Paradise Mello, PT, DPT, NCS Physical Therapist    Deb Turcios, WILLIE Student PT Student                   Mobility       Row Name 06/27/25 0728          Bed Mobility    Bed Mobility supine-sit  -MS (r) GM (t) MS (c)     Supine-Sit [place] (Bed Mobility) minimum assist (75% patient effort);verbal cues;nonverbal cues (demo/gesture)  -MS (r) GM (t) MS (c)     Assistive Device (Bed Mobility) bed rails;head of bed elevated  -MS (r) GM (t) MS (c)       Row Name 06/27/25 0728          Bed-Chair Transfer    Bed-Chair [place] (Transfers) moderate assist (50% patient effort);2 person assist;nonverbal cues (demo/gesture);verbal cues  -MS (r) GM (t) MS (c)     Assistive Device (Bed-Chair Transfers) walker, front-wheeled  -MS (r) GM (t) MS  (c)     Comment, (Bed-Chair Transfer) bed to BSC, BSC to chair  -MS (r) GM (t) MS (c)       Row Name 06/27/25 0728          Sit-Stand Transfer    Sit-Stand [place] (Transfers) moderate assist (50% patient effort);2 person assist;verbal cues;nonverbal cues (demo/gesture)  -MS (r) GM (t) MS (c)     Assistive Device (Sit-Stand Transfers) walker, front-wheeled  -MS (r) GM (t) MS (c)       Row Name 06/27/25 0728          Mobility    Extremity Weight-bearing Status right lower extremity  -MS (r) GM (t) MS (c)     Right Lower Extremity (Weight-bearing Status) toe touch weight-bearing (TTWB)  -MS (r) GM (t) MS (c)               User Key  (r) = Recorded By, (t) = Taken By, (c) = Cosigned By      Initials Name Provider Type    Paradise Mello, PT, DPT, NCS Physical Therapist    Deb Turcios, WILLIE Student PT Student                   Obj/Interventions       Row Name 06/27/25 0728          Range of Motion Comprehensive    General Range of Motion bilateral lower extremity ROM WFL  -MS (r) GM (t) MS (c)       Row Name 06/27/25 0728          Strength Comprehensive (MMT)    General Manual Muscle Testing (MMT) Assessment lower extremity strength deficits identified  -MS (r) GM (t) MS (c)     Comment, General Manual Muscle Testing (MMT) Assessment no formal strength assessment d/t difficulty following commands. LLE grossly WFL, RLE likely weak d/t pain and inability to bear weight  -MS (r) GM (t) MS (c)       Row Name 06/27/25 0728          Balance    Balance Assessment sitting static balance;standing static balance;standing dynamic balance  -MS (r) GM (t) MS (c)     Static Sitting Balance standby assist  -MS (r) GM (t) MS (c)     Position, Sitting Balance unsupported;sitting edge of bed  -MS (r) GM (t) MS (c)     Static Standing Balance minimal assist;non-verbal cues (demo/gesture);verbal cues  -MS (r) GM (t) MS (c)     Dynamic Standing Balance moderate assist;2-person assist;non-verbal cues (demo/gesture);verbal cues   -MS (r) GM (t) MS (c)     Position/Device Used, Standing Balance supported;walker, front-wheeled  -MS (r) GM (t) MS (c)       Row Name 06/27/25 0728          Sensory Assessment (Somatosensory)    Sensory Assessment (Somatosensory) unable/difficult to assess  -MS (r) GM (t) MS (c)               User Key  (r) = Recorded By, (t) = Taken By, (c) = Cosigned By      Initials Name Provider Type    Paradise Mello, PT, DPT, NCS Physical Therapist    Deb Turcios, PT Student PT Student                   Goals/Plan       Row Name 06/27/25 0728          Bed Mobility Goal 1 (PT)    Activity/Assistive Device (Bed Mobility Goal 1, PT) sit to supine;supine to sit  -MS (r) GM (t) MS (c)     [place] Level/Cues Needed (Bed Mobility Goal 1, PT) supervision required  -MS (r) GM (t) MS (c)     Time Frame (Bed Mobility Goal 1, PT) long term goal (LTG);10 days  -MS (r) GM (t) MS (c)     Progress/Outcomes (Bed Mobility Goal 1, PT) new goal  -MS (r) GM (t) MS (c)       Row Name 06/27/25 0728          Transfer Goal 1 (PT)    Activity/Assistive Device (Transfer Goal 1, PT) sit-to-stand/stand-to-sit  -MS (r) GM (t) MS (c)     [place] Level/Cues Needed (Transfer Goal 1, PT) minimum assist (75% or more patient effort)  -MS (r) GM (t) MS (c)     Time Frame (Transfer Goal 1, PT) long term goal (LTG);10 days  -MS (r) GM (t) MS (c)     Progress/Outcome (Transfer Goal 1, PT) new goal  -MS (r) GM (t) MS (c)       Row Name 06/27/25 0728          Problem Specific Goal 1 (PT)    Problem Specific Goal 1 (PT) self-propel 50 ft in wheelchair with supervision  -MS (r) GM (t) MS (c)     Time Frame (Problem Specific Goal 1, PT) long-term goal (LTG)  -MS (r) GM (t) MS (c)     Progress/Outcome (Problem Specific Goal 1, PT) new goal  -MS (r) GM (t) MS (c)       Row Name 06/27/25 0728          Therapy Assessment/Plan (PT)    Planned Therapy Interventions (PT) balance training;bed mobility training;home exercise program;patient/family  education;postural re-education;ROM (range of motion);strengthening;transfer training;wheelchair management/propulsion training;gait training  -MS (r) GM (t) MS (c)               User Key  (r) = Recorded By, (t) = Taken By, (c) = Cosigned By      Initials Name Provider Type    Paradise Mello R, PT, DPT, NCS Physical Therapist    Deb Turcios, PT Student PT Student                   Clinical Impression       Row Name 06/27/25 0728          Pain    Pain Location hip  -MS (r) GM (t) MS (c)     Pain Side/Orientation right  -MS (r) GM (t) MS (c)       Row Name 06/27/25 0728          Pain Scale: FACES Pre/Post-Treatment    Pain: FACES Scale, Pretreatment 0-->no hurt  -MS (r) GM (t) MS (c)     Posttreatment Pain Rating 0-->no hurt  -MS (r) GM (t) MS (c)       Row Name 06/27/25 0728          Plan of Care Review    Plan of Care Reviewed With patient;child  -MS (r) GM (t) MS (c)     Progress no change  -MS (r) GM (t) MS (c)     Outcome Evaluation PT eval completed. Pt presents in bed with son at bedside, alert and oriented to person and time. Pt is hard of hearing and requires a lot of repetition. Son reports pt was independent with all ADLs, ambulation, and household activities in her assistive living facility. Pt is currently TTWB on RLE for at least 6 weeks. Currently, supine>sit required min A with verbal and tactile cues for positioning. Sit<>stand transfers and stand pivot transfers from bed>BSC>chair with RW required mod Ax2 with consistent verbal and tactile cues. Cues were for safety, maintenance of TTWB on RLE, and positioning of RW during transfer. Recommend skilled PT to address balance deficits, activity tolerance, strength impairments, and functional mobility impairments. Recommend SNF at [place].  -MS (r) GM (t) MS (c)       Row Name 06/27/25 0728          Therapy Assessment/Plan (PT)    Patient/Family Therapy Goals Statement (PT) return to PLOF  -MS (r) GM (t) MS (c)     Rehab Potential (PT) fair  -MS (r)  GM (t) MS (c)     Criteria for Skilled Interventions Met (PT) yes;skilled treatment is necessary;meets criteria  -MS (r) GM (t) MS (c)     Therapy Frequency (PT) 2 times/day  -MS (r) GM (t) MS (c)     Predicted Duration of Therapy Intervention (PT) until dc  -MS (r) GM (t) MS (c)       Row Name 06/27/25 0728          Positioning and Restraints    Pre-Treatment Position in bed  -MS (r) GM (t) MS (c)     Post Treatment Position chair  -MS (r) GM (t) MS (c)     In Chair reclined;call light within reach;encouraged to call for assist;with family/caregiver;notified nsg  -MS (r) GM (t) MS (c)               User Key  (r) = Recorded By, (t) = Taken By, (c) = Cosigned By      Initials Name Provider Type    Paradise Mello, PT, DPT, NCS Physical Therapist    Deb Turcios, PT Student PT Student                   Outcome Measures       Row Name 06/27/25 0905 06/27/25 0728       How much help from another person do you currently need...    Turning from your back to your side while in flat bed without using bedrails? 2  -CP 3  -MS (r) GM (t) MS (c)    Moving from lying on back to sitting on the side of a flat bed without bedrails? 2  -CP 3  -MS (r) GM (t) MS (c)    Moving to and from a bed to a chair (including a wheelchair)? 2  -CP 2  -MS (r) GM (t) MS (c)    Standing up from a chair using your arms (e.g., wheelchair, bedside chair)? 2  -CP 2  -MS (r) GM (t) MS (c)    Climbing 3-5 steps with a railing? 1  -CP 1  -MS (r) GM (t) MS (c)    To walk in hospital room? 2  -CP 1  -MS (r) GM (t) MS (c)    AM-PAC 6 Clicks Score (PT) 11  -CP 12  -MS (r) GM (t)      Row Name 06/27/25 0742          Functional Assessment    Outcome Measure Options AM-PAC 6 Clicks Daily Activity (OT)  -LS               User Key  (r) = Recorded By, (t) = Taken By, (c) = Cosigned By      Initials Name Provider Type    Paradise Mello, PT, DPT, NCS Physical Therapist    Deirdre Hernandez, RN Registered Nurse    Candis Denton, OTR/L  Occupational Therapist     Deb Renner, PT Student PT Student                                 Physical Therapy Education       Title: PT OT SLP Therapies (Done)       Topic: Physical Therapy (Done)       Point: Mobility training (Done)       Learning Progress Summary            Patient Acceptance, E,D, VU,NR by  at 6/27/2025 0728    Comment: educated on RLE TTWB precaution, benefits of PT and mobility   Family Acceptance, E,D, VU,NR by  at 6/27/2025 0728    Comment: educated on RLE TTWB precaution, benefits of PT and mobility    Acceptance, E,TB, VU,NR by AR at 6/26/2025 1202                      Point: Precautions (Done)       Learning Progress Summary            Patient Acceptance, E,D, VU,NR by  at 6/27/2025 0728    Comment: educated on RLE TTWB precaution, benefits of PT and mobility   Family Acceptance, E,D, VU,NR by  at 6/27/2025 0728    Comment: educated on RLE TTWB precaution, benefits of PT and mobility    Acceptance, E,TB, VU,NR by AR at 6/26/2025 1202                                      User Key       Initials Effective Dates Name Provider Type Discipline    AR 12/02/24 -  Cynthia Nathan, RN Registered Nurse Nurse     04/21/25 -  Deb Renner, PT Student PT Student PT                  PT Recommendation and Plan  Planned Therapy Interventions (PT): balance training, bed mobility training, home exercise program, patient/family education, postural re-education, ROM (range of motion), strengthening, transfer training, wheelchair management/propulsion training, gait training  Progress: no change  Outcome Evaluation: PT eval completed. Pt presents in bed with son at bedside, alert and oriented to person and time. Pt is hard of hearing and requires a lot of repetition. Son reports pt was independent with all ADLs, ambulation, and household activities in her assistive living facility. Pt is currently TTWB on RLE for at least 6 weeks. Currently, supine>sit required min A with verbal and tactile  cues for positioning. Sit<>stand transfers and stand pivot transfers from bed>BSC>chair with RW required mod Ax2 with consistent verbal and tactile cues. Cues were for safety, maintenance of TTWB on RLE, and positioning of RW during transfer. Recommend skilled PT to address balance deficits, activity tolerance, strength impairments, and functional mobility impairments. Recommend SNF at [place].     Time Calculation:         PT Charges       Row Name 06/27/25 0728             Time Calculation    Start Time 0728  -MS (r) GM (t) MS (c)      Stop Time 0842  -MS (r) GM (t) MS (c)      Time Calculation (min) 74 min  -MS (r) GM (t)      PT Received On 06/27/25  -MS (r) GM (t) MS (c)      PT Goal Re-Cert Due Date 07/07/25  -MS (r) GM (t) MS (c)         Time Calculation- PT    Total Timed Code Minutes- PT 14 minute(s)  -MS (r) GM (t) MS (c)         Timed Charges    47640 - PT Therapeutic Activity Minutes 14  -MS (r) GM (t) MS (c)         Untimed Charges    PT Eval/Re-eval Minutes 60  -MS (r) GM (t) MS (c)         Total Minutes    Timed Charges Total Minutes 14  -MS (r) GM (t)      Untimed Charges Total Minutes 60  -MS (r) GM (t)       Total Minutes 74  -MS (r) GM (t)                User Key  (r) = Recorded By, (t) = Taken By, (c) = Cosigned By      Initials Name Provider Type    Paradise Mello, PT, DPT, NCS Physical Therapist    Deb Turcios, WILLIE Student PT Student                      PT G-Codes  Outcome Measure Options: AM-PAC 6 Clicks Daily Activity (OT)  AM-PAC 6 Clicks Score (PT): 11  AM-PAC 6 Clicks Score (OT): 11  PT Discharge Summary  Anticipated Discharge Disposition (PT): skilled nursing facility    Deb Renner PT Student  6/27/2025

## 2025-06-27 NOTE — CASE MANAGEMENT/SOCIAL WORK
Discharge Planning Assessment   [place]     Patient Name: Sunny Lacey  MRN: [id number]  Today's Date: 6/27/2025    Admit Date: 6/25/2025        Discharge Needs Assessment       Row Name 06/27/25 1422       Living Environment    People in Home facility resident    Name(s) of People in Home [facility]    Current Living Arrangements assisted living facility    Potentially Unsafe Housing Conditions none    In the past 12 months has the electric, gas, oil, or water company threatened to shut off services in your home? No    Primary Care Provided by self;child(alayna);spouse/significant other    Provides Primary Care For no one    Family Caregiver if Needed child(alayna), adult    Quality of Family Relationships helpful;involved    Able to Return to Prior Arrangements other (see comments)       Resource/Environmental Concerns    Resource/Environmental Concerns none    Transportation Concerns none       Transportation Needs    In the past 12 months, has lack of transportation kept you from medical appointments or from getting medications? no    In the past 12 months, has lack of transportation kept you from meetings, work, or from getting things needed for daily living? No       Food Insecurity    Within the past 12 months, you worried that your food would run out before you got the money to buy more. Never true    Within the past 12 months, the food you bought just didn't last and you didn't have money to get more. Never true       Transition Planning    Patient/Family Anticipates Transition to long-term care facility;inpatient rehabilitation facility    Patient/Family Anticipated Services at Transition rehabilitation services;skilled nursing    Transportation Anticipated health plan transportation       Discharge Needs Assessment    Readmission Within the Last 30 Days no previous admission in last 30 days    Current Outpatient/Agency/Support Group assisted living facility    Equipment Currently Used at Home grab  bar;bath bench    Concerns to be Addressed adjustment to diagnosis/illness;discharge planning    Do you want help finding or keeping work or a job? I do not need or want help    Do you want help with school or training? For example, starting or completing job training or getting a high school diploma, GED or equivalent No    Anticipated Changes Related to Illness inability to care for self    Equipment Needed After Discharge other (see comments)    Outpatient/Agency/Support Group Needs assisted living facility;skilled nursing facility    Discharge Facility/Level of Care Needs assisted living facility;nursing facility, skilled                   Discharge Plan       Row Name 06/27/25 4531       Plan    Plan Comments SW spoke to pt in room, no family present. Pt appeared confused and thought she was at her assisted living ([facility]). Pt admitted with pelvic fx and will more than likely need Snf placement. Pt is currently in obs status and Medicare will not cover snf.  will send updated info in later today to see if pt meets inpt. SW will update pt son once status known.                  Continued Care and Services - Admitted Since 6/25/2025    No active coordination exists.          Demographic Summary    No documentation.                  Functional Status    No documentation.                  Psychosocial    No documentation.                  Abuse/Neglect    No documentation.                  Legal    No documentation.                  Substance Abuse    No documentation.                  Patient Forms    No documentation.                     GEETA Delarosa

## 2025-06-27 NOTE — PLAN OF CARE
Goal Outcome Evaluation:  Plan of Care Reviewed With: patient        Progress: improving  Outcome Evaluation: Pt A&Ox4. Up x1 to BSC. Voiding. IVF and abx given per orders. Dsg to abd CDI. PPP. Denies n/t. C/o of pain w/ PRN pain meds given w/ good releif. Family at BS. Call light in reach. Safety maintained.

## 2025-06-28 PROBLEM — I10 ESSENTIAL HYPERTENSION: Status: ACTIVE | Noted: 2025-06-28

## 2025-06-28 PROCEDURE — 97535 SELF CARE MNGMENT TRAINING: CPT

## 2025-06-28 PROCEDURE — 97530 THERAPEUTIC ACTIVITIES: CPT

## 2025-06-28 PROCEDURE — G0378 HOSPITAL OBSERVATION PER HR: HCPCS

## 2025-06-28 RX ORDER — POTASSIUM CHLORIDE 1500 MG/1
40 TABLET, EXTENDED RELEASE ORAL DAILY
Status: DISCONTINUED | OUTPATIENT
Start: 2025-06-28 | End: 2025-06-30 | Stop reason: ALTCHOICE

## 2025-06-28 RX ORDER — LATANOPROST 50 UG/ML
1 SOLUTION/ DROPS OPHTHALMIC NIGHTLY
Status: DISCONTINUED | OUTPATIENT
Start: 2025-06-28 | End: 2025-07-02 | Stop reason: HOSPADM

## 2025-06-28 RX ORDER — DULOXETIN HYDROCHLORIDE 30 MG/1
30 CAPSULE, DELAYED RELEASE ORAL DAILY
Status: DISCONTINUED | OUTPATIENT
Start: 2025-06-29 | End: 2025-07-02 | Stop reason: HOSPADM

## 2025-06-28 RX ORDER — GABAPENTIN 300 MG/1
300 CAPSULE ORAL NIGHTLY
Status: DISCONTINUED | OUTPATIENT
Start: 2025-06-28 | End: 2025-07-02 | Stop reason: HOSPADM

## 2025-06-28 RX ADMIN — AMLODIPINE BESYLATE 5 MG: 5 TABLET ORAL at 08:47

## 2025-06-28 RX ADMIN — FERROUS SULFATE TAB 325 MG (65 MG ELEMENTAL FE) 325 MG: 325 (65 FE) TAB at 08:47

## 2025-06-28 RX ADMIN — DOCUSATE SODIUM 100 MG: 100 CAPSULE, LIQUID FILLED ORAL at 08:47

## 2025-06-28 RX ADMIN — LATANOPROST 1 DROP: 50 SOLUTION OPHTHALMIC at 20:12

## 2025-06-28 RX ADMIN — POTASSIUM CHLORIDE 40 MEQ: 1500 TABLET, EXTENDED RELEASE ORAL at 15:00

## 2025-06-28 RX ADMIN — GABAPENTIN 300 MG: 300 CAPSULE ORAL at 20:12

## 2025-06-28 NOTE — THERAPY TREATMENT NOTE
Acute Care - Physical Therapy Treatment Note  [facility]     Patient Name: Sunny Lacey  : 1921  MRN: [id number]  Today's Date: 2025      Visit Dx:     ICD-10-CM ICD-9-CM   1. Closed displaced fracture of pelvis, unspecified part of pelvis, initial encounter  S32.9XXA 808.8   2. Impaired mobility [Z74.09]  Z74.09 799.89     Patient Active Problem List   Diagnosis    Sepsis    Pneumonia due to infectious organism    At risk for falls    Hypokalemia    Pelvis fracture    Fall     Past Medical History:   Diagnosis Date    Anemia     Anxiety     Arthritis     Hypertension     Skin cancer      Past Surgical History:   Procedure Laterality Date    SKIN CANCER EXCISION       PT Assessment (Last 12 Hours)       PT Evaluation and Treatment       [facility] Name [address] [facility]          Physical Therapy Time and Intention    Subjective Information no complaints  -     Document Type therapy note (daily note)  -     Mode of Treatment physical therapy  -[facility] Name 25 [facility]          General Information    Existing Precautions/Restrictions fall;right;weight bearing;other (see comments)  TTWB RLE  -     Limitations/Impairments hearing  -     Barriers to Rehab hearing deficit  -[facility] Name 25 075          Pain Scale: FACES Pre/Post-Treatment    Pain: FACES Scale, Pretreatment 0-->no hurt  -     Posttreatment Pain Rating 0-->no hurt  -[facility] Name 25 [facility]          Mobility    Extremity Weight-bearing Status right lower extremity  -     Right Lower Extremity (Weight-bearing Status) toe touch weight-bearing (TTWB)  -[facility] Name 25 [facility]          Bed Mobility    Bed Mobility supine-sit;sit-supine  -     Supine-Sit [place] (Bed Mobility) minimum assist (75% patient effort);verbal cues  [facility]     Sit-Supine [place] (Bed Mobility) --  chair  -[facility] Name 25 075          Sit-Stand Transfer    Sit-Stand [place] (Transfers) verbal cues;moderate  assist (50% patient effort)  -     Assistive Device (Sit-Stand Transfers) walker, front-wheeled  -       Row Name 06/28/25 0751          Stand-Sit Transfer    Stand-Sit [place] (Transfers) verbal cues;moderate assist (50% patient effort)  -     Assistive Device (Stand-Sit Transfers) walker, front-wheeled  -       Row Name 06/28/25 0751          Toilet Transfer    [place] Level (Toilet Transfer) minimum assist (75% patient effort);moderate assist (50% patient effort);verbal cues  -     Assistive Device (Toilet Transfer) commode, bedside without drop arms;walker, front-wheeled  -       Row Name 06/28/25 0751          Gait/Stairs (Locomotion)    [place] Level (Gait) minimum assist (75% patient effort);moderate assist (50% patient effort);verbal cues  -     Assistive Device (Gait) walker, front-wheeled  -     Distance in Feet (Gait) 2  bed-BSC-chair  -       Row Name 06/28/25 0751          Plan of Care Review    Plan of Care Reviewed With patient  -     Progress no change  -     Outcome Evaluation pt trans to EOB min assist, sit-stand mod assist, pivot to BSC with rwx mod assist, cues for TTWB RLE, toilet trans min-mod, steps to chair min-mod, pt would benefit from cont therapy  -       Row Name 06/28/25 0751          Positioning and Restraints    Pre-Treatment Position in bed  -     Post Treatment Position chair  -     In Chair reclined;call light within reach;encouraged to call for assist;exit alarm on;waffle cushion;with [facility]               User Key  (r) = Recorded By, (t) = Taken By, (c) = Cosigned By      Initials Name Provider Type     Marcia Dillon, PTA Physical Therapist Assistant                    Physical Therapy Education       Title: PT OT SLP Therapies (Done)       Topic: Physical Therapy (Done)       Point: Mobility training (Done)       Learning Progress Summary            Patient Acceptance, E,D, VU,NR by  at 6/27/2025 9854    Comment: educated on RLE  TTWB precaution, benefits of PT and mobility   Family Acceptance, E,D, VU,NR by  at 6/27/2025 0728    Comment: educated on RLE TTWB precaution, benefits of PT and mobility    Acceptance, E,TB, VU,NR by AR at 6/26/2025 1202                      Point: Precautions (Done)       Learning Progress Summary            Patient Acceptance, E,D, VU,NR by  at 6/27/2025 0728    Comment: educated on RLE TTWB precaution, benefits of PT and mobility   Family Acceptance, E,D, VU,NR by GM at 6/27/2025 0728    Comment: educated on RLE TTWB precaution, benefits of PT and mobility    Acceptance, E,TB, VU,NR by AR at 6/26/2025 1202                                      User Key       Initials Effective Dates Name Provider Type Discipline    AR 12/02/24 -  Cynthia Nathan, RN Registered Nurse Nurse     04/21/25 -  Deb Renner, PT Student PT Student PT                  PT Recommendation and Plan     Plan of Care Reviewed With: patient  Progress: no change  Outcome Evaluation: pt trans to EOB min assist, sit-stand mod assist, pivot to BSC with rwx mod assist, cues for TTWB RLE, toilet trans min-mod, steps to chair min-mod, pt would benefit from cont therapy   Outcome Measures       Row Name 06/28/25 0800             How much help from another person do you currently need...    Turning from your back to your side while in flat bed without using bedrails? 3  -AH      Moving from lying on back to sitting on the side of a flat bed without bedrails? 3  -AH      Moving to and from a bed to a chair (including a wheelchair)? 2  -AH      Standing up from a chair using your arms (e.g., wheelchair, bedside chair)? 2  -AH      Climbing 3-5 steps with a railing? 1  -AH      To walk in hospital room? 2  -AH      AM-PAC 6 Clicks Score (PT) 13  -AH         Functional Assessment    Outcome Measure Options AM-PAC 6 Clicks Basic Mobility (PT)  -                User Key  (r) = Recorded By, (t) = Taken By, (c) = Cosigned By      Initials Name Provider  Type     Marcia Dillon PTA Physical Therapist Assistant                     Time Calculation:    PT Charges       Row Name 06/28/25 0816             Time Calculation    Start Time 0751  -      Stop Time 0814  -      Time Calculation (min) 23 min  -      PT Received On 06/28/25  -         Time Calculation- PT    Total Timed Code Minutes- PT 23 minute(s)  -         Timed Charges    87314 - PT Therapeutic Activity Minutes 23  -AH         Total Minutes    Timed Charges Total Minutes 23  -AH       Total Minutes 23  -AH                User Key  (r) = Recorded By, (t) = Taken By, (c) = Cosigned By      Initials Name Provider Type    Marcia Glass PTA Physical Therapist Assistant                  Therapy Charges for Today       Code Description Service Date Service Provider Modifiers Qty    86750899094 HC PT THERAPEUTIC ACT EA 15 MIN 6/28/2025 Marcia Dillon PTA GP 2            PT G-Codes  Outcome Measure Options: AM-PAC 6 Clicks Basic Mobility (PT)  AM-PAC 6 Clicks Score (PT): 13  AM-PAC 6 Clicks Score (OT): 11    Marcia Dillon PTA  6/28/2025

## 2025-06-28 NOTE — PLAN OF CARE
Goal Outcome Evaluation:  Plan of Care Reviewed With: patient        Progress: no change  Outcome Evaluation: Alert, disoriented to place, time. No c/o pain. VSS. NSR on tele. PPP. Denies numbness/tingling. Up x1-2 with walker to C. Voiding without difficulty. Refuses turns at times. Call light within reach.

## 2025-06-28 NOTE — PLAN OF CARE
Goal Outcome Evaluation:                 Pt a/ox2 to self and year. Intermittently confused. Up x1 to BSC, has brief on. Gotten some sleep today. Had 2 BM. No c/o pain today. Pills in applesauce. Family at bedside. Call light in reach, safety maintained.

## 2025-06-28 NOTE — PROGRESS NOTES
"    [facility] Medicine Services  INPATIENT PROGRESS NOTE    Patient Name: Sunny Lacey  Date of Admission: 6/25/2025  Today's Date: 06/28/25  Length of Stay: 1  Primary Care Physician: Nevaeh Thornton PA    Subjective   Chief Complaint: f/u pelvic fx    HPI   Patient seen resting in bed.  Son at bedside.  She is on room air.  Currently seems to be doing fairly well.  Denies any pain.  Denies any shortness of breath.  Has had issues ambulating with therapies.  No other significant events ongoing.  Tolerating p.o.  Afebrile.        Review of Systems   All pertinent negatives and positives are as above. All other systems have been reviewed and are negative unless otherwise stated.     Objective    Temp:  [97.1 °F (36.2 °C)-98.3 °F (36.8 °C)] 98 °F (36.7 °C)  Heart Rate:  [] 102  Resp:  [16] 16  BP: (128-161)/(63-76) 142/66  Physical Exam  GEN: Awake, alert, interactive, in NAD, [place]  HEENT:  PERRLA, EOMI, Anicteric  Lungs:  no wheezing/rales/rhonchi  Heart: RRR, +S1/s2, no rub  ABD: soft, nt/nd, +BS, no guarding/rebound  Extremities:  no cyanosis, no edema  Skin: no rashes or petechiae  Neuro: AAOx3, no focal deficits, gait not tested  Psych: normal mood & affect        Results Review:  I have reviewed the labs, radiology results, and diagnostic studies.    Laboratory Data:   Results from last 7 days   Lab Units 06/26/25  0444 06/25/25 1917   WBC 10*3/mm3 7.25 6.76   HEMOGLOBIN g/dL 13.3 12.8   HEMATOCRIT % 39.6 38.9   PLATELETS 10*3/mm3 122* 119*        Results from last 7 days   Lab Units 06/26/25  0444 06/25/25 1917   SODIUM mmol/L 143 144   POTASSIUM mmol/L 3.2* 3.7   CHLORIDE mmol/L 103 105   CO2 mmol/L 27.0 28.0   BUN mg/dL 11.9 13.9   CREATININE mg/dL 0.71 0.86   CALCIUM mg/dL 9.5 9.7*   BILIRUBIN mg/dL 0.6 0.4   ALK PHOS U/L 104 101   ALT (SGPT) U/L 13 14   AST (SGOT) U/L 26 29   GLUCOSE mg/dL 103* 102*       Culture Data:   No results found for: \"BLOODCX\", " "\"URINECX\", \"WOUNDCX\", \"MRSACX\", \"RESPCX\", \"STOOLCX\"    Radiology Data:   Imaging Results (Last 24 Hours)       ** No results found for the last 24 hours. **            I have reviewed the patient's current medications.     Assessment/Plan   Assessment  Active Hospital Problems    Diagnosis     **Pelvis fracture     Essential hypertension     Fall     Hypokalemia        Treatment Plan  #1 pelvis fracture -nonoperative.  Currently not requiring much in the way of pain medicine but has ambulatory dysfunction and needs ongoing therapy.  She is from an [facility] will not be able to return at this time due to not being independent.  Plan for rehab placement.  Appreciate Ortho input.    #2 fall -appears to be mechanical.  Continue PT and OT.    #3 essential hypertension -currently on amlodipine.  Monitor for adjustments.    #4 hypokalemia -replace and monitor.  Encourage p.o. intake.    Medical Decision Making  Number and Complexity of problems: 2 acute, on chronic  Differential Diagnosis: As above    Conditions and Status        New to me.  Improving.  Not at goal.     MDM Data  External documents reviewed: None  Cardiac tracing (EKG, telemetry) interpretation: None  Radiology interpretation: Reports reviewed  Labs reviewed: As above  Any tests that were considered but not ordered: None     Decision rules/scores evaluated (example [id number], [place], etc): None     Discussed with: patient, son, nursing     Care Planning  Shared decision making: Patient apprised of current labs, vitals, imaging and treatment plan.  They are agreeable with proceeding with plans as discussed.    Code status and discussions: DNR/DNI    Disposition  Social Determinants of Health that impact treatment or disposition: none  I expect the patient to be discharged to [facility] when bed available. Son is hoping for a facility in [place].         Electronically signed by David Saldaña DO, 06/28/25, 14:37 CDT.    "

## 2025-06-28 NOTE — PLAN OF CARE
Goal Outcome Evaluation:  Plan of Care Reviewed With: patient        Progress: no change  Outcome Evaluation: pt trans to EOB min assist, sit-stand mod assist, pivot to BSC with rwx mod assist, cues for TTWB RLE, toilet trans min-mod, steps to chair min-mod, pt would benefit from cont therapy

## 2025-06-28 NOTE — THERAPY TREATMENT NOTE
Acute Care - Occupational Therapy Treatment Note  [facility]     Patient Name: Sunny Lacey  : 1921  MRN: [id number]  Today's Date: 2025             Admit Date: 2025       ICD-10-CM ICD-9-CM   1. Closed displaced fracture of pelvis, unspecified part of pelvis, initial encounter  S32.9XXA 808.8   2. Impaired mobility [Z74.09]  Z74.09 799.89     Patient Active Problem List   Diagnosis    Sepsis    Pneumonia due to infectious organism    At risk for falls    Hypokalemia    Pelvis fracture    Fall     Past Medical History:   Diagnosis Date    Anemia     Anxiety     Arthritis     Hypertension     Skin cancer      Past Surgical History:   Procedure Laterality Date    SKIN CANCER EXCISION           OT ASSESSMENT FLOWSHEET (Last 12 Hours)       OT Evaluation and Treatment       Row Name 25 1004                   OT Time and Intention    Subjective Information complains of;pain  -TS        Document Type therapy note (daily note)  -TS        Mode of Treatment occupational therapy  -TS        Patient Effort good  -TS        Comment TTWB RLE  -TS           General Information    Existing Precautions/Restrictions fall;right;weight bearing;other (see comments)  -TS           Pain Assessment    Pretreatment Pain Rating 7/10  -TS        Posttreatment Pain Rating 8/10  -TS        Pain Location hip  -TS        Pain Side/Orientation right  -TS        Pain Management Interventions exercise or physical activity utilized;positioning techniques utilized  -TS        Response to Pain Interventions activity participation with tolerable pain  -TS           Cognition    Personal Safety Interventions fall prevention program maintained;gait belt;nonskid shoes/slippers when out of bed  -TS           Activities of Daily Living    BADL Assessment/Intervention bathing;upper body dressing;lower body dressing;toileting;grooming  -TS           Bathing Assessment/Intervention    [place] Level (Bathing) upper body;lower  body;perineal area;moderate assist (50% patient effort);maximum assist (25% patient effort)  -TS        Assistive Devices (Bathing) grab bar, tub/shower;shower commode chair, rolling  -TS        Position (Bathing) supported sitting  -TS           Upper Body Dressing Assessment/Training    [place] Level (Upper Body Dressing) don;doff;moderate assist (50% patient effort)  -TS        Position (Upper Body Dressing) supported sitting  -TS           Lower Body Dressing Assessment/Training    [place] Level (Lower Body Dressing) don;doff;pants/bottoms;socks;maximum assist (25% patient effort)  -TS        Position (Lower Body Dressing) supported sitting;supported standing  -TS           Grooming Assessment/Training    [place] Level (Grooming) wash face, hands;set up  -TS        Position (Grooming) supported sitting  -TS           Toileting Assessment/Training    [place] Level (Toileting) toileting skills;standby assist;adjust/manage clothing;perform perineal hygiene;maximum assist (25% patient effort)  -TS        Assistive Devices (Toileting) commode;grab bar/safety frame  -TS        Position (Toileting) unsupported sitting;supported standing  -TS           Bed Mobility    Sit-Supine [place] (Bed Mobility) minimum assist (75% patient effort)  -TS        Assistive Device (Bed Mobility) bed rails  -TS           Transfer Assessment/Treatment    Transfers sit-stand transfer;stand-sit transfer;toilet transfer;stand pivot/stand step transfer  -TS           Sit-Stand Transfer    Sit-Stand [place] (Transfers) minimum assist (75% patient effort);moderate assist (50% patient effort)  -TS        Assistive Device (Sit-Stand Transfers) walker, front-wheeled  -TS           Stand-Sit Transfer    Stand-Sit [place] (Transfers) minimum assist (75% patient effort)  -TS        Assistive Device (Stand-Sit Transfers) walker, front-wheeled  -TS           Stand Pivot/Stand Step Transfer    Stand Pivot/Stand Step  [place] (Transfers) moderate assist (50% patient effort);maximum assist (25% patient effort);2 person assist  -TS           Toilet Transfer    Type (Toilet Transfer) sit-stand;stand-sit;stand pivot/stand step  -TS        [place] Level (Toilet Transfer) minimum assist (75% patient effort);moderate assist (50% patient effort)  -TS        Assistive Device (Toilet Transfer) commode;grab bars/safety frame  -TS           Plan of Care Review    Plan of Care Reviewed With patient  -TS           Positioning and Restraints    Pre-Treatment Position sitting in chair/recliner  -TS        Post Treatment Position bed  -TS        In Bed fowlers;call light within reach;encouraged to call for assist;side rails up x2;with family/caregiver  -TS           Therapy Plan Review/Discharge Plan (OT)    Anticipated Discharge Disposition (OT) skilled nursing facility  -                  User Key  (r) = Recorded By, (t) = Taken By, (c) = Cosigned By      Initials Name Effective Dates     Ciara Aguirre COTA 02/03/23 -                      Occupational Therapy Education       Title: PT OT SLP Therapies (Done)       Topic: Occupational Therapy (Done)       Point: ADL training (Done)       Learning Progress Summary            Patient Acceptance, E, VU,NR by  at 6/27/2025 0857                      Point: Precautions (Done)       Learning Progress Summary            Patient Acceptance, E, VU,NR by  at 6/27/2025 0857                      Point: Body mechanics (Done)       Learning Progress Summary            Patient Acceptance, E, VU,NR by  at 6/27/2025 0857                                      User Key       Initials Effective Dates Name Provider Type Discipline     06/20/22 -  Candis Guzman, OTR/L Occupational Therapist OT                      OT Recommendation and Plan     Plan of Care Review  Plan of Care Reviewed With: patient  Plan of Care Reviewed With: patient     Outcome Measures       Row Name 06/28/25 1200  06/28/25 0800          How much help from another person do you currently need...    Turning from your back to your side while in flat bed without using bedrails? -- 3  -AH     Moving from lying on back to sitting on the side of a flat bed without bedrails? -- 3  -AH     Moving to and from a bed to a chair (including a wheelchair)? -- 2  -AH     Standing up from a chair using your arms (e.g., wheelchair, bedside chair)? -- 2  -AH     Climbing 3-5 steps with a railing? -- 1  -AH     To walk in hospital room? -- 2  -AH     AM-PAC 6 Clicks Score (PT) -- 13  -AH        How much help from another is currently needed...    Putting on and taking off regular lower body clothing? 2  -TS --     Bathing (including washing, rinsing, and drying) 2  -TS --     Toileting (which includes using toilet bed pan or urinal) 2  -TS --     Putting on and taking off regular upper body clothing 3  -TS --     Taking care of personal grooming (such as brushing teeth) 3  -TS --     Eating meals 3  -TS --     AM-PAC 6 Clicks Score (OT) 15  -TS --        Functional Assessment    Outcome Measure Options -- AM-PAC 6 Clicks Basic Mobility (PT)  -               User Key  (r) = Recorded By, (t) = Taken By, (c) = Cosigned By      Initials Name Provider Type     Marcia Dillon, JASBIR Physical Therapist Assistant     Ciara Aguirre COTA Occupational Therapist Assistant                    Time Calculation:    Time Calculation- OT       Row Name 06/28/25 1224             Time Calculation- OT    OT Start Time 1004  -TS      OT Stop Time 1127  -TS      OT Time Calculation (min) 83 min  -TS      Total Timed Code Minutes- OT 83 minute(s)  -TS      OT Received On 06/28/25  -TS         Timed Charges    52425 - OT Self Care/Mgmt Minutes 83  -TS         Total Minutes    Timed Charges Total Minutes 83  -TS       Total Minutes 83  -TS                User Key  (r) = Recorded By, (t) = Taken By, (c) = Cosigned By      Initials Name Provider Type      Ciara Aguirre COTA Occupational Therapist Assistant                  Therapy Charges for Today       Code Description Service Date Service Provider Modifiers Qty    [id number] HC OT SELF CARE/MGMT/TRAIN EA 15 MIN 6/28/2025 Ciara Aguirre COTA GO 6                 Ciara BOURGEOIS. MARCELA Aguirre  6/28/2025

## 2025-06-29 LAB
ANION GAP SERPL CALCULATED.3IONS-SCNC: 11 MMOL/L (ref 5–15)
BUN SERPL-MCNC: 17.2 MG/DL (ref 8–23)
BUN/CREAT SERPL: 20.5 (ref 7–25)
CALCIUM SPEC-SCNC: 9.6 MG/DL (ref 8.2–9.6)
CHLORIDE SERPL-SCNC: 107 MMOL/L (ref 98–107)
CO2 SERPL-SCNC: 26 MMOL/L (ref 22–29)
CREAT SERPL-MCNC: 0.84 MG/DL (ref 0.57–1)
EGFRCR SERPLBLD CKD-EPI 2021: 61 ML/MIN/1.73
GLUCOSE SERPL-MCNC: 101 MG/DL (ref 65–99)
MAGNESIUM SERPL-MCNC: 2.2 MG/DL (ref 1.7–2.3)
POTASSIUM SERPL-SCNC: 4.1 MMOL/L (ref 3.5–5.2)
QT INTERVAL: 392 MS
QTC INTERVAL: 441 MS
SODIUM SERPL-SCNC: 144 MMOL/L (ref 136–145)

## 2025-06-29 PROCEDURE — 97530 THERAPEUTIC ACTIVITIES: CPT

## 2025-06-29 PROCEDURE — 80048 BASIC METABOLIC PNL TOTAL CA: CPT | Performed by: INTERNAL MEDICINE

## 2025-06-29 PROCEDURE — 83735 ASSAY OF MAGNESIUM: CPT | Performed by: INTERNAL MEDICINE

## 2025-06-29 RX ADMIN — POTASSIUM CHLORIDE 40 MEQ: 1500 TABLET, EXTENDED RELEASE ORAL at 08:55

## 2025-06-29 RX ADMIN — AMLODIPINE BESYLATE 5 MG: 5 TABLET ORAL at 08:54

## 2025-06-29 RX ADMIN — DULOXETINE 30 MG: 30 CAPSULE, DELAYED RELEASE ORAL at 08:54

## 2025-06-29 RX ADMIN — DOCUSATE SODIUM 100 MG: 100 CAPSULE, LIQUID FILLED ORAL at 08:55

## 2025-06-29 RX ADMIN — LATANOPROST 1 DROP: 50 SOLUTION OPHTHALMIC at 21:34

## 2025-06-29 RX ADMIN — FERROUS SULFATE TAB 325 MG (65 MG ELEMENTAL FE) 325 MG: 325 (65 FE) TAB at 08:55

## 2025-06-29 RX ADMIN — ACETAMINOPHEN 650 MG: 325 TABLET ORAL at 08:54

## 2025-06-29 RX ADMIN — GABAPENTIN 300 MG: 300 CAPSULE ORAL at 21:34

## 2025-06-29 NOTE — PLAN OF CARE
Goal Outcome Evaluation:  Plan of Care Reviewed With: patient, daughter        Progress: improving  Outcome Evaluation: pt in chair, sit-stand min-mod, cues for hand placement, pivot to BSC with rwx min assist, cues for TTWB RLE, pt not able to maintain TTWB RLE, sit-stand from BSC min-mod, pivot from BSC to bed, trans back to bed mod assist, pt would benefit from rehab

## 2025-06-29 NOTE — THERAPY TREATMENT NOTE
Acute Care - Physical Therapy Treatment Note  [facility]     Patient Name: Sunny Lacey  : 1921  MRN: [id number]  Today's Date: 2025      Visit Dx:     ICD-10-CM ICD-9-CM   1. Closed displaced fracture of pelvis, unspecified part of pelvis, initial encounter  S32.9XXA 808.8   2. Impaired mobility [Z74.09]  Z74.09 799.89     Patient Active Problem List   Diagnosis    Sepsis    Pneumonia due to infectious organism    At risk for falls    Hypokalemia    Pelvis fracture    Fall    Essential hypertension     Past Medical History:   Diagnosis Date    Anemia     Anxiety     Arthritis     Hypertension     Skin cancer      Past Surgical History:   Procedure Laterality Date    SKIN CANCER EXCISION       PT Assessment (Last 12 Hours)       PT Evaluation and Treatment       [facility] Name 25 1057 25 0748       Physical Therapy Time and Intention    Subjective Information complains of;weakness;fatigue;pain  - --  -    Document Type therapy note (daily note)  - --    Mode of Treatment physical therapy  - --    Session Not Performed -- other (see comments)  -    Comment, Session Not Performed -- check back later  -      Row Name 25 1057          General Information    Existing Precautions/Restrictions fall;right;weight bearing;other (see comments)  TTWB RLE  -     Limitations/Impairments hearing   -     Barriers to Rehab hearing deficit  -[facility] Name 25 1057          Pain    Pain Location hip  -     Pain Side/Orientation right  -     Pain Management Interventions positioning techniques utilized  -     Response to Pain Interventions activity participation with tolerable pain  -       Row Name 25 1057          Pain Scale: FACES Pre/Post-Treatment    Pain: FACES Scale, Pretreatment 2-->hurts little bit  -     Posttreatment Pain Rating 4-->hurts little more  -       Row Name 25 1057          Mobility    Extremity Weight-bearing Status right lower  extremity  -     Right Lower Extremity (Weight-bearing Status) toe touch weight-bearing (TTWB)  -       Row Name 06/29/25 1057          Bed Mobility    Bed Mobility supine-sit;sit-supine  -     Supine-Sit [place] (Bed Mobility) --  chair  -     Sit-Supine [place] (Bed Mobility) minimum assist (75% patient effort);moderate assist (50% patient effort);verbal cues  -[facility] Name 06/29/25 1057          Transfers    Comment, (Transfers) chair-BSC-bed  -[facility] Name 06/29/25 1057          Sit-Stand Transfer    Sit-Stand [place] (Transfers) verbal cues;moderate assist (50% patient effort)  -     Assistive Device (Sit-Stand Transfers) walker, front-wheeled  [facility]       Row Name 06/29/25 1057          Stand-Sit Transfer    Stand-Sit [place] (Transfers) verbal cues;moderate assist (50% patient effort);minimum assist (75% patient effort)  -     Assistive Device (Stand-Sit Transfers) walker, front-wheeled  -[facility] Name 06/29/25 1057          Toilet Transfer    [place] Level (Toilet Transfer) minimum assist (75% patient effort);moderate assist (50% patient effort);verbal cues  -     Assistive Device (Toilet Transfer) commode, bedside without drop arms;walker, front-wheeled  -       Row Name 06/29/25 1057          Plan of Care Review    Plan of Care Reviewed With patient;daughter  -     Progress improving  -     Outcome Evaluation pt in chair, sit-stand min-mod, cues for hand placement, pivot to BSC with rwx min assist, cues for TTWB RLE, pt not able to maintain TTWB RLE, sit-stand from BSC min-mod, pivot from BSC to bed, trans back to bed mod assist, pt would benefit from rehab  -[facility] Name 06/29/25 1057          Positioning and Restraints    Pre-Treatment Position sitting in chair/recliner  -     Post Treatment Position bed  -     In Bed side lying left;fowlers;call light within reach;encouraged to call for assist;with family/caregiver  -               User  Key  (r) = Recorded By, (t) = Taken By, (c) = Cosigned By      Initials Name Provider Type     Marcia Dillon, PTA Physical Therapist Assistant                    Physical Therapy Education       Title: PT OT SLP Therapies (Done)       Topic: Physical Therapy (Done)       Point: Mobility training (Done)       Learning Progress Summary            Patient Acceptance, E,D, VU,NR by  at 6/27/2025 0728    Comment: educated on RLE TTWB precaution, benefits of PT and mobility   Family Acceptance, E,D, VU,NR by GM at 6/27/2025 0728    Comment: educated on RLE TTWB precaution, benefits of PT and mobility    Acceptance, E,TB, VU,NR by AR at 6/26/2025 1202                      Point: Precautions (Done)       Learning Progress Summary            Patient Acceptance, E,D, VU,NR by  at 6/27/2025 0728    Comment: educated on RLE TTWB precaution, benefits of PT and mobility   Family Acceptance, E,D, VU,NR by  at 6/27/2025 0728    Comment: educated on RLE TTWB precaution, benefits of PT and mobility    Acceptance, E,TB, VU,NR by AR at 6/26/2025 1202                                      User Key       Initials Effective Dates Name Provider Type Discipline    AR 12/02/24 -  Cynthia Nathan, RN Registered Nurse Nurse     04/21/25 -  Deb Renner PT Student PT Student PT                  PT Recommendation and Plan     Plan of Care Reviewed With: patient, daughter  Progress: improving  Outcome Evaluation: pt in chair, sit-stand min-mod, cues for hand placement, pivot to BSC with rwx min assist, cues for TTWB RLE, pt not able to maintain TTWB RLE, sit-stand from BSC min-mod, pivot from BSC to bed, trans back to bed mod assist, pt would benefit from rehab   Outcome Measures       Row Name 06/28/25 1200 06/28/25 0800          How much help from another person do you currently need...    Turning from your back to your side while in flat bed without using bedrails? -- 3  -AH     Moving from lying on back to sitting on the side of  a flat bed without bedrails? -- 3  -AH     Moving to and from a bed to a chair (including a wheelchair)? -- 2  -AH     Standing up from a chair using your arms (e.g., wheelchair, bedside chair)? -- 2  -AH     Climbing 3-5 steps with a railing? -- 1  -AH     To walk in hospital room? -- 2  -AH     AM-PAC 6 Clicks Score (PT) -- 13  -AH        How much help from another is currently needed...    Putting on and taking off regular lower body clothing? 2  -TS --     Bathing (including washing, rinsing, and drying) 2  -TS --     Toileting (which includes using toilet bed pan or urinal) 2  -TS --     Putting on and taking off regular upper body clothing 3  -TS --     Taking care of personal grooming (such as brushing teeth) 3  -TS --     Eating meals 3  -TS --     AM-PAC 6 Clicks Score (OT) 15  -TS --        Functional Assessment    Outcome Measure Options -- AM-PAC 6 Clicks Basic Mobility (PT)  -               User Key  (r) = Recorded By, (t) = Taken By, (c) = Cosigned By      Initials Name Provider Type    Marcia Glass, JASBIR Physical Therapist Assistant    Ciara Dupree COTA Occupational Therapist Assistant                     Time Calculation:    PT Charges       Row Name 06/29/25 1134             Time Calculation    Start Time 1057  -      Stop Time 1127  -      Time Calculation (min) 30 min  -      PT Received On 06/29/25  -         Time Calculation- PT    Total Timed Code Minutes- PT 30 minute(s)  -         Timed Charges    19748 - PT Therapeutic Activity Minutes 30  -AH         Total Minutes    Timed Charges Total Minutes 30  -AH       Total Minutes 30  -AH                User Key  (r) = Recorded By, (t) = Taken By, (c) = Cosigned By      Initials Name Provider Type    Marcia Glass PTA Physical Therapist Assistant                  Therapy Charges for Today       Code Description Service Date Service Provider Modifiers Qty    [id number]  PT THERAPEUTIC ACT EA 15 MIN 6/28/2025  Marcia Dillon, PTA GP 2    [id number] HC PT THERAPEUTIC ACT EA 15 MIN 6/29/2025 Marcia Dillon, JASBIR GP 2            PT G-Codes  Outcome Measure Options: AM-PAC 6 Clicks Basic Mobility (PT)  AM-PAC 6 Clicks Score (PT): 18  AM-PAC 6 Clicks Score (OT): 15    Marcia Dillon PTA  6/29/2025

## 2025-06-29 NOTE — PLAN OF CARE
Goal Outcome Evaluation:  Plan of Care Reviewed With: patient        Progress: no change  Outcome Evaluation: Alert to self. Confused. Very [place]. Incontinent. Turning. Pills whole in applesauce. Appears to be sleeping well between care.

## 2025-06-29 NOTE — DISCHARGE PLACEMENT REQUEST
"  Debby Sunday  [phone]  Deann Sousa (89 y.o. Female)       Date of Birth   09/28/1921    Social Security Number       Address   [address] [place] [id number]    Home Phone   [phone]    MRN   [id number]       P[employer]ism   Worship    Marital Status                               Admission Date   6/25/2025    Admission Type   Emergency    Admitting Provider   Octaviano Pink MD    Attending Provider   Maurizio Borges MD    Department, Room/Bed   [facility] 3A, 331/1       Discharge Date       Discharge Disposition       Discharge Destination                                 Attending Provider: Maurizio Borges MD    Allergies: No Known Allergies    Isolation: None   Infection: None   Code Status: No CPR    Ht: 165.1 cm (65\")   Wt: 63.5 kg (139 lb 15.9 oz)    Admission Cmt: None   Principal Problem: Pelvis fracture [S32.9XXA]                   Active Insurance as of 6/25/2025       Primary Coverage       Payor Plan Insurance Group Employer/Plan Group    MEDICARE MEDICARE A & B        Payor Plan Address Payor Plan Phone Number Payor Plan Fax Number Effective Dates    [address] [phone]  9/1/1986 - None Entered    [facility] [id number]         Subscriber Name Subscriber Birth Date Member ID       DEANN SOUSA Y 9/28/1921 [id number]               Secondary Coverage       Payor Plan Insurance Group Employer/Plan Group    [place]        Payor Plan Address Payor Plan Phone Number Payor Plan Fax Number Effective Dates    [address] [address] [phone]  7/7/2020 - None Entered    [facility] [id number]         Subscriber Name Subscriber Birth Date Member ID       DEANN SOUSA Y 9/28/1921 [id number]                     Emergency Contacts        (Rel.) Home Phone Work Phone Mobile Phone    Deepthi Cristino (Power of ) -- -- [phone]    Dominic Sousa (Son) [phone] -- [phone]                 History & Physical    "     Octaviano Pink MD at 06/25/25 2105              [facility] Medicine Services  HISTORY AND PHYSICAL    Date of Admission: 6/25/2025  Primary Care Physician: Nevaeh Thornton PA    Subjective   Primary Historian: Patient    Chief Complaint: Fall and pelvic pain    History of Present Illness  This is a 89-year-old female patient who lives at assisted living facility coming for the evaluation of mechanical fall and hip pain.  As reported, patient had a fall yesterday and since then she was having significant pain in her pelvis.  She was evaluated at another facility, had some x-rays, reportedly did not show acute fractures, patient was discharged home.  However, patient continues to have pain.  So she presented to the ED for evaluation.  Patient denies having any chest pain, shortness of breath, nausea vomiting or diarrhea, fever or chills.        Review of Systems   Otherwise complete ROS reviewed and negative except as mentioned in the HPI.    Past Medical History:   Past Medical History:   Diagnosis Date    Anemia     Anxiety     Arthritis     Hypertension     Skin cancer      Past Surgical History:  Past Surgical History:   Procedure Laterality Date    SKIN CANCER EXCISION       Social History:  reports that she has never smoked. She does not have any smokeless tobacco history on file. She reports that she does not drink alcohol and does not use drugs.    Family History: family history is not on file.       Allergies:  No Known Allergies    Medications:  Prior to Admission medications    Medication Sig Start Date End Date Taking? Authorizing Provider   acetaminophen (TYLENOL) 325 MG tablet Take 650 mg by mouth 2 (two) times a day.    Provider, MD Milton   albuterol sulfate  (90 Base) MCG/ACT inhaler Inhale 2 puffs Every 4 (Four) Hours As Needed for Wheezing or Shortness of Air.    Provider, MD Milton   ALPRAZolam (XANAX) 0.25 MG tablet Take 0.25 mg by mouth  "every night at bedtime.    Milton Diaz MD   amLODIPine (NORVASC) 5 MG tablet Take 5 mg by mouth Daily.    Milton Diaz MD   bimatoprost (LUMIGAN) 0.01 % ophthalmic drops Administer 1 drop to both eyes Every Night.    Milton Diaz MD   brimonidine-timolol (COMBIGAN) 0.2-0.5 % ophthalmic solution Administer 1 drop to the right eye Every 12 (Twelve) Hours.    Milton Diaz MD   Calcium Carbonate-Vit D-Min (CALCIUM 1200 PO) Take 600 mg by mouth 2 (two) times a day.    Milton Diaz MD   diclofenac (VOLTAREN) 1 % gel gel Apply 4 g topically to the appropriate area as directed 4 (Four) Times a Day As Needed.    Milton Diaz MD   docusate sodium (COLACE) 100 MG capsule Take 100 mg by mouth Daily.    Milton Diaz MD   ferrous sulfate 325 (65 FE) MG tablet Take 325 mg by mouth Daily With Breakfast.    Milton Diaz MD   lidocaine (LMX) 4 % cream Apply 1 application topically to the appropriate area as directed 4 (Four) Times a Day As Needed for Mild Pain .    Milton Diaz MD   megestrol (MEGACE) 40 MG/ML suspension Take 20 mL by mouth Daily. 7/21/20   Phong Joseph MD   mirtazapine (REMERON) 15 MG tablet Take 1 tablet by mouth Every Night. 7/20/20   Phong Joseph MD   potassium chloride (MICRO-K) 10 MEQ CR capsule Take 2 capsules by mouth 2 (Two) Times a Day With Meals. 7/20/20   Phong Joseph MD     I have utilized all available immediate resources to obtain, update, or review the patient's current medications (including all prescriptions, over-the-counter products, herbals, cannabis/cannabidiol products, and vitamin/mineral/dietary (nutritional) supplements).    Objective     Vital Signs: /64 (BP Location: Left arm, Patient Position: Lying)   Pulse 82   Temp 98.1 °F (36.7 °C) (Oral)   Resp 16   Ht 165.1 cm (65\")   Wt 63.5 kg (139 lb 15.9 oz)   SpO2 95%   BMI 23.30 kg/m²   Physical Exam  Constitutional:     "   Appearance: She is ill-appearing.   Cardiovascular:      Rate and Rhythm: Normal rate and regular rhythm.      Pulses: Normal pulses.      Heart sounds: Normal heart sounds.   Pulmonary:      Effort: Pulmonary effort is normal. No respiratory distress.      Breath sounds: Normal breath sounds. No wheezing or rales.   Abdominal:      General: Bowel sounds are normal. There is no distension.      Palpations: Abdomen is soft.      Tenderness: There is no abdominal tenderness. There is no guarding.   Musculoskeletal:      Right lower leg: No edema.      Left lower leg: No edema.   Skin:     General: Skin is warm.   Neurological:      Mental Status: She is alert and oriented to person, place, and time. Mental status is at baseline.              Results Reviewed:  Lab Results (last 24 hours)       Procedure Component Value Units Date/Time    High Sensitivity Troponin T 1Hr [[id number]]  (Abnormal) Collected: 06/25/25 2042    Specimen: Blood Updated: 06/25/25 2122     HS Troponin T 17 ng/L      Troponin T Numeric Delta -1 ng/L      Troponin T % Delta -6    Narrative:      High Sensitive Troponin T Reference Range:  <14.0 ng/L- Negative Female for AMI  <22.0 ng/L- Negative Male for AMI  >=14 - Abnormal Female indicating possible myocardial injury.  >=22 - Abnormal Male indicating possible myocardial injury.   Clinicians would have to utilize clinical acumen, EKG, Troponin, and serial changes to determine if it is an Acute Myocardial Infarction or myocardial injury due to an underlying chronic condition.         Comprehensive Metabolic Panel [[id number]]  (Abnormal) Collected: 06/25/25 1917    Specimen: Blood Updated: 06/25/25 1947     Glucose 102 mg/dL      BUN 13.9 mg/dL      Creatinine 0.86 mg/dL      Sodium 144 mmol/L      Potassium 3.7 mmol/L      Comment: Slight hemolysis detected by analyzer. Result may be falsely elevated.        Chloride 105 mmol/L      CO2 28.0 mmol/L      Calcium 9.7 mg/dL      Total Protein 6.3  g/dL      Albumin 3.8 g/dL      ALT (SGPT) 14 U/L      AST (SGOT) 29 U/L      Alkaline Phosphatase 101 U/L      Total Bilirubin 0.4 mg/dL      Globulin 2.5 gm/dL      A/G Ratio 1.5 g/dL      BUN/Creatinine Ratio 16.2     Anion Gap 11.0 mmol/L      eGFR 59.3 mL/min/1.73     Narrative:      GFR Categories in Chronic Kidney Disease (CKD)              GFR Category          GFR (mL/min/1.73)    Interpretation  G1                    90 or greater        Normal or high (1)  G2                    60-89                Mild decrease (1)  G3a                   45-59                Mild to moderate decrease  G3b                   30-44                Moderate to severe decrease  G4                    15-29                Severe decrease  G5                    14 or less           Kidney failure    (1)In the absence of evidence of kidney disease, neither GFR category G1 or G2 fulfill the criteria for CKD.    eGFR calculation 2021 CKD-EPI creatinine equation, which does not include race as a factor    High Sensitivity Troponin T [[id number]]  (Abnormal) Collected: 06/25/25 1917    Specimen: Blood Updated: 06/25/25 1942     HS Troponin T 18 ng/L     Narrative:      High Sensitive Troponin T Reference Range:  <14.0 ng/L- Negative Female for AMI  <22.0 ng/L- Negative Male for AMI  >=14 - Abnormal Female indicating possible myocardial injury.  >=22 - Abnormal Male indicating possible myocardial injury.   Clinicians would have to utilize clinical acumen, EKG, Troponin, and serial changes to determine if it is an Acute Myocardial Infarction or myocardial injury due to an underlying chronic condition.         CBC & Differential [[id number]]  (Abnormal) Collected: 06/25/25 1917    Specimen: Blood Updated: 06/25/25 1928    Narrative:      The following orders were created for panel order CBC & Differential.  Procedure                               Abnormality         Status                     ---------                                -----------         ------                     CBC Auto Differential[[id number]]        Abnormal            Final result                 Please view results for these tests on the individual orders.    CBC Auto Differential [[id number]]  (Abnormal) Collected: 06/25/25 1917    Specimen: Blood Updated: 06/25/25 1928     WBC 6.76 10*3/mm3      RBC 4.01 10*6/mm3      Hemoglobin 12.8 g/dL      Hematocrit 38.9 %      MCV 97.0 fL      MCH 31.9 pg      MCHC 32.9 g/dL      RDW 12.7 %      RDW-SD 45.4 fl      MPV 10.2 fL      Platelets 119 10*3/mm3      Neutrophil % 68.4 %      Lymphocyte % 19.4 %      Monocyte % 10.1 %      Eosinophil % 1.2 %      Basophil % 0.6 %      Immature Grans % 0.3 %      Neutrophils, Absolute 4.63 10*3/mm3      Lymphocytes, Absolute 1.31 10*3/mm3      Monocytes, Absolute 0.68 10*3/mm3      Eosinophils, Absolute 0.08 10*3/mm3      Basophils, Absolute 0.04 10*3/mm3      Immature Grans, Absolute 0.02 10*3/mm3           Imaging Results (Last 24 Hours)       Procedure Component Value Units Date/Time    CT Pelvis Without Contrast [[id number]] Collected: 06/25/25 2011     Updated: 06/25/25 2020    Narrative:      EXAM/TECHNIQUE:  1. CT pelvis without contrast  2. CT right hip without contrast     INDICATION: R hip pain, negative XR yesterday at other facility  negative.     COMPARISON: None available.     DLP: 1262.69 mGy.cm. Automated exposure control was utilized to decrease  patient radiation dose.     FINDINGS:     Bilateral hip arthroplasty with streak artifact slightly limiting  evaluation. No acute mild displaced fracture of the right acetabulum  (pelvic CT series 4 image 60 and 70). There is an acute comminuted and  displaced fracture of the right inferior pubic ramus. No pubic symphysis  widening. No left pubic rami fracture or left acetabular fracture. No  evidence of hip arthroplasty hardware malalignment. No sacral fracture  identified. No evidence of proximal femoral fracture. Right  knee  arthroplasty is noted, without evidence of complication.     Moderate volume stool in the rectum. Colonic diverticulosis without  diverticulitis. No bowel distention or active bowel inflammation in the  pelvis. No focal urinary bladder abnormality. Nonaneurysmal  atherosclerotic distal abdominal aorta. No pelvic lymphadenopathy. No  free pelvic fluid. No acute soft tissue abnormality.       Impression:         1. Acute mildly displaced fracture of the right acetabulum.     2. Acute comminuted and displaced fracture of the right inferior pubic  ramus.     3. No evidence of left-sided pelvic or hip fracture     4. Bilateral hip arthroplasty, with streak artifact from hardware  limiting evaluation. No evidence of hardware malalignment.        This report was signed and finalized on 6/25/2025 8:17 PM by Dr. Rafael Abel MD.       CT Lower Extremity Right Without Contrast [[id number]] Collected: 06/25/25 2011     Updated: 06/25/25 2020    Narrative:      EXAM/TECHNIQUE:  1. CT pelvis without contrast  2. CT right hip without contrast     INDICATION: R hip pain, negative XR yesterday at other facility  negative.     COMPARISON: None available.     DLP: 1262.69 mGy.cm. Automated exposure control was utilized to decrease  patient radiation dose.     FINDINGS:     Bilateral hip arthroplasty with streak artifact slightly limiting  evaluation. No acute mild displaced fracture of the right acetabulum  (pelvic CT series 4 image 60 and 70). There is an acute comminuted and  displaced fracture of the right inferior pubic ramus. No pubic symphysis  widening. No left pubic rami fracture or left acetabular fracture. No  evidence of hip arthroplasty hardware malalignment. No sacral fracture  identified. No evidence of proximal femoral fracture. Right knee  arthroplasty is noted, without evidence of complication.     Moderate volume stool in the rectum. Colonic diverticulosis without  diverticulitis. No bowel distention or  active bowel inflammation in the  pelvis. No focal urinary bladder abnormality. Nonaneurysmal  atherosclerotic distal abdominal aorta. No pelvic lymphadenopathy. No  free pelvic fluid. No acute soft tissue abnormality.       Impression:         1. Acute mildly displaced fracture of the right acetabulum.     2. Acute comminuted and displaced fracture of the right inferior pubic  ramus.     3. No evidence of left-sided pelvic or hip fracture     4. Bilateral hip arthroplasty, with streak artifact from hardware  limiting evaluation. No evidence of hardware malalignment.        This report was signed and finalized on 6/25/2025 8:17 PM by Dr. Rafael Abel MD.       CT Cervical Spine Without Contrast [[id number]] Collected: 06/25/25 2006     Updated: 06/25/25 2012    Narrative:      EXAM/TECHNIQUE: CT cervical spine without contrast     INDICATION: fall, cant rule out by Albanian C spine rule due to age     COMPARISON: None available.     DLP: 1262.69 mGy.cm. Automated exposure control was utilized to decrease  patient radiation dose.     FINDINGS:     Craniocervical relationships are maintained. Trace anterolisthesis of C7  on T1, likely degenerative in nature. Cervical vertebral body heights  are maintained. No acute fracture. Advanced multilevel cervical spine  degenerative change with multilevel neural foraminal stenosis.  Incomplete C1 arch, likely chronic/postsurgical. Paravertebral soft  tissues are unremarkable.       Impression:      1.  No acute osseous findings.  2.  Moderate multilevel cervical spine degenerative change.        This report was signed and finalized on 6/25/2025 8:09 PM by Dr. Rafael Abel MD.       CT Head Without Contrast [[id number]] Collected: 06/25/25 2003     Updated: 06/25/25 2009    Narrative:      EXAM/TECHNIQUE: CT head without contrast     INDICATION: fall, yesterday dizziness     COMPARISON: 3/15/2023     DLP: 1262.69 mGy.cm. Automated exposure control was utilized to  decrease  patient radiation dose.     FINDINGS:     No evidence of intracranial hemorrhage. Gray-white differentiation is  maintained. 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 Global cerebral  volume loss and presumed chronic microvascular ischemic white matter  change. No midline shift or mass effect. Lateral ventricles are  nondilated. Basilar cisterns are patent. No acute orbital finding. Right  mastoid effusion. Left mastoid air cells are clear. Visualized paranasal  sinuses are clear. No acute osseous finding.       Impression:         No acute intracranial findings.        This report was signed and finalized on 6/25/2025 8:06 PM by Dr. Rafael Abel MD.             I have personally reviewed and interpreted the radiology studies and ECG obtained at time of admission.     Assessment / Plan   Assessment:   Active Hospital Problems    Diagnosis     **Pelvis fracture     Fall        Treatment Plan  The patient will be admitted to my service here at [facility].     Assessment and plan:  #Mechanical fall.  #Pelvic fracture.    - Admitting to floor.  - ED contacted orthopedic, who agreed to consult.  Advised no need for surgery.  - PT and OT ordered  - Fall precautions ordered.  - DVT prophylaxis with SCDs.      Medical Decision Making  Number and Complexity of problems: 2 acute and moderate  Differential Diagnosis: As above    Conditions and Status        Condition is worsening.     MDM Data  External documents reviewed: Yes  Cardiac tracing (EKG, telemetry) interpretation: Yes  Radiology interpretation: Yes  Labs reviewed: Yes  Any tests that were considered but not ordered: No     Decision rules/scores evaluated (example [id number], Wells, etc): No     Discussed with: Patient and ED provider     Care Planning  Code status and discussions: I asked the patient about code status and she was not sure what to say. I tried calling her son 3 times to discuss code status but no answer. Called her  facility, spoke with the CNA, and they reviewed her chart, and they confirmed that she is full code according to their records.    Disposition  Social Determinants of Health that impact treatment or disposition: Came from facility  Estimated length of stay is 2 to 3 days.     I confirmed that the patient's advanced care plan is present, code status is documented, and a surrogate decision maker is listed in the patient's medical record.     The patient was seen and examined by me on 6/25 at 9:15 PM.    Electronically signed by Octaviano Pink MD, 06/26/25, 00:50 CDT.              Electronically signed by Octaviano Pink MD at 06/26/25 0051       Current Facility-Administered Medications   Medication Dose Route Frequency Provider Last Rate Last Admin    acetaminophen (TYLENOL) tablet 650 mg  650 mg Oral Q4H PRN Octaviano Pink MD   650 mg at 06/29/25 0854    Or    acetaminophen (TYLENOL) 160 MG/5ML oral solution 650 mg  650 mg Oral Q4H PRN Octaviano Pink MD        Or    acetaminophen (TYLENOL) suppository 650 mg  650 mg Rectal Q4H PRN Octaviano Pink MD        albuterol (PROVENTIL) nebulizer solution 0.083% 2.5 mg/3mL  2.5 mg Nebulization Q4H PRN Octaviano Pink MD        amLODIPine (NORVASC) tablet 5 mg  5 mg Oral Daily Octaviano Pink MD   5 mg at 06/29/25 0854    sennosides-docusate (PERICOLACE) 8.6-50 MG per tablet 2 tablet  2 tablet Oral BID PRN Octaviano Pink MD        And    polyethylene glycol (MIRALAX) packet 17 g  17 g Oral Daily PRN Octaviano Pink MD        And    bisacodyl (DULCOLAX) EC tablet 5 mg  5 mg Oral Daily PRN Octaviano Pink MD        And    bisacodyl (DULCOLAX) suppository 10 mg  10 mg Rectal Daily PRN Octaviano Pink MD        docusate sodium (COLACE) capsule 100 mg  100 mg Oral Daily Octaviano Pink MD   100 mg at 06/29/25 0855    DULoxetine (CYMBALTA) DR capsule 30 mg  30 mg Oral Daily David Saldaña DO   30 mg at 06/29/25 0854    ferrous sulfate tablet 325 mg  325 mg Oral Daily With  Breakfast Octaviano Pink MD   325 mg at 06/29/25 0855    gabapentin (NEURONTIN) capsule 300 mg  300 mg Oral Nightly David Saldaña, DO   300 mg at 06/28/25 2012    latanoprost (XALATAN) 0.005 % ophthalmic solution 1 drop  1 drop Both Eyes Nightly David Saldaña, DO   1 drop at 06/28/25 2012    nitroglycerin (NITROSTAT) SL tablet 0.4 mg  0.4 mg Sublingual Q5 Min PRN Octaviano Pink MD        potassium chloride (KLOR-CON M20) CR tablet 40 mEq  40 mEq Oral Daily David Saldaña, DO   40 mEq at 06/29/25 0855    sodium chloride 0.9 % flush 10 mL  10 mL Intravenous Q12H Octaviano Pink MD   10 mL at 06/26/25 2045    sodium chloride 0.9 % flush 10 mL  10 mL Intravenous PRN Octaviano Pink MD        sodium chloride 0.9 % infusion 40 mL  40 mL Intravenous PRN Octaviano Pink MD            Physician Progress Notes (last 24 hours)        Maurizio Borges MD at 06/29/25 0739              [facility] Medicine Services  INPATIENT PROGRESS NOTE    Patient Name: Sunny Lacey  Date of Admission: 6/25/2025  Today's Date: 06/29/25  Length of Stay: 1  Primary Care Physician: Nevaeh Thornton PA    Subjective   Chief Complaint: Hip fracture/falling  HPI   Patient is awake alert working physical therapy reason for elevated blood pressure, afebrile.  Potassium normalized.  Hemoglobin normal.  White blood cells normal.  Thrombocytopenia noted.    Review of Systems   Constitutional:  Positive for activity change, appetite change and fatigue. Negative for chills and fever.   HENT:  Negative for hearing loss, nosebleeds, tinnitus and trouble swallowing.    Eyes:  Negative for visual disturbance.   Respiratory:  Negative for cough, chest tightness, shortness of breath and wheezing.    Cardiovascular:  Negative for chest pain, palpitations and leg swelling.   Gastrointestinal:  Negative for abdominal distention, abdominal pain, blood in stool, constipation, diarrhea, nausea and vomiting.   Endocrine:  Negative for cold intolerance, heat intolerance, polydipsia, polyphagia and polyuria.   Genitourinary:  Negative for decreased urine volume, difficulty urinating, dysuria, flank pain, frequency and hematuria.   Musculoskeletal:  Positive for arthralgias, gait problem and myalgias. Negative for joint swelling.   Skin:  Negative for rash.   Allergic/Immunologic: Negative for immunocompromised state.   Neurological:  Positive for weakness. Negative for dizziness, syncope, light-headedness and headaches.   Hematological:  Negative for adenopathy. Does not bruise/bleed easily.   Psychiatric/Behavioral:  Negative for confusion and sleep disturbance. The patient is not nervous/anxious.         All pertinent negatives and positives are as above. All other systems have been reviewed and are negative unless otherwise stated.     Objective    Temp:  [97.1 °F (36.2 °C)-98.4 °F (36.9 °C)] 98.4 °F (36.9 °C)  Heart Rate:  [] 99  Resp:  [16-18] 18  BP: (111-161)/(54-76) 152/56  Physical Exam  Vitals and nursing note reviewed.   Constitutional:       Comments: Advanced age.  Deconditioning.   HENT:      Head: Normocephalic.   Eyes:      Conjunctiva/sclera: Conjunctivae normal.      Pupils: Pupils are equal, round, and reactive to light.   Cardiovascular:      Rate and Rhythm: Normal rate and regular rhythm.      Heart sounds: Normal heart sounds.   Pulmonary:      Effort: Pulmonary effort is normal. No respiratory distress.      Breath sounds: Normal breath sounds.      Comments: Patient is on room air.  Abdominal:      General: Bowel sounds are normal. There is no distension.      Palpations: Abdomen is soft.      Tenderness: There is no abdominal tenderness.      Comments: Depend.    Musculoskeletal:         General: No swelling.      Cervical back: Neck supple.   Skin:     General: Skin is warm and dry.      Capillary Refill: Capillary refill takes 2 to 3 seconds.      Findings: No rash.   Neurological:      Mental Status:  "She is alert.      Motor: Weakness present.      Coordination: Coordination abnormal.      Gait: Gait abnormal.         Results Review:  I have reviewed the labs, radiology results, and diagnostic studies.    Laboratory Data:   Results from last 7 days   Lab Units 06/26/25 0444 06/25/25 1917   WBC 10*3/mm3 7.25 6.76   HEMOGLOBIN g/dL 13.3 12.8   HEMATOCRIT % 39.6 38.9   PLATELETS 10*3/mm3 122* 119*        Results from last 7 days   Lab Units 06/29/25  0312 06/26/25 0444 06/25/25 1917   SODIUM mmol/L 144 143 144   POTASSIUM mmol/L 4.1 3.2* 3.7   CHLORIDE mmol/L 107 103 105   CO2 mmol/L 26.0 27.0 28.0   BUN mg/dL 17.2 11.9 13.9   CREATININE mg/dL 0.84 0.71 0.86   CALCIUM mg/dL 9.6 9.5 9.7*   BILIRUBIN mg/dL  --  0.6 0.4   ALK PHOS U/L  --  104 101   ALT (SGPT) U/L  --  13 14   AST (SGOT) U/L  --  26 29   GLUCOSE mg/dL 101* 103* 102*       Culture Data:   No results found for: \"BLOODCX\", \"URINECX\", \"WOUNDCX\", \"MRSACX\", \"RESPCX\", \"STOOLCX\"    Radiology Data:   Imaging Results (Last 24 Hours)       ** No results found for the last 24 hours. **            I have reviewed the patient's current medications.     Assessment/Plan   Assessment  Active Hospital Problems    Diagnosis     **Pelvis fracture     Essential hypertension     Fall     Hypokalemia        Treatment Plan  Pelvis fracture -nonoperative.  Currently not requiring much in the way of pain medicine but has ambulatory dysfunction and needs ongoing therapy.  She is from an [place] will not be able to return at this time due to not being independent.  Plan for rehab placement.  Consulted orthopedics.  CT cervical spine-No acute osseous findings, Moderate multilevel cervical spine degenerative change.  CT scan of pelvic-Acute mildly displaced fracture of the right acetabulum, Acute comminuted and displaced fracture of the right inferior pubic ramus, No evidence of left-sided pelvic or hip fracture.  CT scan head-No acute intracranial findings.      Essential " hypertension.  Amlodipine.  Nitro as needed.     Hypokalemia.  Resolved.  Potassium replacement protocol.  Potassium daily.    Constipation.  Colace.    Anxiety/depression.  Cymbalta .    Anemia . Hemoglobin is normal.  Iron sulfate.    Thrombocytopenia.  Slight increase in platelets.    Neuropathy.  Neurontin.    Advanced age . 89 years old.    Nutrition . Regular diet.    Deconditioning/gait/falling.  PT OT consult.  Fall precaution.  Will depends . Walker at baseline with 1-2 assist.    Patient will need rehab placement.    DNR . DNI.    Medical Decision Making  Number and Complexity of problems: Pelvic fracture/hypertension/hypokalemia/advanced age .  Differential Diagnosis: None    Conditions and Status        Condition is unchanged.     [facility] Data  External documents reviewed: Previous note .  Cardiac tracing (EKG, telemetry) interpretation: No monitor.  Radiology interpretation: CT scan  Labs reviewed: Laboratory  Any tests that were considered but not ordered: Lab in a.m.     Decision rules/scores evaluated (example [id number], Wells, etc): None     Discussed with: Patient .      Care Planning  Shared decision making: Patient .  Code status and discussions: DNR . DNI    Disposition  Social Determinants of Health that impact treatment or disposition: From assisted living .  Pending rehab placement      Electronically signed by Maurizio Borges MD, 06/29/25, 07:39 CDT.    Electronically signed by Maurizio Borges MD at 06/29/25 0850       David Saldaña DO at 06/28/25 1437              [facility] Medicine Services  INPATIENT PROGRESS NOTE    Patient Name: Sunny Lacey  Date of Admission: 6/25/2025  Today's Date: 06/28/25  Length of Stay: 1  Primary Care Physician: Nevaeh Thornton PA    Subjective   Chief Complaint: f/u pelvic fx    HPI   Patient seen resting in bed.  Son at bedside.  She is on room air.  Currently seems to be doing fairly well.  Denies any pain.   "Denies any shortness of breath.  Has had issues ambulating with therapies.  No other significant events ongoing.  Tolerating p.o.  Afebrile.        Review of Systems   All pertinent negatives and positives are as above. All other systems have been reviewed and are negative unless otherwise stated.     Objective    Temp:  [97.1 °F (36.2 °C)-98.3 °F (36.8 °C)] 98 °F (36.7 °C)  Heart Rate:  [] 102  Resp:  [16] 16  BP: (128-161)/(63-76) 142/66  Physical Exam  GEN: Awake, alert, interactive, in NAD, [place]  HEENT:  PERRLA, EOMI, Anicteric  Lungs:  no wheezing/rales/rhonchi  Heart: RRR, +S1/s2, no rub  ABD: soft, nt/nd, +BS, no guarding/rebound  Extremities:  no cyanosis, no edema  Skin: no rashes or petechiae  Neuro: AAOx3, no focal deficits, gait not tested  Psych: normal mood & affect        Results Review:  I have reviewed the labs, radiology results, and diagnostic studies.    Laboratory Data:   Results from last 7 days   Lab Units 06/26/25  0444 06/25/25  1917   WBC 10*3/mm3 7.25 6.76   HEMOGLOBIN g/dL 13.3 12.8   HEMATOCRIT % 39.6 38.9   PLATELETS 10*3/mm3 122* 119*        Results from last 7 days   Lab Units 06/26/25  0444 06/25/25  1917   SODIUM mmol/L 143 144   POTASSIUM mmol/L 3.2* 3.7   CHLORIDE mmol/L 103 105   CO2 mmol/L 27.0 28.0   BUN mg/dL 11.9 13.9   CREATININE mg/dL 0.71 0.86   CALCIUM mg/dL 9.5 9.7*   BILIRUBIN mg/dL 0.6 0.4   ALK PHOS U/L 104 101   ALT (SGPT) U/L 13 14   AST (SGOT) U/L 26 29   GLUCOSE mg/dL 103* 102*       Culture Data:   No results found for: \"BLOODCX\", \"URINECX\", \"WOUNDCX\", \"MRSACX\", \"RESPCX\", \"STOOLCX\"    Radiology Data:   Imaging Results (Last 24 Hours)       ** No results found for the last 24 hours. **            I have reviewed the patient's current medications.     Assessment/Plan   Assessment  Active Hospital Problems    Diagnosis     **Pelvis fracture     Essential hypertension     Fall     Hypokalemia        Treatment Plan  #1 pelvis fracture -nonoperative.  Currently " not requiring much in the way of pain medicine but has ambulatory dysfunction and needs ongoing therapy.  She is from an [facility] will not be able to return at this time due to not being independent.  Plan for rehab placement.  Appreciate Ortho input.    #2 fall -appears to be mechanical.  Continue PT and OT.    #3 essential hypertension -currently on amlodipine.  Monitor for adjustments.    #4 hypokalemia -replace and monitor.  Encourage p.o. intake.    Medical Decision Making  Number and Complexity of problems: 2 acute, on chronic  Differential Diagnosis: As above    Conditions and Status        New to me.  Improving.  Not at goal.     MDM Data  External documents reviewed: None  Cardiac tracing (EKG, telemetry) interpretation: None  Radiology interpretation: Reports reviewed  Labs reviewed: As above  Any tests that were considered but not ordered: None     Decision rules/scores evaluated (example [id number], Wells, etc): None     Discussed with: patient, son, nursing     Care Planning  Shared decision making: Patient apprised of current labs, vitals, imaging and treatment plan.  They are agreeable with proceeding with plans as discussed.    Code status and discussions: DNR/DNI    Disposition  Social Determinants of Health that impact treatment or disposition: none  I expect the patient to be discharged to [facility] when bed available. Son is hoping for a facility in [place].         Electronically signed by David Saldaña DO, 06/28/25, 14:37 CDT.      Electronically signed by David Saldaña DO at 06/28/25 1540       Consult Notes (last 24 hours)  Notes from 06/28/25 1142 through 06/29/25 1142   No notes of this type exist for this encounter.       Nutrition Notes (last 24 hours)  Notes from 06/28/25 1142 through 06/29/25 1142   No notes exist for this encounter.          Physical Therapy Notes (last 24 hours)        Marcia Dillon PTA at 06/29/25 1133  Version 1 of 1         Goal Outcome Evaluation:  Plan of Care  Reviewed With: patient, daughter        Progress: improving  Outcome Evaluation: pt in chair, sit-stand min-mod, cues for hand placement, pivot to BSC with rwx min assist, cues for TTWB RLE, pt not able to maintain TTWB RLE, sit-stand from BSC min-mod, pivot from BSC to bed, trans back to bed mod assist, pt would benefit from rehab                               Electronically signed by Marcia Dillon, PTA at 25 1133       Marcia Dillon, PTA at 25 1135  Version 1 of 1         [facility] - Physical Therapy Treatment Note  [facility]     Patient Name: Sunny Lacey  : 1921  MRN: [id number]  Today's Date: 2025      Visit Dx:     ICD-10-CM ICD-9-CM   1. Closed displaced fracture of pelvis, unspecified part of pelvis, initial encounter  S32.9XXA 808.8   2. Impaired mobility [Z74.09]  Z74.09 799.89     Patient Active Problem List   Diagnosis    Sepsis    Pneumonia due to infectious organism    At risk for falls    Hypokalemia    Pelvis fracture    Fall    Essential hypertension     Past Medical History:   Diagnosis Date    Anemia     Anxiety     Arthritis     Hypertension     Skin cancer      Past Surgical History:   Procedure Laterality Date    SKIN CANCER EXCISION       PT Assessment (Last 12 Hours)       PT Evaluation and Treatment       Row Name 25 1057 25 0748       Physical Therapy Time and Intention    Subjective Information complains of;weakness;fatigue;pain  - --  -    Document Type therapy note (daily note)  - --    Mode of Treatment physical therapy  - --    Session Not Performed -- other (see comments)  -    Comment, Session Not Performed -- check back later  -      Row Name 25 1059          General Information    Existing Precautions/Restrictions fall;right;weight bearing;other (see comments)  TTWB RLE  -     Limitations/Impairments hearing   -     Barriers to Rehab hearing deficit  -       Row Name 25 1056          Pain    Pain Location  hip  -     Pain Side/Orientation right  -     Pain Management Interventions positioning techniques utilized  -     Response to Pain Interventions activity participation with tolerable pain  -[facility] Name 06/29/25 1057          Pain Scale: FACES Pre/Post-Treatment    Pain: FACES Scale, Pretreatment 2-->hurts little bit  -     Posttreatment Pain Rating 4-->hurts little more  -[facility] Name 06/29/25 1057          Mobility    Extremity Weight-bearing Status right lower extremity  -     Right Lower Extremity (Weight-bearing Status) toe touch weight-bearing (TTWB)  -[facility] Name 06/29/25 1057          Bed Mobility    Bed Mobility supine-sit;sit-supine  -     Supine-Sit [place] (Bed Mobility) --  chair  -     Sit-Supine [place] (Bed Mobility) minimum assist (75% patient effort);moderate assist (50% patient effort);verbal cues  -AH       Row Name 06/29/25 1057          Transfers    Comment, (Transfers) chair-BSC-bed  -AH       Row Name 06/29/25 1057          Sit-Stand Transfer    Sit-Stand [place] (Transfers) verbal cues;moderate assist (50% patient effort)  -     Assistive Device (Sit-Stand Transfers) walker, front-wheeled  -[facility] Name 06/29/25 1057          Stand-Sit Transfer    Stand-Sit [place] (Transfers) verbal cues;moderate assist (50% patient effort);minimum assist (75% patient effort)  -     Assistive Device (Stand-Sit Transfers) walker, front-wheeled  -[facility] Name 06/29/25 1057          Toilet Transfer    [place] Level (Toilet Transfer) minimum assist (75% patient effort);moderate assist (50% patient effort);verbal cues  -     Assistive Device (Toilet Transfer) commode, bedside without drop arms;walker, front-wheeled  -[facility] Name 06/29/25 1057          Plan of Care Review    Plan of Care Reviewed With patient;daughter  -     Progress improving  -     Outcome Evaluation pt in chair, sit-stand min-mod, cues for hand placement, pivot  to [facility] with rwx min assist, cues for TTWB RLE, pt not able to maintain TTWB RLE, sit-stand from BSC min-mod, pivot from BSC to bed, trans back to bed mod assist, pt would benefit from rehab  [facility]       Row Name 06/29/25 1057          Positioning and Restraints    Pre-Treatment Position sitting in chair/recliner  -     Post Treatment Position bed  -     In Bed side lying left;fowlers;call light within reach;encouraged to call for assist;with family/caregiver  -               User Key  (r) = Recorded By, (t) = Taken By, (c) = Cosigned By      Initials Name Provider Type     Marcia Dillon, PTA Physical Therapist Assistant                    Physical Therapy Education       Title: PT OT SLP Therapies (Done)       Topic: Physical Therapy (Done)       Point: Mobility training (Done)       Learning Progress Summary            Patient Acceptance, E,D, VU,NR by  at 6/27/2025 0728    Comment: educated on RLE TTWB precaution, benefits of PT and mobility   Family Acceptance, E,D, VU,NR by  at 6/27/2025 0728    Comment: educated on RLE TTWB precaution, benefits of PT and mobility    Acceptance, E,TB, VU,NR by AR at 6/26/2025 1202                      Point: Precautions (Done)       Learning Progress Summary            Patient Acceptance, E,D, VU,NR by  at 6/27/2025 0728    Comment: educated on RLE TTWB precaution, benefits of PT and mobility   Family Acceptance, E,D, VU,NR by  at 6/27/2025 0728    Comment: educated on RLE TTWB precaution, benefits of PT and mobility    Acceptance, E,TB, VU,NR by AR at 6/26/2025 1202                                      User Key       Initials Effective Dates Name Provider Type Discipline    AR 12/02/24 -  Cynthia Nathan, RN Registered Nurse Nurse     04/21/25 -  Deb Renner PT Student PT Student PT                  PT Recommendation and Plan     Plan of Care Reviewed With: patient, daughter  Progress: improving  Outcome Evaluation: pt in chair, sit-stand min-mod, cues for  hand placement, pivot to BSC with rwx min assist, cues for TTWB RLE, pt not able to maintain TTWB RLE, sit-stand from BSC min-mod, pivot from BSC to bed, trans back to bed mod assist, pt would benefit from rehab   Outcome Measures       Row Name 06/28/25 1200 06/28/25 0800          How much help from another person do you currently need...    Turning from your back to your side while in flat bed without using bedrails? -- 3  -AH     Moving from lying on back to sitting on the side of a flat bed without bedrails? -- 3  -AH     Moving to and from a bed to a chair (including a wheelchair)? -- 2  -AH     Standing up from a chair using your arms (e.g., wheelchair, bedside chair)? -- 2  -AH     Climbing 3-5 steps with a railing? -- 1  -AH     To walk in hospital room? -- 2  -AH     AM-PAC 6 Clicks Score (PT) -- 13  -        How much help from another is currently needed...    Putting on and taking off regular lower body clothing? 2  -TS --     Bathing (including washing, rinsing, and drying) 2  -TS --     Toileting (which includes using toilet bed pan or urinal) 2  -TS --     Putting on and taking off regular upper body clothing 3  -TS --     Taking care of personal grooming (such as brushing teeth) 3  -TS --     Eating meals 3  -TS --     AM-PAC 6 Clicks Score (OT) 15  -TS --        Functional Assessment    Outcome Measure Options -- AM-PAC 6 Clicks Basic Mobility (PT)  -               User Key  (r) = Recorded By, (t) = Taken By, (c) = Cosigned By      Initials Name Provider Type     Marcia Dillon, JASBIR Physical Therapist Assistant     Ciara Aguirre COTA Occupational Therapist Assistant                     Time Calculation:    PT Charges       Row Name 06/29/25 1134             Time Calculation    Start Time 1057  -      Stop Time 1127  -      Time Calculation (min) 30 min  -      PT Received On 06/29/25  -         Time Calculation- PT    Total Timed Code Minutes- PT 30 minute(s)  -          Timed Charges    99295 - PT Therapeutic Activity Minutes 30  -AH         Total Minutes    Timed Charges Total Minutes 30  -AH       Total Minutes 30  -AH                User Key  (r) = Recorded By, (t) = Taken By, (c) = Cosigned By      Initials Name Provider Type     Marcia Dillon PTA Physical Therapist Assistant                  Therapy Charges for Today       Code Description Service Date Service Provider Modifiers Qty    08985953185 HC PT THERAPEUTIC ACT EA 15 MIN 2025 Marcia Dillon PTA GP 2    55909736050 HC PT THERAPEUTIC ACT EA 15 MIN 2025 Marcia Dillon PTA GP 2            PT G-Codes  Outcome Measure Options: AM-PAC 6 Clicks Basic Mobility (PT)  AM-PAC 6 Clicks Score (PT): 18  AM-PAC 6 Clicks Score (OT): 15    Marcia Dillon PTA  2025      Electronically signed by Marcia Dillon PTA at 25 1135          Occupational Therapy Notes (last 24 hours)        Ciara Aguirre COTA at 25 1224          Acute Care - Occupational Therapy Treatment Note  [facility]     Patient Name: Sunny Lacey  : 1921  MRN: [id number]  Today's Date: 2025             Admit Date: 2025       ICD-10-CM ICD-9-CM   1. Closed displaced fracture of pelvis, unspecified part of pelvis, initial encounter  S32.9XXA 808.8   2. Impaired mobility [Z74.09]  Z74.09 799.89     Patient Active Problem List   Diagnosis    Sepsis    Pneumonia due to infectious organism    At risk for falls    Hypokalemia    Pelvis fracture    Fall     Past Medical History:   Diagnosis Date    Anemia     Anxiety     Arthritis     Hypertension     Skin cancer      Past Surgical History:   Procedure Laterality Date    SKIN CANCER EXCISION           OT ASSESSMENT FLOWSHEET (Last 12 Hours)       OT Evaluation and Treatment       Row Name 25 1004                   OT Time and Intention    Subjective Information complains of;pain  -TS        Document Type therapy note (daily note)  -TS        Mode of  Treatment occupational therapy  -TS        Patient Effort good  -TS        Comment TTWB RLE  -TS           General Information    Existing Precautions/Restrictions fall;right;weight bearing;other (see comments)  -TS           Pain Assessment    Pretreatment Pain Rating 7/10  -TS        Posttreatment Pain Rating 8/10  -TS        Pain Location hip  -TS        Pain Side/Orientation right  -TS        Pain Management Interventions exercise or physical activity utilized;positioning techniques utilized  -TS        Response to Pain Interventions activity participation with tolerable pain  -TS           Cognition    Personal Safety Interventions fall prevention program maintained;gait belt;nonskid shoes/slippers when out of bed  -TS           Activities of Daily Living    BADL Assessment/Intervention bathing;upper body dressing;lower body dressing;toileting;grooming  -TS           Bathing Assessment/Intervention    [place] Level (Bathing) upper body;lower body;perineal area;moderate assist (50% patient effort);maximum assist (25% patient effort)  -TS        Assistive Devices (Bathing) grab bar, tub/shower;shower commode chair, rolling  -TS        Position (Bathing) supported sitting  -TS           Upper Body Dressing Assessment/Training    [place] Level (Upper Body Dressing) don;doff;moderate assist (50% patient effort)  -TS        Position (Upper Body Dressing) supported sitting  -TS           Lower Body Dressing Assessment/Training    [place] Level (Lower Body Dressing) don;doff;pants/bottoms;socks;maximum assist (25% patient effort)  -TS        Position (Lower Body Dressing) supported sitting;supported standing  -TS           Grooming Assessment/Training    [place] Level (Grooming) wash face, hands;set up  -TS        Position (Grooming) supported sitting  -TS           Toileting Assessment/Training    [place] Level (Toileting) toileting skills;standby assist;adjust/manage clothing;perform perineal  hygiene;maximum assist (25% patient effort)  -TS        Assistive Devices (Toileting) commode;grab bar/safety frame  -TS        Position (Toileting) unsupported sitting;supported standing  -TS           Bed Mobility    Sit-Supine [place] (Bed Mobility) minimum assist (75% patient effort)  -TS        Assistive Device (Bed Mobility) bed rails  -TS           Transfer Assessment/Treatment    Transfers sit-stand transfer;stand-sit transfer;toilet transfer;stand pivot/stand step transfer  -TS           Sit-Stand Transfer    Sit-Stand Roberta (Transfers) minimum assist (75% patient effort);moderate assist (50% patient effort)  -TS        Assistive Device (Sit-Stand Transfers) walker, front-wheeled  -TS           Stand-Sit Transfer    Stand-Sit [place] (Transfers) minimum assist (75% patient effort)  -TS        Assistive Device (Stand-Sit Transfers) walker, front-wheeled  -TS           Stand Pivot/Stand Step Transfer    Stand Pivot/Stand Step Roberta (Transfers) moderate assist (50% patient effort);maximum assist (25% patient effort);2 person assist  -TS           Toilet Transfer    Type (Toilet Transfer) sit-stand;stand-sit;stand pivot/stand step  -TS        [place] Level (Toilet Transfer) minimum assist (75% patient effort);moderate assist (50% patient effort)  -TS        Assistive Device (Toilet Transfer) commode;grab bars/safety frame  -TS           Plan of Care Review    Plan of Care Reviewed With patient  -TS           Positioning and Restraints    Pre-Treatment Position sitting in chair/recliner  -TS        Post Treatment Position bed  -TS        In Bed fowlers;call light within reach;encouraged to call for assist;side rails up x2;with family/caregiver  -TS           Therapy Plan Review/Discharge Plan (OT)    Anticipated Discharge Disposition (OT) skilled nursing facility  -TS                  User Key  (r) = Recorded By, (t) = Taken By, (c) = Cosigned By      Initials Name Effective Dates     Ciara Dupree COTA 02/03/23 -                      Occupational Therapy Education       Title: PT OT SLP Therapies (Done)       Topic: Occupational Therapy (Done)       Point: ADL training (Done)       Learning Progress Summary            Patient Acceptance, E, VU,NR by  at 6/27/2025 0857                      Point: Precautions (Done)       Learning Progress Summary            Patient Acceptance, E, VU,NR by  at 6/27/2025 0857                      Point: Body mechanics (Done)       Learning Progress Summary            Patient Acceptance, E, VU,NR by  at 6/27/2025 0857                                      User Key       Initials Effective Dates Name Provider Type Discipline     06/20/22 -  Candis Guzman, OTR/L Occupational Therapist OT                      OT Recommendation and Plan     Plan of Care Review  Plan of Care Reviewed With: patient  Plan of Care Reviewed With: patient     Outcome Measures       Row Name 06/28/25 1200 06/28/25 0800          How much help from another person do you currently need...    Turning from your back to your side while in flat bed without using bedrails? -- 3  -AH     Moving from lying on back to sitting on the side of a flat bed without bedrails? -- 3  -AH     Moving to and from a bed to a chair (including a wheelchair)? -- 2  -AH     Standing up from a chair using your arms (e.g., wheelchair, bedside chair)? -- 2  -AH     Climbing 3-5 steps with a railing? -- 1  -AH     To walk in hospital room? -- 2  -AH     AM-PAC 6 Clicks Score (PT) -- 13  -AH        How much help from another is currently needed...    Putting on and taking off regular lower body clothing? 2  -TS --     Bathing (including washing, rinsing, and drying) 2  -TS --     Toileting (which includes using toilet bed pan or urinal) 2  -TS --     Putting on and taking off regular upper body clothing 3  -TS --     Taking care of personal grooming (such as brushing teeth) 3  -TS --     Eating meals 3  -TS  --     AM-PAC 6 Clicks Score (OT) 15  -TS --        Functional Assessment    Outcome Measure Options -- AM-PAC 6 Clicks Basic Mobility (PT)  -               User Key  (r) = Recorded By, (t) = Taken By, (c) = Cosigned By      Initials Name Provider Type     Marcia Diloln, PTA Physical Therapist Assistant    Ciraa Dupree COTA Occupational Therapist Assistant                    Time Calculation:    Time Calculation- OT       Row Name 06/28/25 1224             Time Calculation- OT    OT Start Time 1004  -TS      OT Stop Time 1127  -TS      OT Time Calculation (min) 83 min  -TS      Total Timed Code Minutes- OT 83 minute(s)  -TS      OT Received On 06/28/25  -TS         Timed Charges    24259 - OT Self Care/Mgmt Minutes 83  -TS         Total Minutes    Timed Charges Total Minutes 83  -TS       Total Minutes 83  -TS                User Key  (r) = Recorded By, (t) = Taken By, (c) = Cosigned By      Initials Name Provider Type     Ciara Aguirre COTA Occupational Therapist Assistant                  Therapy Charges for Today       Code Description Service Date Service Provider Modifiers Qty    56431197599 HC OT SELF CARE/MGMT/TRAIN EA 15 MIN 6/28/2025 Ciara Aguirre COTA GO 6                 Ciara BOURGEOIS. MARCELA Aguirre  6/28/2025    Electronically signed by Ciara Aguirre COTA at 06/28/25 1225       Speech Language Pathology Notes (last 24 hours)  Notes from 06/28/25 1142 through 06/29/25 1142   No notes exist for this encounter.       Respiratory Therapy Notes (last 24 hours)  Notes from 06/28/25 1142 through 06/29/25 1142   No notes exist for this encounter.

## 2025-06-29 NOTE — CASE MANAGEMENT/SOCIAL WORK
Continued Stay Note  [facility]     Patient Name: Sunny Lacey  MRN: [id number]  Today's Date: 6/29/2025    Admit Date: 6/25/2025    Plan: Referral to [facility] back program   Discharge Plan       Row Name 06/29/25 1139       Plan    Plan Referral to [facility] back program    Patient/Family in Agreement with Plan yes    Plan Comments Spoke with Cristino Lacey Power of    [phone] and pt is now inpt. status here and he is requesting a referral being sent to [facility]. This SW will fax referral and will follow for decision.                   Discharge Codes    No documentation.                       BAILEE RomeroW

## 2025-06-29 NOTE — PROGRESS NOTES
[facility] Medicine Services  INPATIENT PROGRESS NOTE    Patient Name: Sunny Lacey  Date of Admission: 6/25/2025  Today's Date: 06/29/25  Length of Stay: 1  Primary Care Physician: Nevaeh Thornton PA    Subjective   Chief Complaint: Hip fracture/falling  HPI   Patient is awake alert working physical therapy reason for elevated blood pressure, afebrile.  Potassium normalized.  Hemoglobin normal.  White blood cells normal.  Thrombocytopenia noted.    Review of Systems   Constitutional:  Positive for activity change, appetite change and fatigue. Negative for chills and fever.   HENT:  Negative for hearing loss, nosebleeds, tinnitus and trouble swallowing.    Eyes:  Negative for visual disturbance.   Respiratory:  Negative for cough, chest tightness, shortness of breath and wheezing.    Cardiovascular:  Negative for chest pain, palpitations and leg swelling.   Gastrointestinal:  Negative for abdominal distention, abdominal pain, blood in stool, constipation, diarrhea, nausea and vomiting.   Endocrine: Negative for cold intolerance, heat intolerance, polydipsia, polyphagia and polyuria.   Genitourinary:  Negative for decreased urine volume, difficulty urinating, dysuria, flank pain, frequency and hematuria.   Musculoskeletal:  Positive for arthralgias, gait problem and myalgias. Negative for joint swelling.   Skin:  Negative for rash.   Allergic/Immunologic: Negative for immunocompromised state.   Neurological:  Positive for weakness. Negative for dizziness, syncope, light-headedness and headaches.   Hematological:  Negative for adenopathy. Does not bruise/bleed easily.   Psychiatric/Behavioral:  Negative for confusion and sleep disturbance. The patient is not nervous/anxious.         All pertinent negatives and positives are as above. All other systems have been reviewed and are negative unless otherwise stated.     Objective    Temp:  [97.1 °F (36.2 °C)-98.4 °F (36.9 °C)]  98.4 °F (36.9 °C)  Heart Rate:  [] 99  Resp:  [16-18] 18  BP: (111-161)/(54-76) 152/56  Physical Exam  Vitals and nursing note reviewed.   Constitutional:       Comments: Advanced age.  Deconditioning.   HENT:      Head: Normocephalic.   Eyes:      Conjunctiva/sclera: Conjunctivae normal.      Pupils: Pupils are equal, round, and reactive to light.   Cardiovascular:      Rate and Rhythm: Normal rate and regular rhythm.      Heart sounds: Normal heart sounds.   Pulmonary:      Effort: Pulmonary effort is normal. No respiratory distress.      Breath sounds: Normal breath sounds.      Comments: Patient is on room air.  Abdominal:      General: Bowel sounds are normal. There is no distension.      Palpations: Abdomen is soft.      Tenderness: There is no abdominal tenderness.      Comments: Depend.    Musculoskeletal:         General: No swelling.      Cervical back: Neck supple.   Skin:     General: Skin is warm and dry.      Capillary Refill: Capillary refill takes 2 to 3 seconds.      Findings: No rash.   Neurological:      Mental Status: She is alert.      Motor: Weakness present.      Coordination: Coordination abnormal.      Gait: Gait abnormal.         Results Review:  I have reviewed the labs, radiology results, and diagnostic studies.    Laboratory Data:   Results from last 7 days   Lab Units 06/26/25  0444 06/25/25 1917   WBC 10*3/mm3 7.25 6.76   HEMOGLOBIN g/dL 13.3 12.8   HEMATOCRIT % 39.6 38.9   PLATELETS 10*3/mm3 122* 119*        Results from last 7 days   Lab Units 06/29/25  0312 06/26/25  0444 06/25/25 1917   SODIUM mmol/L 144 143 144   POTASSIUM mmol/L 4.1 3.2* 3.7   CHLORIDE mmol/L 107 103 105   CO2 mmol/L 26.0 27.0 28.0   BUN mg/dL 17.2 11.9 13.9   CREATININE mg/dL 0.84 0.71 0.86   CALCIUM mg/dL 9.6 9.5 9.7*   BILIRUBIN mg/dL  --  0.6 0.4   ALK PHOS U/L  --  104 101   ALT (SGPT) U/L  --  13 14   AST (SGOT) U/L  --  26 29   GLUCOSE mg/dL 101* 103* 102*       Culture Data:   No results found  "for: \"BLOODCX\", \"URINECX\", \"WOUNDCX\", \"MRSACX\", \"RESPCX\", \"STOOLCX\"    Radiology Data:   Imaging Results (Last 24 Hours)       ** No results found for the last 24 hours. **            I have reviewed the patient's current medications.     Assessment/Plan   Assessment  Active Hospital Problems    Diagnosis     **Pelvis fracture     Essential hypertension     Fall     Hypokalemia        Treatment Plan  Pelvis fracture -nonoperative.  Currently not requiring much in the way of pain medicine but has ambulatory dysfunction and needs ongoing therapy.  She is from an [facility] will not be able to return at this time due to not being independent.  Plan for rehab placement.  Consulted orthopedics.  CT cervical spine-No acute osseous findings, Moderate multilevel cervical spine degenerative change.  CT scan of pelvic-Acute mildly displaced fracture of the right acetabulum, Acute comminuted and displaced fracture of the right inferior pubic ramus, No evidence of left-sided pelvic or hip fracture.  CT scan head-No acute intracranial findings.      Essential hypertension.  Amlodipine.  Nitro as needed.     Hypokalemia.  Resolved.  Potassium replacement protocol.  Potassium daily.    Constipation.  Colace.    Anxiety/depression.  Cymbalta .    Anemia . Hemoglobin is normal.  Iron sulfate.    Thrombocytopenia.  Slight increase in platelets.    Neuropathy.  Neurontin.    Advanced age . 89 years old.    Nutrition . Regular diet.    Deconditioning/gait/falling.  PT OT consult.  Fall precaution.  Will depends . Walker at baseline with 1-2 assist.    Patient will need rehab placement.    DNR . DNI.    Medical Decision Making  Number and Complexity of problems: Pelvic fracture/hypertension/hypokalemia/advanced age .  Differential Diagnosis: None    Conditions and Status        Condition is unchanged.     [facility] Data  External documents reviewed: Previous note .  Cardiac tracing (EKG, telemetry) interpretation: No monitor.  Radiology " interpretation: CT scan  Labs reviewed: Laboratory  Any tests that were considered but not ordered: Lab in a.m.     Decision rules/scores evaluated (example [id number], [place], etc): None     Discussed with: Patient .      Care Planning  Shared decision making: Patient .  Code status and discussions: DNR . DNI    Disposition  Social Determinants of Health that impact treatment or disposition: From assisted living .  Pending rehab placement      Electronically signed by Maurizio Borges MD, 06/29/25, 07:39 CDT.

## 2025-06-29 NOTE — PLAN OF CARE
Goal Outcome Evaluation:  Plan of Care Reviewed With: patient        Progress: no change   Pt alert to self only. VSS on RA. Up x2 to BSC. No IV access. Pills crushed in applesauce. Voiding/incontinent at night. SCDs. Very [place], only one hearing aid. Small BM this shift. Safety maintained. Call light in reach.

## 2025-06-30 LAB
ALBUMIN SERPL-MCNC: 3.4 G/DL (ref 3.5–5.2)
ALBUMIN/GLOB SERPL: 1.4 G/DL
ALP SERPL-CCNC: 109 U/L (ref 39–117)
ALT SERPL W P-5'-P-CCNC: 23 U/L (ref 1–33)
ANION GAP SERPL CALCULATED.3IONS-SCNC: 11 MMOL/L (ref 5–15)
AST SERPL-CCNC: 40 U/L (ref 1–32)
BASOPHILS # BLD AUTO: 0.1 10*3/MM3 (ref 0–0.2)
BASOPHILS NFR BLD AUTO: 1.4 % (ref 0–1.5)
BILIRUB SERPL-MCNC: 0.5 MG/DL (ref 0–1.2)
BUN SERPL-MCNC: 20.5 MG/DL (ref 8–23)
BUN/CREAT SERPL: 28.1 (ref 7–25)
CALCIUM SPEC-SCNC: 9.4 MG/DL (ref 8.2–9.6)
CHLORIDE SERPL-SCNC: 109 MMOL/L (ref 98–107)
CHOLEST SERPL-MCNC: 150 MG/DL (ref 0–200)
CO2 SERPL-SCNC: 24 MMOL/L (ref 22–29)
CREAT SERPL-MCNC: 0.73 MG/DL (ref 0.57–1)
DEPRECATED RDW RBC AUTO: 46.2 FL (ref 37–54)
EGFRCR SERPLBLD CKD-EPI 2021: 72.2 ML/MIN/1.73
EOSINOPHIL # BLD AUTO: 0.2 10*3/MM3 (ref 0–0.4)
EOSINOPHIL NFR BLD AUTO: 2.8 % (ref 0.3–6.2)
ERYTHROCYTE [DISTWIDTH] IN BLOOD BY AUTOMATED COUNT: 12.8 % (ref 12.3–15.4)
GLOBULIN UR ELPH-MCNC: 2.5 GM/DL
GLUCOSE SERPL-MCNC: 95 MG/DL (ref 65–99)
HBA1C MFR BLD: 5 % (ref 4.8–5.6)
HCT VFR BLD AUTO: 38.6 % (ref 34–46.6)
HDLC SERPL-MCNC: 65 MG/DL (ref 40–60)
HGB BLD-MCNC: 12.4 G/DL (ref 12–15.9)
IMM GRANULOCYTES # BLD AUTO: 0.03 10*3/MM3 (ref 0–0.05)
IMM GRANULOCYTES NFR BLD AUTO: 0.4 % (ref 0–0.5)
LDLC SERPL CALC-MCNC: 71 MG/DL (ref 0–100)
LDLC/HDLC SERPL: 1.1 {RATIO}
LYMPHOCYTES # BLD AUTO: 1.86 10*3/MM3 (ref 0.7–3.1)
LYMPHOCYTES NFR BLD AUTO: 26.2 % (ref 19.6–45.3)
MCH RBC QN AUTO: 31.6 PG (ref 26.6–33)
MCHC RBC AUTO-ENTMCNC: 32.1 G/DL (ref 31.5–35.7)
MCV RBC AUTO: 98.2 FL (ref 79–97)
MONOCYTES # BLD AUTO: 0.67 10*3/MM3 (ref 0.1–0.9)
MONOCYTES NFR BLD AUTO: 9.4 % (ref 5–12)
NEUTROPHILS NFR BLD AUTO: 4.25 10*3/MM3 (ref 1.7–7)
NEUTROPHILS NFR BLD AUTO: 59.8 % (ref 42.7–76)
NRBC BLD AUTO-RTO: 0 /100 WBC (ref 0–0.2)
PLATELET # BLD AUTO: 164 10*3/MM3 (ref 140–450)
PMV BLD AUTO: 10.5 FL (ref 6–12)
POTASSIUM SERPL-SCNC: 4.1 MMOL/L (ref 3.5–5.2)
PROT SERPL-MCNC: 5.9 G/DL (ref 6–8.5)
RBC # BLD AUTO: 3.93 10*6/MM3 (ref 3.77–5.28)
SODIUM SERPL-SCNC: 144 MMOL/L (ref 136–145)
TRIGL SERPL-MCNC: 68 MG/DL (ref 0–150)
TSH SERPL DL<=0.05 MIU/L-ACNC: 2 UIU/ML (ref 0.27–4.2)
VLDLC SERPL-MCNC: 14 MG/DL (ref 5–40)
WBC NRBC COR # BLD AUTO: 7.11 10*3/MM3 (ref 3.4–10.8)

## 2025-06-30 PROCEDURE — 84443 ASSAY THYROID STIM HORMONE: CPT | Performed by: FAMILY MEDICINE

## 2025-06-30 PROCEDURE — 97535 SELF CARE MNGMENT TRAINING: CPT

## 2025-06-30 PROCEDURE — 80061 LIPID PANEL: CPT | Performed by: FAMILY MEDICINE

## 2025-06-30 PROCEDURE — 80053 COMPREHEN METABOLIC PANEL: CPT | Performed by: FAMILY MEDICINE

## 2025-06-30 PROCEDURE — 97530 THERAPEUTIC ACTIVITIES: CPT

## 2025-06-30 PROCEDURE — 85025 COMPLETE CBC W/AUTO DIFF WBC: CPT | Performed by: FAMILY MEDICINE

## 2025-06-30 PROCEDURE — 83036 HEMOGLOBIN GLYCOSYLATED A1C: CPT | Performed by: FAMILY MEDICINE

## 2025-06-30 RX ORDER — POTASSIUM CHLORIDE 1.5 G/1.58G
40 POWDER, FOR SOLUTION ORAL DAILY
Status: DISCONTINUED | OUTPATIENT
Start: 2025-06-30 | End: 2025-07-02 | Stop reason: HOSPADM

## 2025-06-30 RX ADMIN — GABAPENTIN 300 MG: 300 CAPSULE ORAL at 22:08

## 2025-06-30 RX ADMIN — FERROUS SULFATE TAB 325 MG (65 MG ELEMENTAL FE) 325 MG: 325 (65 FE) TAB at 09:41

## 2025-06-30 RX ADMIN — LATANOPROST 1 DROP: 50 SOLUTION OPHTHALMIC at 22:08

## 2025-06-30 RX ADMIN — DULOXETINE 30 MG: 30 CAPSULE, DELAYED RELEASE ORAL at 09:41

## 2025-06-30 RX ADMIN — ACETAMINOPHEN 650 MG: 650 SOLUTION ORAL at 22:08

## 2025-06-30 RX ADMIN — DOCUSATE SODIUM 100 MG: 100 CAPSULE, LIQUID FILLED ORAL at 09:41

## 2025-06-30 RX ADMIN — AMLODIPINE BESYLATE 5 MG: 5 TABLET ORAL at 09:41

## 2025-06-30 RX ADMIN — POTASSIUM CHLORIDE 40 MEQ: 1500 TABLET, EXTENDED RELEASE ORAL at 09:43

## 2025-06-30 NOTE — DISCHARGE PLACEMENT REQUEST
"Deann Sousa (89 y.o. Female)       Date of Birth   09/28/1921    Social Security Number       Address   [address] [place] [address]    Home Phone   [phone]    MRN   [id number]       Rastafarian   Christianity    Marital Status                               Admission Date   6/25/2025    Admission Type   Emergency    Admitting Provider       Attending Provider   Maurizio Borges MD    Department, Room/Bed   [facility] 3A, 331/1       Discharge Date       Discharge Disposition       Discharge Destination                                 Attending Provider: Maurizio Borges MD    Allergies: No Known Allergies    Isolation: None   Infection: None   Code Status: No CPR    Ht: 165.1 cm (65\")   Wt: 63 kg (139 lb)    Admission Cmt: None   Principal Problem: Pelvis fracture [S32.9XXA]                   Active Insurance as of 6/25/2025       Primary Coverage       Payor Plan Insurance Group Employer/Plan Group    MEDICARE MEDICARE A & B        Payor Plan Address Payor Plan Phone Number Payor Plan Fax Number Effective Dates    [address] [phone]  9/1/1986 - None Entered    [facility] [id number]         Subscriber Name Subscriber Birth Date Member ID       DEANN SOUSA Y 9/28/1921 [id number]               Secondary Coverage       Payor Plan Insurance Group Employer/Plan Group    [place]        Payor Plan Address Payor Plan Phone Number Payor Plan Fax Number Effective Dates    [address] [address] [phone]  7/7/2020 - None Entered    [facility] [id number]         Subscriber Name Subscriber Birth Date Member ID       DEANN SOUSA Y 9/28/1921 [id number]                     Emergency Contacts        (Rel.) Home Phone Work Phone Mobile Phone    DeepthiCristino (Power of ) -- -- [phone]    Dominic Sousa (Son) [phone] -- [phone]              Insurance Information                  MEDICARE/MEDICARE A & B Phone: [phone] "    Subscriber: Sunny Lacey Subscriber#: [id number]    Group#: -- Precert#: --    Authorization#: -- Effective Date: --        [place] Phone: [phone]    Subscriber: Sunny Lacey Subscriber#: [id number]    Group#: -- Precert#: --    Authorization#: -- Effective Date: --             History & Physical        Octaviano Pink MD at 06/25/25 2109              [facility] Medicine Services  HISTORY AND PHYSICAL    Date of Admission: 6/25/2025  Primary Care Physician: Nevaeh Thornton PA    Subjective   Primary Historian: Patient    Chief Complaint: Fall and pelvic pain    History of Present Illness  This is a 89-year-old female patient who lives at assisted living facility coming for the evaluation of mechanical fall and hip pain.  As reported, patient had a fall yesterday and since then she was having significant pain in her pelvis.  She was evaluated at another facility, had some x-rays, reportedly did not show acute fractures, patient was discharged home.  However, patient continues to have pain.  So she presented to the ED for evaluation.  Patient denies having any chest pain, shortness of breath, nausea vomiting or diarrhea, fever or chills.        Review of Systems   Otherwise complete ROS reviewed and negative except as mentioned in the HPI.    Past Medical History:   Past Medical History:   Diagnosis Date    Anemia     Anxiety     Arthritis     Hypertension     Skin cancer      Past Surgical History:  Past Surgical History:   Procedure Laterality Date    SKIN CANCER EXCISION       Social History:  reports that she has never smoked. She does not have any smokeless tobacco history on file. She reports that she does not drink alcohol and does not use drugs.    Family History: family history is not on file.       Allergies:  No Known Allergies    Medications:  Prior to Admission medications    Medication Sig Start Date End Date Taking?  Authorizing Provider   acetaminophen (TYLENOL) 325 MG tablet Take 650 mg by mouth 2 (two) times a day.    Milton Diaz MD   albuterol sulfate  (90 Base) MCG/ACT inhaler Inhale 2 puffs Every 4 (Four) Hours As Needed for Wheezing or Shortness of Air.    Milton Diaz MD   ALPRAZolam (XANAX) 0.25 MG tablet Take 0.25 mg by mouth every night at bedtime.    Milton Diaz MD   amLODIPine (NORVASC) 5 MG tablet Take 5 mg by mouth Daily.    Milton Diaz MD   bimatoprost (LUMIGAN) 0.01 % ophthalmic drops Administer 1 drop to both eyes Every Night.    Milton Diaz MD   brimonidine-timolol (COMBIGAN) 0.2-0.5 % ophthalmic solution Administer 1 drop to the right eye Every 12 (Twelve) Hours.    Milton Diaz MD   Calcium Carbonate-Vit D-Min (CALCIUM 1200 PO) Take 600 mg by mouth 2 (two) times a day.    Milton Diaz MD   diclofenac (VOLTAREN) 1 % gel gel Apply 4 g topically to the appropriate area as directed 4 (Four) Times a Day As Needed.    Milton Diaz MD   docusate sodium (COLACE) 100 MG capsule Take 100 mg by mouth Daily.    Milton Diaz MD   ferrous sulfate 325 (65 FE) MG tablet Take 325 mg by mouth Daily With Breakfast.    Milton Diaz MD   lidocaine (LMX) 4 % cream Apply 1 application topically to the appropriate area as directed 4 (Four) Times a Day As Needed for Mild Pain .    Milton Diaz MD   megestrol (MEGACE) 40 MG/ML suspension Take 20 mL by mouth Daily. 7/21/20   Phong Joseph MD   mirtazapine (REMERON) 15 MG tablet Take 1 tablet by mouth Every Night. 7/20/20   Phong Joseph MD   potassium chloride (MICRO-K) 10 MEQ CR capsule Take 2 capsules by mouth 2 (Two) Times a Day With Meals. 7/20/20   Phong Joseph MD     I have utilized all available immediate resources to obtain, update, or review the patient's current medications (including all prescriptions, over-the-counter products, herbals,  "cannabis/cannabidiol products, and vitamin/mineral/dietary (nutritional) supplements).    Objective     Vital Signs: /64 (BP Location: Left arm, Patient Position: Lying)   Pulse 82   Temp 98.1 °F (36.7 °C) (Oral)   Resp 16   Ht 165.1 cm (65\")   Wt 63.5 kg (139 lb 15.9 oz)   SpO2 95%   BMI 23.30 kg/m²   Physical Exam  Constitutional:       Appearance: She is ill-appearing.   Cardiovascular:      Rate and Rhythm: Normal rate and regular rhythm.      Pulses: Normal pulses.      Heart sounds: Normal heart sounds.   Pulmonary:      Effort: Pulmonary effort is normal. No respiratory distress.      Breath sounds: Normal breath sounds. No wheezing or rales.   Abdominal:      General: Bowel sounds are normal. There is no distension.      Palpations: Abdomen is soft.      Tenderness: There is no abdominal tenderness. There is no guarding.   Musculoskeletal:      Right lower leg: No edema.      Left lower leg: No edema.   Skin:     General: Skin is warm.   Neurological:      Mental Status: She is alert and oriented to person, place, and time. Mental status is at baseline.              Results Reviewed:  Lab Results (last 24 hours)       Procedure Component Value Units Date/Time    High Sensitivity Troponin T 1Hr [[id number]]  (Abnormal) Collected: 06/25/25 2042    Specimen: Blood Updated: 06/25/25 2122     HS Troponin T 17 ng/L      Troponin T Numeric Delta -1 ng/L      Troponin T % Delta -6    Narrative:      High Sensitive Troponin T Reference Range:  <14.0 ng/L- Negative Female for AMI  <22.0 ng/L- Negative Male for AMI  >=14 - Abnormal Female indicating possible myocardial injury.  >=22 - Abnormal Male indicating possible myocardial injury.   Clinicians would have to utilize clinical acumen, EKG, Troponin, and serial changes to determine if it is an Acute Myocardial Infarction or myocardial injury due to an underlying chronic condition.         Comprehensive Metabolic Panel [[id number]]  (Abnormal) Collected: " 06/25/25 1917    Specimen: Blood Updated: 06/25/25 1947     Glucose 102 mg/dL      BUN 13.9 mg/dL      Creatinine 0.86 mg/dL      Sodium 144 mmol/L      Potassium 3.7 mmol/L      Comment: Slight hemolysis detected by analyzer. Result may be falsely elevated.        Chloride 105 mmol/L      CO2 28.0 mmol/L      Calcium 9.7 mg/dL      Total Protein 6.3 g/dL      Albumin 3.8 g/dL      ALT (SGPT) 14 U/L      AST (SGOT) 29 U/L      Alkaline Phosphatase 101 U/L      Total Bilirubin 0.4 mg/dL      Globulin 2.5 gm/dL      A/G Ratio 1.5 g/dL      BUN/Creatinine Ratio 16.2     Anion Gap 11.0 mmol/L      eGFR 59.3 mL/min/1.73     Narrative:      GFR Categories in Chronic Kidney Disease (CKD)              GFR Category          GFR (mL/min/1.73)    Interpretation  G1                    90 or greater        Normal or high (1)  G2                    60-89                Mild decrease (1)  G3a                   45-59                Mild to moderate decrease  G3b                   30-44                Moderate to severe decrease  G4                    15-29                Severe decrease  G5                    14 or less           Kidney failure    (1)In the absence of evidence of kidney disease, neither GFR category G1 or G2 fulfill the criteria for CKD.    eGFR calculation 2021 CKD-EPI creatinine equation, which does not include race as a factor    High Sensitivity Troponin T [[id number]]  (Abnormal) Collected: 06/25/25 1917    Specimen: Blood Updated: 06/25/25 1942     HS Troponin T 18 ng/L     Narrative:      High Sensitive Troponin T Reference Range:  <14.0 ng/L- Negative Female for AMI  <22.0 ng/L- Negative Male for AMI  >=14 - Abnormal Female indicating possible myocardial injury.  >=22 - Abnormal Male indicating possible myocardial injury.   Clinicians would have to utilize clinical acumen, EKG, Troponin, and serial changes to determine if it is an Acute Myocardial Infarction or myocardial injury due to an underlying chronic  condition.         CBC & Differential [[id number]]  (Abnormal) Collected: 06/25/25 1917    Specimen: Blood Updated: 06/25/25 1928    Narrative:      The following orders were created for panel order CBC & Differential.  Procedure                               Abnormality         Status                     ---------                               -----------         ------                     CBC Auto Differential[[id number]]        Abnormal            Final result                 Please view results for these tests on the individual orders.    CBC Auto Differential [[id number]]  (Abnormal) Collected: 06/25/25 1917    Specimen: Blood Updated: 06/25/25 1928     WBC 6.76 10*3/mm3      RBC 4.01 10*6/mm3      Hemoglobin 12.8 g/dL      Hematocrit 38.9 %      MCV 97.0 fL      MCH 31.9 pg      MCHC 32.9 g/dL      RDW 12.7 %      RDW-SD 45.4 fl      MPV 10.2 fL      Platelets 119 10*3/mm3      Neutrophil % 68.4 %      Lymphocyte % 19.4 %      Monocyte % 10.1 %      Eosinophil % 1.2 %      Basophil % 0.6 %      Immature Grans % 0.3 %      Neutrophils, Absolute 4.63 10*3/mm3      Lymphocytes, Absolute 1.31 10*3/mm3      Monocytes, Absolute 0.68 10*3/mm3      Eosinophils, Absolute 0.08 10*3/mm3      Basophils, Absolute 0.04 10*3/mm3      Immature Grans, Absolute 0.02 10*3/mm3           Imaging Results (Last 24 Hours)       Procedure Component Value Units Date/Time    CT Pelvis Without Contrast [[id number]] Collected: 06/25/25 2011     Updated: 06/25/25 2020    Narrative:      EXAM/TECHNIQUE:  1. CT pelvis without contrast  2. CT right hip without contrast     INDICATION: R hip pain, negative XR yesterday at other facility  negative.     COMPARISON: None available.     DLP: 1262.69 mGy.cm. Automated exposure control was utilized to decrease  patient radiation dose.     FINDINGS:     Bilateral hip arthroplasty with streak artifact slightly limiting  evaluation. No acute mild displaced fracture of the right acetabulum  (pelvic CT  series 4 image 60 and 70). There is an acute comminuted and  displaced fracture of the right inferior pubic ramus. No pubic symphysis  widening. No left pubic rami fracture or left acetabular fracture. No  evidence of hip arthroplasty hardware malalignment. No sacral fracture  identified. No evidence of proximal femoral fracture. Right knee  arthroplasty is noted, without evidence of complication.     Moderate volume stool in the rectum. Colonic diverticulosis without  diverticulitis. No bowel distention or active bowel inflammation in the  pelvis. No focal urinary bladder abnormality. Nonaneurysmal  atherosclerotic distal abdominal aorta. No pelvic lymphadenopathy. No  free pelvic fluid. No acute soft tissue abnormality.       Impression:         1. Acute mildly displaced fracture of the right acetabulum.     2. Acute comminuted and displaced fracture of the right inferior pubic  ramus.     3. No evidence of left-sided pelvic or hip fracture     4. Bilateral hip arthroplasty, with streak artifact from hardware  limiting evaluation. No evidence of hardware malalignment.        This report was signed and finalized on 6/25/2025 8:17 PM by Dr. Rafael Abel MD.       CT Lower Extremity Right Without Contrast [[id number]] Collected: 06/25/25 2011     Updated: 06/25/25 2020    Narrative:      EXAM/TECHNIQUE:  1. CT pelvis without contrast  2. CT right hip without contrast     INDICATION: R hip pain, negative XR yesterday at other facility  negative.     COMPARISON: None available.     DLP: 1262.69 mGy.cm. Automated exposure control was utilized to decrease  patient radiation dose.     FINDINGS:     Bilateral hip arthroplasty with streak artifact slightly limiting  evaluation. No acute mild displaced fracture of the right acetabulum  (pelvic CT series 4 image 60 and 70). There is an acute comminuted and  displaced fracture of the right inferior pubic ramus. No pubic symphysis  widening. No left pubic rami fracture or  left acetabular fracture. No  evidence of hip arthroplasty hardware malalignment. No sacral fracture  identified. No evidence of proximal femoral fracture. Right knee  arthroplasty is noted, without evidence of complication.     Moderate volume stool in the rectum. Colonic diverticulosis without  diverticulitis. No bowel distention or active bowel inflammation in the  pelvis. No focal urinary bladder abnormality. Nonaneurysmal  atherosclerotic distal abdominal aorta. No pelvic lymphadenopathy. No  free pelvic fluid. No acute soft tissue abnormality.       Impression:         1. Acute mildly displaced fracture of the right acetabulum.     2. Acute comminuted and displaced fracture of the right inferior pubic  ramus.     3. No evidence of left-sided pelvic or hip fracture     4. Bilateral hip arthroplasty, with streak artifact from hardware  limiting evaluation. No evidence of hardware malalignment.        This report was signed and finalized on 6/25/2025 8:17 PM by Dr. Rafael Abel MD.       CT Cervical Spine Without Contrast [[id number]] Collected: 06/25/25 2006     Updated: 06/25/25 2012    Narrative:      EXAM/TECHNIQUE: CT cervical spine without contrast     INDICATION: fall, cant rule out by Somali C spine rule due to age     COMPARISON: None available.     DLP: 1262.69 mGy.cm. Automated exposure control was utilized to decrease  patient radiation dose.     FINDINGS:     Craniocervical relationships are maintained. Trace anterolisthesis of C7  on T1, likely degenerative in nature. Cervical vertebral body heights  are maintained. No acute fracture. Advanced multilevel cervical spine  degenerative change with multilevel neural foraminal stenosis.  Incomplete C1 arch, likely chronic/postsurgical. Paravertebral soft  tissues are unremarkable.       Impression:      1.  No acute osseous findings.  2.  Moderate multilevel cervical spine degenerative change.        This report was signed and finalized on  6/25/2025 8:09 PM by Dr. Rafael Abel MD.       CT Head Without Contrast [[id number]] Collected: 06/25/25 2003     Updated: 06/25/25 2009    Narrative:      EXAM/TECHNIQUE: CT head without contrast     INDICATION: fall, yesterday dizziness     COMPARISON: 3/15/2023     DLP: 1262.69 mGy.cm. Automated exposure control was utilized to decrease  patient radiation dose.     FINDINGS:     No evidence of intracranial hemorrhage. Gray-white differentiation is  maintained. 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 Global cerebral  volume loss and presumed chronic microvascular ischemic white matter  change. No midline shift or mass effect. Lateral ventricles are  nondilated. Basilar cisterns are patent. No acute orbital finding. Right  mastoid effusion. Left mastoid air cells are clear. Visualized paranasal  sinuses are clear. No acute osseous finding.       Impression:         No acute intracranial findings.        This report was signed and finalized on 6/25/2025 8:06 PM by Dr. Rafael Abel MD.             I have personally reviewed and interpreted the radiology studies and ECG obtained at time of admission.     Assessment / Plan   Assessment:   Active Hospital Problems    Diagnosis     **Pelvis fracture     Fall        Treatment Plan  The patient will be admitted to my service here at [facility].     Assessment and plan:  #Mechanical fall.  #Pelvic fracture.    - Admitting to floor.  - ED contacted orthopedic, who agreed to consult.  Advised no need for surgery.  - PT and OT ordered  - Fall precautions ordered.  - DVT prophylaxis with SCDs.      Medical Decision Making  Number and Complexity of problems: 2 acute and moderate  Differential Diagnosis: As above    Conditions and Status        Condition is worsening.     [facility] Data  External documents reviewed: Yes  Cardiac tracing (EKG, telemetry) interpretation: Yes  Radiology interpretation: Yes  Labs reviewed: Yes  Any tests that were considered but not  ordered: No     Decision rules/scores evaluated (example [id number], Wells, etc): No     Discussed with: Patient and ED provider     Care Planning  Code status and discussions: I asked the patient about code status and she was not sure what to say. I tried calling her son 3 times to discuss code status but no answer. Called her facility, spoke with the CNA, and they reviewed her chart, and they confirmed that she is full code according to their records.    Disposition  Social Determinants of Health that impact treatment or disposition: Came from facility  Estimated length of stay is 2 to 3 days.     I confirmed that the patient's advanced care plan is present, code status is documented, and a surrogate decision maker is listed in the patient's medical record.     The patient was seen and examined by me on 6/25 at 9:15 PM.    Electronically signed by Octaviano Pink MD, 06/26/25, 00:50 CDT.              Electronically signed by Octaviano Pink MD at 06/26/25 0051       Current Facility-Administered Medications   Medication Dose Route Frequency Provider Last Rate Last Admin    acetaminophen (TYLENOL) tablet 650 mg  650 mg Oral Q4H PRN Octaviano Pink MD   650 mg at 06/29/25 0854    Or    acetaminophen (TYLENOL) 160 MG/5ML oral solution 650 mg  650 mg Oral Q4H PRN Octaviano Pink MD        Or    acetaminophen (TYLENOL) suppository 650 mg  650 mg Rectal Q4H PRN Octaviano Pink MD        albuterol (PROVENTIL) nebulizer solution 0.083% 2.5 mg/3mL  2.5 mg Nebulization Q4H PRN Octaviano Pink MD        amLODIPine (NORVASC) tablet 5 mg  5 mg Oral Daily Octaviano Pink MD   5 mg at 06/30/25 0941    sennosides-docusate (PERICOLACE) 8.6-50 MG per tablet 2 tablet  2 tablet Oral BID PRN Octaviano Pink MD        And    polyethylene glycol (MIRALAX) packet 17 g  17 g Oral Daily PRN Octaviano Pink MD        And    bisacodyl (DULCOLAX) EC tablet 5 mg  5 mg Oral Daily PRN Octaviano Pink MD        And    bisacodyl (DULCOLAX)  suppository 10 mg  10 mg Rectal Daily PRN Octaviano Pink MD        docusate sodium (COLACE) capsule 100 mg  100 mg Oral Daily Octaviano Pink MD   100 mg at 06/30/25 0941    DULoxetine (CYMBALTA) DR capsule 30 mg  30 mg Oral Daily David Saldaña, DO   30 mg at 06/30/25 0941    ferrous sulfate tablet 325 mg  325 mg Oral Daily With Breakfast Octaviano Pink MD   325 mg at 06/30/25 0941    gabapentin (NEURONTIN) capsule 300 mg  300 mg Oral Nightly Piper, David MARIE, DO   300 mg at 06/29/25 2134    latanoprost (XALATAN) 0.005 % ophthalmic solution 1 drop  1 drop Both Eyes Nightly David Saldaña, DO   1 drop at 06/29/25 2134    nitroglycerin (NITROSTAT) SL tablet 0.4 mg  0.4 mg Sublingual Q5 Min PRN Octaviano Pink MD        potassium chloride (KLOR-CON) packet 40 mEq  40 mEq Oral Daily Maurizio Borges MD        sodium chloride 0.9 % flush 10 mL  10 mL Intravenous Q12H Octaviano Pink MD   10 mL at 06/26/25 2045    sodium chloride 0.9 % flush 10 mL  10 mL Intravenous PRN Octaviano Pink MD        sodium chloride 0.9 % infusion 40 mL  40 mL Intravenous PRN Octaviano Pink MD         Operative/Procedure Notes (last 7 days)  Notes from 06/23/25 1045 through 06/30/25 1045   No notes of this type exist for this encounter.       Physician Progress Notes (last 24 hours)  Notes from 06/29/25 1045 through 06/30/25 1045   No notes of this type exist for this encounter.       Consult Notes (last 24 hours)  Notes from 06/29/25 1045 through 06/30/25 1045   No notes of this type exist for this encounter.          Physical Therapy Notes (last 24 hours)        Marcia Dillon PTA at 06/29/25 1133  Version 1 of 1         Goal Outcome Evaluation:  Plan of Care Reviewed With: patient, daughter        Progress: improving  Outcome Evaluation: pt in chair, sit-stand min-mod, cues for hand placement, pivot to BSC with rwx min assist, cues for TTWB RLE, pt not able to maintain TTWB RLE, sit-stand from BSC min-mod, pivot from BSC to bed,  trans back to bed mod assist, pt would benefit from rehab                               Electronically signed by Marcia Dillon, PTA at 25 1133       Marcia Dillon, PTA at 25 1135  Version 1 of 1         [facility] - Physical Therapy Treatment Note  [facility]     Patient Name: Sunny Lacey  : 1921  MRN: [id number]  Today's Date: 2025      Visit Dx:     ICD-10-CM ICD-9-CM   1. Closed displaced fracture of pelvis, unspecified part of pelvis, initial encounter  S32.9XXA 808.8   2. Impaired mobility [Z74.09]  Z74.09 799.89     Patient Active Problem List   Diagnosis    Sepsis    Pneumonia due to infectious organism    At risk for falls    Hypokalemia    Pelvis fracture    Fall    Essential hypertension     Past Medical History:   Diagnosis Date    Anemia     Anxiety     Arthritis     Hypertension     Skin cancer      Past Surgical History:   Procedure Laterality Date    SKIN CANCER EXCISION       PT Assessment (Last 12 Hours)       PT Evaluation and Treatment       Row Name 25 1057 25 0748       Physical Therapy Time and Intention    Subjective Information complains of;weakness;fatigue;pain  - --  -    Document Type therapy note (daily note)  - --    Mode of Treatment physical therapy  - --    Session Not Performed -- other (see comments)  -    Comment, Session Not Performed -- check back later  -      Row Name 25 1057          General Information    Existing Precautions/Restrictions fall;right;weight bearing;other (see comments)  TTWB RLE  -     Limitations/Impairments hearing   -     Barriers to Rehab hearing deficit  -       Row Name 25 1057          Pain    Pain Location hip  -     Pain Side/Orientation right  -     Pain Management Interventions positioning techniques utilized  -     Response to Pain Interventions activity participation with tolerable pain  -       Row Name 25 1057          Pain Scale: FACES Pre/Post-Treatment     Pain: FACES Scale, Pretreatment 2-->hurts little bit  -     Posttreatment Pain Rating 4-->hurts little more  -[facility] Name 06/29/25 1057          Mobility    Extremity Weight-bearing Status right lower extremity  -     Right Lower Extremity (Weight-bearing Status) toe touch weight-bearing (TTWB)  -[facility] Name 06/29/25 1057          Bed Mobility    Bed Mobility supine-sit;sit-supine  -     Supine-Sit [place] (Bed Mobility) --  chair  -     Sit-Supine [place] (Bed Mobility) minimum assist (75% patient effort);moderate assist (50% patient effort);verbal cues  -[facility] Name 06/29/25 1057          Transfers    Comment, (Transfers) chair-BSC-bed  -[facility] Name 06/29/25 1057          Sit-Stand Transfer    Sit-Stand [place] (Transfers) verbal cues;moderate assist (50% patient effort)  -     Assistive Device (Sit-Stand Transfers) walker, front-wheeled  -[facility] Name 06/29/25 1057          Stand-Sit Transfer    Stand-Sit [place] (Transfers) verbal cues;moderate assist (50% patient effort);minimum assist (75% patient effort)  -     Assistive Device (Stand-Sit Transfers) walker, front-wheeled  -[facility] Name 06/29/25 1057          Toilet Transfer    [place] Level (Toilet Transfer) minimum assist (75% patient effort);moderate assist (50% patient effort);verbal cues  -     Assistive Device (Toilet Transfer) commode, bedside without drop arms;walker, front-wheeled  -[facility] Name 06/29/25 1057          Plan of Care Review    Plan of Care Reviewed With patient;daughter  -     Progress improving  -     Outcome Evaluation pt in chair, sit-stand min-mod, cues for hand placement, pivot to BSC with rwx min assist, cues for TTWB RLE, pt not able to maintain TTWB RLE, sit-stand from BSC min-mod, pivot from BSC to bed, trans back to bed mod assist, pt would benefit from rehab  -[facility] Name 06/29/25 1057          Positioning and Restraints    Pre-Treatment  Position sitting in chair/recliner  -     Post Treatment Position bed  -     In Bed side lying left;fowlers;call light within reach;encouraged to call for assist;with family/caregiver  -               User Key  (r) = Recorded By, (t) = Taken By, (c) = Cosigned By      Initials Name Provider Type     Marcia Dillon, PTA Physical Therapist Assistant                    Physical Therapy Education       Title: PT OT SLP Therapies (Done)       Topic: Physical Therapy (Done)       Point: Mobility training (Done)       Learning Progress Summary            Patient Acceptance, E,D, VU,NR by  at 6/27/2025 0728    Comment: educated on RLE TTWB precaution, benefits of PT and mobility   Family Acceptance, E,D, VU,NR by GM at 6/27/2025 0728    Comment: educated on RLE TTWB precaution, benefits of PT and mobility    Acceptance, E,TB, VU,NR by AR at 6/26/2025 1202                      Point: Precautions (Done)       Learning Progress Summary            Patient Acceptance, E,D, VU,NR by  at 6/27/2025 0728    Comment: educated on RLE TTWB precaution, benefits of PT and mobility   Family Acceptance, E,D, VU,NR by GM at 6/27/2025 0728    Comment: educated on RLE TTWB precaution, benefits of PT and mobility    Acceptance, E,TB, VU,NR by AR at 6/26/2025 1202                                      User Key       Initials Effective Dates Name Provider Type Discipline    AR 12/02/24 -  Cynthia Nathan, RN Registered Nurse Nurse     04/21/25 -  Deb Renner, WILLIE Student PT Student PT                  PT Recommendation and Plan     Plan of Care Reviewed With: patient, daughter  Progress: improving  Outcome Evaluation: pt in chair, sit-stand min-mod, cues for hand placement, pivot to BSC with rwx min assist, cues for TTWB RLE, pt not able to maintain TTWB RLE, sit-stand from BSC min-mod, pivot from BSC to bed, trans back to bed mod assist, pt would benefit from rehab   Outcome Measures       Row Name 06/28/25 1200 06/28/25 0800           How much help from another person do you currently need...    Turning from your back to your side while in flat bed without using bedrails? -- 3  -AH     Moving from lying on back to sitting on the side of a flat bed without bedrails? -- 3  -AH     Moving to and from a bed to a chair (including a wheelchair)? -- 2  -AH     Standing up from a chair using your arms (e.g., wheelchair, bedside chair)? -- 2  -AH     Climbing 3-5 steps with a railing? -- 1  -AH     To walk in hospital room? -- 2  -AH     AM-PAC 6 Clicks Score (PT) -- 13  -AH        How much help from another is currently needed...    Putting on and taking off regular lower body clothing? 2  -TS --     Bathing (including washing, rinsing, and drying) 2  -TS --     Toileting (which includes using toilet bed pan or urinal) 2  -TS --     Putting on and taking off regular upper body clothing 3  -TS --     Taking care of personal grooming (such as brushing teeth) 3  -TS --     Eating meals 3  -TS --     AM-PAC 6 Clicks Score (OT) 15  -TS --        Functional Assessment    Outcome Measure Options -- AM-PAC 6 Clicks Basic Mobility (PT)  -AH               User Key  (r) = Recorded By, (t) = Taken By, (c) = Cosigned By      Initials Name Provider Type    Marcia Glass, JASBIR Physical Therapist Assistant    Ciara Dupree COTA Occupational Therapist Assistant                     Time Calculation:    PT Charges       Row Name 06/29/25 1134             Time Calculation    Start Time 1057  -      Stop Time 1127  -      Time Calculation (min) 30 min  -      PT Received On 06/29/25  -         Time Calculation- PT    Total Timed Code Minutes- PT 30 minute(s)  -         Timed Charges    74711 - PT Therapeutic Activity Minutes 30  -AH         Total Minutes    Timed Charges Total Minutes 30  -AH       Total Minutes 30  -AH                User Key  (r) = Recorded By, (t) = Taken By, (c) = Cosigned By      Initials Name Provider Type    ISRAEL Dillon  Marcia KEYES PTA Physical Therapist Assistant                  Therapy Charges for Today       Code Description Service Date Service Provider Modifiers Qty    10604971658 HC PT THERAPEUTIC ACT EA 15 MIN 2025 Marcia Dillon PTA GP 2    44347024922 HC PT THERAPEUTIC ACT EA 15 MIN 2025 Marcia Dillon PTA GP 2            PT G-Codes  Outcome Measure Options: AM-PAC 6 Clicks Basic Mobility (PT)  AM-PAC 6 Clicks Score (PT): 18  AM-PAC 6 Clicks Score (OT): 15    Marcia Dillon PTA  2025      Electronically signed by Marcia Dillon PTA at 25 1135       Nohemy Fraire PTA at 25 0947  Version 1 of 1         [facility] - Physical Therapy Treatment Note  [facility]     Patient Name: Sunny Lacey  : 1921  MRN: [id number]  Today's Date: 2025      Visit Dx:     ICD-10-CM ICD-9-CM   1. Closed displaced fracture of pelvis, unspecified part of pelvis, initial encounter  S32.9XXA 808.8   2. Impaired mobility [Z74.09]  Z74.09 799.89     Patient Active Problem List   Diagnosis    Sepsis    Pneumonia due to infectious organism    At risk for falls    Hypokalemia    Pelvis fracture    Fall    Essential hypertension     Past Medical History:   Diagnosis Date    Anemia     Anxiety     Arthritis     Hypertension     Skin cancer      Past Surgical History:   Procedure Laterality Date    SKIN CANCER EXCISION       PT Assessment (Last 12 Hours)       PT Evaluation and Treatment       Row Name 25 0906 2545       Physical Therapy Time and Intention    Subjective Information --  -NW --    Document Type --  -NW therapy note (daily note)  -NW    Mode of Treatment --  -NW physical therapy  -NW      Row Name 25 0845          General Information    Existing Precautions/Restrictions fall;right;weight bearing;other (see comments)  TTWB RLE  -NW     Limitations/Impairments hearing  [place]  -NW       Row Name 2545          Pain    Pain Location hip  -NW     Pain Side/Orientation  right  -NW       Row Name 06/30/25 0845          Pain Scale: FACES Pre/Post-Treatment    Pain: FACES Scale, Pretreatment 2-->hurts little bit  -NW     Posttreatment Pain Rating 4-->hurts little more  -NW       Row Name 06/30/25 0845          Mobility    Extremity Weight-bearing Status right lower extremity  -NW     Right Lower Extremity (Weight-bearing Status) toe touch weight-bearing (TTWB)  -NW       Row Name 06/30/25 0845          Bed Mobility    Bed Mobility supine-sit;sit-supine  -NW     Supine-Sit [place] (Bed Mobility) verbal cues;minimum assist (75% patient effort);moderate assist (50% patient effort)  -NW       Row Name 06/30/25 0845          Sit-Stand Transfer    Sit-Stand [place] (Transfers) verbal cues;moderate assist (50% patient effort)  -     Assistive Device (Sit-Stand Transfers) walker, front-wheeled  -NW       Row Name 06/30/25 0845          Stand-Sit Transfer    Stand-Sit [place] (Transfers) verbal cues;moderate assist (50% patient effort);minimum assist (75% patient effort)  -     Assistive Device (Stand-Sit Transfers) walker, front-wheeled  -NW       Row Name 06/30/25 0845          Toilet Transfer    [place] Level (Toilet Transfer) minimum assist (75% patient effort);moderate assist (50% patient effort);verbal cues  -     Assistive Device (Toilet Transfer) commode, bedside without drop arms;walker, front-wheeled  -NW       Row Name 06/30/25 0845          Gait/Stairs (Locomotion)    Comment, (Gait/Stairs) pt able to stand and take steps bed-bsc-chair  cues to maintain TTWB  -NW       Row Name 06/30/25 0845          Safety Issues/Impairments Affecting Functional Mobility    Impairments Affecting Function (Mobility) cognition;strength  -NW       Row Name 06/30/25 0845          Positioning and Restraints    Pre-Treatment Position in bed  -NW     Post Treatment Position chair  -NW     In Chair reclined;call light within reach;encouraged to call for assist;with nsg  -NW                User Key  (r) = Recorded By, (t) = Taken By, (c) = Cosigned By      Initials Name Provider Type    NW Nohemy Fraire, PTA Physical Therapist Assistant                    Physical Therapy Education       Title: PT OT SLP Therapies (Done)       Topic: Physical Therapy (Done)       Point: Mobility training (Done)       Learning Progress Summary            Patient Acceptance, E,D, VU,NR by  at 6/27/2025 0728    Comment: educated on RLE TTWB precaution, benefits of PT and mobility   Family Acceptance, E,D, VU,NR by GM at 6/27/2025 0728    Comment: educated on RLE TTWB precaution, benefits of PT and mobility    Acceptance, E,TB, VU,NR by AR at 6/26/2025 1202                      Point: Precautions (Done)       Learning Progress Summary            Patient Acceptance, E,D, VU,NR by  at 6/27/2025 0728    Comment: educated on RLE TTWB precaution, benefits of PT and mobility   Family Acceptance, E,D, VU,NR by  at 6/27/2025 0728    Comment: educated on RLE TTWB precaution, benefits of PT and mobility    Acceptance, E,TB, VU,NR by AR at 6/26/2025 1202                                      User Key       Initials Effective Dates Name Provider Type Discipline    AR 12/02/24 -  Cynthia Nathan, NANCI Registered Nurse Nurse     04/21/25 -  Deb Renner, WILLIE Student PT Student PT                  PT Recommendation and Plan         Outcome Measures       Row Name 06/28/25 1200 06/28/25 0800          How much help from another person do you currently need...    Turning from your back to your side while in flat bed without using bedrails? -- 3  -AH     Moving from lying on back to sitting on the side of a flat bed without bedrails? -- 3  -AH     Moving to and from a bed to a chair (including a wheelchair)? -- 2  -AH     Standing up from a chair using your arms (e.g., wheelchair, bedside chair)? -- 2  -AH     Climbing 3-5 steps with a railing? -- 1  -AH     To walk in hospital room? -- 2  -AH     AM-PAC 6 Clicks Score (PT)  -- 13  -        How much help from another is currently needed...    Putting on and taking off regular lower body clothing? 2  -TS --     Bathing (including washing, rinsing, and drying) 2  -TS --     Toileting (which includes using toilet bed pan or urinal) 2  -TS --     Putting on and taking off regular upper body clothing 3  -TS --     Taking care of personal grooming (such as brushing teeth) 3  -TS --     Eating meals 3  -TS --     AM-PAC 6 Clicks Score (OT) 15  -TS --        Functional Assessment    Outcome Measure Options -- AM-PAC 6 Clicks Basic Mobility (PT)  -               User Key  (r) = Recorded By, (t) = Taken By, (c) = Cosigned By      Initials Name Provider Type     Marcia Dillon PTA Physical Therapist Assistant    Ciara Dupree COTA Occupational Therapist Assistant                     Time Calculation:         PT G-Codes  Outcome Measure Options: AM-PAC 6 Clicks Basic Mobility (PT)  AM-PAC 6 Clicks Score (PT): 15  AM-PAC 6 Clicks Score (OT): 15    Nohemy Fraire PTA  2025      Electronically signed by Nohemy Fraire PTA at 25 0942       Nohemy Fraire PTA at 25 0948  Version 1 of 1         Goal Outcome Evaluation:  Plan of Care Reviewed With: patient        Progress: improving  Outcome Evaluation: Bed mobility min/mod x1 w/ cues and extra time. Sat EOB before attempting to stand min/mod x1 pt was able to take few steps to bsc stand mult times to get cleaned up then steps to chair. Pt needed cues to maintain WB status. Pt will need snf upon d/c to cont working on strength, mobility and safety.                               Electronically signed by Nohemy Fraire PTA at 25 0947          Occupational Therapy Notes (last 48 hours)        Ciara Aguirre COTA at 25 1224          Acute Care - Occupational Therapy Treatment Note  [facility]     Patient Name: Sunny Lacey  : 1921  MRN: [id number]  Today's Date: 2025              Admit Date: 6/25/2025       ICD-10-CM ICD-9-CM   1. Closed displaced fracture of pelvis, unspecified part of pelvis, initial encounter  S32.9XXA 808.8   2. Impaired mobility [Z74.09]  Z74.09 799.89     Patient Active Problem List   Diagnosis    Sepsis    Pneumonia due to infectious organism    At risk for falls    Hypokalemia    Pelvis fracture    Fall     Past Medical History:   Diagnosis Date    Anemia     Anxiety     Arthritis     Hypertension     Skin cancer      Past Surgical History:   Procedure Laterality Date    SKIN CANCER EXCISION           OT ASSESSMENT FLOWSHEET (Last 12 Hours)       OT Evaluation and Treatment       Row Name 06/28/25 1004                   OT Time and Intention    Subjective Information complains of;pain  -TS        Document Type therapy note (daily note)  -TS        Mode of Treatment occupational therapy  -TS        Patient Effort good  -TS        Comment TTWB RLE  -TS           General Information    Existing Precautions/Restrictions fall;right;weight bearing;other (see comments)  -TS           Pain Assessment    Pretreatment Pain Rating 7/10  -TS        Posttreatment Pain Rating 8/10  -TS        Pain Location hip  -TS        Pain Side/Orientation right  -TS        Pain Management Interventions exercise or physical activity utilized;positioning techniques utilized  -TS        Response to Pain Interventions activity participation with tolerable pain  -TS           Cognition    Personal Safety Interventions fall prevention program maintained;gait belt;nonskid shoes/slippers when out of bed  -TS           Activities of Daily Living    BADL Assessment/Intervention bathing;upper body dressing;lower body dressing;toileting;grooming  -TS           Bathing Assessment/Intervention    [place] Level (Bathing) upper body;lower body;perineal area;moderate assist (50% patient effort);maximum assist (25% patient effort)  -TS        Assistive Devices (Bathing) grab bar, tub/shower;shower  commode chair, rolling  -TS        Position (Bathing) supported sitting  -TS           Upper Body Dressing Assessment/Training    [place] Level (Upper Body Dressing) don;doff;moderate assist (50% patient effort)  -TS        Position (Upper Body Dressing) supported sitting  -TS           Lower Body Dressing Assessment/Training    [place] Level (Lower Body Dressing) don;doff;pants/bottoms;socks;maximum assist (25% patient effort)  -TS        Position (Lower Body Dressing) supported sitting;supported standing  -TS           Grooming Assessment/Training    [place] Level (Grooming) wash face, hands;set up  -TS        Position (Grooming) supported sitting  -TS           Toileting Assessment/Training    [place] Level (Toileting) toileting skills;standby assist;adjust/manage clothing;perform perineal hygiene;maximum assist (25% patient effort)  -TS        Assistive Devices (Toileting) commode;grab bar/safety frame  -TS        Position (Toileting) unsupported sitting;supported standing  -TS           Bed Mobility    Sit-Supine [place] (Bed Mobility) minimum assist (75% patient effort)  -TS        Assistive Device (Bed Mobility) bed rails  -TS           Transfer Assessment/Treatment    Transfers sit-stand transfer;stand-sit transfer;toilet transfer;stand pivot/stand step transfer  -TS           Sit-Stand Transfer    Sit-Stand [place] (Transfers) minimum assist (75% patient effort);moderate assist (50% patient effort)  -TS        Assistive Device (Sit-Stand Transfers) walker, front-wheeled  -TS           Stand-Sit Transfer    Stand-Sit [place] (Transfers) minimum assist (75% patient effort)  -TS        Assistive Device (Stand-Sit Transfers) walker, front-wheeled  -TS           Stand Pivot/Stand Step Transfer    Stand Pivot/Stand Step [place] (Transfers) moderate assist (50% patient effort);maximum assist (25% patient effort);2 person assist  -TS           Toilet Transfer    Type (Toilet  Transfer) sit-stand;stand-sit;stand pivot/stand step  -TS        [place] Level (Toilet Transfer) minimum assist (75% patient effort);moderate assist (50% patient effort)  -TS        Assistive Device (Toilet Transfer) commode;grab bars/safety frame  -TS           Plan of Care Review    Plan of Care Reviewed With patient  -TS           Positioning and Restraints    Pre-Treatment Position sitting in chair/recliner  -TS        Post Treatment Position bed  -TS        In Bed fowlers;call light within reach;encouraged to call for assist;side rails up x2;with family/caregiver  -TS           Therapy Plan Review/Discharge Plan (OT)    Anticipated Discharge Disposition (OT) skilled nursing [facility]  -                  User Key  (r) = Recorded By, (t) = Taken By, (c) = Cosigned By      Initials Name Effective Dates    TS Ciara Aguirre COTA 02/03/23 -                      Occupational Therapy Education       Title: PT OT SLP Therapies (Done)       Topic: Occupational Therapy (Done)       Point: ADL training (Done)       Learning Progress Summary            Patient Acceptance, E, VU,NR by  at 6/27/2025 0857                      Point: Precautions (Done)       Learning Progress Summary            Patient Acceptance, E, VU,NR by  at 6/27/2025 0857                      Point: Body mechanics (Done)       Learning Progress Summary            Patient Acceptance, E, VU,NR by  at 6/27/2025 0857                                      User Key       Initials Effective Dates Name Provider Type Discipline     06/20/22 -  Candis Guzman, OTR/L Occupational Therapist OT                      OT Recommendation and Plan     Plan of Care Review  Plan of Care Reviewed With: patient  Plan of Care Reviewed With: patient     Outcome Measures       Row Name 06/28/25 1200 06/28/25 0800          How much help from another person do you currently need...    Turning from your back to your side while in flat bed without using bedrails?  -- 3  -AH     Moving from lying on back to sitting on the side of a flat bed without bedrails? -- 3  -AH     Moving to and from a bed to a chair (including a wheelchair)? -- 2  -AH     Standing up from a chair using your arms (e.g., wheelchair, bedside chair)? -- 2  -AH     Climbing 3-5 steps with a railing? -- 1  -AH     To walk in hospital room? -- 2  -AH     AM-PAC 6 Clicks Score (PT) -- 13  -AH        How much help from another is currently needed...    Putting on and taking off regular lower body clothing? 2  -TS --     Bathing (including washing, rinsing, and drying) 2  -TS --     Toileting (which includes using toilet bed pan or urinal) 2  -TS --     Putting on and taking off regular upper body clothing 3  -TS --     Taking care of personal grooming (such as brushing teeth) 3  -TS --     Eating meals 3  -TS --     AM-PAC 6 Clicks Score (OT) 15  -TS --        Functional Assessment    Outcome Measure Options -- AM-PAC 6 Clicks Basic Mobility (PT)  -AH               User Key  (r) = Recorded By, (t) = Taken By, (c) = Cosigned By      Initials Name Provider Type     Marcia Dillon, JASBIR Physical Therapist Assistant    Ciara Dupree COTA Occupational Therapist Assistant                    Time Calculation:    Time Calculation- OT       Row Name 06/28/25 1224             Time Calculation- OT    OT Start Time 1004  -TS      OT Stop Time 1127  -TS      OT Time Calculation (min) 83 min  -TS      Total Timed Code Minutes- OT 83 minute(s)  -TS      OT Received On 06/28/25  -TS         Timed Charges    95461 - OT Self Care/Mgmt Minutes 83  -TS         Total Minutes    Timed Charges Total Minutes 83  -TS       Total Minutes 83  -TS                User Key  (r) = Recorded By, (t) = Taken By, (c) = Cosigned By      Initials Name Provider Type    Ciara Dupree COTA Occupational Therapist Assistant                  Therapy Charges for Today       Code Description Service Date Service Provider Modifiers  Qty    18689988043 HC OT SELF CARE/MGMT/TRAIN EA 15 MIN 6/28/2025 Ciara Aguirre COTA GO 6                 Ciara BOURGEOIS. MARCELA Aguirre  6/28/2025    Electronically signed by Ciara Aguirre COTA at 06/28/25 1225        Maurizio Borges MD   Physician  Hospitalist     Progress Notes      Signed     Date of Service: 06/29/25 0739  Creation Time: 06/29/25 0739     Signed         Show:  []Written[]Templated[]Copied    Added by:  [x]Maurizio Borges MD    []Renzo for details       [facility] Medicine Services  INPATIENT PROGRESS NOTE     Patient Name: Sunny Lacey  Date of Admission: 6/25/2025  Today's Date: 06/29/25  Length of Stay: 1  Primary Care Physician: Nevaeh Thornton PA     Subjective   Chief Complaint: Hip fracture/falling  HPI   Patient is awake alert working physical therapy reason for elevated blood pressure, afebrile.  Potassium normalized.  Hemoglobin normal.  White blood cells normal.  Thrombocytopenia noted.     Review of Systems   Constitutional:  Positive for activity change, appetite change and fatigue. Negative for chills and fever.   HENT:  Negative for hearing loss, nosebleeds, tinnitus and trouble swallowing.    Eyes:  Negative for visual disturbance.   Respiratory:  Negative for cough, chest tightness, shortness of breath and wheezing.    Cardiovascular:  Negative for chest pain, palpitations and leg swelling.   Gastrointestinal:  Negative for abdominal distention, abdominal pain, blood in stool, constipation, diarrhea, nausea and vomiting.   Endocrine: Negative for cold intolerance, heat intolerance, polydipsia, polyphagia and polyuria.   Genitourinary:  Negative for decreased urine volume, difficulty urinating, dysuria, flank pain, frequency and hematuria.   Musculoskeletal:  Positive for arthralgias, gait problem and myalgias. Negative for joint swelling.   Skin:  Negative for rash.   Allergic/Immunologic: Negative for immunocompromised state.    Neurological:  Positive for weakness. Negative for dizziness, syncope, light-headedness and headaches.   Hematological:  Negative for adenopathy. Does not bruise/bleed easily.   Psychiatric/Behavioral:  Negative for confusion and sleep disturbance. The patient is not nervous/anxious.          All pertinent negatives and positives are as above. All other systems have been reviewed and are negative unless otherwise stated.      Objective    Temp:  [97.1 °F (36.2 °C)-98.4 °F (36.9 °C)] 98.4 °F (36.9 °C)  Heart Rate:  [] 99  Resp:  [16-18] 18  BP: (111-161)/(54-76) 152/56  Physical Exam  Vitals and nursing note reviewed.   Constitutional:       Comments: Advanced age.  Deconditioning.   HENT:      Head: Normocephalic.   Eyes:      Conjunctiva/sclera: Conjunctivae normal.      Pupils: Pupils are equal, round, and reactive to light.   Cardiovascular:      Rate and Rhythm: Normal rate and regular rhythm.      Heart sounds: Normal heart sounds.   Pulmonary:      Effort: Pulmonary effort is normal. No respiratory distress.      Breath sounds: Normal breath sounds.      Comments: Patient is on room air.  Abdominal:      General: Bowel sounds are normal. There is no distension.      Palpations: Abdomen is soft.      Tenderness: There is no abdominal tenderness.      Comments: Depend.    Musculoskeletal:         General: No swelling.      Cervical back: Neck supple.   Skin:     General: Skin is warm and dry.      Capillary Refill: Capillary refill takes 2 to 3 seconds.      Findings: No rash.   Neurological:      Mental Status: She is alert.      Motor: Weakness present.      Coordination: Coordination abnormal.      Gait: Gait abnormal.            Results Review:  I have reviewed the labs, radiology results, and diagnostic studies.     Laboratory Data:         Results from last 7 days   Lab Units 06/26/25  0444 06/25/25  1917   WBC 10*3/mm3 7.25 6.76   HEMOGLOBIN g/dL 13.3 12.8   HEMATOCRIT % 39.6 38.9   PLATELETS  "10*3/mm3 122* 119*                Results from last 7 days   Lab Units 06/29/25  0312 06/26/25  0444 06/25/25  1917   SODIUM mmol/L 144 143 144   POTASSIUM mmol/L 4.1 3.2* 3.7   CHLORIDE mmol/L 107 103 105   CO2 mmol/L 26.0 27.0 28.0   BUN mg/dL 17.2 11.9 13.9   CREATININE mg/dL 0.84 0.71 0.86   CALCIUM mg/dL 9.6 9.5 9.7*   BILIRUBIN mg/dL  --  0.6 0.4   ALK PHOS U/L  --  104 101   ALT (SGPT) U/L  --  13 14   AST (SGOT) U/L  --  26 29   GLUCOSE mg/dL 101* 103* 102*         Culture Data:   No results found for: \"BLOODCX\", \"URINECX\", \"WOUNDCX\", \"MRSACX\", \"RESPCX\", \"STOOLCX\"     Radiology Data:   Imaging Results (Last 24 Hours)         ** No results found for the last 24 hours. **                I have reviewed the patient's current medications.      Assessment/Plan   Assessment       Active Hospital Problems     Diagnosis      **Pelvis fracture      Essential hypertension      Fall      Hypokalemia           Treatment Plan  Pelvis fracture -nonoperative.  Currently not requiring much in the way of pain medicine but has ambulatory dysfunction and needs ongoing therapy.  She is from [place] will not be able to return at this time due to not being independent.  Plan for rehab placement.  Consulted orthopedics.  CT cervical spine-No acute osseous findings, Moderate multilevel cervical spine degenerative change.  CT scan of pelvic-Acute mildly displaced fracture of the right acetabulum, Acute comminuted and displaced fracture of the right inferior pubic ramus, No evidence of left-sided pelvic or hip fracture.  CT scan head-No acute intracranial findings.      Essential hypertension.  Amlodipine.  Nitro as needed.     Hypokalemia.  Resolved.  Potassium replacement protocol.  Potassium daily.     Constipation.  Colace.     Anxiety/depression.  Cymbalta .     Anemia . Hemoglobin is normal.  Iron sulfate.     Thrombocytopenia.  Slight increase in platelets.     Neuropathy.  Neurontin.     Advanced age . 89 years old.   " "  Nutrition . Regular diet.     Deconditioning/gait/falling.  PT OT consult.  Fall precaution.  Will depends . Walker at baseline with 1-2 assist.     Patient will need rehab placement.     DNR . DNI.     Medical Decision Making  Number and Complexity of problems: Pelvic fracture/hypertension/hypokalemia/advanced age .  Differential Diagnosis: None     Conditions and Status        Condition is unchanged.     [facility] Data  External documents reviewed: Previous note .  Cardiac tracing (EKG, telemetry) interpretation: No monitor.  Radiology interpretation: CT scan  Labs reviewed: Laboratory  Any tests that were considered but not ordered: Lab in a.m.     Decision rules/scores evaluated (example [id number], [place], etc): None     Discussed with: Patient .      Care Planning  Shared decision making: Patient .  Code status and discussions: DNR . DNI     Disposition  Social Determinants of Health that impact treatment or disposition: From assisted living .  Pending rehab placement        Electronically signed by Maurizio Borges MD, 06/29/25, 07:39 CDT.                     ED to Hosp-Admission (Current) on 6/25/2025            Detailed Report            ROS Info          Note shared with patient  Additional Documentation    Vitals: /64 (BP Location: Right arm, Patient Position: Lying)     Pulse 92     Temp 97.9 °F (36.6 °C) (Oral)     Resp 18     Ht 165.1 cm (65\")     Wt 63 kg (139 lb)     SpO2 95%     BMI 23.13 kg/m²     BSA 1.7 m²          More Vitals   Flowsheets: HPI,     Pain,     Acuity/Destination,     Sepsis Predictive Model,     ...(75 more)   SmartForms:  ED TRAUMA - PHYSICAL DIAGRAM   Encounter Info: Billing Info,     History,     Allergies,     Detailed Report     Encounter Information    Encounter Information   Department Encounter #   6/25/2025  6:20 PM  [facility] 3A [id number]     Care Timeline    06/26   Admitted from ED 1600     Clinical Impressions      Closed displaced fracture of pelvis, unspecified part " of pelvis, initial encounter  Disposition      Decision to Admit   Orders Placed      Labs    Basic Metabolic Panel Morning Draw    CBC & Differential STAT  ...(13 more)    Imaging    CT Cervical Spine Without Contrast 1 Time Imaging    CT Head Without Contrast 1 Time Imaging    CT Lower Extremity Right Without Contrast 1 Time Imaging    CT Pelvis Without Contrast 1 Time Imaging    Other Orders    ECG 12 Lead Syncope STAT    ECG Scan No remaining occurrences  ...(27 more)  All Encounter Results  Care Timeline    06/25       ECG Scan     Telemetry Scan     Telemetry Scan   1820   Arrived   1917   Comprehensive Metabolic Panel      CBC & Differential      High Sensitivity Troponin T    1922   Acetaminophen 500 mg   1935   ECG 12 Lead Syncope   1952   CT Head Without Contrast     CT Cervical Spine Without Contrast     CT Pelvis Without Contrast     CT Lower Extremity Right Without Contrast   2042   High Sensitivity Troponin T 1Hr    2111   Admitted (ED Board[facility])   06/26     0106   Sodium Chloride Flush 10 mL   0138   HYDROcodone-Acetaminophen 1 tablet   0444   Comprehensive Metabolic Panel      Magnesium     Phosphorus     CBC (No Diff)    0918   Sodium Chloride Flush 10 mL   0926   Docusate Sodium 100 mg     Ferrous Sulfate 325 mg   1035   Placed in Observation   1326   HYDROcodone-Acetaminophen 1 tablet   1329   amLODIPine Besylate 5 mg   1600   Transferred to [facility] 3A     Medications Administered    Acetaminophen (2 administrations)  amLODIPine Besylate (5 administrations)  Docusate Sodium (5 administrations)  DULoxetine HCl 30 mg, 30 mg  Ferrous Sulfate (5 administrations)  Gabapentin 300 mg, 300 mg  HYDROcodone-Acetaminophen 1 tablet, 1 tablet  Latanoprost 1 drop, 1 drop  Potassium Chloride [place] ER (4 administrations)  Sodium Chloride Flush (3 administrations)  Visit Diagnoses      Closed displaced fracture of pelvis, unspecified part of pelvis, initial encounter    Impaired mobility  [Z74.09]  Problem List  Media  From this encounter  Electronic signature on 6/26/2025 1053 - E-signed   Electronic signature on 6/25/2025 1853 - E-signed   Electronic signature on 6/25/2025 1852 - E-signed   Scan on 6/27/2025 1443 by [employer] [place]: MEDICATION LIST, [facility], 06/25/2025   Scan on 6/26/2025 1118 by [employer], [facility] 6/26/25     Committee Details    There is no Committee Review information to display for this encounter.     Committee Members    Occupational Therapy: Lux Louis, OTR/L, CNT (Occupational Therapist)  Candis Guzman, OTR/L (Occupational Therapist)  Ciara Aguirre, MARCELA (Occupational Therapy Assistant) Orthopedic Surgery: Patricio Csatillo MD (Consulting Physician)   Palliative Care: Viktoria Yost, APRN (Nurse Practitioner) Physical Therapy: Diamond Torres, PT DPT (Physical Therapist)  Tess Beckman, PT (Physical Therapist)  Marcia Dillon, PTA (Physical Therapy Assistant)  Michel Sanchez, PT (Physical Therapist)  Deb Renner, PT Student (Physical Therapy Student)  [place]ad[place], PT, DPT, NCS (Physical Therapist)  Nohemy Fraire, PTA (Physical Therapy Assistant)  Candis Quiles, PTA (Physical Therapy Assistant)   Other Attendees: Leila Elaine, CRT (Respiratory Therapist)  Jessica Lord RDN, LD (Dietitian)  Nirav Nunez, PCT (Patient Care Technician)  Cece Cortez, RRT (Respiratory Therapist)  Cynthia Desir, PCT (Patient Care Technician)  Smita Pete, MSW ()  Jade Orozco, NANCI (Registered Nurse)  Abi Darling, CRT (Respiratory Therapist)  Kasie Munoz, RN (Registered Nurse)  Nevaeh Hollingsworth, RN (Registered Nurse)  Mary Rodríguez, PCT (Patient Care Technician)  Evonne Nettles, NANCI (Registered Nurse)  Siri Jolley, RRT (Respiratory Therapist)  Nathaniel Berman, PCT (Patient Care Technician)  Jenifer Sanchez, PCT (Patient Care Technician)  Ventura Smith (Social  Worker)  Kaitlin Gillespie, PCT (Patient Care Technician)  Edelmira Eisenberg, RN (Registered Nurse)  Roland Bolaños, OT Student (Occupational Therapy student)  Cheyenne Callejas, PCT (Patient Care Technician)  Madonna Watson, RRT (Respiratory Therapist)  Lisa Weems, PCT (Technician)  Nick Ford ([facility])  Lynne Edwards, RN (Registered Nurse)  Andrea Campos, PCT (Patient Care Technician)  Alexander Rich, NANCI (Registered Nurse)  Lucia Christie, PCT (Patient Care Technician)  Rocio Frederick, RRT (Respiratory Therapist)  Sherice Frederick, NANCI (Registered Nurse)  Nevaeh Nathan, RN (Registered Nurse)  Beka Grossman, RN (Registered Nurse)  Nimisha Rogel, RN ()  Sylvie Tolentino, RN (Registered Nurse)  Deirdre Lynch, RN (Registered Nurse)  Cynthia Nathan, RN (Registered Nurse)  Severs, Elizabeth D, RRT (Respiratory Therapist)  Yulissa Joseph LPN (Licensed Practical Nurse)  eLnka Ortez, RN (Patient Care Technician)  Claritza Lal, PCT (Patient Care Technician)  Beena Cruz RDN, LD (Dietitian)  Jennifer Walker, RN ()

## 2025-06-30 NOTE — THERAPY TREATMENT NOTE
Acute Care - Occupational Therapy Treatment Note  ADRIANA Negrete     Patient Name: Sunny Lacey  : 1921  MRN: [id number]  Today's Date: 2025             Admit Date: 2025       ICD-10-CM ICD-9-CM   1. Closed displaced fracture of pelvis, unspecified part of pelvis, initial encounter  S32.9XXA 808.8   2. Impaired mobility [Z74.09]  Z74.09 799.89     Patient Active Problem List   Diagnosis    Sepsis    Pneumonia due to infectious organism    At risk for falls    Hypokalemia    Pelvis fracture    Fall    Essential hypertension     Past Medical History:   Diagnosis Date    Anemia     Anxiety     Arthritis     Hypertension     Skin cancer      Past Surgical History:   Procedure Laterality Date    SKIN CANCER EXCISION           OT ASSESSMENT FLOWSHEET (Last 12 Hours)       OT Evaluation and Treatment       Row Name 25 1345                   OT Time and Intention    Subjective Information complains of;pain  -AC (r) BH (t) AC (c)        Document Type therapy note (daily note)  -AC (r) BH (t) AC (c)        Mode of Treatment occupational therapy  -AC (r) BH (t) AC (c)           General Information    Existing Precautions/Restrictions fall;weight bearing;other (see comments)  TTWB R LE, Very hard of hearing  -AC (r) BH (t) AC (c)           Pain Assessment    Pretreatment Pain Rating 8/10  -AC (r) BH (t) AC (c)        Posttreatment Pain Rating 6/10  -AC (r) BH (t) AC (c)        Pain Location hip  -AC (r) BH (t) AC (c)        Pain Side/Orientation right  -AC (r) BH (t) AC (c)        Pain Management Interventions exercise or physical activity utilized;premedicated for activity  -AC (r) BH (t) AC (c)        Response to Pain Interventions activity participation with decreased pain  -AC (r) BH (t) AC (c)           Cognition    Cognitive Status Pt presents alert and oriented to person. Pt is conversationally confused at times and is very hard of hearing which makes command following difficult but pt able to  follow commands aprox 75% of time throughout tx.  -AC (r) BH (t) AC (c)        Orientation Status (Cognition) oriented to;person;disoriented to;place;situation;time  -AC (r) BH (t) AC (c)        Personal Safety Interventions fall prevention program maintained;gait belt;muscle strengthening facilitated;nonskid shoes/slippers when out of bed;supervised activity;elopement precautions initiated  -AC (r) BH (t) AC (c)           Activities of Daily Living    BADL Assessment/Intervention lower body dressing;toileting  -AC (r) BH (t) AC (c)           Lower Body Dressing Assessment/Training    [place] Level (Lower Body Dressing) don;doff;pants/bottoms;socks;maximum assist (25% patient effort);verbal cues;nonverbal cues (demo/gesture)  -AC (r) BH (t) AC (c)        Position (Lower Body Dressing) supported sitting;supported standing  -AC (r) BH (t) AC (c)           Grooming Assessment/Training    [place] Level (Grooming) wash face, hands;set up  -AC (r) BH (t) AC (c)        Position (Grooming) --  Seated in chair  -AC (r) BH (t) AC (c)           Toileting Assessment/Training    [place] Level (Toileting) adjust/manage clothing;perform perineal hygiene;maximum assist (25% patient effort);verbal cues;nonverbal cues (demo/gesture)  -AC (r) BH (t) AC (c)        Assistive Devices (Toileting) commode, bedside with drop arms  -AC (r) BH (t) AC (c)        Position (Toileting) unsupported sitting;supported standing  -AC (r) BH (t) AC (c)           Functional Mobility    Functional Mobility- Ind. Level moderate assist (50% patient effort);2 person assist required  -AC (r) BH (t) AC (c)        Functional Mobility- Device walker, front-wheeled  -AC (r) BH (t) AC (c)        Functional Mobility-Maintain WBing unable to maintain weight bearing status  Unable to maintain TTWB even with constant verbal/tactile cues provided.  -AC (r) BH (t) AC (c)        Functional Mobility- Comment From chair <> BSC  -AC (r) BH (t) AC (c)            Transfer Assessment/Treatment    Transfers sit-stand transfer;stand-sit transfer;toilet transfer;stand pivot/stand step transfer  -AC (r) BH (t) AC (c)        Maintains Weight-bearing Status (Transfers) unable to maintain;nonverbal cues (demo/gesture) to maintain;verbal cues to maintain  -AC (r) BH (t) AC (c)           Sit-Stand Transfer    Sit-Stand [place] (Transfers) moderate assist (50% patient effort);2 person assist;verbal cues;nonverbal cues (demo/gesture)  -AC (r) BH (t) AC (c)        Assistive Device (Sit-Stand Transfers) walker, front-wheeled  -AC (r) BH (t) AC (c)           Stand-Sit Transfer    Stand-Sit [place] (Transfers) moderate assist (50% patient effort);2 person assist;verbal cues;nonverbal cues (demo/gesture)  -AC (r) BH (t) AC (c)        Assistive Device (Stand-Sit Transfers) walker, front-wheeled  -AC (r) BH (t) AC (c)           Stand Pivot/Stand Step Transfer    Stand Pivot/Stand Step [place] (Transfers) moderate assist (50% patient effort);2 person assist;verbal cues;nonverbal cues (demo/gesture)  -AC (r) BH (t) AC (c)        Assistive Device (Stand Pivot Stand Step Transfer) walker, front-wheeled  -AC (r) BH (t) AC (c)           Toilet Transfer    Type (Toilet Transfer) sit-stand;stand-sit;stand pivot/stand step  -AC (r) BH (t) AC (c)        [place] Level (Toilet Transfer) moderate assist (50% patient effort);2 person assist;verbal cues;nonverbal cues (demo/gesture)  -AC (r) BH (t) AC (c)        Assistive Device (Toilet Transfer) commode, bedside without drop arms;walker, front-wheeled  -AC (r) BH (t) AC (c)           Plan of Care Review    Plan of Care Reviewed With patient  -AC (r) BH (t) AC (c)        Progress no change  -AC (r) BH (t) AC (c)        Outcome Evaluation OT tx complete. Pt seen seated in chair with increased confusion as she stated could not remember how to hit her call light to ask for help and needed to use the BSC. Pt is very [place] which makes command  following and safety precaution follow through difficult throughout tx. Pt educated on TTWB status of R LE and verbalized understanding. Pt completed all functional mobility, t/fs, and transitions with ModAx2. Even with constant verbal/tactile cues pt unable to maintain TTWB on R LE. Pt required MaxA to complete LB dressing and toileting hygiene/clothing management. Pt is quick to fatigue during tx and congitive/fucntional status currently inhibits her from completing ADL's without MaxA. OT will continue to tx as indicated. Recommend SNF at d/c.  -AC (r) BH (t) AC (c)           Positioning and Restraints    Pre-Treatment Position sitting in chair/recliner  -AC (r) BH (t) AC (c)        Post Treatment Position chair  -AC (r) BH (t) AC (c)        In Chair reclined;call light within reach;encouraged to call for assist;exit alarm on  -AC (r) BH (t) AC (c)           Therapy Plan Review/Discharge Plan (OT)    Anticipated Discharge Disposition (OT) skilled nursing facility  -AC (r) BH (t) AC (c)                  User Key  (r) = Recorded By, (t) = Taken By, (c) = Cosigned By      Initials Name Effective Dates    AC Lux Louis, OTR/L, CNT 02/03/23 -     Roland Brown, MARY Student 05/12/25 -                      Occupational Therapy Education       Title: PT OT SLP Therapies (In Progress)       Topic: Occupational Therapy (In Progress)       Point: ADL training (In Progress)       Learning Progress Summary            Patient Acceptance, E, NR,NL by  at 6/30/2025 1517    Comment: OT POC, OT role in care, t/f training, WB status of R LE, d/c planning.    Acceptance, E, VU,NR by  at 6/27/2025 0857                      Point: Precautions (In Progress)       Learning Progress Summary            Patient Acceptance, E, NR,NL by  at 6/30/2025 1517    Comment: OT POC, OT role in care, t/f training, WB status of R LE, d/c planning.    Acceptance, E, VU,NR by LS at 6/27/2025 0857                      Point: Body mechanics  (In Progress)       Learning Progress Summary            Patient Acceptance, E, NR,NL by  at 6/30/2025 0053    Comment: OT POC, OT role in care, t/f training, WB status of R LE, d/c planning.    Acceptance, E, VU,NR by  at 6/27/2025 9294                                      User Key       Initials Effective Dates Name Provider Type Discipline     06/20/22 -  Candis Guzman, OTR/L Occupational Therapist OT     05/12/25 -  Roland Bolaños, OT Student OT Student OT                      OT Recommendation and Plan     Plan of Care Review  Plan of Care Reviewed With: patient  Progress: no change  Outcome Evaluation: OT tx complete. Pt seen seated in chair with increased confusion as she stated could not remember how to hit her call light to ask for help and needed to use the BSC. Pt is very [place] which makes command following and safety precaution follow through difficult throughout tx. Pt educated on TTWB status of R LE and verbalized understanding. Pt completed all functional mobility, t/fs, and transitions with ModAx2. Even with constant verbal/tactile cues pt unable to maintain TTWB on R LE. Pt required MaxA to complete LB dressing and toileting hygiene/clothing management. Pt is quick to fatigue during tx and congitive/fucntional status currently inhibits her from completing ADL's without MaxA. OT will continue to tx as indicated. Recommend SNF at d/c.  Plan of Care Reviewed With: patient  Outcome Evaluation: OT tx complete. Pt seen seated in chair with increased confusion as she stated could not remember how to hit her call light to ask for help and needed to use the BSC. Pt is very [place] which makes command following and safety precaution follow through difficult throughout tx. Pt educated on TTWB status of R LE and verbalized understanding. Pt completed all functional mobility, t/fs, and transitions with ModAx2. Even with constant verbal/tactile cues pt unable to maintain TTWB on R LE. Pt required MaxA to complete  LB dressing and toileting hygiene/clothing management. Pt is quick to fatigue during tx and congitive/fucntional status currently inhibits her from completing ADL's without MaxA. OT will continue to tx as indicated. Recommend SNF at d/c.     Outcome Measures       Row Name 06/30/25 1345 06/28/25 1200 06/28/25 0800       How much help from another person do you currently need...    Turning from your back to your side while in flat bed without using bedrails? -- -- 3  -AH    Moving from lying on back to sitting on the side of a flat bed without bedrails? -- -- 3  -AH    Moving to and from a bed to a chair (including a wheelchair)? -- -- 2  -AH    Standing up from a chair using your arms (e.g., wheelchair, bedside chair)? -- -- 2  -AH    Climbing 3-5 steps with a railing? -- -- 1  -AH    To walk in hospital room? -- -- 2  -AH    AM-PAC 6 Clicks Score (PT) -- -- 13  -AH       How much help from another is currently needed...    Putting on and taking off regular lower body clothing? 2  -AC (r) BH (t) AC (c) 2  -TS --    Bathing (including washing, rinsing, and drying) 2  -AC (r) BH (t) AC (c) 2  -TS --    Toileting (which includes using toilet bed pan or urinal) 2  -AC (r) BH (t) AC (c) 2  -TS --    Putting on and taking off regular upper body clothing 3  -AC (r) BH (t) AC (c) 3  -TS --    Taking care of personal grooming (such as brushing teeth) 3  -AC (r) BH (t) AC (c) 3  -TS --    Eating meals 3  -AC (r) BH (t) AC (c) 3  -TS --    AM-PAC 6 Clicks Score (OT) 15  -AC (r) BH (t) 15  -TS --       Functional Assessment    Outcome Measure Options AM-PAC 6 Clicks Daily Activity (OT)  -AC (r) BH (t) AC (c) -- AM-PAC 6 Clicks Basic Mobility (PT)  -AH              User Key  (r) = Recorded By, (t) = Taken By, (c) = Cosigned By      Initials Name Provider Type    Lux Plunkett, OTR/L, CNT Occupational Therapist    Marcia Glass, PTA Physical Therapist Assistant    TS Ciara Aguirre, MARCELA Occupational Therapist  Assistant    Roland Brown, OT Student OT Student                    Time Calculation:    Time Calculation- OT       Row Name 06/30/25 1457             Time Calculation- OT    OT Start Time 1342  -AC (r) BH (t) AC (c)      OT Stop Time 1410  -AC (r) BH (t) AC (c)      OT Time Calculation (min) 28 min  -AC (r) BH (t)      Total Timed Code Minutes- OT 28 minute(s)  -AC (r) BH (t) AC (c)      OT Received On 06/30/25  -AC (r) BH (t) AC (c)         Timed Charges    40923 - OT Self Care/Mgmt Minutes 28  -AC (r) BH (t) AC (c)         Total Minutes    Timed Charges Total Minutes 28  -AC (r) BH (t)       Total Minutes 28  -AC (r) BH (t)                User Key  (r) = Recorded By, (t) = Taken By, (c) = Cosigned By      Initials Name Provider Type    AC Lux Louis, OTR/L, CNT Occupational Therapist    Roland Brown, OT Student OT Student                           Roland Bolaños, MARY Student  6/30/2025

## 2025-06-30 NOTE — PLAN OF CARE
Goal Outcome Evaluation:  Plan of Care Reviewed With: patient        Progress: no change  Outcome Evaluation: OT tx complete. Pt seen seated in chair with increased confusion as she stated could not remember how to hit her call light to ask for help and needed to use the BSC. Pt is very [place] which makes command following and safety precaution follow through difficult throughout tx. Pt educated on TTWB status of R LE and verbalized understanding. Pt completed all functional mobility, t/fs, and transitions with ModAx2. Even with constant verbal/tactile cues pt unable to maintain TTWB on R LE. Pt required MaxA to complete LB dressing and toileting hygiene/clothing management. Pt is quick to fatigue during tx and congitive/fucntional status currently inhibits her from completing ADL's without MaxA. OT will continue to tx as indicated. Recommend SNF at d/c.    Anticipated Discharge Disposition (OT): skilled nursing facility

## 2025-06-30 NOTE — PLAN OF CARE
Goal Outcome Evaluation:  Plan of Care Reviewed With: patient        Progress: improving  Outcome Evaluation: Bed mobility min/mod x1 w/ cues and extra time. Sat EOB before attempting to stand min/mod x1 pt was able to take few steps to bsc stand mult times to get cleaned up then steps to chair. Pt needed cues to maintain WB status. Pt will need snf upon d/c to cont working on strength, mobility and safety.

## 2025-06-30 NOTE — CASE MANAGEMENT/SOCIAL WORK
Continued Stay Note   [place]     Patient Name: Sunny Lacey  MRN: [id number]  Today's Date: 6/30/2025    Admit Date: 6/25/2025    Plan: Referral to [facility] back program   Discharge Plan       Row Name 06/30/25 1046       Plan    Plan Comments SW spoke to William at [facility] and he did not receive referral. Referral faxed to William at [phone]. Await answer.                   Discharge Codes    No documentation.                       GEETA Delarosa

## 2025-06-30 NOTE — THERAPY TREATMENT NOTE
Acute Care - Physical Therapy Treatment Note  [facility]     Patient Name: Sunny Lacey  : 1921  MRN: [id number]  Today's Date: 2025      Visit Dx:     ICD-10-CM ICD-9-CM   1. Closed displaced fracture of pelvis, unspecified part of pelvis, initial encounter  S32.9XXA 808.8   2. Impaired mobility [Z74.09]  Z74.09 799.89     Patient Active Problem List   Diagnosis    Sepsis    Pneumonia due to infectious organism    At risk for falls    Hypokalemia    Pelvis fracture    Fall    Essential hypertension     Past Medical History:   Diagnosis Date    Anemia     Anxiety     Arthritis     Hypertension     Skin cancer      Past Surgical History:   Procedure Laterality Date    SKIN CANCER EXCISION       PT Assessment (Last 12 Hours)       PT Evaluation and Treatment       Row Name 25 0906 25 0845       Physical Therapy Time and Intention    Subjective Information --  -NW --    Document Type --  -NW therapy note (daily note)  -NW    Mode of Treatment --  -NW physical therapy  -NW      Row Name 25 0845          General Information    Existing Precautions/Restrictions fall;right;weight bearing;other (see comments)  TTWB RLE  -NW     Limitations/Impairments hearing  [place]  -NW       Row Name 25 0845          Pain    Pain Location hip  -NW     Pain Side/Orientation right  -NW       Row Name 25 0845          Pain Scale: FACES Pre/Post-Treatment    Pain: FACES Scale, Pretreatment 2-->hurts little bit  -NW     Posttreatment Pain Rating 4-->hurts little more  -NW       Row Name 25 0845          Mobility    Extremity Weight-bearing Status right lower extremity  -NW     Right Lower Extremity (Weight-bearing Status) toe touch weight-bearing (TTWB)  -NW       Row Name 25 0845          Bed Mobility    Bed Mobility supine-sit;sit-supine  -NW     Supine-Sit [place] (Bed Mobility) verbal cues;minimum assist (75% patient effort);moderate assist (50% patient effort)  -NW       Row  Name 06/30/25 0845          Sit-Stand Transfer    Sit-Stand [place] (Transfers) verbal cues;moderate assist (50% patient effort)  -NW     Assistive Device (Sit-Stand Transfers) walker, front-wheeled  -NW       Row Name 06/30/25 0845          Stand-Sit Transfer    Stand-Sit [place] (Transfers) verbal cues;moderate assist (50% patient effort);minimum assist (75% patient effort)  -NW     Assistive Device (Stand-Sit Transfers) walker, front-wheeled  -NW       Row Name 06/30/25 0845          Toilet Transfer    [place] Level (Toilet Transfer) minimum assist (75% patient effort);moderate assist (50% patient effort);verbal cues  -NW     Assistive Device (Toilet Transfer) commode, bedside without drop arms;walker, front-wheeled  -NW       Row Name 06/30/25 0845          Gait/Stairs (Locomotion)    Comment, (Gait/Stairs) pt able to stand and take steps bed-bsc-chair  cues to maintain TTWB  -NW       Row Name 06/30/25 0845          Safety Issues/Impairments Affecting Functional Mobility    Impairments Affecting Function (Mobility) cognition;strength  -NW       Row Name 06/30/25 0845          Positioning and Restraints    Pre-Treatment Position in bed  -NW     Post Treatment Position chair  -NW     In Chair reclined;call light within reach;encouraged to call for assist;with nsg  -NW               User Key  (r) = Recorded By, (t) = Taken By, (c) = Cosigned By      Initials Name Provider Type    Nohemy Cesar, PTA Physical Therapist Assistant                    Physical Therapy Education       Title: PT OT SLP Therapies (Done)       Topic: Physical Therapy (Done)       Point: Mobility training (Done)       Learning Progress Summary            Patient Acceptance, E,D, VU,NR by  at 6/27/2025 0728    Comment: educated on RLE TTWB precaution, benefits of PT and mobility   Family Acceptance, E,D, VU,NR by  at 6/27/2025 0728    Comment: educated on RLE TTWB precaution, benefits of PT and mobility    Acceptance,  E,TB, VU,NR by AR at 6/26/2025 1202                      Point: Precautions (Done)       Learning Progress Summary            Patient Acceptance, E,D, VU,NR by  at 6/27/2025 0728    Comment: educated on RLE TTWB precaution, benefits of PT and mobility   Family Acceptance, E,D, VU,NR by GM at 6/27/2025 0728    Comment: educated on RLE TTWB precaution, benefits of PT and mobility    Acceptance, E,TB, VU,NR by AR at 6/26/2025 1202                                      User Key       Initials Effective Dates Name Provider Type Discipline    AR 12/02/24 -  Cynthia Nathan, RN Registered Nurse Nurse     04/21/25 -  Deb Renner, WILLIE Student PT Student PT                  PT Recommendation and Plan         Outcome Measures       Row Name 06/28/25 1200 06/28/25 0800          How much help from another person do you currently need...    Turning from your back to your side while in flat bed without using bedrails? -- 3  -AH     Moving from lying on back to sitting on the side of a flat bed without bedrails? -- 3  -AH     Moving to and from a bed to a chair (including a wheelchair)? -- 2  -AH     Standing up from a chair using your arms (e.g., wheelchair, bedside chair)? -- 2  -AH     Climbing 3-5 steps with a railing? -- 1  -AH     To walk in hospital room? -- 2  -AH     AM-PAC 6 Clicks Score (PT) -- 13  -AH        How much help from another is currently needed...    Putting on and taking off regular lower body clothing? 2  -TS --     Bathing (including washing, rinsing, and drying) 2  -TS --     Toileting (which includes using toilet bed pan or urinal) 2  -TS --     Putting on and taking off regular upper body clothing 3  -TS --     Taking care of personal grooming (such as brushing teeth) 3  -TS --     Eating meals 3  -TS --     AM-PAC 6 Clicks Score (OT) 15  -TS --        Functional Assessment    Outcome Measure Options -- AM-PAC 6 Clicks Basic Mobility (PT)  -AH               User Key  (r) = Recorded By, (t) = Taken  By, (c) = Cosigned By      Initials Name Provider Type    Marcia Glass, JASBIR Physical Therapist Assistant    Ciara Dupree COTA Occupational Therapist Assistant                     Time Calculation:         PT G-Codes  Outcome Measure Options: AM-PAC 6 Clicks Basic Mobility (PT)  AM-PAC 6 Clicks Score (PT): 15  AM-PAC 6 Clicks Score (OT): 15    Nohemy Fraire PTA  6/30/2025

## 2025-06-30 NOTE — PLAN OF CARE
Goal Outcome Evaluation:  Plan of Care Reviewed With: patient        Progress: improving  Outcome Evaluation: Pt had no c/o pain this shift. Safety maintained. A/o to person and time. No IV present. Up with asst x 2 with walker, TTWB. Very [place]. Tolerating meds crushed in AS. Pt able to swallow meds whole occasionally if unable to crush. Awaiting d/c plans. Cont to monitor.

## 2025-06-30 NOTE — PLAN OF CARE
Problem: Adult Inpatient Plan of Care  Goal: Plan of Care Review  Outcome: Progressing  Flowsheets (Taken 6/30/2025 8233)  Progress: no change  Outcome Evaluation: Pt is alert, confused. Very [place]. voiding. Upx2, unsteady gait. VSS. Safety maintained.  Plan of Care Reviewed With: patient

## 2025-06-30 NOTE — PROGRESS NOTES
[facility] Medicine Services  INPATIENT PROGRESS NOTE    Patient Name: Sunny Lacey  Date of Admission: 6/25/2025  Today's Date: 06/30/25  Length of Stay: 3  Primary Care Physician: Nevaeh Thornton PA    Subjective   Chief Complaint: Hip fracture/falling   HPI   Blood pressure stable, afebrile.  Patient is really hard of hearing.  Potassium is normal.  TSH is normal.  White blood cells normal.  Hemoglobin is normal.  Platelet count is also normalized.  Patient is on room air.    Review of Systems   Constitutional:  Positive for activity change, appetite change and fatigue. Negative for chills and fever.   HENT:  Negative for hearing loss, nosebleeds, tinnitus and trouble swallowing.    Eyes:  Negative for visual disturbance.   Respiratory:  Negative for cough, chest tightness, shortness of breath and wheezing.    Cardiovascular:  Negative for chest pain, palpitations and leg swelling.   Gastrointestinal:  Negative for abdominal distention, abdominal pain, blood in stool, constipation, diarrhea, nausea and vomiting.   Endocrine: Negative for cold intolerance, heat intolerance, polydipsia, polyphagia and polyuria.   Genitourinary:  Negative for decreased urine volume, difficulty urinating, dysuria, flank pain, frequency and hematuria.   Musculoskeletal:  Positive for arthralgias, gait problem and myalgias. Negative for joint swelling.   Skin:  Negative for rash.   Allergic/Immunologic: Negative for immunocompromised state.   Neurological:  Positive for weakness. Negative for dizziness, syncope, light-headedness and headaches.   Hematological:  Negative for adenopathy. Does not bruise/bleed easily.   Psychiatric/Behavioral:  Negative for confusion and sleep disturbance. The patient is not nervous/anxious.    All pertinent negatives and positives are as above. All other systems have been reviewed and are negative unless otherwise stated.     Objective    Temp:  [97.8 °F (36.6  °C)-98.2 °F (36.8 °C)] 97.9 °F (36.6 °C)  Heart Rate:  [73-94] 92  Resp:  [16-20] 18  BP: (111-136)/(47-84) 136/64  Physical Exam  Vitals and nursing note reviewed.   Constitutional:       Comments: Advanced age.  Cachectic.  Hard of hearing  HENT:      Head: Normocephalic.   Eyes:      Conjunctiva/sclera: Conjunctivae normal.      Pupils: Pupils are equal, round, and reactive to light.   Cardiovascular:      Rate and Rhythm: Normal rate and regular rhythm.      Heart sounds: Normal heart sounds.   Pulmonary:      Effort: Pulmonary effort is normal. No respiratory distress.      Breath sounds: Normal breath sounds.      Comments: Patient is on room air.  Abdominal:      General: Bowel sounds are normal. There is no distension.      Palpations: Abdomen is soft.      Tenderness: There is no abdominal tenderness.      Comments: Depend.    Musculoskeletal:         General: No swelling.      Cervical back: Neck supple.   Skin:     General: Skin is warm and dry.      Capillary Refill: Capillary refill takes 2 to 3 seconds.      Findings: No rash.   Neurological:      Mental Status: She is alert.      Motor: Weakness present.      Coordination: Coordination abnormal.      Gait: Gait abnormal.       Results Review:  I have reviewed the labs, radiology results, and diagnostic studies.    Laboratory Data:   Results from last 7 days   Lab Units 06/30/25 0416 06/26/25 0444 06/25/25 1917   WBC 10*3/mm3 7.11 7.25 6.76   HEMOGLOBIN g/dL 12.4 13.3 12.8   HEMATOCRIT % 38.6 39.6 38.9   PLATELETS 10*3/mm3 164 122* 119*        Results from last 7 days   Lab Units 06/30/25  0416 06/29/25  0312 06/26/25 0444 06/25/25 1917   SODIUM mmol/L 144 144 143 144   POTASSIUM mmol/L 4.1 4.1 3.2* 3.7   CHLORIDE mmol/L 109* 107 103 105   CO2 mmol/L 24.0 26.0 27.0 28.0   BUN mg/dL 20.5 17.2 11.9 13.9   CREATININE mg/dL 0.73 0.84 0.71 0.86   CALCIUM mg/dL 9.4 9.6 9.5 9.7*   BILIRUBIN mg/dL 0.5  --  0.6 0.4   ALK PHOS U/L 109  --  104 101   ALT  "(SGPT) U/L 23  --  13 14   AST (SGOT) U/L 40*  --  26 29   GLUCOSE mg/dL 95 101* 103* 102*       Culture Data:   No results found for: \"BLOODCX\", \"URINECX\", \"WOUNDCX\", \"MRSACX\", \"RESPCX\", \"STOOLCX\"    Radiology Data:   Imaging Results (Last 24 Hours)       ** No results found for the last 24 hours. **            I have reviewed the patient's current medications.     Assessment/Plan   Assessment  Active Hospital Problems    Diagnosis     **Pelvis fracture     Essential hypertension     Fall     Hypokalemia        Treatment Plan  Pelvis fracture -nonoperative.  Currently not requiring much in the way of pain medicine but has ambulatory dysfunction and needs ongoing therapy.  She is from an [facility] will not be able to return at this time due to not being independent.  Plan for rehab placement.  Consulted orthopedics.  CT cervical spine-No acute osseous findings, Moderate multilevel cervical spine degenerative change.  CT scan of pelvic-Acute mildly displaced fracture of the right acetabulum, Acute comminuted and displaced fracture of the right inferior pubic ramus, No evidence of left-sided pelvic or hip fracture.  CT scan head-No acute intracranial findings.      Essential hypertension.  Amlodipine.  Nitro as needed.     Hypokalemia.  Resolved.  Potassium replacement protocol.  Potassium daily.     Constipation.  Colace.     Anxiety/depression.  Cymbalta .     Anemia . Hemoglobin within normal limits.  Iron sulfate.     Thrombocytopenia.  Platelet count is normalized.     Neuropathy.  Neurontin.     Advanced age . 89 years old.     Nutrition . Regular diet.     Deconditioning/gait/falling.  PT OT consult.  Fall precaution.  Will depends . Walker at baseline with 1-2 assist.     Patient will need rehab placement.     DNR . DNI.     Medical Decision Making  Number and Complexity of problems: Pelvic fracture/hypertension/hypokalemia/advanced age .  Differential Diagnosis: None     Conditions and Status        Condition is " unchanged.     [facility] Data  External documents reviewed: Previous note .  Cardiac tracing (EKG, telemetry) interpretation: No monitor.  Radiology interpretation: CT scan  Labs reviewed: Laboratory  Any tests that were considered but not ordered: Lab in a.m.     Decision rules/scores evaluated (example [id number], [place], etc): None     Discussed with: Patient .      Care Planning  Shared decision making: Patient .  Code status and discussions: DNR . DNI     Disposition  Social Determinants of Health that impact treatment or disposition: From assisted living .  Pending rehab placement         Electronically signed by Maurizio Borges MD, 06/30/25, 11:25 CDT.

## 2025-06-30 NOTE — CASE MANAGEMENT/SOCIAL WORK
Continued Stay Note   [place]     Patient Name: Sunny Lacey  MRN: [id number]  Today's Date: 6/30/2025    Admit Date: 6/25/2025    Plan: Referral to [facility] back program   Discharge Plan       Row Name 06/30/25 2854       Plan    Plan Comments [facility] did offer a bed and can accept after 3rd inpt night. Plan dc to [facility] on 7/2, if pt is medically stable for dc. Will update family in AM on status.                   Discharge Codes    No documentation.                       GEETA Delarosa

## 2025-07-01 LAB
ANION GAP SERPL CALCULATED.3IONS-SCNC: 11 MMOL/L (ref 5–15)
BUN SERPL-MCNC: 17.7 MG/DL (ref 8–23)
BUN/CREAT SERPL: 24.6 (ref 7–25)
CALCIUM SPEC-SCNC: 9.5 MG/DL (ref 8.2–9.6)
CHLORIDE SERPL-SCNC: 107 MMOL/L (ref 98–107)
CO2 SERPL-SCNC: 23 MMOL/L (ref 22–29)
CREAT SERPL-MCNC: 0.72 MG/DL (ref 0.57–1)
DEPRECATED RDW RBC AUTO: 45.3 FL (ref 37–54)
EGFRCR SERPLBLD CKD-EPI 2021: 73.4 ML/MIN/1.73
ERYTHROCYTE [DISTWIDTH] IN BLOOD BY AUTOMATED COUNT: 12.5 % (ref 12.3–15.4)
GLUCOSE SERPL-MCNC: 88 MG/DL (ref 65–99)
HCT VFR BLD AUTO: 38.7 % (ref 34–46.6)
HGB BLD-MCNC: 12.5 G/DL (ref 12–15.9)
MCH RBC QN AUTO: 31.6 PG (ref 26.6–33)
MCHC RBC AUTO-ENTMCNC: 32.3 G/DL (ref 31.5–35.7)
MCV RBC AUTO: 97.7 FL (ref 79–97)
PLATELET # BLD AUTO: 189 10*3/MM3 (ref 140–450)
PMV BLD AUTO: 10.5 FL (ref 6–12)
POTASSIUM SERPL-SCNC: 4.4 MMOL/L (ref 3.5–5.2)
RBC # BLD AUTO: 3.96 10*6/MM3 (ref 3.77–5.28)
SODIUM SERPL-SCNC: 141 MMOL/L (ref 136–145)
WBC NRBC COR # BLD AUTO: 7.18 10*3/MM3 (ref 3.4–10.8)

## 2025-07-01 PROCEDURE — 97110 THERAPEUTIC EXERCISES: CPT

## 2025-07-01 PROCEDURE — 97530 THERAPEUTIC ACTIVITIES: CPT

## 2025-07-01 PROCEDURE — 85027 COMPLETE CBC AUTOMATED: CPT | Performed by: FAMILY MEDICINE

## 2025-07-01 PROCEDURE — 97535 SELF CARE MNGMENT TRAINING: CPT

## 2025-07-01 PROCEDURE — 99232 SBSQ HOSP IP/OBS MODERATE 35: CPT

## 2025-07-01 PROCEDURE — 80048 BASIC METABOLIC PNL TOTAL CA: CPT | Performed by: FAMILY MEDICINE

## 2025-07-01 RX ADMIN — POTASSIUM CHLORIDE 40 MEQ: 1.5 POWDER, FOR SOLUTION ORAL at 10:59

## 2025-07-01 RX ADMIN — DULOXETINE 30 MG: 30 CAPSULE, DELAYED RELEASE ORAL at 10:59

## 2025-07-01 RX ADMIN — FERROUS SULFATE TAB 325 MG (65 MG ELEMENTAL FE) 325 MG: 325 (65 FE) TAB at 10:59

## 2025-07-01 RX ADMIN — LATANOPROST 1 DROP: 50 SOLUTION OPHTHALMIC at 21:17

## 2025-07-01 RX ADMIN — DOCUSATE SODIUM 100 MG: 100 CAPSULE, LIQUID FILLED ORAL at 10:59

## 2025-07-01 RX ADMIN — AMLODIPINE BESYLATE 5 MG: 5 TABLET ORAL at 10:59

## 2025-07-01 RX ADMIN — GABAPENTIN 300 MG: 300 CAPSULE ORAL at 21:15

## 2025-07-01 NOTE — CASE MANAGEMENT/SOCIAL WORK
Continued Stay Note   [place]     Patient Name: Sunny Lacey  MRN: [id number]  Today's Date: 7/1/2025    Admit Date: 6/25/2025    Plan: Referral to [facility] back program   Discharge Plan       Row Name 07/01/25 1044       Plan    Plan Comments SW called pt POA to inform of bed offer at [facility] but had to leave a  (Cristino Lacey [phone]). Bed will be ready tomorrow.  Pharmacy updated for [facility] (Critical Care, [place] [facility]).     Final Discharge Disposition Code 03 - skilled nursing facility (SNF)                   Discharge Codes    No documentation.                       GEETA Delarosa

## 2025-07-01 NOTE — THERAPY TREATMENT NOTE
Acute Care - Occupational Therapy Treatment Note  [facility]     Patient Name: Sunny Lacey  : 1921  MRN: [id number]  Today's Date: 2025             Admit Date: 2025       ICD-10-CM ICD-9-CM   1. Closed displaced fracture of pelvis, unspecified part of pelvis, initial encounter  S32.9XXA 808.8   2. Impaired mobility [Z74.09]  Z74.09 799.89     Patient Active Problem List   Diagnosis    Sepsis    Pneumonia due to infectious organism    At risk for falls    Hypokalemia    Pelvis fracture    Fall    Essential hypertension     Past Medical History:   Diagnosis Date    Anemia     Anxiety     Arthritis     Hypertension     Skin cancer      Past Surgical History:   Procedure Laterality Date    SKIN CANCER EXCISION           OT ASSESSMENT FLOWSHEET (Last 12 Hours)       OT Evaluation and Treatment       Row Name 25 1010                   OT Time and Intention    Subjective Information no complaints  -AC        Document Type therapy note (daily note)  -AC        Mode of Treatment occupational therapy  -AC           General Information    Existing Precautions/Restrictions fall;weight bearing;other (see comments)  TTWB RLE, very hard of hearing  -AC        Limitations/Impairments hearing;visual  -AC        Barriers to Rehab visual deficit;hearing deficit;physical barrier  -AC           Pain Scale: FACES Pre/Post-Treatment    Pain: FACES Scale, Pretreatment 0-->no hurt  -AC        Posttreatment Pain Rating 0-->no hurt  -AC           Cognition    Personal Safety Interventions fall prevention program maintained;gait belt;muscle strengthening facilitated;nonskid shoes/slippers when out of bed;supervised activity  -AC           Activities of Daily Living    BADL Assessment/Intervention grooming  -AC           Grooming Assessment/Training    [place] Level (Grooming) wash face, hands;set up;oral care regimen;minimum assist (75% patient effort)  -AC        Position (Grooming) unsupported sitting  -AC            Transfer Assessment/Treatment    Maintains Weight-bearing Status (Transfers) verbal cues to maintain;unable to maintain  -AC           Sit-Stand Transfer    Sit-Stand [place] (Transfers) minimum assist (75% patient effort);moderate assist (50% patient effort)  -AC        Assistive Device (Sit-Stand Transfers) walker, front-wheeled  -AC           Stand-Sit Transfer    Stand-Sit [place] (Transfers) minimum assist (75% patient effort);moderate assist (50% patient effort);verbal cues  -AC        Assistive Device (Stand-Sit Transfers) walker, front-wheeled  -AC           Plan of Care Review    Plan of Care Reviewed With patient  -AC        Progress improving  -AC        Outcome Evaluation OT tx completed.  Pt up in chair, no c/o pain.  Pt sat unsupported in chair and completed face washing with set up and oral hygiene with set up and [place].  Verbal cues periodically for postural control as pt sat unsupported.  Pt stood twice initially with [place] and verbal cues.  Pt stood in place for approx 1-2 min intermittently able to maintain TTWB RLE.  Pt was reeducated on transfer technique and WB status.  Attempted transfer again but requiring modA and unsuccessful due to inability to maintain WB status and sequence the transfer components including hand placement.  Pt returned to recline position in chair, assisted with scooting self back in chair with modAx2.  SNF recommended at discharge.  -AC           Positioning and Restraints    Pre-Treatment Position sitting in chair/recliner  -AC        Post Treatment Position chair  -AC        In Chair reclined;call light within reach;encouraged to call for assist;legs elevated  -AC           Therapy Plan Review/Discharge Plan (OT)    Anticipated Discharge Disposition (OT) skilled nursing facility  -AC                  User Key  (r) = Recorded By, (t) = Taken By, (c) = Cosigned By      Initials Name Effective Dates    Lux Plunkett, OTR/L, CNT 02/03/23 -                       Occupational Therapy Education       Title: PT OT SLP Therapies (Done)       Topic: Occupational Therapy (Done)       Point: ADL training (Done)       Learning Progress Summary            Patient Acceptance, E, VU,NR by  at 7/1/2025 1054    Acceptance, E, NR,NL by  at 6/30/2025 1517    Comment: OT POC, OT role in care, t/f training, WB status of R LE, d/c planning.                      Point: Home exercise program (Done)       Learning Progress Summary            Patient Acceptance, E, VU,NR by  at 7/1/2025 1054                      Point: Precautions (Done)       Learning Progress Summary            Patient Acceptance, E, VU,NR by  at 7/1/2025 1054    Acceptance, E, NR,NL by  at 6/30/2025 1517    Comment: OT POC, OT role in care, t/f training, WB status of R LE, d/c planning.                      Point: Body mechanics (Done)       Learning Progress Summary            Patient Acceptance, E, VU,NR by  at 7/1/2025 1054    Acceptance, E, NR,NL by  at 6/30/2025 1517    Comment: OT POC, OT role in care, t/f training, WB status of R LE, d/c planning.                                      User Key       Initials Effective Dates Name Provider Type Discipline     02/03/23 -  Lux Louis, OTR/L, CNT Occupational Therapist OT     05/12/25 -  Roland Bolaños OT Student OT Student OT                      OT Recommendation and Plan     Plan of Care Review  Plan of Care Reviewed With: patient  Progress: improving  Outcome Evaluation: OT tx completed.  Pt up in chair, no c/o pain.  Pt sat unsupported in chair and completed face washing with set up and oral hygiene with set up and [place].  Verbal cues periodically for postural control as pt sat unsupported.  Pt stood twice initially with [place] and verbal cues.  Pt stood in place for approx 1-2 min intermittently able to maintain TTWB RLE.  Pt was reeducated on transfer technique and WB status.  Attempted transfer again but requiring modA and unsuccessful  due to inability to maintain WB status and sequence the transfer components including hand placement.  Pt returned to recline position in chair, assisted with scooting self back in chair with modAx2.  SNF recommended at discharge.  Plan of Care Reviewed With: patient  Outcome Evaluation: OT tx completed.  Pt up in chair, no c/o pain.  Pt sat unsupported in chair and completed face washing with set up and oral hygiene with set up and Lynn.  Verbal cues periodically for postural control as pt sat unsupported.  Pt stood twice initially with [place] and verbal cues.  Pt stood in place for approx 1-2 min intermittently able to maintain TTWB RLE.  Pt was reeducated on transfer technique and WB status.  Attempted transfer again but requiring modA and unsuccessful due to inability to maintain WB status and sequence the transfer components including hand placement.  Pt returned to recline position in chair, assisted with scooting self back in chair with modAx2.  SNF recommended at discharge.     Outcome Measures       Row Name 07/01/25 1010 06/30/25 1345 06/28/25 1200       How much help from another is currently needed...    Putting on and taking off regular lower body clothing? 2  -AC 2  -AC (r) BH (t) AC (c) 2  -TS    Bathing (including washing, rinsing, and drying) 2  -AC 2  -AC (r) BH (t) AC (c) 2  -TS    Toileting (which includes using toilet bed pan or urinal) 2  -AC 2  -AC (r) BH (t) AC (c) 2  -TS    Putting on and taking off regular upper body clothing 3  -AC 3  -AC (r) BH (t) AC (c) 3  -TS    Taking care of personal grooming (such as brushing teeth) 3  -AC 3  -AC (r) BH (t) AC (c) 3  -TS    Eating meals 3  -AC 3  -AC (r) BH (t) AC (c) 3  -TS    AM-PAC 6 Clicks Score (OT) 15  -AC 15  -AC (r) BH (t) 15  -TS       Functional Assessment    Outcome Measure Options AM-PAC 6 Clicks Daily Activity (OT)  -AC AM-PAC 6 Clicks Daily Activity (OT)  -AC (r) BH (t) AC (c) --              User Key  (r) = Recorded By, (t) = Taken By,  (c) = Cosigned By      Initials Name Provider Type    AC Lux Louis, OTR/L, CNT Occupational Therapist    Ciara Dupree COTA Occupational Therapist Assistant     Roland Bolaños, OT Student OT Student                    Time Calculation:    Time Calculation- OT       Row Name 07/01/25 1055             Time Calculation- OT    OT Start Time 1010  -AC      OT Stop Time 1050  -AC      OT Time Calculation (min) 40 min  -AC      Total Timed Code Minutes- OT 40 minute(s)  -AC      OT Received On 07/01/25  -AC         Timed Charges    75366 - OT Self Care/Mgmt Minutes 40  -AC         Total Minutes    Timed Charges Total Minutes 40  -AC       Total Minutes 40  -AC                User Key  (r) = Recorded By, (t) = Taken By, (c) = Cosigned By      Initials Name Provider Type    Lux Plunkett, OTR/L, CNT Occupational Therapist                  Therapy Charges for Today       Code Description Service Date Service Provider Modifiers Qty    35332261179 HC OT SELF CARE/MGMT/TRAIN EA 15 MIN 6/30/2025 Lux Louis, OTR/L, CNT GO 2    33593651610 HC OT SELF CARE/MGMT/TRAIN EA 15 MIN 7/1/2025 Lux Louis, OTR/L, CNT GO 3                 Lux Louis, OTR/L, CNT  7/1/2025

## 2025-07-01 NOTE — PROGRESS NOTES
[facility] Palliative Care Services  Progress Note  Patient Name: Sunny Lacey  Date of Admission: 6/25/2025  Today's Date: 07/01/25     Code Status and Medical Interventions: No CPR (Do Not Attempt to Resuscitate); Limited Support; No intubation (DNI), No cardioversion   Ordered at: 06/26/25 1438     Code Status (Patient has no pulse and is not breathing):    No CPR (Do Not Attempt to Resuscitate)     Medical Interventions (Patient has pulse or is breathing):    Limited Support     Medical Intervention Limits:    No intubation (DNI)       No cardioversion     Level Of Support Discussed With:    Next of Kin (If No Surrogate)     Subjective   Chief complaint/Reason for Referral/Visit: Follow up on Goals of Care/Advance Care Planning.    Medical record reviewed.  Events noted.  Labs collected this morning unremarkable.  She is sitting up in the chair, alert, and in no apparent distress.  Her son, Dominic, is at bedside.  Anticipating discharge to SNF tomorrow if remains medically stable.    Advance Care Planning   Advanced Directives: Advance directive on file.  Advance Care Planning Discussion: Ms. Lacey is pleasantly confused and unable to demonstrate ability to make complex medical decisions at time of exam.  Call placed to patient's son/POA, Cristino, at number provided in chart to follow-up and determine if interested in completing MOST form.  Unable to reach.  Voicemail left with request for return phone call.     The patient receives support from her children and extended family. Patient's son, Cristino, is her POA.    Due to the palliative care topics discussed including goals of care we will establish an advance care plan.    Goals of care: Ongoing.    Review of Systems   Unable to perform ROS: Other   Cardiovascular:  Negative for chest pain.   Respiratory:  Negative for shortness of breath.        Pain Assessment  Preferred Pain Scale: PAINAD (Pain Assessment in Advance Dementia Scale)  CPOT and  PAINAD Scales: PAINAD (Pain Assessment in Advance Dementia Scale)  PAINAD Breathin-->normal  PAINAD Negative Vocalization: 0-->none  PAINAD Facial Expression: 0-->smiling or inexpressive  PAINAD Body Language: 0-->relaxed  PAINAD Consolability: 0-->no need to console  PAINAD Score: 0  Pain Location: hip  Objective   Diagnostics: Reviewed      Intake/Output Summary (Last 24 hours) at 2025 1043  Last data filed at 2025 1755  Gross per 24 hour   Intake 240 ml   Output --   Net 240 ml     Current Facility-Administered Medications   Medication Dose Route Frequency Provider Last Rate Last Admin    acetaminophen (TYLENOL) tablet 650 mg  650 mg Oral Q4H PRN Octaviano Pink MD   650 mg at 25 0854    Or    acetaminophen (TYLENOL) 160 MG/5ML oral solution 650 mg  650 mg Oral Q4H PRN Octaviano Pink MD   650 mg at 25 2208    Or    acetaminophen (TYLENOL) suppository 650 mg  650 mg Rectal Q4H PRN Octaviano Pink MD        albuterol (PROVENTIL) nebulizer solution 0.083% 2.5 mg/3mL  2.5 mg Nebulization Q4H PRN Octaviano Pink MD        amLODIPine (NORVASC) tablet 5 mg  5 mg Oral Daily Octaviano Pink MD   5 mg at 25 0941    sennosides-docusate (PERICOLACE) 8.6-50 MG per tablet 2 tablet  2 tablet Oral BID PRN Octaviano Pink MD        And    polyethylene glycol (MIRALAX) packet 17 g  17 g Oral Daily PRN Octaviano Pink MD        And    bisacodyl (DULCOLAX) EC tablet 5 mg  5 mg Oral Daily PRN Octaviano Pink MD        And    bisacodyl (DULCOLAX) suppository 10 mg  10 mg Rectal Daily PRN Octaviano Pink MD        docusate sodium (COLACE) capsule 100 mg  100 mg Oral Daily Octaviano Pink MD   100 mg at 25 0941    DULoxetine (CYMBALTA) DR capsule 30 mg  30 mg Oral Daily David Saldaña DO   30 mg at 25 0941    ferrous sulfate tablet 325 mg  325 mg Oral Daily With Breakfast Octaviano Pink MD   325 mg at 25 09    gabapentin (NEURONTIN) capsule 300 mg  300 mg Oral Nightly Piper  "David MARIE DO   300 mg at 06/30/25 2208    latanoprost (XALATAN) 0.005 % ophthalmic solution 1 drop  1 drop Both Eyes Nightly David Saldaña, DO   1 drop at 06/30/25 2208    nitroglycerin (NITROSTAT) SL tablet 0.4 mg  0.4 mg Sublingual Q5 Min PRN Octaviano Pink MD        potassium chloride (KLOR-CON) packet 40 mEq  40 mEq Oral Daily Maurizio Borges MD        sodium chloride 0.9 % flush 10 mL  10 mL Intravenous Q12H Octaviano Pink MD   10 mL at 06/26/25 2045    sodium chloride 0.9 % flush 10 mL  10 mL Intravenous PRN Octaviano Pink MD        sodium chloride 0.9 % infusion 40 mL  40 mL Intravenous PRN Octaviano Pink MD              acetaminophen **OR** acetaminophen **OR** acetaminophen    albuterol    senna-docusate sodium **AND** polyethylene glycol **AND** bisacodyl **AND** bisacodyl    nitroglycerin    sodium chloride    sodium chloride  Current medications patient is presently taking including all prescriptions, over-the-counter, herbals and vitamin/mineral/dietary (nutritional) supplements with reviewed including route, type, dose and frequency and are current per MAR at time of dictation.    Assessment:  Vital Signs: /72 (BP Location: Right arm, Patient Position: Sitting)   Pulse 91   Temp 97.8 °F (36.6 °C) (Oral)   Resp 18   Ht 165.1 cm (65\")   Wt 63 kg (139 lb)   SpO2 95%   BMI 23.13 kg/m²     Physical Exam  Vitals and nursing note reviewed.   Constitutional:       General: She is not in acute distress.  HENT:      Head: Normocephalic and atraumatic.      Right Ear: Decreased hearing noted.      Left Ear: Decreased hearing noted.   Eyes:      General: Lids are normal.      Extraocular Movements: Extraocular movements intact.   Neck:      Vascular: No JVD.      Trachea: Trachea normal.   Cardiovascular:      Rate and Rhythm: Tachycardia present.   Musculoskeletal:      Cervical back: Neck supple.   Skin:     General: Skin is warm and dry.   Neurological:      Mental Status: She is alert. "   Psychiatric:         Behavior: Behavior is cooperative.     Functional status: Palliative Performance Scale Score: Performance 50% based on the following measures: Ambulation: Mainly sit or lie down, Activity and Evidence of Disease: Unable to do any work, extensive evidence of disease, Self-Care: Considerable assistance required,  Intake: Normal or reduced, LOC: Full or confusion.  Nutritional status: Albumin 3.4. Body mass index is 23.13 kg/m².  Patient status: Disease state: Controlled with current treatments.    Active Hospital Problems    Diagnosis     **Pelvis fracture     Essential hypertension     Fall     Hypokalemia      Impression/Problem List:  Pelvis fracture - Acute comminuted and displaced fracture of the right inferior pubic ramus & Acetabulum fracture, right - Acute mildly displaced per CT   Fall  Hypertension   Advanced age        Plan / Recommendations   Palliative Care Encounter   Ms. Lacey is pleasantly confused and unable to demonstrate ability to make complex medical decisions at time of exam.  Call placed to son/POA, Cristino, at number provided in chart however unable to reach.  Voicemail left with request for return phone call.   Would benefit from MOST form prior to discharge.  Will plan to see if interested if return phone call received.   Anticipating discharge to SNF tomorrow if remains medically stable.    Thank you for allowing us to participate in patient's plan of care. Palliative Care Team will continue to follow patient.       Electronically signed by, PEGGY Cancino, 07/01/25.

## 2025-07-01 NOTE — PLAN OF CARE
Goal Outcome Evaluation:  Plan of Care Reviewed With: patient        Progress: improving  Outcome Evaluation: Pt had no c/o pain this shift. Safety maintained. A/o to person and month. No IV present. Up with asst x 2 with walker, TTWB to chair/BSC. Very [place], 1 hearing aid present this shift. Pt prefers meds whole in AS, tolerated well for me this shift. Meds can be crushed in AS if needed. Possible dc tomorrow to [facility]. Cont to monitor.

## 2025-07-01 NOTE — PROGRESS NOTES
[facility] Hospital Medicine Services  INPATIENT PROGRESS NOTE    Patient Name: Sunny Lacey  Date of Admission: 6/25/2025  Today's Date: 07/01/25  Length of Stay: 4  Primary Care Physician: Nevaeh Thornton PA    Subjective   Chief Complaint: Hip fracture/falling   HPI   Blood pressure slightly high but stable, afebrile. Patient is on room air.  White blood cells normal.  Hemoglobin is normal.    Review of Systems   Constitutional:  Positive for activity change, appetite change and fatigue. Negative for chills and fever.   HENT:  Negative for hearing loss, nosebleeds, tinnitus and trouble swallowing.    Eyes:  Negative for visual disturbance.   Respiratory:  Negative for cough, chest tightness, shortness of breath and wheezing.    Cardiovascular:  Negative for chest pain, palpitations and leg swelling.   Gastrointestinal:  Negative for abdominal distention, abdominal pain, blood in stool, constipation, diarrhea, nausea and vomiting.   Endocrine: Negative for cold intolerance, heat intolerance, polydipsia, polyphagia and polyuria.   Genitourinary:  Negative for decreased urine volume, difficulty urinating, dysuria, flank pain, frequency and hematuria.   Musculoskeletal:  Positive for arthralgias, gait problem and myalgias. Negative for joint swelling.   Skin:  Negative for rash.   Allergic/Immunologic: Negative for immunocompromised state.   Neurological:  Positive for weakness. Negative for dizziness, syncope, light-headedness and headaches.   Hematological:  Negative for adenopathy. Does not bruise/bleed easily.   Psychiatric/Behavioral:  Negative for confusion and sleep disturbance. The patient is not nervous/anxious.    All pertinent negatives and positives are as above. All other systems have been reviewed and are negative unless otherwise stated.     Objective    Temp:  [97.7 °F (36.5 °C)-98.3 °F (36.8 °C)] 97.8 °F (36.6 °C)  Heart Rate:  [] 91  Resp:  [16-18] 18  BP:  (116-140)/(41-85) 140/72  Physical Exam  Vitals and nursing note reviewed.   Constitutional:       Comments: Advanced age.  Cachectic.  Hard of hearing  HENT:      Head: Normocephalic.   Eyes:      Conjunctiva/sclera: Conjunctivae normal.      Pupils: Pupils are equal, round, and reactive to light.   Cardiovascular:      Rate and Rhythm: Normal rate and regular rhythm.      Heart sounds: Normal heart sounds.   Pulmonary:      Effort: Pulmonary effort is normal. No respiratory distress.      Breath sounds: Normal breath sounds.      Comments: Patient is on room air.  Abdominal:      General: Bowel sounds are normal. There is no distension.      Palpations: Abdomen is soft.      Tenderness: There is no abdominal tenderness.      Comments: Depend.    Musculoskeletal:         General: No swelling.      Cervical back: Neck supple.   Skin:     General: Skin is warm and dry.      Capillary Refill: Capillary refill takes 2 to 3 seconds.      Findings: No rash.   Neurological:      Mental Status: She is alert.      Motor: Weakness present.      Coordination: Coordination abnormal.      Gait: Gait abnormal.          Results Review:  I have reviewed the labs, radiology results, and diagnostic studies.    Laboratory Data:   Results from last 7 days   Lab Units 07/01/25  0304 06/30/25  0416 06/26/25  0444   WBC 10*3/mm3 7.18 7.11 7.25   HEMOGLOBIN g/dL 12.5 12.4 13.3   HEMATOCRIT % 38.7 38.6 39.6   PLATELETS 10*3/mm3 189 164 122*        Results from last 7 days   Lab Units 07/01/25  0304 06/30/25  0416 06/29/25  0312 06/26/25  0444 06/25/25  1917   SODIUM mmol/L 141 144 144 143 144   POTASSIUM mmol/L 4.4 4.1 4.1 3.2* 3.7   CHLORIDE mmol/L 107 109* 107 103 105   CO2 mmol/L 23.0 24.0 26.0 27.0 28.0   BUN mg/dL 17.7 20.5 17.2 11.9 13.9   CREATININE mg/dL 0.72 0.73 0.84 0.71 0.86   CALCIUM mg/dL 9.5 9.4 9.6 9.5 9.7*   BILIRUBIN mg/dL  --  0.5  --  0.6 0.4   ALK PHOS U/L  --  109  --  104 101   ALT (SGPT) U/L  --  23  --  13 14   AST  "(SGOT) U/L  --  40*  --  26 29   GLUCOSE mg/dL 88 95 101* 103* 102*       Culture Data:   No results found for: \"BLOODCX\", \"URINECX\", \"WOUNDCX\", \"MRSACX\", \"RESPCX\", \"STOOLCX\"    Radiology Data:   Imaging Results (Last 24 Hours)       ** No results found for the last 24 hours. **            I have reviewed the patient's current medications.     Assessment/Plan   Assessment  Active Hospital Problems    Diagnosis     **Pelvis fracture     Essential hypertension     Fall     Hypokalemia        Treatment Plan  Pelvis fracture -nonoperative.  Currently not requiring much in the way of pain medicine but has ambulatory dysfunction and needs ongoing therapy.  She is from an assisted will not be able to return at this time due to not being independent.  Plan for rehab placement.  Consulted orthopedics.  CT cervical spine-No acute osseous findings, Moderate multilevel cervical spine degenerative change.  CT scan of pelvic-Acute mildly displaced fracture of the right acetabulum, Acute comminuted and displaced fracture of the right inferior pubic ramus, No evidence of left-sided pelvic or hip fracture.  CT scan head-No acute intracranial findings.      Essential hypertension.  Amlodipine.  Nitro as needed.     Hypokalemia.  Resolved.  Potassium replacement protocol.  Potassium daily.     Constipation.  Colace.     Anxiety/depression.  Cymbalta .     Anemia .  Hemoglobin is normal. Iron sulfate.     Thrombocytopenia.  Platelet count is normal.     Neuropathy.  Neurontin.     Advanced age . 89 years old.     Nutrition . Regular diet.     Deconditioning/gait/falling.  PT OT consult.  Fall precaution.  Will depends . Walker at baseline with 1-2 assist.     Patient will need rehab placement.     DNR . DNI.     Medical Decision Making  Number and Complexity of problems: Pelvic fracture/hypertension/hypokalemia/advanced age .  Differential Diagnosis: None     Conditions and Status        Condition is unchanged.     [facility] Data  External " documents reviewed: Previous note .  Cardiac tracing (EKG, telemetry) interpretation: No monitor.  Radiology interpretation: CT scan  Labs reviewed: Laboratory  Any tests that were considered but not ordered: Lab in a.m.     Decision rules/scores evaluated (example [id number], [place], etc): None     Discussed with: Patient .      Care Planning  Shared decision making: Patient .  Code status and discussions: DNR . DNI     Disposition  Social Determinants of Health that impact treatment or disposition: From assisted living .  Pending rehab placement         Electronically signed by Maurizio Borges MD, 07/01/25, 09:09 CDT.

## 2025-07-01 NOTE — THERAPY TREATMENT NOTE
Acute Care - Physical Therapy Treatment Note  [facility]     Patient Name: Sunny Lacey  : 1921  MRN: [id number]  Today's Date: 2025      Visit Dx:     ICD-10-CM ICD-9-CM   1. Closed displaced fracture of pelvis, unspecified part of pelvis, initial encounter  S32.9XXA 808.8   2. Impaired mobility [Z74.09]  Z74.09 799.89     Patient Active Problem List   Diagnosis    Sepsis    Pneumonia due to infectious organism    At risk for falls    Hypokalemia    Pelvis fracture    Fall    Essential hypertension     Past Medical History:   Diagnosis Date    Anemia     Anxiety     Arthritis     Hypertension     Skin cancer      Past Surgical History:   Procedure Laterality Date    SKIN CANCER EXCISION       PT Assessment (Last 12 Hours)       PT Evaluation and Treatment       Row Name 25 [facility]          Physical Therapy Time and Intention    Subjective Information no complaints  -     Document Type therapy note (daily note)  -     Mode of Treatment physical therapy  -       Row Name 25 1434          General Information    Existing Precautions/Restrictions fall;weight bearing;right  TTWB RLE, very [place]  -     Limitations/Impairments hearing;safety/cognitive;visual  -       Row Name 25 1434          Pain Scale: FACES Pre/Post-Treatment    Pain: FACES Scale, Pretreatment 2-->hurts little bit  -     Posttreatment Pain Rating 2-->hurts little bit  -       Row Name 25 1434          Bed Mobility    Sit-Supine [place] (Bed Mobility) verbal cues;minimum assist (75% patient effort)  -       Row Name 25 143          Transfers    Transfers chair-bed transfer  -ALEXANDRA     Comment, (Transfers) pt unable to maintain TTWB  -       Row Name 25 1434          Chair-Bed Transfer    Chair-Bed [place] (Transfers) verbal cues;minimum assist (75% patient effort)  -     Assistive Device (Chair-Bed Transfers) walker, front-wheeled  -       Row Name 25 143           Sit-Stand Transfer    Sit-Stand [place] (Transfers) verbal cues;minimum assist (75% patient effort)  -ALEXANDRA     Assistive Device (Sit-Stand Transfers) walker, front-wheeled  -ALEXANDRA       Row Name 07/01/25 1434          Stand-Sit Transfer    Stand-Sit [place] (Transfers) verbal cues;minimum assist (75% patient effort)  -ALEXANDRA     Assistive Device (Stand-Sit Transfers) walker, front-wheeled  -ALEXANDRA       Row Name 07/01/25 1434          Motor Skills    Comments, Therapeutic Exercise sitting AROM BLE X 10  -ALEXANDRA       Row Name 07/01/25 1434          Positioning and Restraints    Pre-Treatment Position sitting in chair/recliner  -ALEXANDRA     Post Treatment Position bed  -ALEXANDRA     In Bed supine;call light within reach;encouraged to call for assist;exit alarm on;side rails up x2  -ALEXANDRA               User Key  (r) = Recorded By, (t) = Taken By, (c) = Cosigned By      Initials Name Provider Type    Bharathi Veronica, PTA Physical Therapist Assistant                    Physical Therapy Education       Title: PT OT SLP Therapies (Done)       Topic: Physical Therapy (Done)       Point: Mobility training (Done)       Learning Progress Summary            Patient Acceptance, E,D, VU,NR by  at 6/27/2025 0728    Comment: educated on RLE TTWB precaution, benefits of PT and mobility   Family Acceptance, E,D, VU,NR by GM at 6/27/2025 0728    Comment: educated on RLE TTWB precaution, benefits of PT and mobility    Acceptance, E,TB, VU,NR by AR at 6/26/2025 1202                      Point: Precautions (Done)       Learning Progress Summary            Patient Acceptance, E,D, VU,NR by  at 6/27/2025 0728    Comment: educated on RLE TTWB precaution, benefits of PT and mobility   Family Acceptance, E,D, VU,NR by GM at 6/27/2025 0728    Comment: educated on RLE TTWB precaution, benefits of PT and mobility    Acceptance, E,TB, VU,NR by AR at 6/26/2025 1202                                      User Key       Initials Effective Dates Name Provider  Type Discipline    AR 12/02/24 -  Cynthia Nathan, RN Registered Nurse Nurse     04/21/25 -  Deb Renner, PT Student PT Student PT                  PT Recommendation and Plan         Outcome Measures       Row Name 07/01/25 1434 07/01/25 1010 06/30/25 1345       How much help from another person do you currently need...    Turning from your back to your side while in flat bed without using bedrails? 3  -ALEXANDRA -- --    Moving from lying on back to sitting on the side of a flat bed without bedrails? 3  -ALEXANDRA -- --    Moving to and from a bed to a chair (including a wheelchair)? 3  -ALEXANDRA -- --    Standing up from a chair using your arms (e.g., wheelchair, bedside chair)? 2  -ALEXANDRA -- --    Climbing 3-5 steps with a railing? 2  -ALEXANDRA -- --    To walk in hospital room? 2  -ALEXANDRA -- --    AM-PAC 6 Clicks Score (PT) 15  -ALEXANDRA -- --       How much help from another is currently needed...    Putting on and taking off regular lower body clothing? -- 2  -AC 2  -AC (r) BH (t) AC (c)    Bathing (including washing, rinsing, and drying) -- 2  -AC 2  -AC (r) BH (t) AC (c)    Toileting (which includes using toilet bed pan or urinal) -- 2  -AC 2  -AC (r) BH (t) AC (c)    Putting on and taking off regular upper body clothing -- 3  -AC 3  -AC (r) BH (t) AC (c)    Taking care of personal grooming (such as brushing teeth) -- 3  -AC 3  -AC (r) BH (t) AC (c)    Eating meals -- 3  -AC 3  -AC (r) BH (t) AC (c)    AM-PAC 6 Clicks Score (OT) -- 15  -AC 15  -AC (r) BH (t)       Functional Assessment    Outcome Measure Options AM-PAC 6 Clicks Basic Mobility (PT)  -ALEXANDRA AM-PAC 6 Clicks Daily Activity (OT)  -AC AM-PAC 6 Clicks Daily Activity (OT)  -AC (r) BH (t) AC (c)              User Key  (r) = Recorded By, (t) = Taken By, (c) = Cosigned By      Initials Name Provider Type    Lux Plunkett, OTR/L, CNT Occupational Therapist    Bharathi Veronica, PTA Physical Therapist Assistant    Roland Brown, OT Student OT Student                     Time  Calculation:    PT Charges       Row Name 07/01/25 1434             Time Calculation    Start Time 1434  -ALEXANDRA      Stop Time 1500  -ALEXANDRA      Time Calculation (min) 26 min  -ALEXANDRA      PT Received On 07/01/25  -ALEXANDRA         Time Calculation- PT    Total Timed Code Minutes- PT 26 minute(s)  -ALEXANDRA         Timed Charges    97788 - PT Therapeutic Exercise Minutes 12  -ALEXANDRA      11115 - PT Therapeutic Activity Minutes 14  -ALEXANDRA         Total Minutes    Timed Charges Total Minutes 26  -ALEXANDRA       Total Minutes 26  -ALEXANDRA                User Key  (r) = Recorded By, (t) = Taken By, (c) = Cosigned By      Initials Name Provider Type    Bharathi Veronica PTA Physical Therapist Assistant                  Therapy Charges for Today       Code Description Service Date Service Provider Modifiers Qty    08402472743 HC PT THER PROC EA 15 MIN 7/1/2025 Bharathi Dan PTA GP 1    73195600852 HC PT THERAPEUTIC ACT EA 15 MIN 7/1/2025 Bharathi Dan PTA GP 1            PT G-Codes  Outcome Measure Options: AM-PAC 6 Clicks Basic Mobility (PT)  AM-PAC 6 Clicks Score (PT): 15  AM-PAC 6 Clicks Score (OT): 15    Bharathi Dan PTA  7/1/2025

## 2025-07-01 NOTE — PLAN OF CARE
Problem: Adult Inpatient Plan of Care  Goal: Plan of Care Review  Outcome: Progressing  Flowsheets (Taken 7/1/2025 2162)  Progress: no change  Outcome Evaluation: Pt is alert, confused at times. No complaints of any pain. Slept well during the night. Upx2. Incont at times. VSS. Safety maintained.  Plan of Care Reviewed With: patient

## 2025-07-01 NOTE — PLAN OF CARE
Goal Outcome Evaluation:  Plan of Care Reviewed With: patient        Progress: improving  Outcome Evaluation: OT tx completed.  Pt up in chair, no c/o pain.  Pt sat unsupported in chair and completed face washing with set up and oral hygiene with set up and Lynn.  Verbal cues periodically for postural control as pt sat unsupported.  Pt stood twice initially with Lynn and verbal cues.  Pt stood in place for approx 1-2 min intermittently able to maintain TTWB RLE.  Pt was reeducated on transfer technique and WB status.  Attempted transfer again but requiring modA and unsuccessful due to inability to maintain WB status and sequence the transfer components including hand placement.  Pt returned to recline position in chair, assisted with scooting self back in chair with modAx2.  SNF recommended at discharge.    Anticipated Discharge Disposition (OT): skilled nursing facility

## 2025-07-02 VITALS
HEART RATE: 89 BPM | TEMPERATURE: 98 F | HEIGHT: 65 IN | BODY MASS INDEX: 23.16 KG/M2 | SYSTOLIC BLOOD PRESSURE: 139 MMHG | RESPIRATION RATE: 14 BRPM | DIASTOLIC BLOOD PRESSURE: 68 MMHG | OXYGEN SATURATION: 96 % | WEIGHT: 139 LBS

## 2025-07-02 LAB
ANION GAP SERPL CALCULATED.3IONS-SCNC: 11 MMOL/L (ref 5–15)
BUN SERPL-MCNC: 14.9 MG/DL (ref 8–23)
BUN/CREAT SERPL: 21 (ref 7–25)
CALCIUM SPEC-SCNC: 9.5 MG/DL (ref 8.2–9.6)
CHLORIDE SERPL-SCNC: 106 MMOL/L (ref 98–107)
CO2 SERPL-SCNC: 24 MMOL/L (ref 22–29)
CREAT SERPL-MCNC: 0.71 MG/DL (ref 0.57–1)
DEPRECATED RDW RBC AUTO: 45.1 FL (ref 37–54)
EGFRCR SERPLBLD CKD-EPI 2021: 74.6 ML/MIN/1.73
ERYTHROCYTE [DISTWIDTH] IN BLOOD BY AUTOMATED COUNT: 12.7 % (ref 12.3–15.4)
GLUCOSE SERPL-MCNC: 101 MG/DL (ref 65–99)
HCT VFR BLD AUTO: 36.8 % (ref 34–46.6)
HGB BLD-MCNC: 12.1 G/DL (ref 12–15.9)
MCH RBC QN AUTO: 31.9 PG (ref 26.6–33)
MCHC RBC AUTO-ENTMCNC: 32.9 G/DL (ref 31.5–35.7)
MCV RBC AUTO: 97.1 FL (ref 79–97)
PLATELET # BLD AUTO: 186 10*3/MM3 (ref 140–450)
PMV BLD AUTO: 10.6 FL (ref 6–12)
POTASSIUM SERPL-SCNC: 4 MMOL/L (ref 3.5–5.2)
RBC # BLD AUTO: 3.79 10*6/MM3 (ref 3.77–5.28)
SODIUM SERPL-SCNC: 141 MMOL/L (ref 136–145)
WBC NRBC COR # BLD AUTO: 6.97 10*3/MM3 (ref 3.4–10.8)

## 2025-07-02 PROCEDURE — 85027 COMPLETE CBC AUTOMATED: CPT | Performed by: FAMILY MEDICINE

## 2025-07-02 PROCEDURE — 80048 BASIC METABOLIC PNL TOTAL CA: CPT | Performed by: FAMILY MEDICINE

## 2025-07-02 RX ORDER — AMLODIPINE BESYLATE 5 MG/1
5 TABLET ORAL DAILY
Start: 2025-07-02

## 2025-07-02 RX ORDER — POTASSIUM CHLORIDE 750 MG/1
10 CAPSULE, EXTENDED RELEASE ORAL DAILY
Start: 2025-07-02

## 2025-07-02 RX ADMIN — FERROUS SULFATE TAB 325 MG (65 MG ELEMENTAL FE) 325 MG: 325 (65 FE) TAB at 08:14

## 2025-07-02 RX ADMIN — DOCUSATE SODIUM 100 MG: 100 CAPSULE, LIQUID FILLED ORAL at 08:14

## 2025-07-02 RX ADMIN — DULOXETINE 30 MG: 30 CAPSULE, DELAYED RELEASE ORAL at 08:14

## 2025-07-02 RX ADMIN — POTASSIUM CHLORIDE 40 MEQ: 1.5 POWDER, FOR SOLUTION ORAL at 08:15

## 2025-07-02 RX ADMIN — AMLODIPINE BESYLATE 5 MG: 5 TABLET ORAL at 08:33

## 2025-07-02 NOTE — PLAN OF CARE
Goal Outcome Evaluation:  Plan of Care Reviewed With: patient        Progress: no change   Pt alert to self only. VSS on RA. AVS reviewed w/ Pt and family, questions answered. Report called to Denice CHRISTINE at [facility]. Pt to be transported to [facility] by Daughter. Safety maintained.

## 2025-07-02 NOTE — PLAN OF CARE
Goal Outcome Evaluation:  Plan of Care Reviewed With: patient        Progress: improving  Outcome Evaluation: Alert, pleasantly confused at times. Upx2 w/ walker to BR, encouraging pt to be TTWB on RLE. No reports of pain thus far. Pt appears to be resting well tonight. [place]. VSS. Safety maintained.

## 2025-07-02 NOTE — THERAPY DISCHARGE NOTE
Acute Care - Physical Therapy Discharge Summary  [facility]       Patient Name: Sunny Lacey  : 1921  MRN: [id number]    Today's Date: 2025                 Admit Date: 2025      PT Recommendation and Plan    Visit Dx:    ICD-10-CM ICD-9-CM   1. Closed displaced fracture of pelvis, unspecified part of pelvis, initial encounter  S32.9XXA 808.8   2. Impaired mobility [Z74.09]  Z74.09 799.89        Outcome Measures       Row Name 25 1434 25 1010 25 1345       How much help from another person do you currently need...    Turning from your back to your side while in flat bed without using bedrails? 3  -ALEXANDRA -- --    Moving from lying on back to sitting on the side of a flat bed without bedrails? 3  -ALEXANDRA -- --    Moving to and from a bed to a chair (including a wheelchair)? 3  -ALEXANDRA -- --    Standing up from a chair using your arms (e.g., wheelchair, bedside chair)? 2  -ALEXANDRA -- --    Climbing 3-5 steps with a railing? 2  -ALEXANDRA -- --    To walk in hospital room? 2  -ALEXANDRA -- --    AM-PAC 6 Clicks Score (PT) 15  -ALEXANDRA -- --       How much help from another is currently needed...    Putting on and taking off regular lower body clothing? -- 2  -AC 2  -AC (r) BH (t) AC (c)    Bathing (including washing, rinsing, and drying) -- 2  -AC 2  -AC (r) BH (t) AC (c)    Toileting (which includes using toilet bed pan or urinal) -- 2  -AC 2  -AC (r) BH (t) AC (c)    Putting on and taking off regular upper body clothing -- 3  -AC 3  -AC (r) BH (t) AC (c)    Taking care of personal grooming (such as brushing teeth) -- 3  -AC 3  -AC (r) BH (t) AC (c)    Eating meals -- 3  -AC 3  -AC (r) BH (t) AC (c)    AM-PAC 6 Clicks Score (OT) -- 15  -AC 15  -AC (r) BH (t)       Functional Assessment    Outcome Measure Options AM-PAC 6 Clicks Basic Mobility (PT)  -ALEXANDRA AM-PAC 6 Clicks Daily Activity (OT)  -AC AM-PAC 6 Clicks Daily Activity (OT)  -AC (r) BH (t) AC (c)              User Key  (r) = Recorded By, (t) = Taken By, (c) =  Cosigned By      Initials Name Provider Type    AC Lux Louis, OTR/L, CNT Occupational Therapist    Bharathi Veronica, PTA Physical Therapist Assistant    Roland Brown, OT Student OT Student                         PT Rehab Goals       Row Name 07/02/25 1300             Bed Mobility Goal 1 (PT)    Activity/Assistive Device (Bed Mobility Goal 1, PT) sit to supine;supine to sit  -AB      [place] Level/Cues Needed (Bed Mobility Goal 1, PT) supervision required  -AB      Time Frame (Bed Mobility Goal 1, PT) long term goal (LTG);10 days  -AB      Progress/Outcomes (Bed Mobility Goal 1, PT) goal not met  -AB         Transfer Goal 1 (PT)    Activity/Assistive Device (Transfer Goal 1, PT) sit-to-stand/stand-to-sit  -AB      [place] Level/Cues Needed (Transfer Goal 1, PT) minimum assist (75% or more patient effort)  -AB      Time Frame (Transfer Goal 1, PT) long term goal (LTG);10 days  -AB      Progress/Outcome (Transfer Goal 1, PT) goal met  -AB         Problem Specific Goal 1 (PT)    Problem Specific Goal 1 (PT) self-propel 50 ft in wheelchair with supervision  -AB      Time Frame (Problem Specific Goal 1, PT) long-term goal (LTG)  -AB      Progress/Outcome (Problem Specific Goal 1, PT) goal not met  -AB                User Key  (r) = Recorded By, (t) = Taken By, (c) = Cosigned By      Initials Name Provider Type Discipline    Cynthia Estrada PTA Physical Therapist Assistant PT                        PT Discharge Summary  Anticipated Discharge Disposition (PT): skilled nursing facility  Reason for Discharge: Discharge from facility  Outcomes Achieved: Refer to plan of care for updates on goals achieved  Discharge Destination: SNF      Cynthia Ayala PTA   7/2/2025

## 2025-07-02 NOTE — CASE MANAGEMENT/SOCIAL WORK
Continued Stay Note  [facility]     Patient Name: Sunny Lacey  MRN: [id number]  Today's Date: 7/2/2025    Admit Date: 6/25/2025    Plan: Referral to [facility] back program   Discharge Plan       Row Name 07/02/25 1032       Plan    Final Note Pt is being dcd to [facility] today. DC Summary/orders/meds faxed. Pt son will be transporting pt to [facility]. Call report number is [phone]                   Discharge Codes    No documentation.                 Expected Discharge Date and Time       Expected Discharge Date Expected Discharge Time    Jul 2, 2025               GEETA Delarosa

## 2025-07-02 NOTE — THERAPY DISCHARGE NOTE
Acute Care - Occupational Therapy Discharge Summary  [facility]     Patient Name: Sunny Lacey  : 1921  MRN: [id number]    Today's Date: 2025                 Admit Date: 2025        OT Recommendation and Plan    Visit Dx:    ICD-10-CM ICD-9-CM   1. Closed displaced fracture of pelvis, unspecified part of pelvis, initial encounter  S32.9XXA 808.8   2. Impaired mobility [Z74.09]  Z74.09 799.89                OT Rehab Goals       Row Name 25 1500             Transfer Goal 1 (OT)    Activity/Assistive Device (Transfer Goal 1, OT) commode, bedside without drop arms  -      [place] Level/Cues Needed (Transfer Goal 1, OT) minimum assist (75% or more patient effort)  -      Time Frame (Transfer Goal 1, OT) long term goal (LTG);10 days  -      Progress/Outcome (Transfer Goal 1, OT) goal not met  -         Toileting Goal 1 (OT)    Activity/Device (Toileting Goal 1, OT) toileting skills, all;commode, bedside without drop arms  -      [place] Level/Cues Needed (Toileting Goal 1, OT) minimum assist (75% or more patient effort)  -      Time Frame (Toileting Goal 1, OT) long term goal (LTG);10 days  -      Progress/Outcome (Toileting Goal 1, OT) goal not met  -         Problem Specific Goal 1 (OT)    Problem Specific Goal 1 (OT) Pt will independently implement one pain management technique to decrease pain and improve functional adl performance.  -      Time Frame (Problem Specific Goal 1, OT) long term goal (LTG);by discharge  -      Progress/Outcome (Problem Specific Goal 1, OT) goal not met  -                User Key  (r) = Recorded By, (t) = Taken By, (c) = Cosigned By      Initials Name Provider Type Discipline    Keisha Lopez, OTR/L, CSRS Occupational Therapist OT                     Outcome Measures       Row Name 25 1434 25 1010 25 1345       How much help from another person do you currently need...    Turning from your back to your  side while in flat bed without using bedrails? 3  -ALEXANDRA -- --    Moving from lying on back to sitting on the side of a flat bed without bedrails? 3  -ALEXANDRA -- --    Moving to and from a bed to a chair (including a wheelchair)? 3  -ALEXANDRA -- --    Standing up from a chair using your arms (e.g., wheelchair, bedside chair)? 2  -ALEXANDRA -- --    Climbing 3-5 steps with a railing? 2  -ALEXANDRA -- --    To walk in hospital room? 2  -ALEXANDRA -- --    AM-PAC 6 Clicks Score (PT) 15  -ALEXANDRA -- --       How much help from another is currently needed...    Putting on and taking off regular lower body clothing? -- 2  -AC 2  -AC (r) BH (t) AC (c)    Bathing (including washing, rinsing, and drying) -- 2  -AC 2  -AC (r) BH (t) AC (c)    Toileting (which includes using toilet bed pan or urinal) -- 2  -AC 2  -AC (r) BH (t) AC (c)    Putting on and taking off regular upper body clothing -- 3  -AC 3  -AC (r) BH (t) AC (c)    Taking care of personal grooming (such as brushing teeth) -- 3  -AC 3  -AC (r) BH (t) AC (c)    Eating meals -- 3  -AC 3  -AC (r) BH (t) AC (c)    AM-PAC 6 Clicks Score (OT) -- 15  -AC 15  -AC (r) BH (t)       Functional Assessment    Outcome Measure Options AM-PAC 6 Clicks Basic Mobility (PT)  -ALEXANDRA AM-PAC 6 Clicks Daily Activity (OT)  -AC AM-PAC 6 Clicks Daily Activity (OT)  -AC (r) BH (t) AC (c)              User Key  (r) = Recorded By, (t) = Taken By, (c) = Cosigned By      Initials Name Provider Type    Lux Plunkett, OTR/L, CNT Occupational Therapist    Bharathi Veronica, PTA Physical Therapist Assistant    BH Roland Bolaños, OT Student OT Student                    Timed Therapy Charges  Total Units: 3      Suggested Charges  Total Units: 3      Procedure Name Documented Minutes Units Code    HC OT SELF CARE/MGMT/TRAIN EA 15 MIN 40 3   02167 (CPT®)                 Documented Minutes  Total Minutes: 40      Therapy Provided Minutes    19735 - OT Self Care/Mgmt Minutes 40                        OT Discharge Summary  Anticipated  Discharge Disposition (OT): skilled nursing facility  Reason for Discharge: Discharge from facility  Outcomes Achieved: Refer to plan of care for updates on goals achieved  Discharge Destination: SNF      Keisha Quiros, OTR/L, CSRS  7/2/2025

## 2025-08-13 ENCOUNTER — OFFICE VISIT (OUTPATIENT)
Age: 89
End: 2025-08-13
Payer: MEDICARE

## 2025-08-13 VITALS — BODY MASS INDEX: 20.25 KG/M2 | HEIGHT: 66 IN | WEIGHT: 126 LBS

## 2025-08-13 DIAGNOSIS — S32.9XXA CLOSED NONDISPLACED FRACTURE OF PELVIS, UNSPECIFIED PART OF PELVIS, INITIAL ENCOUNTER (HCC): Primary | ICD-10-CM

## 2025-08-13 DIAGNOSIS — S32.591D CLOSED FRACTURE OF RIGHT INFERIOR PUBIC RAMUS, WITH ROUTINE HEALING, SUBSEQUENT ENCOUNTER: ICD-10-CM

## 2025-08-13 PROCEDURE — 1036F TOBACCO NON-USER: CPT | Performed by: ORTHOPAEDIC SURGERY

## 2025-08-13 PROCEDURE — 1123F ACP DISCUSS/DSCN MKR DOCD: CPT | Performed by: ORTHOPAEDIC SURGERY

## 2025-08-13 PROCEDURE — G8420 CALC BMI NORM PARAMETERS: HCPCS | Performed by: ORTHOPAEDIC SURGERY

## 2025-08-13 PROCEDURE — 1090F PRES/ABSN URINE INCON ASSESS: CPT | Performed by: ORTHOPAEDIC SURGERY

## 2025-08-13 PROCEDURE — 1159F MED LIST DOCD IN RCRD: CPT | Performed by: ORTHOPAEDIC SURGERY

## 2025-08-13 PROCEDURE — 1160F RVW MEDS BY RX/DR IN RCRD: CPT | Performed by: ORTHOPAEDIC SURGERY

## 2025-08-13 PROCEDURE — G8427 DOCREV CUR MEDS BY ELIG CLIN: HCPCS | Performed by: ORTHOPAEDIC SURGERY

## 2025-08-13 PROCEDURE — 99204 OFFICE O/P NEW MOD 45 MIN: CPT | Performed by: ORTHOPAEDIC SURGERY

## 2025-08-13 RX ORDER — FUROSEMIDE 20 MG/1
TABLET ORAL
COMMUNITY
Start: 2025-05-19

## 2025-08-13 RX ORDER — POTASSIUM CHLORIDE 750 MG/1
10 CAPSULE, EXTENDED RELEASE ORAL DAILY
COMMUNITY
Start: 2025-07-02

## 2025-08-13 RX ORDER — BRIMONIDINE TARTRATE AND TIMOLOL MALEATE 2; 5 MG/ML; MG/ML
SOLUTION OPHTHALMIC
COMMUNITY
Start: 2025-06-19

## 2025-08-13 RX ORDER — DULOXETIN HYDROCHLORIDE 30 MG/1
30 CAPSULE, DELAYED RELEASE ORAL DAILY
COMMUNITY

## 2025-08-13 RX ORDER — MIRTAZAPINE 30 MG/1
TABLET, FILM COATED ORAL
COMMUNITY
Start: 2025-05-19

## 2025-08-13 RX ORDER — HYDROCODONE BITARTRATE AND ACETAMINOPHEN 5; 325 MG/1; MG/1
TABLET ORAL
COMMUNITY
Start: 2025-05-19

## 2025-08-13 RX ORDER — AMLODIPINE BESYLATE 5 MG/1
5 TABLET ORAL DAILY
COMMUNITY
Start: 2025-07-02

## 2025-08-13 RX ORDER — GABAPENTIN 300 MG/1
300 CAPSULE ORAL NIGHTLY
COMMUNITY

## 2025-08-13 ASSESSMENT — ENCOUNTER SYMPTOMS
RESPIRATORY NEGATIVE: 1
GASTROINTESTINAL NEGATIVE: 1
ALLERGIC/IMMUNOLOGIC NEGATIVE: 1
EYES NEGATIVE: 1